# Patient Record
Sex: FEMALE | Race: BLACK OR AFRICAN AMERICAN | Employment: UNEMPLOYED
[De-identification: names, ages, dates, MRNs, and addresses within clinical notes are randomized per-mention and may not be internally consistent; named-entity substitution may affect disease eponyms.]

---

## 2017-01-03 DIAGNOSIS — I10 ESSENTIAL HYPERTENSION: ICD-10-CM

## 2017-01-03 RX ORDER — LOSARTAN POTASSIUM 25 MG/1
TABLET ORAL
Qty: 30 TABLET | Refills: 0 | Status: SHIPPED | OUTPATIENT
Start: 2017-01-03 | End: 2017-01-09

## 2017-01-20 DIAGNOSIS — R35.0 FREQUENT URINATION: ICD-10-CM

## 2017-01-25 ENCOUNTER — OFFICE VISIT (OUTPATIENT)
Dept: FAMILY MEDICINE CLINIC | Facility: CLINIC | Age: 57
End: 2017-01-25

## 2017-01-25 VITALS
DIASTOLIC BLOOD PRESSURE: 79 MMHG | SYSTOLIC BLOOD PRESSURE: 139 MMHG | HEART RATE: 66 BPM | HEIGHT: 62 IN | BODY MASS INDEX: 35.36 KG/M2 | WEIGHT: 192.13 LBS

## 2017-01-25 DIAGNOSIS — I10 ESSENTIAL HYPERTENSION: ICD-10-CM

## 2017-01-25 DIAGNOSIS — Z98.84 S/P GASTRIC BYPASS: ICD-10-CM

## 2017-01-25 DIAGNOSIS — E78.2 MIXED HYPERLIPIDEMIA: ICD-10-CM

## 2017-01-25 DIAGNOSIS — N18.30 CKD (CHRONIC KIDNEY DISEASE) STAGE 3, GFR 30-59 ML/MIN (HCC): ICD-10-CM

## 2017-01-25 DIAGNOSIS — E11.9 TYPE 2 DIABETES MELLITUS WITHOUT COMPLICATION, UNSPECIFIED LONG TERM INSULIN USE STATUS: Primary | ICD-10-CM

## 2017-01-25 DIAGNOSIS — E66.09 NON MORBID OBESITY DUE TO EXCESS CALORIES: ICD-10-CM

## 2017-01-25 DIAGNOSIS — M17.0 PRIMARY OSTEOARTHRITIS OF BOTH KNEES: ICD-10-CM

## 2017-01-25 LAB — HBA1C MFR BLD: 5.6 %

## 2017-01-25 PROCEDURE — 99214 OFFICE O/P EST MOD 30 MIN: CPT | Performed by: FAMILY MEDICINE

## 2017-01-25 PROCEDURE — 83036 HEMOGLOBIN GLYCOSYLATED A1C: CPT | Performed by: FAMILY MEDICINE

## 2017-01-25 RX ORDER — ACETAMINOPHEN 500 MG
500 TABLET ORAL EVERY 6 HOURS PRN
COMMUNITY
End: 2018-05-07 | Stop reason: ALTCHOICE

## 2017-01-25 RX ORDER — LANOLIN ALCOHOL/MO/W.PET/CERES
325 CREAM (GRAM) TOPICAL
COMMUNITY
End: 2018-05-07 | Stop reason: SDUPTHER

## 2017-01-25 ASSESSMENT — ENCOUNTER SYMPTOMS
ABDOMINAL PAIN: 0
CHEST TIGHTNESS: 0
CONSTIPATION: 0
SORE THROAT: 0
TROUBLE SWALLOWING: 0
DIARRHEA: 0
RHINORRHEA: 0
BACK PAIN: 1
COUGH: 0
SHORTNESS OF BREATH: 0
NAUSEA: 0

## 2017-02-17 DIAGNOSIS — I10 ESSENTIAL HYPERTENSION: ICD-10-CM

## 2017-02-21 RX ORDER — AMLODIPINE BESYLATE 10 MG/1
TABLET ORAL
Qty: 30 TABLET | Refills: 0 | Status: SHIPPED | OUTPATIENT
Start: 2017-02-21 | End: 2017-03-13 | Stop reason: SDUPTHER

## 2017-03-10 ENCOUNTER — HOSPITAL ENCOUNTER (OUTPATIENT)
Age: 57
Discharge: HOME | End: 2017-03-10
Payer: MEDICARE

## 2017-03-10 DIAGNOSIS — N18.4 CKD (CHRONIC KIDNEY DISEASE) STAGE 4, GFR 15-29 ML/MIN (HCC): ICD-10-CM

## 2017-03-10 DIAGNOSIS — I12.9 BENIGN HYPERTENSION WITH CKD (CHRONIC KIDNEY DISEASE) STAGE IV (HCC): ICD-10-CM

## 2017-03-10 DIAGNOSIS — E13.22 SECONDARY DIABETES MELLITUS WITH STAGE 4 CHRONIC KIDNEY DISEASE (GFR 15-29) (HCC): ICD-10-CM

## 2017-03-10 DIAGNOSIS — N20.0 NEPHROLITHIASIS: ICD-10-CM

## 2017-03-10 DIAGNOSIS — N18.4 SECONDARY DIABETES MELLITUS WITH STAGE 4 CHRONIC KIDNEY DISEASE (GFR 15-29) (HCC): ICD-10-CM

## 2017-03-10 DIAGNOSIS — N18.4 BENIGN HYPERTENSION WITH CKD (CHRONIC KIDNEY DISEASE) STAGE IV (HCC): ICD-10-CM

## 2017-03-10 DIAGNOSIS — N13.30 HYDRONEPHROSIS OF LEFT KIDNEY: ICD-10-CM

## 2017-03-10 DIAGNOSIS — N26.1 RENAL ATROPHY, RIGHT: ICD-10-CM

## 2017-03-10 LAB
ABSOLUTE EOS #: 0.2 K/UL (ref 0–0.4)
ABSOLUTE LYMPH #: 1.8 K/UL (ref 1–4.8)
ABSOLUTE MONO #: 0.5 K/UL (ref 0.1–1.3)
ANION GAP SERPL CALCULATED.3IONS-SCNC: 16 MMOL/L (ref 9–17)
BASOPHILS # BLD: 1 % (ref 0–2)
BASOPHILS ABSOLUTE: 0 K/UL (ref 0–0.2)
BUN BLDV-MCNC: 33 MG/DL (ref 6–20)
BUN/CREAT BLD: ABNORMAL (ref 9–20)
CALCIUM SERPL-MCNC: 9.2 MG/DL (ref 8.6–10.4)
CHLORIDE BLD-SCNC: 101 MMOL/L (ref 98–107)
CO2: 28 MMOL/L (ref 20–31)
CREAT SERPL-MCNC: 1.97 MG/DL (ref 0.5–0.9)
DIFFERENTIAL TYPE: ABNORMAL
EOSINOPHILS RELATIVE PERCENT: 4 % (ref 0–4)
GFR AFRICAN AMERICAN: 32 ML/MIN
GFR NON-AFRICAN AMERICAN: 26 ML/MIN
GFR SERPL CREATININE-BSD FRML MDRD: ABNORMAL ML/MIN/{1.73_M2}
GFR SERPL CREATININE-BSD FRML MDRD: ABNORMAL ML/MIN/{1.73_M2}
GLUCOSE BLD-MCNC: 103 MG/DL (ref 70–99)
HCT VFR BLD CALC: 36.3 % (ref 36–46)
HEMOGLOBIN: 11.9 G/DL (ref 12–16)
LYMPHOCYTES # BLD: 27 % (ref 24–44)
MAGNESIUM: 2.5 MG/DL (ref 1.6–2.6)
MCH RBC QN AUTO: 28.9 PG (ref 26–34)
MCHC RBC AUTO-ENTMCNC: 32.9 G/DL (ref 31–37)
MCV RBC AUTO: 87.8 FL (ref 80–100)
MONOCYTES # BLD: 7 % (ref 1–7)
PDW BLD-RTO: 14.8 % (ref 11.5–14.9)
PHOSPHORUS: 3.3 MG/DL (ref 2.6–4.5)
PLATELET # BLD: 353 K/UL (ref 150–450)
PLATELET ESTIMATE: ABNORMAL
PMV BLD AUTO: 7.8 FL (ref 6–12)
POTASSIUM SERPL-SCNC: 3.4 MMOL/L (ref 3.7–5.3)
RBC # BLD: 4.13 M/UL (ref 4–5.2)
RBC # BLD: ABNORMAL 10*6/UL
SEG NEUTROPHILS: 61 % (ref 36–66)
SEGMENTED NEUTROPHILS ABSOLUTE COUNT: 4.2 K/UL (ref 1.3–9.1)
SODIUM BLD-SCNC: 145 MMOL/L (ref 135–144)
VITAMIN D 25-HYDROXY: 28.7 NG/ML (ref 30–100)
WBC # BLD: 6.7 K/UL (ref 3.5–11)
WBC # BLD: ABNORMAL 10*3/UL

## 2017-03-10 PROCEDURE — 84100 ASSAY OF PHOSPHORUS: CPT

## 2017-03-10 PROCEDURE — 36415 COLL VENOUS BLD VENIPUNCTURE: CPT

## 2017-03-10 PROCEDURE — 80048 BASIC METABOLIC PNL TOTAL CA: CPT

## 2017-03-10 PROCEDURE — 82306 VITAMIN D 25 HYDROXY: CPT

## 2017-03-10 PROCEDURE — 85025 COMPLETE CBC W/AUTO DIFF WBC: CPT

## 2017-03-10 PROCEDURE — 83735 ASSAY OF MAGNESIUM: CPT

## 2017-03-22 DIAGNOSIS — I10 ESSENTIAL HYPERTENSION: ICD-10-CM

## 2017-03-23 RX ORDER — AMLODIPINE BESYLATE 10 MG/1
TABLET ORAL
Qty: 30 TABLET | Refills: 2 | Status: SHIPPED | OUTPATIENT
Start: 2017-03-23 | End: 2017-07-27 | Stop reason: SDUPTHER

## 2017-04-26 ENCOUNTER — TELEPHONE (OUTPATIENT)
Dept: FAMILY MEDICINE CLINIC | Age: 57
End: 2017-04-26

## 2017-04-27 ENCOUNTER — APPOINTMENT (OUTPATIENT)
Dept: GENERAL RADIOLOGY | Age: 57
End: 2017-04-27
Payer: MEDICARE

## 2017-04-27 ENCOUNTER — HOSPITAL ENCOUNTER (EMERGENCY)
Age: 57
Discharge: HOME | End: 2017-04-27
Attending: EMERGENCY MEDICINE
Payer: MEDICARE

## 2017-04-27 VITALS
HEART RATE: 61 BPM | DIASTOLIC BLOOD PRESSURE: 81 MMHG | HEIGHT: 63 IN | BODY MASS INDEX: 33.84 KG/M2 | RESPIRATION RATE: 16 BRPM | SYSTOLIC BLOOD PRESSURE: 149 MMHG | OXYGEN SATURATION: 99 % | TEMPERATURE: 98.3 F | WEIGHT: 191 LBS

## 2017-04-27 DIAGNOSIS — S92.912A CLOSED NONDISPLACED FRACTURE OF PHALANX OF TOE OF LEFT FOOT, UNSPECIFIED TOE, INITIAL ENCOUNTER: Primary | ICD-10-CM

## 2017-04-27 PROCEDURE — 6370000000 HC RX 637 (ALT 250 FOR IP): Performed by: EMERGENCY MEDICINE

## 2017-04-27 PROCEDURE — 99283 EMERGENCY DEPT VISIT LOW MDM: CPT

## 2017-04-27 PROCEDURE — 73630 X-RAY EXAM OF FOOT: CPT

## 2017-04-27 RX ORDER — ACETAMINOPHEN 500 MG
1000 TABLET ORAL ONCE
Status: COMPLETED | OUTPATIENT
Start: 2017-04-27 | End: 2017-04-27

## 2017-04-27 RX ORDER — NAPROXEN 250 MG/1
500 TABLET ORAL ONCE
Status: DISCONTINUED | OUTPATIENT
Start: 2017-04-27 | End: 2017-04-27

## 2017-04-27 RX ORDER — HYDROCODONE BITARTRATE AND ACETAMINOPHEN 5; 325 MG/1; MG/1
1 TABLET ORAL EVERY 6 HOURS PRN
Qty: 10 TABLET | Refills: 0 | Status: SHIPPED | OUTPATIENT
Start: 2017-04-27 | End: 2017-05-04

## 2017-04-27 RX ADMIN — ACETAMINOPHEN 1000 MG: 500 TABLET, FILM COATED ORAL at 22:41

## 2017-04-27 ASSESSMENT — ENCOUNTER SYMPTOMS
VOMITING: 0
SHORTNESS OF BREATH: 0
NAUSEA: 0
RHINORRHEA: 0
TROUBLE SWALLOWING: 0
EYE PAIN: 0
ABDOMINAL PAIN: 0
EYE DISCHARGE: 0
PHOTOPHOBIA: 0
DIARRHEA: 0
COUGH: 0
SORE THROAT: 0

## 2017-04-27 ASSESSMENT — PAIN DESCRIPTION - LOCATION: LOCATION: FOOT

## 2017-04-27 ASSESSMENT — PAIN DESCRIPTION - FREQUENCY: FREQUENCY: CONTINUOUS

## 2017-04-27 ASSESSMENT — PAIN DESCRIPTION - ORIENTATION: ORIENTATION: LEFT

## 2017-04-27 ASSESSMENT — PAIN DESCRIPTION - PAIN TYPE: TYPE: ACUTE PAIN

## 2017-04-27 ASSESSMENT — PAIN DESCRIPTION - PROGRESSION: CLINICAL_PROGRESSION: NOT CHANGED

## 2017-04-27 ASSESSMENT — PAIN DESCRIPTION - ONSET: ONSET: SUDDEN

## 2017-04-27 ASSESSMENT — PAIN SCALES - GENERAL: PAINLEVEL_OUTOF10: 6

## 2017-04-27 ASSESSMENT — PAIN DESCRIPTION - DESCRIPTORS: DESCRIPTORS: ACHING;THROBBING

## 2017-05-11 ENCOUNTER — OFFICE VISIT (OUTPATIENT)
Dept: FAMILY MEDICINE CLINIC | Age: 57
End: 2017-05-11
Payer: MEDICARE

## 2017-05-11 VITALS
WEIGHT: 193.6 LBS | HEART RATE: 64 BPM | HEIGHT: 63 IN | SYSTOLIC BLOOD PRESSURE: 130 MMHG | DIASTOLIC BLOOD PRESSURE: 80 MMHG | BODY MASS INDEX: 34.3 KG/M2

## 2017-05-11 DIAGNOSIS — S92.502A FRACTURE OF FOURTH TOE, LEFT, CLOSED, INITIAL ENCOUNTER: Primary | ICD-10-CM

## 2017-05-11 DIAGNOSIS — Z98.84 S/P GASTRIC BYPASS: ICD-10-CM

## 2017-05-11 DIAGNOSIS — E11.9 DIABETES MELLITUS TYPE 2, NONINSULIN DEPENDENT (HCC): ICD-10-CM

## 2017-05-11 DIAGNOSIS — R26.81 UNSTABLE GAIT: ICD-10-CM

## 2017-05-11 DIAGNOSIS — I10 ESSENTIAL HYPERTENSION: ICD-10-CM

## 2017-05-11 DIAGNOSIS — E66.09 NON MORBID OBESITY DUE TO EXCESS CALORIES: ICD-10-CM

## 2017-05-11 DIAGNOSIS — E78.2 MIXED HYPERLIPIDEMIA: ICD-10-CM

## 2017-05-11 DIAGNOSIS — N18.30 CKD (CHRONIC KIDNEY DISEASE) STAGE 3, GFR 30-59 ML/MIN (HCC): ICD-10-CM

## 2017-05-11 PROCEDURE — 99213 OFFICE O/P EST LOW 20 MIN: CPT | Performed by: FAMILY MEDICINE

## 2017-05-11 RX ORDER — MEDICAL SUPPLY, MISCELLANEOUS
EACH MISCELLANEOUS
Qty: 1 EACH | Refills: 0 | Status: SHIPPED | OUTPATIENT
Start: 2017-05-11 | End: 2018-04-16 | Stop reason: SDUPTHER

## 2017-05-11 ASSESSMENT — ENCOUNTER SYMPTOMS
RHINORRHEA: 0
DIARRHEA: 0
CHEST TIGHTNESS: 0
COUGH: 0
TROUBLE SWALLOWING: 0
SORE THROAT: 0
NAUSEA: 0
SHORTNESS OF BREATH: 0
BACK PAIN: 0
CONSTIPATION: 0
ABDOMINAL PAIN: 0

## 2017-05-11 ASSESSMENT — PATIENT HEALTH QUESTIONNAIRE - PHQ9
SUM OF ALL RESPONSES TO PHQ9 QUESTIONS 1 & 2: 0
SUM OF ALL RESPONSES TO PHQ QUESTIONS 1-9: 0
1. LITTLE INTEREST OR PLEASURE IN DOING THINGS: 0
2. FEELING DOWN, DEPRESSED OR HOPELESS: 0

## 2017-06-05 ENCOUNTER — HOSPITAL ENCOUNTER (OUTPATIENT)
Age: 57
Discharge: HOME | End: 2017-06-05
Payer: MEDICARE

## 2017-06-05 DIAGNOSIS — E55.9 VITAMIN D DEFICIENCY: ICD-10-CM

## 2017-06-05 DIAGNOSIS — N18.30 CKD (CHRONIC KIDNEY DISEASE) STAGE 3, GFR 30-59 ML/MIN (HCC): ICD-10-CM

## 2017-06-05 DIAGNOSIS — E13.22 SECONDARY DIABETES MELLITUS WITH STAGE 3 CHRONIC KIDNEY DISEASE (GFR 30-59) (HCC): ICD-10-CM

## 2017-06-05 DIAGNOSIS — E87.6 HYPOKALEMIA: ICD-10-CM

## 2017-06-05 DIAGNOSIS — N18.30 SECONDARY DIABETES MELLITUS WITH STAGE 3 CHRONIC KIDNEY DISEASE (GFR 30-59) (HCC): ICD-10-CM

## 2017-06-05 DIAGNOSIS — I12.9 BENIGN HYPERTENSION WITH CKD (CHRONIC KIDNEY DISEASE) STAGE III (HCC): ICD-10-CM

## 2017-06-05 DIAGNOSIS — N18.30 BENIGN HYPERTENSION WITH CKD (CHRONIC KIDNEY DISEASE) STAGE III (HCC): ICD-10-CM

## 2017-06-05 LAB
ABSOLUTE EOS #: 0.2 K/UL (ref 0–0.4)
ABSOLUTE LYMPH #: 2 K/UL (ref 1–4.8)
ABSOLUTE MONO #: 0.5 K/UL (ref 0.1–1.3)
ANION GAP SERPL CALCULATED.3IONS-SCNC: 12 MMOL/L (ref 9–17)
BASOPHILS # BLD: 1 %
BASOPHILS ABSOLUTE: 0 K/UL (ref 0–0.2)
BUN BLDV-MCNC: 39 MG/DL (ref 6–20)
BUN/CREAT BLD: ABNORMAL (ref 9–20)
CALCIUM SERPL-MCNC: 9.3 MG/DL (ref 8.6–10.4)
CHLORIDE BLD-SCNC: 101 MMOL/L (ref 98–107)
CO2: 29 MMOL/L (ref 20–31)
CREAT SERPL-MCNC: 1.82 MG/DL (ref 0.5–0.9)
CREATININE URINE: 67.3 MG/DL (ref 28–217)
DIFFERENTIAL TYPE: ABNORMAL
EOSINOPHILS RELATIVE PERCENT: 3 %
GFR AFRICAN AMERICAN: 35 ML/MIN
GFR NON-AFRICAN AMERICAN: 29 ML/MIN
GFR SERPL CREATININE-BSD FRML MDRD: ABNORMAL ML/MIN/{1.73_M2}
GFR SERPL CREATININE-BSD FRML MDRD: ABNORMAL ML/MIN/{1.73_M2}
GLUCOSE BLD-MCNC: 92 MG/DL (ref 70–99)
HCT VFR BLD CALC: 36.5 % (ref 36–46)
HEMOGLOBIN: 11.8 G/DL (ref 12–16)
LYMPHOCYTES # BLD: 28 %
MAGNESIUM: 2.1 MG/DL (ref 1.6–2.6)
MCH RBC QN AUTO: 28.5 PG (ref 26–34)
MCHC RBC AUTO-ENTMCNC: 32.4 G/DL (ref 31–37)
MCV RBC AUTO: 87.9 FL (ref 80–100)
MONOCYTES # BLD: 7 %
PDW BLD-RTO: 15.2 % (ref 11.5–14.9)
PHOSPHORUS: 3.5 MG/DL (ref 2.6–4.5)
PLATELET # BLD: 318 K/UL (ref 150–450)
PLATELET ESTIMATE: ABNORMAL
PMV BLD AUTO: 7.9 FL (ref 6–12)
POTASSIUM SERPL-SCNC: 3.3 MMOL/L (ref 3.7–5.3)
RBC # BLD: 4.15 M/UL (ref 4–5.2)
RBC # BLD: ABNORMAL 10*6/UL
SEG NEUTROPHILS: 61 %
SEGMENTED NEUTROPHILS ABSOLUTE COUNT: 4.2 K/UL (ref 1.3–9.1)
SODIUM BLD-SCNC: 142 MMOL/L (ref 135–144)
TOTAL PROTEIN, URINE: 21 MG/DL
URINE TOTAL PROTEIN CREATININE RATIO: 0.31 (ref 0–0.2)
VITAMIN D 25-HYDROXY: 45.7 NG/ML (ref 30–100)
WBC # BLD: 7 K/UL (ref 3.5–11)
WBC # BLD: ABNORMAL 10*3/UL

## 2017-06-05 PROCEDURE — 36415 COLL VENOUS BLD VENIPUNCTURE: CPT

## 2017-06-05 PROCEDURE — 82306 VITAMIN D 25 HYDROXY: CPT

## 2017-06-05 PROCEDURE — 84156 ASSAY OF PROTEIN URINE: CPT

## 2017-06-05 PROCEDURE — 84100 ASSAY OF PHOSPHORUS: CPT

## 2017-06-05 PROCEDURE — 82570 ASSAY OF URINE CREATININE: CPT

## 2017-06-05 PROCEDURE — 85025 COMPLETE CBC W/AUTO DIFF WBC: CPT

## 2017-06-05 PROCEDURE — 80048 BASIC METABOLIC PNL TOTAL CA: CPT

## 2017-06-05 PROCEDURE — 83735 ASSAY OF MAGNESIUM: CPT

## 2017-09-14 ENCOUNTER — OFFICE VISIT (OUTPATIENT)
Dept: FAMILY MEDICINE CLINIC | Age: 57
End: 2017-09-14
Payer: MEDICARE

## 2017-09-14 VITALS
BODY MASS INDEX: 36.4 KG/M2 | HEART RATE: 52 BPM | WEIGHT: 197.8 LBS | SYSTOLIC BLOOD PRESSURE: 156 MMHG | HEIGHT: 62 IN | DIASTOLIC BLOOD PRESSURE: 86 MMHG

## 2017-09-14 DIAGNOSIS — E78.2 MIXED HYPERLIPIDEMIA: ICD-10-CM

## 2017-09-14 DIAGNOSIS — E11.9 DIABETES MELLITUS TYPE 2, NONINSULIN DEPENDENT (HCC): ICD-10-CM

## 2017-09-14 DIAGNOSIS — E66.09 NON MORBID OBESITY DUE TO EXCESS CALORIES: ICD-10-CM

## 2017-09-14 DIAGNOSIS — N18.30 CKD (CHRONIC KIDNEY DISEASE) STAGE 3, GFR 30-59 ML/MIN (HCC): ICD-10-CM

## 2017-09-14 DIAGNOSIS — T75.3XXA MOTION SICKNESS, INITIAL ENCOUNTER: ICD-10-CM

## 2017-09-14 DIAGNOSIS — K59.01 SLOW TRANSIT CONSTIPATION: ICD-10-CM

## 2017-09-14 DIAGNOSIS — E79.0 HYPERURICEMIA: ICD-10-CM

## 2017-09-14 DIAGNOSIS — I10 ESSENTIAL HYPERTENSION: Primary | ICD-10-CM

## 2017-09-14 DIAGNOSIS — Z98.84 S/P GASTRIC BYPASS: ICD-10-CM

## 2017-09-14 PROBLEM — N28.9 RENAL INSUFFICIENCY: Status: ACTIVE | Noted: 2017-09-14

## 2017-09-14 PROCEDURE — 99214 OFFICE O/P EST MOD 30 MIN: CPT | Performed by: FAMILY MEDICINE

## 2017-09-14 RX ORDER — LANOLIN ALCOHOL/MO/W.PET/CERES
CREAM (GRAM) TOPICAL
COMMUNITY
Start: 2017-08-23 | End: 2017-09-14 | Stop reason: SDUPTHER

## 2017-09-14 RX ORDER — FERROUS SULFATE 325(65) MG
TABLET ORAL
COMMUNITY
Start: 2017-07-28 | End: 2017-09-14 | Stop reason: SDUPTHER

## 2017-09-14 RX ORDER — MECLIZINE HYDROCHLORIDE CHEWABLE TABLETS 25 MG/1
25 TABLET, CHEWABLE ORAL 2 TIMES DAILY PRN
Qty: 30 TABLET | Refills: 0 | Status: SHIPPED | OUTPATIENT
Start: 2017-09-14 | End: 2018-05-07 | Stop reason: ALTCHOICE

## 2017-09-14 RX ORDER — DOCUSATE SODIUM 100 MG/1
100 CAPSULE, LIQUID FILLED ORAL 2 TIMES DAILY
Qty: 30 CAPSULE | Refills: 0 | Status: SHIPPED | OUTPATIENT
Start: 2017-09-14 | End: 2018-05-07 | Stop reason: ALTCHOICE

## 2017-09-14 RX ORDER — ALLOPURINOL 100 MG/1
100 TABLET ORAL DAILY
Qty: 30 TABLET | Refills: 3 | Status: SHIPPED | OUTPATIENT
Start: 2017-09-14 | End: 2018-01-17 | Stop reason: SDUPTHER

## 2017-09-21 ASSESSMENT — ENCOUNTER SYMPTOMS
RHINORRHEA: 0
SHORTNESS OF BREATH: 0
BACK PAIN: 1
SORE THROAT: 0
CONSTIPATION: 0
ABDOMINAL PAIN: 0
CHEST TIGHTNESS: 0
DIARRHEA: 0
COUGH: 0
TROUBLE SWALLOWING: 0
NAUSEA: 0

## 2018-01-17 DIAGNOSIS — E79.0 HYPERURICEMIA: ICD-10-CM

## 2018-01-17 RX ORDER — ALLOPURINOL 100 MG/1
TABLET ORAL
Qty: 30 TABLET | Refills: 0 | Status: SHIPPED | OUTPATIENT
Start: 2018-01-17 | End: 2018-02-09 | Stop reason: SDUPTHER

## 2018-02-09 DIAGNOSIS — E79.0 HYPERURICEMIA: ICD-10-CM

## 2018-02-09 RX ORDER — ALLOPURINOL 100 MG/1
TABLET ORAL
Qty: 30 TABLET | Refills: 1 | Status: SHIPPED | OUTPATIENT
Start: 2018-02-09 | End: 2018-02-26 | Stop reason: SDUPTHER

## 2018-02-09 NOTE — TELEPHONE ENCOUNTER
Health Maintenance   Topic Date Due    Colon cancer screen colonoscopy  02/03/2011    Smoker: low dose lung CT screening  02/03/2016    Diabetic retinal exam  03/21/2016    Diabetic foot exam  07/14/2016    Cervical cancer screen  02/18/2017    Lipid screen  08/01/2017    Flu vaccine (1) 09/01/2017    Breast cancer screen  11/09/2017    A1C test (Diabetic or Prediabetic)  01/25/2018    DTaP/Tdap/Td vaccine (1 - Tdap) 05/11/2018 (Originally 2/3/1980)    Pneumococcal highest risk (1 of 3 - PCV13) 05/11/2018 (Originally 2/3/1980)    Potassium monitoring  06/05/2018    Creatinine monitoring  06/05/2018    Hepatitis C screen  Completed    HIV screen  Completed             (applicable per patient's age: Cancer Screenings, Depression Screening, Fall Risk Screening, Immunizations)    Hemoglobin A1C (%)   Date Value   01/25/2017 5.6   05/17/2016 5.6   04/29/2015 5.6     Microalb/Crt. Ratio (mcg/mg creat)   Date Value   02/09/2013 664     LDL Cholesterol (mg/dL)   Date Value   02/09/2013 202 (H)     LDL Calculated (mg/dL)   Date Value   08/01/2016 116     AST (U/L)   Date Value   05/04/2016 17     ALT (U/L)   Date Value   05/04/2016 8     BUN (mg/dL)   Date Value   06/05/2017 39 (H)      (goal A1C is < 7)   (goal LDL is <100) need 30-50% reduction from baseline     BP Readings from Last 3 Encounters:   09/14/17 (!) 156/86   05/11/17 130/80   04/27/17 (!) 149/81    (goal /80)      All Future Testing planned in [facility]:  Lab Frequency Next Occurrence   Potassium Once 58/52/4478   Basic Metabolic Panel Once 29/10/0192   CBC Auto Differential Once 09/12/2017   Magnesium Once 09/12/2017   Phosphorus Once 09/12/2017       Next Visit Date:  No future appointments.          Patient Active Problem List:     Hypertension     Diabetes mellitus type 2, noninsulin dependent     Hyperlipidemia     OA (osteoarthritis) of knee,bilateral     Nephrolithiasis     Microalbuminuria     Smoker Cessation     Obesity, morbid

## 2018-03-21 ENCOUNTER — APPOINTMENT (OUTPATIENT)
Dept: CT IMAGING | Age: 58
End: 2018-03-21
Payer: MEDICARE

## 2018-03-21 ENCOUNTER — APPOINTMENT (OUTPATIENT)
Dept: GENERAL RADIOLOGY | Age: 58
End: 2018-03-21
Payer: MEDICARE

## 2018-03-21 ENCOUNTER — APPOINTMENT (OUTPATIENT)
Dept: MRI IMAGING | Age: 58
End: 2018-03-21
Payer: MEDICARE

## 2018-03-21 ENCOUNTER — HOSPITAL ENCOUNTER (OUTPATIENT)
Age: 58
Setting detail: OBSERVATION
Discharge: ANOTHER HOSPITAL | End: 2018-03-22
Attending: EMERGENCY MEDICINE | Admitting: INTERNAL MEDICINE
Payer: MEDICARE

## 2018-03-21 DIAGNOSIS — R07.9 CHEST PAIN, UNSPECIFIED TYPE: Primary | ICD-10-CM

## 2018-03-21 LAB
ABSOLUTE EOS #: 0.2 K/UL (ref 0–0.4)
ABSOLUTE IMMATURE GRANULOCYTE: ABNORMAL K/UL (ref 0–0.3)
ABSOLUTE LYMPH #: 1.5 K/UL (ref 1–4.8)
ABSOLUTE MONO #: 0.5 K/UL (ref 0.1–1.3)
ANION GAP SERPL CALCULATED.3IONS-SCNC: 9 MMOL/L (ref 9–17)
BASOPHILS # BLD: 1 % (ref 0–2)
BASOPHILS ABSOLUTE: 0.1 K/UL (ref 0–0.2)
BNP INTERPRETATION: ABNORMAL
BUN BLDV-MCNC: 33 MG/DL (ref 6–20)
BUN/CREAT BLD: ABNORMAL (ref 9–20)
CALCIUM SERPL-MCNC: 8.5 MG/DL (ref 8.6–10.4)
CHLORIDE BLD-SCNC: 104 MMOL/L (ref 98–107)
CO2: 31 MMOL/L (ref 20–31)
CREAT SERPL-MCNC: 1.81 MG/DL (ref 0.5–0.9)
DIFFERENTIAL TYPE: ABNORMAL
EKG ATRIAL RATE: 47 BPM
EKG P AXIS: 53 DEGREES
EKG P-R INTERVAL: 142 MS
EKG Q-T INTERVAL: 486 MS
EKG QRS DURATION: 86 MS
EKG QTC CALCULATION (BAZETT): 430 MS
EKG R AXIS: -7 DEGREES
EKG T AXIS: -55 DEGREES
EKG VENTRICULAR RATE: 47 BPM
EOSINOPHILS RELATIVE PERCENT: 4 % (ref 0–4)
GFR AFRICAN AMERICAN: 35 ML/MIN
GFR NON-AFRICAN AMERICAN: 29 ML/MIN
GFR SERPL CREATININE-BSD FRML MDRD: ABNORMAL ML/MIN/{1.73_M2}
GFR SERPL CREATININE-BSD FRML MDRD: ABNORMAL ML/MIN/{1.73_M2}
GLUCOSE BLD-MCNC: 121 MG/DL (ref 70–99)
HCT VFR BLD CALC: 32.4 % (ref 36–46)
HEMOGLOBIN: 10.7 G/DL (ref 12–16)
IMMATURE GRANULOCYTES: ABNORMAL %
LACTIC ACID, WHOLE BLOOD: NORMAL MMOL/L (ref 0.7–2.1)
LACTIC ACID: 1.8 MMOL/L (ref 0.5–2.2)
LYMPHOCYTES # BLD: 23 % (ref 24–44)
MCH RBC QN AUTO: 30.1 PG (ref 26–34)
MCHC RBC AUTO-ENTMCNC: 33.1 G/DL (ref 31–37)
MCV RBC AUTO: 90.8 FL (ref 80–100)
MONOCYTES # BLD: 7 % (ref 1–7)
NRBC AUTOMATED: ABNORMAL PER 100 WBC
PDW BLD-RTO: 16.3 % (ref 11.5–14.9)
PLATELET # BLD: 275 K/UL (ref 150–450)
PLATELET ESTIMATE: ABNORMAL
PMV BLD AUTO: 8.5 FL (ref 6–12)
POTASSIUM SERPL-SCNC: 3.7 MMOL/L (ref 3.7–5.3)
PRO-BNP: 3041 PG/ML
RBC # BLD: 3.57 M/UL (ref 4–5.2)
RBC # BLD: ABNORMAL 10*6/UL
SEG NEUTROPHILS: 65 % (ref 36–66)
SEGMENTED NEUTROPHILS ABSOLUTE COUNT: 4.3 K/UL (ref 1.3–9.1)
SODIUM BLD-SCNC: 144 MMOL/L (ref 135–144)
TROPONIN INTERP: NORMAL
TROPONIN T: <0.03 NG/ML
WBC # BLD: 6.6 K/UL (ref 3.5–11)
WBC # BLD: ABNORMAL 10*3/UL

## 2018-03-21 PROCEDURE — 2580000003 HC RX 258: Performed by: EMERGENCY MEDICINE

## 2018-03-21 PROCEDURE — G0378 HOSPITAL OBSERVATION PER HR: HCPCS

## 2018-03-21 PROCEDURE — 6360000002 HC RX W HCPCS: Performed by: STUDENT IN AN ORGANIZED HEALTH CARE EDUCATION/TRAINING PROGRAM

## 2018-03-21 PROCEDURE — 80048 BASIC METABOLIC PNL TOTAL CA: CPT

## 2018-03-21 PROCEDURE — 6360000002 HC RX W HCPCS: Performed by: EMERGENCY MEDICINE

## 2018-03-21 PROCEDURE — 83605 ASSAY OF LACTIC ACID: CPT

## 2018-03-21 PROCEDURE — 6360000002 HC RX W HCPCS: Performed by: NURSE PRACTITIONER

## 2018-03-21 PROCEDURE — 6370000000 HC RX 637 (ALT 250 FOR IP): Performed by: EMERGENCY MEDICINE

## 2018-03-21 PROCEDURE — S0028 INJECTION, FAMOTIDINE, 20 MG: HCPCS | Performed by: STUDENT IN AN ORGANIZED HEALTH CARE EDUCATION/TRAINING PROGRAM

## 2018-03-21 PROCEDURE — 71250 CT THORAX DX C-: CPT

## 2018-03-21 PROCEDURE — 71045 X-RAY EXAM CHEST 1 VIEW: CPT

## 2018-03-21 PROCEDURE — 96372 THER/PROPH/DIAG INJ SC/IM: CPT

## 2018-03-21 PROCEDURE — 94760 N-INVAS EAR/PLS OXIMETRY 1: CPT

## 2018-03-21 PROCEDURE — 36415 COLL VENOUS BLD VENIPUNCTURE: CPT

## 2018-03-21 PROCEDURE — 84484 ASSAY OF TROPONIN QUANT: CPT

## 2018-03-21 PROCEDURE — 94664 DEMO&/EVAL PT USE INHALER: CPT

## 2018-03-21 PROCEDURE — 99285 EMERGENCY DEPT VISIT HI MDM: CPT

## 2018-03-21 PROCEDURE — 96376 TX/PRO/DX INJ SAME DRUG ADON: CPT

## 2018-03-21 PROCEDURE — 93005 ELECTROCARDIOGRAM TRACING: CPT

## 2018-03-21 PROCEDURE — 96375 TX/PRO/DX INJ NEW DRUG ADDON: CPT

## 2018-03-21 PROCEDURE — 85025 COMPLETE CBC W/AUTO DIFF WBC: CPT

## 2018-03-21 PROCEDURE — 96374 THER/PROPH/DIAG INJ IV PUSH: CPT

## 2018-03-21 PROCEDURE — 2500000003 HC RX 250 WO HCPCS: Performed by: STUDENT IN AN ORGANIZED HEALTH CARE EDUCATION/TRAINING PROGRAM

## 2018-03-21 PROCEDURE — 2580000003 HC RX 258: Performed by: STUDENT IN AN ORGANIZED HEALTH CARE EDUCATION/TRAINING PROGRAM

## 2018-03-21 PROCEDURE — 83880 ASSAY OF NATRIURETIC PEPTIDE: CPT

## 2018-03-21 RX ORDER — ACETAMINOPHEN 325 MG/1
650 TABLET ORAL EVERY 4 HOURS PRN
Status: DISCONTINUED | OUTPATIENT
Start: 2018-03-21 | End: 2018-03-22 | Stop reason: HOSPADM

## 2018-03-21 RX ORDER — POTASSIUM CHLORIDE 20 MEQ/1
40 TABLET, EXTENDED RELEASE ORAL PRN
Status: DISCONTINUED | OUTPATIENT
Start: 2018-03-21 | End: 2018-03-22 | Stop reason: HOSPADM

## 2018-03-21 RX ORDER — POTASSIUM CHLORIDE 7.45 MG/ML
10 INJECTION INTRAVENOUS PRN
Status: DISCONTINUED | OUTPATIENT
Start: 2018-03-21 | End: 2018-03-22 | Stop reason: HOSPADM

## 2018-03-21 RX ORDER — CYANOCOBALAMIN (VITAMIN B-12) 1000 MCG
2 TABLET, EXTENDED RELEASE ORAL 2 TIMES DAILY WITH MEALS
COMMUNITY
End: 2018-05-07 | Stop reason: ALTCHOICE

## 2018-03-21 RX ORDER — CYANOCOBALAMIN (VITAMIN B-12) 1000 MCG
1000 TABLET, EXTENDED RELEASE ORAL DAILY
COMMUNITY
End: 2018-05-07 | Stop reason: SDUPTHER

## 2018-03-21 RX ORDER — FERROUS SULFATE 325(65) MG
325 TABLET ORAL
Status: DISCONTINUED | OUTPATIENT
Start: 2018-03-22 | End: 2018-03-22 | Stop reason: HOSPADM

## 2018-03-21 RX ORDER — MORPHINE SULFATE 2 MG/ML
2 INJECTION, SOLUTION INTRAMUSCULAR; INTRAVENOUS
Status: DISCONTINUED | OUTPATIENT
Start: 2018-03-21 | End: 2018-03-22 | Stop reason: HOSPADM

## 2018-03-21 RX ORDER — ONDANSETRON 2 MG/ML
4 INJECTION INTRAMUSCULAR; INTRAVENOUS EVERY 6 HOURS PRN
Status: DISCONTINUED | OUTPATIENT
Start: 2018-03-21 | End: 2018-03-22 | Stop reason: HOSPADM

## 2018-03-21 RX ORDER — SODIUM CHLORIDE 0.9 % (FLUSH) 0.9 %
10 SYRINGE (ML) INJECTION PRN
Status: DISCONTINUED | OUTPATIENT
Start: 2018-03-21 | End: 2018-03-22 | Stop reason: HOSPADM

## 2018-03-21 RX ORDER — POTASSIUM CHLORIDE 1.5 G/1.77G
20 POWDER, FOR SOLUTION ORAL DAILY
Status: DISCONTINUED | OUTPATIENT
Start: 2018-03-22 | End: 2018-03-22 | Stop reason: HOSPADM

## 2018-03-21 RX ORDER — ALBUTEROL SULFATE 90 UG/1
2 AEROSOL, METERED RESPIRATORY (INHALATION) EVERY 6 HOURS PRN
Status: DISCONTINUED | OUTPATIENT
Start: 2018-03-21 | End: 2018-03-22 | Stop reason: HOSPADM

## 2018-03-21 RX ORDER — MORPHINE SULFATE 2 MG/ML
4 INJECTION, SOLUTION INTRAMUSCULAR; INTRAVENOUS
Status: DISCONTINUED | OUTPATIENT
Start: 2018-03-21 | End: 2018-03-22 | Stop reason: HOSPADM

## 2018-03-21 RX ORDER — SODIUM CHLORIDE 0.9 % (FLUSH) 0.9 %
10 SYRINGE (ML) INJECTION EVERY 12 HOURS SCHEDULED
Status: DISCONTINUED | OUTPATIENT
Start: 2018-03-21 | End: 2018-03-22 | Stop reason: HOSPADM

## 2018-03-21 RX ORDER — SODIUM CHLORIDE 9 MG/ML
INJECTION, SOLUTION INTRAVENOUS CONTINUOUS
Status: DISCONTINUED | OUTPATIENT
Start: 2018-03-21 | End: 2018-03-22 | Stop reason: HOSPADM

## 2018-03-21 RX ORDER — HYDRALAZINE HYDROCHLORIDE 20 MG/ML
20 INJECTION INTRAMUSCULAR; INTRAVENOUS EVERY 4 HOURS PRN
Status: DISCONTINUED | OUTPATIENT
Start: 2018-03-21 | End: 2018-03-22 | Stop reason: HOSPADM

## 2018-03-21 RX ORDER — 0.9 % SODIUM CHLORIDE 0.9 %
1000 INTRAVENOUS SOLUTION INTRAVENOUS ONCE
Status: COMPLETED | OUTPATIENT
Start: 2018-03-21 | End: 2018-03-21

## 2018-03-21 RX ORDER — MAGNESIUM SULFATE 1 G/100ML
1 INJECTION INTRAVENOUS PRN
Status: DISCONTINUED | OUTPATIENT
Start: 2018-03-21 | End: 2018-03-22 | Stop reason: HOSPADM

## 2018-03-21 RX ORDER — MAGNESIUM HYDROXIDE/ALUMINUM HYDROXICE/SIMETHICONE 120; 1200; 1200 MG/30ML; MG/30ML; MG/30ML
20 SUSPENSION ORAL ONCE
Status: COMPLETED | OUTPATIENT
Start: 2018-03-21 | End: 2018-03-21

## 2018-03-21 RX ORDER — POTASSIUM CHLORIDE 20MEQ/15ML
40 LIQUID (ML) ORAL PRN
Status: DISCONTINUED | OUTPATIENT
Start: 2018-03-21 | End: 2018-03-22 | Stop reason: HOSPADM

## 2018-03-21 RX ORDER — AMLODIPINE BESYLATE 10 MG/1
10 TABLET ORAL DAILY
Status: DISCONTINUED | OUTPATIENT
Start: 2018-03-22 | End: 2018-03-22 | Stop reason: HOSPADM

## 2018-03-21 RX ORDER — MORPHINE SULFATE 2 MG/ML
4 INJECTION, SOLUTION INTRAMUSCULAR; INTRAVENOUS ONCE
Status: COMPLETED | OUTPATIENT
Start: 2018-03-21 | End: 2018-03-21

## 2018-03-21 RX ORDER — HEPARIN SODIUM 5000 [USP'U]/ML
5000 INJECTION, SOLUTION INTRAVENOUS; SUBCUTANEOUS EVERY 8 HOURS SCHEDULED
Status: DISCONTINUED | OUTPATIENT
Start: 2018-03-21 | End: 2018-03-22 | Stop reason: HOSPADM

## 2018-03-21 RX ADMIN — MORPHINE SULFATE 2 MG: 2 INJECTION, SOLUTION INTRAMUSCULAR; INTRAVENOUS at 21:28

## 2018-03-21 RX ADMIN — MORPHINE SULFATE 4 MG: 2 INJECTION, SOLUTION INTRAMUSCULAR; INTRAVENOUS at 17:37

## 2018-03-21 RX ADMIN — SODIUM CHLORIDE 1000 ML: 9 INJECTION, SOLUTION INTRAVENOUS at 16:18

## 2018-03-21 RX ADMIN — HEPARIN SODIUM 5000 UNITS: 5000 INJECTION, SOLUTION INTRAVENOUS; SUBCUTANEOUS at 23:38

## 2018-03-21 RX ADMIN — SODIUM CHLORIDE: 9 INJECTION, SOLUTION INTRAVENOUS at 23:57

## 2018-03-21 RX ADMIN — HYDRALAZINE HYDROCHLORIDE 20 MG: 20 INJECTION INTRAMUSCULAR; INTRAVENOUS at 23:56

## 2018-03-21 RX ADMIN — Medication 10 ML: at 23:42

## 2018-03-21 RX ADMIN — ALUMINUM HYDROXIDE, MAGNESIUM HYDROXIDE, AND SIMETHICONE 20 ML: 200; 200; 20 SUSPENSION ORAL at 17:40

## 2018-03-21 RX ADMIN — FAMOTIDINE 20 MG: 10 INJECTION, SOLUTION INTRAVENOUS at 23:39

## 2018-03-21 ASSESSMENT — ENCOUNTER SYMPTOMS
ABDOMINAL PAIN: 0
BLURRED VISION: 0
BACK PAIN: 0
CHEST TIGHTNESS: 0
DOUBLE VISION: 0
EYE REDNESS: 0
BLOOD IN STOOL: 0
TROUBLE SWALLOWING: 0
WHEEZING: 0
PHOTOPHOBIA: 0
VOMITING: 0
ORTHOPNEA: 1
DIARRHEA: 0
BACK PAIN: 1
COUGH: 0
SORE THROAT: 0
SHORTNESS OF BREATH: 1
NAUSEA: 0

## 2018-03-21 ASSESSMENT — PAIN DESCRIPTION - PAIN TYPE
TYPE: ACUTE PAIN

## 2018-03-21 ASSESSMENT — PAIN DESCRIPTION - PROGRESSION: CLINICAL_PROGRESSION: GRADUALLY IMPROVING

## 2018-03-21 ASSESSMENT — PAIN DESCRIPTION - LOCATION
LOCATION: CHEST
LOCATION: CHEST
LOCATION: CHEST;BACK

## 2018-03-21 ASSESSMENT — PAIN SCALES - GENERAL
PAINLEVEL_OUTOF10: 6
PAINLEVEL_OUTOF10: 7
PAINLEVEL_OUTOF10: 7
PAINLEVEL_OUTOF10: 8
PAINLEVEL_OUTOF10: 5

## 2018-03-21 ASSESSMENT — PAIN DESCRIPTION - ORIENTATION: ORIENTATION: MID

## 2018-03-21 ASSESSMENT — PAIN DESCRIPTION - DESCRIPTORS: DESCRIPTORS: CONSTANT

## 2018-03-21 NOTE — FLOWSHEET NOTE
03/21/18 4995   Encounter Summary   Services provided to: Patient   Referral/Consult From: [facility]  (Daughter present and talking on phone)   Continue Visiting (3/21/18)   Complexity of Encounter Low   Length of Encounter 15 minutes   Spiritual Assessment Completed Yes   Routine   Type Initial   Assessment Approachable; Anxious  (complained of chest pain)   Intervention Active listening;Explored feelings, thoughts, concerns;Prayer;Sustaining presence/ Ministry of presence; Discussed illness/injury and it's impact   Outcome Acceptance;Expressed feelings/needs/concerns

## 2018-03-21 NOTE — ED PROVIDER NOTES
[facility]     Emergency Department     Faculty Attestation    I performed a history and physical examination of the patient and discussed management with the resident. I reviewed the residents note and agree with the documented findings and plan of care. Any areas of disagreement are noted on the chart. I was personally present for the key portions of any procedures. I have documented in the chart those procedures where I was not present during the key portions. I have reviewed the emergency nurses triage note. I agree with the chief complaint, past medical history, past surgical history, allergies, medications, social and family history as documented unless otherwise noted below. Documentation of the HPI, Physical Exam and Medical Decision Making performed by medical students or scribes is based on my personal performance of the HPI, PE and MDM. For APC cases, I have personally evaluated and examined the patient in conjunction with the APC and agree with the assessment, treatment plan, and disposition of the patient as recorded by the APCAdditional findings are as noted.       CHIEF COMPLAINT       Chief Complaint   Patient presents with    Chest Pain    Shortness of Breath       RECENT VITALS:   BP (!) 163/67   Pulse (!) 46   Temp 97.7 °F (36.5 °C) (Oral)   Resp 19   Ht 5' 2\" (1.575 m)   Wt 190 lb (86.2 kg)   SpO2 99%   BMI 34.75 kg/m²       PERTINENT ATTENDING PHYSICIAN COMMENTS:              Namita Lancaster MD, SAVANAH, [facility]  Attending Emergency Physician           Namita Lancaster MD  03/21/18 1949

## 2018-03-21 NOTE — ED NOTES
Bed: 05  Expected date: 3/21/18  Expected time: 3:46 PM  Means of arrival:   Comments:  Chest pain.  Hoda Wren     Judy Session, [place]  03/21/18 9108

## 2018-03-21 NOTE — ED PROVIDER NOTES
[address]  Emergency Department Encounter  Emergency Medicine Resident     Pt Name: Donovan Rangel  MRN: [id number]  [place] 1960  Date of evaluation: 3/21/18  PCP:  Aki Alves MD    [address] [place] [address]       Chief Complaint   Patient presents with    Chest Pain    Shortness of Breath       HISTORY OF PRESENT ILLNESS  (Location/Symptom, Timing/Onset, Context/Setting, Quality, Duration, Modifying Factors, Severity.)       Donovan Rangel is a 63 y.o. female who presents with  Chest pain. Patient cannot describe the chest pain locates it in her midsternal area and states radiates to her back. States it started abruptly while she was washing dishes. She also shortness of breath. Denies any nausea or vomiting. Denies any lightheadedness or dizziness. Patient does have a history of hypertension, and previously had diabetes which improved after a bypass surgery. She states she has a family history of sudden cardiac death of her mother and father in their 46s. Patient was given nitro and aspirin by EMS. EMS reported that her heart rate was in the 70s but then went on the 50s after the aspirin and nitro. PAST MEDICAL / SURGICAL / SOCIAL / FAMILY HISTORY      has a past medical history of CKD (chronic kidney disease) stage 3, GFR 30-59 ml/min; Gout; Hyperlipidemia; Hypertension; Kidney stone; Nephrolithiasis; Osteoarthritis; and Type II or unspecified type diabetes mellitus without mention of complication, not stated as uncontrolled. has a past surgical history that includes Hysterectomy; Lithotripsy; Thyroidectomy, partial; Gastric bypass surgery; Tonsillectomy; and Total knee arthroplasty (Right, 01/06/2015). Social History     Social History    Marital status: Single     Spouse name: N/A    Number of children: N/A    Years of education: N/A     Occupational History    Not on file.      Social History Main Topics    Smoking status: Former Smoker     Packs/day: 1.00 Years: 30.00     Types: Cigarettes    Smokeless tobacco: Never Used    Alcohol use No    Drug use: No    Sexual activity: Not on file     Other Topics Concern    Not on file     Social History Narrative    No narrative on file       Family History   Problem Relation Age of Onset    Heart Disease Mother     Diabetes Mother     Heart Disease Father     High Blood Pressure Father        Allergies:  Latex; Codeine; Nsaids; Penicillins; and Tolmetin    Home Medications:  Prior to Admission medications    Medication Sig Start Date End Date Taking?  Authorizing Provider   metoprolol tartrate (LOPRESSOR) 25 MG tablet Take 1 tablet by mouth 2 times daily 2/26/18  Yes Flavia Sandoval MD   allopurinol (ZYLOPRIM) 100 MG tablet Take 1 tablet by mouth daily 2/26/18  Yes Flavia Sandoval MD   potassium chloride (KLOR-CON) 20 MEQ packet TAKE 1 PACKET DISSOVLED IN 4 TO 6 OUNCES OF WATER TWICE A DAY 2/12/18  Yes Heather Guthrie MD   losartan-hydrochlorothiazide (HYZAAR) 100-25 MG per tablet TAKE 1 TABLET DAILY 1/18/18  Yes Heather Guthrie MD   amLODIPine (NORVASC) 10 MG tablet TAKE 1 TABLET DAILY 11/22/17  Yes Heather Guthrie MD   vitamin D (ERGOCALCIFEROL) 90019 units CAPS capsule TAKE 1 CAPSULE EVERY WEEK 10/23/17  Yes Heather Guthrie MD   docusate sodium (COLACE) 100 MG capsule Take 1 capsule by mouth 2 times daily 9/14/17  Yes Flavia Sandoval MD   meclizine (ANTIVERT) 25 MG CHEW Take 1 tablet by mouth 2 times daily as needed (motion sickness) 9/14/17  Yes Flavia Sandoval MD   ferrous sulfate 325 (65 FE) MG EC tablet Take 325 mg by mouth 3 times daily (with meals)   Yes Historical Provider, MD   acetaminophen (TYLENOL) 500 MG tablet Take 500 mg by mouth every 6 hours as needed for Pain   Yes Historical Provider, MD   Prenatal Vit-Iron Carbonyl-FA (PRENATAL PLUS IRON) 29-1 MG TABS TAKE 1 TABLET BY MOUTH DAILY 12/7/15  Yes Flavia Sandoval MD   Blood Pressure Monitoring (B-D ASSURE BPM/AUTO WRIST CUFF) MISC Check BP x2 per week. 5/11/17   Keren Blizzard, MD   Blood Pressure Monitor KIT TAKE BLOOD PRESSURE DAILY 12/9/15   Cindy Hooks MD       REVIEW OF SYSTEMS    (2-9 systems for level 4, 10 or more for level 5)      Review of Systems   Constitutional: Negative for chills, fatigue and fever. HENT: Negative for congestion, sore throat and trouble swallowing. Eyes: Negative for photophobia and redness. Respiratory: Positive for shortness of breath. Negative for cough, chest tightness and wheezing. Cardiovascular: Positive for chest pain. Negative for palpitations and leg swelling. Gastrointestinal: Negative for abdominal pain, blood in stool, diarrhea, nausea and vomiting. Genitourinary: Negative for dysuria, hematuria and pelvic pain. Musculoskeletal: Negative for back pain and neck pain. Skin: Negative for pallor and rash. Neurological: Negative for syncope, light-headedness and headaches. Psychiatric/Behavioral: Negative for agitation and confusion. The patient is not nervous/anxious. All other systems reviewed and are negative. PHYSICAL EXAM   (up to 7 for level 4, 8 or more for level 5)      INITIAL VITALS:   BP (!) 154/64   Pulse 51   Temp 97.7 °F (36.5 °C) (Oral)   Resp 13   Ht 5' 2\" (1.575 m)   Wt 190 lb (86.2 kg)   SpO2 98%   BMI 34.75 kg/m²     Physical Exam   Constitutional: She is oriented to person, place, and time. She appears well-developed and well-nourished. No distress. HENT:   Head: Normocephalic and atraumatic. Nose: Nose normal.   Mouth/Throat: Oropharynx is clear and moist. No oropharyngeal exudate. Eyes: Conjunctivae and EOM are normal. Pupils are equal, round, and reactive to light. No scleral icterus. Neck: Normal range of motion. Neck supple. No JVD present. No tracheal deviation present. Cardiovascular: Normal rate, regular rhythm, normal heart sounds and intact distal pulses.   Exam reveals no gallop and no friction rub. Pulmonary/Chest: Breath sounds normal. No stridor. No respiratory distress. She has no wheezes. She has no rales. She exhibits no tenderness. Abdominal: Soft. Bowel sounds are normal. There is no tenderness. There is no rebound and no guarding. Musculoskeletal: Normal range of motion. She exhibits no edema or tenderness. Lymphadenopathy:     She has no cervical adenopathy. Neurological: She is alert and oriented to person, place, and time. She has normal reflexes. No cranial nerve deficit. She exhibits normal muscle tone. Coordination normal.   Skin: Skin is warm and dry. No rash noted. She is not diaphoretic. No erythema. No pallor. Psychiatric: She has a normal mood and affect. Her behavior is normal. Judgment and thought content normal.   Nursing note and vitals reviewed.       DIFFERENTIAL  DIAGNOSIS     PLAN (LABS / IMAGING / EKG):  Orders Placed This Encounter   Procedures    XR CHEST PORTABLE    CBC Auto Differential    Basic Metabolic Panel    Troponin    Troponin    Inpatient consult to Internal Medicine    EKG 12 Lead    Insert peripheral IV       MEDICATIONS ORDERED:  Orders Placed This Encounter   Medications    0.9 % sodium chloride bolus    aluminum & magnesium hydroxide-simethicone (MAALOX) 200-200-20 MG/5ML suspension 20 mL    morphine injection 4 mg       DDX: ACS, disection, GERD     DIAGNOSTIC RESULTS / EMERGENCY DEPARTMENT COURSE / MDM     LABS:  Results for orders placed or performed during the hospital encounter of 03/21/18   CBC Auto Differential   Result Value Ref Range    WBC 6.6 3.5 - 11.0 k/uL    RBC 3.57 (L) 4.0 - 5.2 m/uL    Hemoglobin 10.7 (L) 12.0 - 16.0 g/dL    Hematocrit 32.4 (L) 36 - 46 %    MCV 90.8 80 - 100 fL    MCH 30.1 26 - 34 pg    MCHC 33.1 31 - 37 g/dL    RDW 16.3 (H) 11.5 - 14.9 %    Platelets 581 691 - 015 k/uL    MPV 8.5 6.0 - 12.0 fL    NRBC Automated NOT REPORTED per 100 WBC    Differential Type NOT REPORTED     Seg Neutrophils 65 36 pain into back Ordering Physician Provided Reason for Exam: chest pain into back Acuity: Acute Type of Exam:  Initial FINDINGS: There is moderate cardiomegaly and mild vascular congestion. Calcifications are noted at the aortic knob. No focal airspace consolidation, pneumothorax, or large pleural effusion. No free air beneath the diaphragm. No evidence of acute osseous abnormality. Moderate cardiomegaly and mild vascular congestion. No large pleural effusion. EKG  EKG Interpretation    Interpreted by me    Rhythm: sinus keaton  Rate: normal  Axis: normal  Ectopy: none  Conduction: normal  ST Segments: no acute change  T Waves: Diffuse flattening with multiple precordial T-wave inversions  Q Waves: none    Clinical Impression: Sinus bradycardia, multiple T-wave inversions in lateral leads    All EKG's are interpreted by the Emergency Department Physician who either signs or Co-signs this chart in the absence of a cardiologist.    EMERGENCY DEPARTMENT COURSE:  5:00 PM: Patient cannot get CTA due to low GFR. Troponin is negative. Will admit for cardiac r/o. Discussed with admitted service concern for aortic dissection, and requested they order an MRI to be done on the floor. PROCEDURES:  None    CONSULTS:  IP CONSULT TO INTERNAL MEDICINE    CRITICAL CARE:  None    FINAL IMPRESSION      1. Chest pain, unspecified type          DISPOSITION / PLAN     DISPOSITION Decision To Admit 03/21/2018 05:20:24 PM      PATIENT REFERRED TO:  No follow-up provider specified. DISCHARGE MEDICATIONS:  New Prescriptions    No medications on file       Claribel Mohan DO  Emergency Medicine Resident    (Please note that portions of this note were completed with a voice recognition program.  Efforts were made to edit the dictations but occasionally words are mis-transcribed. )       Claribel Mohan DO  Resident  03/21/18 1989

## 2018-03-21 NOTE — H&P
Authorizing Provider   metoprolol tartrate (LOPRESSOR) 25 MG tablet Take 1 tablet by mouth 2 times daily 2/26/18  Yes Joan Tolentino MD   allopurinol (ZYLOPRIM) 100 MG tablet Take 1 tablet by mouth daily 2/26/18  Yes Joan Tolentino MD   potassium chloride (KLOR-CON) 20 MEQ packet TAKE 1 PACKET DISSOVLED IN 4 TO 6 OUNCES OF WATER TWICE A DAY 2/12/18  Yes Magi San MD   losartan-hydrochlorothiazide (HYZAAR) 100-25 MG per tablet TAKE 1 TABLET DAILY 1/18/18  Yes Magi San MD   amLODIPine (NORVASC) 10 MG tablet TAKE 1 TABLET DAILY 11/22/17  Yes Magi San MD   vitamin D (ERGOCALCIFEROL) 16625 units CAPS capsule TAKE 1 CAPSULE EVERY WEEK 10/23/17  Yes Magi San MD   docusate sodium (COLACE) 100 MG capsule Take 1 capsule by mouth 2 times daily 9/14/17  Yes Joan Tolentino MD   meclizine (ANTIVERT) 25 MG CHEW Take 1 tablet by mouth 2 times daily as needed (motion sickness) 9/14/17  Yes Joan Tolentino MD   ferrous sulfate 325 (65 FE) MG EC tablet Take 325 mg by mouth 3 times daily (with meals)   Yes Historical Provider, MD   acetaminophen (TYLENOL) 500 MG tablet Take 500 mg by mouth every 6 hours as needed for Pain   Yes Historical Provider, MD   Prenatal Vit-Iron Carbonyl-FA (PRENATAL PLUS IRON) 29-1 MG TABS TAKE 1 TABLET BY MOUTH DAILY 12/7/15  Yes Joan Tolentino MD   Blood Pressure Monitoring (B-D ASSURE BPM/AUTO WRIST CUFF) MISC Check BP x2 per week. 5/11/17   Joan Tolentino MD   Blood Pressure Monitor KIT TAKE BLOOD PRESSURE DAILY 12/9/15   Magi San MD        Allergies:     Latex; Codeine; Nsaids; Penicillins; and Tolmetin    Social History:     Tobacco:    reports that she has quit smoking. Her smoking use included Cigarettes. She has a 30.00 pack-year smoking history. She has never used smokeless tobacco.  Alcohol:      reports that she does not drink alcohol. Drug Use:  reports that she does not use drugs.     Family History:     Family and mild vascular congestion. Calcifications are noted at the aortic knob. No focal airspace consolidation, pneumothorax, or large pleural effusion. No free air beneath the diaphragm. No evidence of acute osseous abnormality. Moderate cardiomegaly and mild vascular congestion. No large pleural effusion. Assessment :      Primary Problem  Chest pain    Active Hospital Problems    Diagnosis Date Noted    CKD (chronic kidney disease) stage 3, GFR 30-59 ml/min [N18.3]      Priority: High    Hypertension [I10] 04/18/2011     Priority: High    Chest pain [R07.9] 03/21/2018    Hyperlipidemia [E78.5] 04/18/2011       Plan:     Patient status Admit as inpatient in the  Progressive Unit/Step down    SOB 2/2? R/O Aortic dissection  -chest pain radiating to back   -Hx of HTN, CKD, DM and hyperlipidemia   -MRA of chest  -Stress test if mra is negative  -Echo    HTN  - Norvasc    CKD  - creatinine at b/l  - NS 75 ml/hr    Consultations:   [address] [place] [address] CONSULT TO SOCIAL WORK     Patient is admitted as inpatient status because of co-morbidities listed above, severity of signs and symptoms as outlined, requirement for current medical therapies and most importantly because of direct risk to patient if care not provided in a hospital setting.     Shelley Avery MD  3/21/2018  6:28 PM    Copy sent to Dr. Tran Robledo MD

## 2018-03-22 ENCOUNTER — APPOINTMENT (OUTPATIENT)
Dept: CT IMAGING | Age: 58
End: 2018-03-22
Payer: MEDICARE

## 2018-03-22 ENCOUNTER — HOSPITAL ENCOUNTER (INPATIENT)
Age: 58
LOS: 6 days | Discharge: HOME HEALTH | DRG: 181 | End: 2018-03-28
Attending: INTERNAL MEDICINE | Admitting: INTERNAL MEDICINE
Payer: MEDICARE

## 2018-03-22 VITALS
HEART RATE: 86 BPM | RESPIRATION RATE: 18 BRPM | WEIGHT: 199.52 LBS | BODY MASS INDEX: 35.35 KG/M2 | DIASTOLIC BLOOD PRESSURE: 88 MMHG | OXYGEN SATURATION: 100 % | SYSTOLIC BLOOD PRESSURE: 170 MMHG | TEMPERATURE: 98.6 F | HEIGHT: 63 IN

## 2018-03-22 PROBLEM — I71.00 AORTIC DISSECTION (HCC): Status: ACTIVE | Noted: 2018-03-22

## 2018-03-22 LAB
ABSOLUTE EOS #: 0.07 K/UL (ref 0–0.44)
ABSOLUTE EOS #: 0.1 K/UL (ref 0–0.4)
ABSOLUTE IMMATURE GRANULOCYTE: 0.03 K/UL (ref 0–0.3)
ABSOLUTE IMMATURE GRANULOCYTE: ABNORMAL K/UL (ref 0–0.3)
ABSOLUTE LYMPH #: 1.35 K/UL (ref 1.1–3.7)
ABSOLUTE LYMPH #: 1.8 K/UL (ref 1–4.8)
ABSOLUTE MONO #: 0.59 K/UL (ref 0.1–1.2)
ABSOLUTE MONO #: 0.7 K/UL (ref 0.1–1.3)
ALBUMIN SERPL-MCNC: 3.5 G/DL (ref 3.5–5.2)
ALBUMIN/GLOBULIN RATIO: 1.2 (ref 1–2.5)
ALP BLD-CCNC: 46 U/L (ref 35–104)
ALT SERPL-CCNC: 15 U/L (ref 5–33)
ANION GAP SERPL CALCULATED.3IONS-SCNC: 13 MMOL/L (ref 9–17)
ANION GAP SERPL CALCULATED.3IONS-SCNC: 14 MMOL/L (ref 9–17)
AST SERPL-CCNC: 14 U/L
BASOPHILS # BLD: 0 % (ref 0–2)
BASOPHILS # BLD: 1 % (ref 0–2)
BASOPHILS ABSOLUTE: 0.1 K/UL (ref 0–0.2)
BASOPHILS ABSOLUTE: <0.03 K/UL (ref 0–0.2)
BILIRUB SERPL-MCNC: 0.29 MG/DL (ref 0.3–1.2)
BUN BLDV-MCNC: 29 MG/DL (ref 6–20)
BUN BLDV-MCNC: 30 MG/DL (ref 6–20)
BUN/CREAT BLD: ABNORMAL (ref 9–20)
BUN/CREAT BLD: ABNORMAL (ref 9–20)
CALCIUM SERPL-MCNC: 8.3 MG/DL (ref 8.6–10.4)
CALCIUM SERPL-MCNC: 8.5 MG/DL (ref 8.6–10.4)
CHLORIDE BLD-SCNC: 104 MMOL/L (ref 98–107)
CHLORIDE BLD-SCNC: 104 MMOL/L (ref 98–107)
CO2: 25 MMOL/L (ref 20–31)
CO2: 26 MMOL/L (ref 20–31)
CREAT SERPL-MCNC: 1.5 MG/DL (ref 0.5–0.9)
CREAT SERPL-MCNC: 1.6 MG/DL (ref 0.5–0.9)
DIFFERENTIAL TYPE: ABNORMAL
DIFFERENTIAL TYPE: ABNORMAL
EKG ATRIAL RATE: 74 BPM
EKG P AXIS: 60 DEGREES
EKG P-R INTERVAL: 156 MS
EKG Q-T INTERVAL: 474 MS
EKG QRS DURATION: 92 MS
EKG QTC CALCULATION (BAZETT): 526 MS
EKG T AXIS: 5 DEGREES
EKG VENTRICULAR RATE: 74 BPM
EOSINOPHILS RELATIVE PERCENT: 1 % (ref 0–4)
EOSINOPHILS RELATIVE PERCENT: 1 % (ref 1–4)
GFR AFRICAN AMERICAN: 40 ML/MIN
GFR AFRICAN AMERICAN: 43 ML/MIN
GFR NON-AFRICAN AMERICAN: 33 ML/MIN
GFR NON-AFRICAN AMERICAN: 36 ML/MIN
GFR SERPL CREATININE-BSD FRML MDRD: ABNORMAL ML/MIN/{1.73_M2}
GLUCOSE BLD-MCNC: 100 MG/DL (ref 65–105)
GLUCOSE BLD-MCNC: 107 MG/DL (ref 65–105)
GLUCOSE BLD-MCNC: 116 MG/DL (ref 70–99)
GLUCOSE BLD-MCNC: 143 MG/DL (ref 65–105)
GLUCOSE BLD-MCNC: 97 MG/DL (ref 70–99)
HCT VFR BLD CALC: 31.9 % (ref 36.3–47.1)
HCT VFR BLD CALC: 34.3 % (ref 36–46)
HEMOGLOBIN: 10.2 G/DL (ref 11.9–15.1)
HEMOGLOBIN: 11.2 G/DL (ref 12–16)
IMMATURE GRANULOCYTES: 0 %
IMMATURE GRANULOCYTES: ABNORMAL %
INR BLD: 1.1
LYMPHOCYTES # BLD: 15 % (ref 24–43)
LYMPHOCYTES # BLD: 18 % (ref 24–44)
MAGNESIUM: 1.7 MG/DL (ref 1.6–2.6)
MAGNESIUM: 1.7 MG/DL (ref 1.6–2.6)
MAGNESIUM: 2.9 MG/DL (ref 1.6–2.6)
MCH RBC QN AUTO: 29.3 PG (ref 25.2–33.5)
MCH RBC QN AUTO: 29.9 PG (ref 26–34)
MCHC RBC AUTO-ENTMCNC: 32 G/DL (ref 28.4–34.8)
MCHC RBC AUTO-ENTMCNC: 32.7 G/DL (ref 31–37)
MCV RBC AUTO: 91.4 FL (ref 80–100)
MCV RBC AUTO: 91.7 FL (ref 82.6–102.9)
MONOCYTES # BLD: 7 % (ref 1–7)
MONOCYTES # BLD: 7 % (ref 3–12)
MRSA, DNA, NASAL: NORMAL
NRBC AUTOMATED: 0 PER 100 WBC
NRBC AUTOMATED: ABNORMAL PER 100 WBC
PARTIAL THROMBOPLASTIN TIME: 27.2 SEC (ref 20.5–30.5)
PDW BLD-RTO: 16.4 % (ref 11.5–14.9)
PDW BLD-RTO: 16.8 % (ref 11.8–14.4)
PLATELET # BLD: 268 K/UL (ref 138–453)
PLATELET # BLD: 284 K/UL (ref 150–450)
PLATELET ESTIMATE: ABNORMAL
PLATELET ESTIMATE: ABNORMAL
PMV BLD AUTO: 10.1 FL (ref 8.1–13.5)
PMV BLD AUTO: 8.7 FL (ref 6–12)
POTASSIUM SERPL-SCNC: 2.9 MMOL/L (ref 3.7–5.3)
POTASSIUM SERPL-SCNC: 3 MMOL/L (ref 3.7–5.3)
POTASSIUM SERPL-SCNC: 5.3 MMOL/L (ref 3.7–5.3)
PROTHROMBIN TIME: 11.2 SEC (ref 9–12)
RBC # BLD: 3.48 M/UL (ref 3.95–5.11)
RBC # BLD: 3.75 M/UL (ref 4–5.2)
RBC # BLD: ABNORMAL 10*6/UL
RBC # BLD: ABNORMAL 10*6/UL
SEG NEUTROPHILS: 73 % (ref 36–66)
SEG NEUTROPHILS: 77 % (ref 36–65)
SEGMENTED NEUTROPHILS ABSOLUTE COUNT: 6.77 K/UL (ref 1.5–8.1)
SEGMENTED NEUTROPHILS ABSOLUTE COUNT: 7.4 K/UL (ref 1.3–9.1)
SODIUM BLD-SCNC: 142 MMOL/L (ref 135–144)
SODIUM BLD-SCNC: 144 MMOL/L (ref 135–144)
SPECIMEN DESCRIPTION: NORMAL
TOTAL PROTEIN: 6.4 G/DL (ref 6.4–8.3)
WBC # BLD: 10.1 K/UL (ref 3.5–11)
WBC # BLD: 8.8 K/UL (ref 3.5–11.3)
WBC # BLD: ABNORMAL 10*3/UL
WBC # BLD: ABNORMAL 10*3/UL

## 2018-03-22 PROCEDURE — 76937 US GUIDE VASCULAR ACCESS: CPT

## 2018-03-22 PROCEDURE — 2580000003 HC RX 258: Performed by: INTERNAL MEDICINE

## 2018-03-22 PROCEDURE — G0378 HOSPITAL OBSERVATION PER HR: HCPCS

## 2018-03-22 PROCEDURE — 85025 COMPLETE CBC W/AUTO DIFF WBC: CPT

## 2018-03-22 PROCEDURE — 6360000004 HC RX CONTRAST MEDICATION: Performed by: INTERNAL MEDICINE

## 2018-03-22 PROCEDURE — 85730 THROMBOPLASTIN TIME PARTIAL: CPT

## 2018-03-22 PROCEDURE — 2580000003 HC RX 258: Performed by: NURSE PRACTITIONER

## 2018-03-22 PROCEDURE — 6360000002 HC RX W HCPCS: Performed by: STUDENT IN AN ORGANIZED HEALTH CARE EDUCATION/TRAINING PROGRAM

## 2018-03-22 PROCEDURE — 85610 PROTHROMBIN TIME: CPT

## 2018-03-22 PROCEDURE — 36415 COLL VENOUS BLD VENIPUNCTURE: CPT

## 2018-03-22 PROCEDURE — 2500000003 HC RX 250 WO HCPCS: Performed by: STUDENT IN AN ORGANIZED HEALTH CARE EDUCATION/TRAINING PROGRAM

## 2018-03-22 PROCEDURE — 94762 N-INVAS EAR/PLS OXIMTRY CONT: CPT

## 2018-03-22 PROCEDURE — 6360000002 HC RX W HCPCS: Performed by: NURSE PRACTITIONER

## 2018-03-22 PROCEDURE — 99291 CRITICAL CARE FIRST HOUR: CPT | Performed by: INTERNAL MEDICINE

## 2018-03-22 PROCEDURE — 2000000000 HC ICU R&B

## 2018-03-22 PROCEDURE — 82947 ASSAY GLUCOSE BLOOD QUANT: CPT

## 2018-03-22 PROCEDURE — 80053 COMPREHEN METABOLIC PANEL: CPT

## 2018-03-22 PROCEDURE — 83735 ASSAY OF MAGNESIUM: CPT

## 2018-03-22 PROCEDURE — 84132 ASSAY OF SERUM POTASSIUM: CPT

## 2018-03-22 PROCEDURE — 2580000003 HC RX 258: Performed by: STUDENT IN AN ORGANIZED HEALTH CARE EDUCATION/TRAINING PROGRAM

## 2018-03-22 PROCEDURE — 80048 BASIC METABOLIC PNL TOTAL CA: CPT

## 2018-03-22 PROCEDURE — 6370000000 HC RX 637 (ALT 250 FOR IP): Performed by: STUDENT IN AN ORGANIZED HEALTH CARE EDUCATION/TRAINING PROGRAM

## 2018-03-22 PROCEDURE — 87641 MR-STAPH DNA AMP PROBE: CPT

## 2018-03-22 PROCEDURE — 96376 TX/PRO/DX INJ SAME DRUG ADON: CPT

## 2018-03-22 PROCEDURE — 93005 ELECTROCARDIOGRAM TRACING: CPT

## 2018-03-22 PROCEDURE — 71275 CT ANGIOGRAPHY CHEST: CPT

## 2018-03-22 RX ORDER — LABETALOL HYDROCHLORIDE 5 MG/ML
10 INJECTION, SOLUTION INTRAVENOUS EVERY 6 HOURS PRN
Status: DISCONTINUED | OUTPATIENT
Start: 2018-03-22 | End: 2018-03-26

## 2018-03-22 RX ORDER — LABETALOL 100 MG/1
50 TABLET, FILM COATED ORAL EVERY 12 HOURS SCHEDULED
Status: DISCONTINUED | OUTPATIENT
Start: 2018-03-22 | End: 2018-03-27

## 2018-03-22 RX ORDER — 0.9 % SODIUM CHLORIDE 0.9 %
1000 INTRAVENOUS SOLUTION INTRAVENOUS ONCE
Status: COMPLETED | OUTPATIENT
Start: 2018-03-22 | End: 2018-03-22

## 2018-03-22 RX ORDER — ESMOLOL HYDROCHLORIDE 10 MG/ML
50 INJECTION, SOLUTION INTRAVENOUS CONTINUOUS
Status: DISCONTINUED | OUTPATIENT
Start: 2018-03-22 | End: 2018-03-25

## 2018-03-22 RX ORDER — SODIUM CHLORIDE 0.9 % (FLUSH) 0.9 %
10 SYRINGE (ML) INJECTION PRN
Status: DISCONTINUED | OUTPATIENT
Start: 2018-03-22 | End: 2018-03-22 | Stop reason: HOSPADM

## 2018-03-22 RX ORDER — SODIUM CHLORIDE 0.9 % (FLUSH) 0.9 %
10 SYRINGE (ML) INJECTION EVERY 12 HOURS SCHEDULED
Status: DISCONTINUED | OUTPATIENT
Start: 2018-03-22 | End: 2018-03-28 | Stop reason: HOSPADM

## 2018-03-22 RX ORDER — ACETYLCYSTEINE 200 MG/ML
600 SOLUTION ORAL; RESPIRATORY (INHALATION) 2 TIMES DAILY
Status: DISCONTINUED | OUTPATIENT
Start: 2018-03-22 | End: 2018-03-22 | Stop reason: HOSPADM

## 2018-03-22 RX ORDER — 0.9 % SODIUM CHLORIDE 0.9 %
100 INTRAVENOUS SOLUTION INTRAVENOUS ONCE
Status: COMPLETED | OUTPATIENT
Start: 2018-03-22 | End: 2018-03-22

## 2018-03-22 RX ORDER — MAGNESIUM SULFATE 1 G/100ML
1 INJECTION INTRAVENOUS ONCE
Status: COMPLETED | OUTPATIENT
Start: 2018-03-22 | End: 2018-03-22

## 2018-03-22 RX ORDER — SODIUM CHLORIDE 9 MG/ML
INJECTION, SOLUTION INTRAVENOUS CONTINUOUS
Status: DISCONTINUED | OUTPATIENT
Start: 2018-03-22 | End: 2018-03-25

## 2018-03-22 RX ORDER — DEXTROSE MONOHYDRATE 25 G/50ML
12.5 INJECTION, SOLUTION INTRAVENOUS PRN
Status: DISCONTINUED | OUTPATIENT
Start: 2018-03-22 | End: 2018-03-28 | Stop reason: HOSPADM

## 2018-03-22 RX ORDER — DEXTROSE MONOHYDRATE 50 MG/ML
100 INJECTION, SOLUTION INTRAVENOUS PRN
Status: DISCONTINUED | OUTPATIENT
Start: 2018-03-22 | End: 2018-03-28 | Stop reason: HOSPADM

## 2018-03-22 RX ORDER — ACETAMINOPHEN 325 MG/1
650 TABLET ORAL EVERY 4 HOURS PRN
Status: DISCONTINUED | OUTPATIENT
Start: 2018-03-22 | End: 2018-03-28 | Stop reason: HOSPADM

## 2018-03-22 RX ORDER — NICOTINE POLACRILEX 4 MG
15 LOZENGE BUCCAL PRN
Status: DISCONTINUED | OUTPATIENT
Start: 2018-03-22 | End: 2018-03-28 | Stop reason: HOSPADM

## 2018-03-22 RX ORDER — ONDANSETRON 2 MG/ML
4 INJECTION INTRAMUSCULAR; INTRAVENOUS EVERY 6 HOURS PRN
Status: DISCONTINUED | OUTPATIENT
Start: 2018-03-22 | End: 2018-03-28 | Stop reason: HOSPADM

## 2018-03-22 RX ORDER — FENTANYL CITRATE 50 UG/ML
25 INJECTION, SOLUTION INTRAMUSCULAR; INTRAVENOUS
Status: DISCONTINUED | OUTPATIENT
Start: 2018-03-22 | End: 2018-03-25

## 2018-03-22 RX ORDER — POTASSIUM CHLORIDE 7.45 MG/ML
10 INJECTION INTRAVENOUS PRN
Status: DISCONTINUED | OUTPATIENT
Start: 2018-03-22 | End: 2018-03-28

## 2018-03-22 RX ORDER — SODIUM CHLORIDE 0.9 % (FLUSH) 0.9 %
10 SYRINGE (ML) INJECTION PRN
Status: DISCONTINUED | OUTPATIENT
Start: 2018-03-22 | End: 2018-03-28 | Stop reason: HOSPADM

## 2018-03-22 RX ADMIN — MORPHINE SULFATE 2 MG: 2 INJECTION, SOLUTION INTRAMUSCULAR; INTRAVENOUS at 01:24

## 2018-03-22 RX ADMIN — SODIUM CHLORIDE 100 ML: 9 INJECTION, SOLUTION INTRAVENOUS at 02:28

## 2018-03-22 RX ADMIN — SODIUM CHLORIDE 1000 ML: 9 INJECTION, SOLUTION INTRAVENOUS at 03:15

## 2018-03-22 RX ADMIN — LABETALOL HCL 50 MG: 100 TABLET, FILM COATED ORAL at 11:24

## 2018-03-22 RX ADMIN — SODIUM CHLORIDE: 9 INJECTION, SOLUTION INTRAVENOUS at 23:04

## 2018-03-22 RX ADMIN — ESMOLOL HYDROCHLORIDE 50 MCG/KG/MIN: 10 INJECTION INTRAVENOUS at 08:32

## 2018-03-22 RX ADMIN — ESMOLOL HYDROCHLORIDE 300 MCG/KG/MIN: 10 INJECTION INTRAVENOUS at 19:33

## 2018-03-22 RX ADMIN — ESMOLOL HYDROCHLORIDE 300 MCG/KG/MIN: 10 INJECTION INTRAVENOUS at 17:46

## 2018-03-22 RX ADMIN — MAGNESIUM SULFATE HEPTAHYDRATE 1 G: 1 INJECTION, SOLUTION INTRAVENOUS at 11:20

## 2018-03-22 RX ADMIN — POTASSIUM CHLORIDE: 149 INJECTION, SOLUTION, CONCENTRATE INTRAVENOUS at 10:29

## 2018-03-22 RX ADMIN — ESMOLOL HYDROCHLORIDE 300 MCG/KG/MIN: 10 INJECTION INTRAVENOUS at 14:23

## 2018-03-22 RX ADMIN — HYDRALAZINE HYDROCHLORIDE 20 MG: 20 INJECTION INTRAMUSCULAR; INTRAVENOUS at 04:43

## 2018-03-22 RX ADMIN — MORPHINE SULFATE 2 MG: 2 INJECTION, SOLUTION INTRAMUSCULAR; INTRAVENOUS at 04:38

## 2018-03-22 RX ADMIN — POTASSIUM CHLORIDE 40 MEQ: 149 INJECTION, SOLUTION, CONCENTRATE INTRAVENOUS at 12:48

## 2018-03-22 RX ADMIN — ESMOLOL HYDROCHLORIDE 300 MCG/KG/MIN: 10 INJECTION INTRAVENOUS at 23:04

## 2018-03-22 RX ADMIN — ACETYLCYSTEINE 600 MG: 200 SOLUTION ORAL; RESPIRATORY (INHALATION) at 02:31

## 2018-03-22 RX ADMIN — Medication 10 ML: at 02:28

## 2018-03-22 RX ADMIN — ESMOLOL HYDROCHLORIDE 300 MCG/KG/MIN: 10 INJECTION INTRAVENOUS at 13:05

## 2018-03-22 RX ADMIN — SODIUM CHLORIDE: 9 INJECTION, SOLUTION INTRAVENOUS at 10:29

## 2018-03-22 RX ADMIN — ESMOLOL HYDROCHLORIDE 300 MCG/KG/MIN: 10 INJECTION INTRAVENOUS at 21:09

## 2018-03-22 RX ADMIN — MAGNESIUM SULFATE HEPTAHYDRATE 1 G: 1 INJECTION, SOLUTION INTRAVENOUS at 08:40

## 2018-03-22 RX ADMIN — ESMOLOL HYDROCHLORIDE 300 MCG/KG/MIN: 10 INJECTION INTRAVENOUS at 10:52

## 2018-03-22 RX ADMIN — LABETALOL HCL 50 MG: 100 TABLET, FILM COATED ORAL at 19:40

## 2018-03-22 RX ADMIN — ACETAMINOPHEN 650 MG: 325 TABLET ORAL at 17:45

## 2018-03-22 RX ADMIN — ESMOLOL HYDROCHLORIDE 300 MCG/KG/MIN: 10 INJECTION INTRAVENOUS at 15:49

## 2018-03-22 RX ADMIN — IOPAMIDOL 100 ML: 755 INJECTION, SOLUTION INTRAVENOUS at 02:28

## 2018-03-22 ASSESSMENT — PAIN SCALES - GENERAL
PAINLEVEL_OUTOF10: 9
PAINLEVEL_OUTOF10: 0
PAINLEVEL_OUTOF10: 0
PAINLEVEL_OUTOF10: 5
PAINLEVEL_OUTOF10: 9
PAINLEVEL_OUTOF10: 0
PAINLEVEL_OUTOF10: 9
PAINLEVEL_OUTOF10: 8
PAINLEVEL_OUTOF10: 7

## 2018-03-22 ASSESSMENT — PAIN DESCRIPTION - LOCATION: LOCATION: HEAD

## 2018-03-22 ASSESSMENT — PAIN DESCRIPTION - ORIENTATION: ORIENTATION: MID

## 2018-03-22 ASSESSMENT — PAIN DESCRIPTION - PAIN TYPE: TYPE: ACUTE PAIN

## 2018-03-22 NOTE — PROGRESS NOTES
Report called to [facility] at [facility]. Teresa.

## 2018-03-22 NOTE — PROGRESS NOTES
Multiple telephone interactions with radiology over the course of the evening to clarify interventions based on patient's conflicting risk factors. Dr. Susan Madera called in to inquire after patient and test status at 2148. Case then discussed with Sandra Pineda CNP. At 2230 pt taken down for CT of chest w/o contrast. Based on results and further communications between Sandra Pineda and radiology department, pt signed consent for CTA w/contrast at [address]. Results called from [facility] to Sandra Pineda at 0400. Consults then called by [employer] to cardiothoracic surgery and interventionists at [facility]. V's to coordinate transfer. Awaiting communication from access center. Continuing to monitor patient.

## 2018-03-22 NOTE — H&P
(90.5 kg)   09/14/17 197 lb 12.8 oz (89.7 kg)       Body Mass Index : Body mass index is 35.15 kg/m². PHYSICAL EXAMINATION :    General appearance - alert, well appearing, and in mild distress  Mental status - alert, oriented to person, place, and time  Eyes - pupils equal and reactive, extraocular eye movements intact  Mouth - mucous membranes moist, pharynx normal without lesions  Neck - supple, no significant adenopathy  Chest - clear to auscultation, no wheezes, rales or rhonchi, symmetric air entry  Heart - normal rate, regular rhythm, normal S1, S2, no murmurs, rubs, clicks or gallops  Abdomen - soft, nontender, nondistended, no masses or organomegaly  Neurological - alert, oriented, normal speech, no focal deficits   Extremities - peripheral pulses normal except that she has decreased peripheral pulses in left arm, no pedal edema, no clubbing or cyanosis  Skin - normal coloration and turgor, no rashes, no suspicious skin lesions noted         Any additional physical findings:      Laboratory findings:-    CBC:   Recent Labs      03/22/18   0512   WBC  10.1   HGB  11.2*   PLT  284     BMP:    Recent Labs      03/21/18   1614  03/22/18   0512   NA  144  142   K  3.7  3.0*   CL  104  104   CO2  31  25   BUN  33*  30*   CREATININE  1.81*  1.60*   GLUCOSE  121*  116*     S. Calcium:  Recent Labs      03/22/18   0512   CALCIUM  8.3*     S. Ionized Calcium:No results for input(s): IONCA in the last 72 hours. S. Magnesium:  Recent Labs      03/22/18   0512   MG  1.7     S. Phosphorus:No results for input(s): PHOS in the last 72 hours. S. Glucose:No results for input(s): POCGLU in the last 72 hours. Glycosylated hemoglobin A1C: No results for input(s): LABA1C in the last 72 hours. INR: No results for input(s): INR in the last 72 hours. Hepatic functions: No results for input(s): ALKPHOS, ALT, AST, PROT, BILITOT, BILIDIR, LABALBU in the last 72 hours.   Pancreatic functions:  Recent Labs      03/21/18 2103

## 2018-03-22 NOTE — CONSULTS
 TONSILLECTOMY      TOTAL KNEE ARTHROPLASTY Right 01/06/2015       Social History:  reports that she quit smoking about 17 years ago. Her smoking use included Cigarettes. She has a 30.00 pack-year smoking history. She has never used smokeless tobacco. She reports that she does not drink alcohol or use drugs. Family History: family history includes Diabetes in her mother; Heart Disease in her father and mother; High Blood Pressure in her father. Review of Systems:   General: Denies fevers, chills. HEENT: Denies sore throat, sinus problems, allergic rhinosinusitis  Card: + recent acute left sided chest pain, sharp in nature. Pulm: Denies cough, shortness of breath  GI: denies history of constipation, diarrhea, hematochezia or melena  : Denies polyuria, dysuria, hematuria  Endo: Denies diabetes. Heme: Denies anemia, h/o bleeding or clotting problems. Neuro: Denies h/o CVA, TIA  Skin: Denies rashes, ulcers  Musculoskeletal: Denies muscle, joint, back pain. Allergies: Latex; Codeine; Nsaids;  Penicillins; and Tolmetin    Current Meds:  Current Facility-Administered Medications:     sodium chloride flush 0.9 % injection 10 mL, 10 mL, Intravenous, 2 times per day, Radha Whatley MD    sodium chloride flush 0.9 % injection 10 mL, 10 mL, Intravenous, PRN, Radha Whatley MD    acetaminophen (TYLENOL) tablet 650 mg, 650 mg, Oral, Q4H PRN, Radha Whatley MD    magnesium hydroxide (MILK OF MAGNESIA) 400 MG/5ML suspension 30 mL, 30 mL, Oral, Daily PRN, Radha Whatley MD    ondansetron [facility]) injection 4 mg, 4 mg, Intravenous, Q6H PRN, Radha Whatley MD    esmolol (BREVIBLOC) 2.5gm/250ml NS infusion, 50 mcg/kg/min, Intravenous, Continuous, Radha Whatley MD    fentaNYL (SUBLIMAZE) injection 25 mcg, 25 mcg, Intravenous, Q1H PRN, Radha Whatley MD    Vital Signs:  Vitals:    03/22/18 0700   Temp: 98.6 °F (37 °C)       Physical Exam:  Vital signs and Nurse's note reviewed  Gen:

## 2018-03-22 NOTE — PROGRESS NOTES
Call received from radiologist, Dr. Lily Hensley. Informed of critical finding of CTA as follows: Intramural hematoma of the descending aorta, type B dissection, cardiomegaly, pulmonary hypertension, and small, nonspecific pericardial effusion. Telephone call placed to Dr. Chika Ordoñez, cardiothoracic surgeon. Updated on CTA results. Instructed to transfer patient to [address] [place] [address] under medical service and to request consult for vascular surgeon, Dr. Lucy Love. Will discuss results with patient and inform of need to transfer.

## 2018-03-22 NOTE — PROGRESS NOTES
Spoke with patient and daughter at bedside. Informed of CTA results and conversation with Dr. Michael Carlos, [address] surgeon. Agreeable for transfer to [facility]. Spoke with Dr. Willam Figueroa, intensivist on call at [facility]. [facility], per telephone. Informed of CTA results and request for transfer to [facility] service at [place]. Dr. Willam Figueroa accepts patient for transfer. Access center aware. Awaiting further transfer instructions.

## 2018-03-22 NOTE — PROGRESS NOTES
Smoking Cessation - topics covered   []  Health Risks  []  Benefits of Quitting   []  Smoking Cessation  []  Patient has no history of tobacco use  [x]  Patient is former smoker. Patient quit in 2001. [x]  No need for tobacco cessation education. []  Booklet given  []  Patient verbalizes understanding. []  Patient denies need for tobacco cessation education.   Rosenda Florian  8:21 AM

## 2018-03-23 ENCOUNTER — ANESTHESIA EVENT (OUTPATIENT)
Dept: CARDIAC CATH/INVASIVE PROCEDURES | Age: 58
DRG: 181 | End: 2018-03-23
Payer: MEDICARE

## 2018-03-23 ENCOUNTER — APPOINTMENT (OUTPATIENT)
Dept: GENERAL RADIOLOGY | Age: 58
DRG: 181 | End: 2018-03-23
Attending: INTERNAL MEDICINE
Payer: MEDICARE

## 2018-03-23 ENCOUNTER — ANESTHESIA (OUTPATIENT)
Dept: CARDIAC CATH/INVASIVE PROCEDURES | Age: 58
DRG: 181 | End: 2018-03-23
Payer: MEDICARE

## 2018-03-23 ENCOUNTER — APPOINTMENT (OUTPATIENT)
Dept: CARDIAC CATH/INVASIVE PROCEDURES | Age: 58
DRG: 181 | End: 2018-03-23
Attending: INTERNAL MEDICINE
Payer: MEDICARE

## 2018-03-23 VITALS — TEMPERATURE: 95.4 F | SYSTOLIC BLOOD PRESSURE: 99 MMHG | OXYGEN SATURATION: 100 % | DIASTOLIC BLOOD PRESSURE: 61 MMHG

## 2018-03-23 LAB
-: NORMAL
ABSOLUTE EOS #: 0 K/UL (ref 0–0.4)
ABSOLUTE IMMATURE GRANULOCYTE: 0 K/UL (ref 0–0.3)
ABSOLUTE LYMPH #: 0.57 K/UL (ref 1–4.8)
ABSOLUTE MONO #: 0.64 K/UL (ref 0.1–0.8)
ACTION: NORMAL
ALLEN TEST: ABNORMAL
AMORPHOUS: NORMAL
ANION GAP SERPL CALCULATED.3IONS-SCNC: 12 MMOL/L (ref 9–17)
ANION GAP: 6 MMOL/L (ref 7–16)
BACTERIA: NORMAL
BASOPHILS # BLD: 0 % (ref 0–2)
BASOPHILS ABSOLUTE: 0 K/UL (ref 0–0.2)
BILIRUBIN URINE: NEGATIVE
BLOOD BANK SPECIMEN: NORMAL
BUN BLDV-MCNC: 26 MG/DL (ref 6–20)
BUN BLDV-MCNC: 28 MG/DL (ref 6–20)
BUN/CREAT BLD: ABNORMAL (ref 9–20)
CALCIUM SERPL-MCNC: 7.8 MG/DL (ref 8.6–10.4)
CASTS UA: NORMAL /LPF (ref 0–8)
CHLORIDE BLD-SCNC: 108 MMOL/L (ref 98–107)
CO2: 22 MMOL/L (ref 20–31)
COLOR: YELLOW
COMMENT UA: ABNORMAL
CREAT SERPL-MCNC: 1.65 MG/DL (ref 0.5–0.9)
CREAT SERPL-MCNC: 1.79 MG/DL (ref 0.5–0.9)
CRYSTALS, UA: NORMAL /HPF
DATE AND TIME: NORMAL
DIFFERENTIAL TYPE: ABNORMAL
EOSINOPHILS RELATIVE PERCENT: 0 % (ref 1–4)
EPITHELIAL CELLS UA: NORMAL /HPF (ref 0–5)
ESTIMATED AVERAGE GLUCOSE: 114 MG/DL
FIO2: 40
FIO2: 50
GFR AFRICAN AMERICAN: 35 ML/MIN
GFR AFRICAN AMERICAN: 39 ML/MIN
GFR NON-AFRICAN AMERICAN: 29 ML/MIN
GFR NON-AFRICAN AMERICAN: 32 ML/MIN
GFR SERPL CREATININE-BSD FRML MDRD: ABNORMAL ML/MIN/{1.73_M2}
GLUCOSE BLD-MCNC: 102 MG/DL (ref 65–105)
GLUCOSE BLD-MCNC: 107 MG/DL (ref 74–100)
GLUCOSE BLD-MCNC: 122 MG/DL (ref 70–99)
GLUCOSE BLD-MCNC: 125 MG/DL (ref 74–100)
GLUCOSE BLD-MCNC: 186 MG/DL (ref 74–100)
GLUCOSE BLD-MCNC: 90 MG/DL (ref 74–100)
GLUCOSE URINE: NEGATIVE
HBA1C MFR BLD: 5.6 % (ref 4–6)
HCT VFR BLD CALC: 30.2 % (ref 36.3–47.1)
HEMOGLOBIN: 9.2 G/DL (ref 11.9–15.1)
IMMATURE GRANULOCYTES: 0 %
KETONES, URINE: NEGATIVE
LEUKOCYTE ESTERASE, URINE: NEGATIVE
LYMPHOCYTES # BLD: 8 % (ref 24–44)
MCH RBC QN AUTO: 29 PG (ref 25.2–33.5)
MCHC RBC AUTO-ENTMCNC: 30.5 G/DL (ref 28.4–34.8)
MCV RBC AUTO: 95.3 FL (ref 82.6–102.9)
MODE: ABNORMAL
MONOCYTES # BLD: 9 % (ref 1–7)
MORPHOLOGY: ABNORMAL
MUCUS: NORMAL
NEGATIVE BASE EXCESS, ART: 3 (ref 0–2)
NEGATIVE BASE EXCESS, ART: 4 (ref 0–2)
NEGATIVE BASE EXCESS, ART: 6 (ref 0–2)
NEGATIVE BASE EXCESS, ART: 6 (ref 0–2)
NEGATIVE BASE EXCESS, ART: 9 (ref 0–2)
NITRITE, URINE: NEGATIVE
NOTIFY: NORMAL
NRBC AUTOMATED: 0 PER 100 WBC
O2 DEVICE/FLOW/%: ABNORMAL
OTHER OBSERVATIONS UA: NORMAL
PATIENT TEMP: ABNORMAL
PDW BLD-RTO: 16.8 % (ref 11.8–14.4)
PH UA: 5 (ref 5–8)
PLATELET # BLD: 227 K/UL (ref 138–453)
PLATELET ESTIMATE: ABNORMAL
PMV BLD AUTO: 10.3 FL (ref 8.1–13.5)
POC CHLORIDE: 117 MMOL/L (ref 98–107)
POC HCO3: 20.7 MMOL/L (ref 21–28)
POC HCO3: 21.6 MMOL/L (ref 21–28)
POC HCO3: 22.6 MMOL/L (ref 21–28)
POC HCO3: 22.9 MMOL/L (ref 21–28)
POC HCO3: 24.5 MMOL/L (ref 21–28)
POC IONIZED CALCIUM: 1.02 MMOL/L (ref 1.15–1.33)
POC O2 SATURATION: 96 % (ref 94–98)
POC O2 SATURATION: 97 % (ref 94–98)
POC O2 SATURATION: 99 % (ref 94–98)
POC PCO2 TEMP: ABNORMAL MM HG
POC PCO2: 52.6 MM HG (ref 35–48)
POC PCO2: 52.6 MM HG (ref 35–48)
POC PCO2: 57.3 MM HG (ref 35–48)
POC PCO2: 58.6 MM HG (ref 35–48)
POC PCO2: 63 MM HG (ref 35–48)
POC PH TEMP: ABNORMAL
POC PH: 7.12 (ref 7.35–7.45)
POC PH: 7.19 (ref 7.35–7.45)
POC PH: 7.22 (ref 7.35–7.45)
POC PH: 7.24 (ref 7.35–7.45)
POC PH: 7.25 (ref 7.35–7.45)
POC PO2 TEMP: ABNORMAL MM HG
POC PO2: 107.7 MM HG (ref 83–108)
POC PO2: 107.7 MM HG (ref 83–108)
POC PO2: 142.9 MM HG (ref 83–108)
POC PO2: 143 MM HG (ref 83–108)
POC PO2: 150.5 MM HG (ref 83–108)
POC POTASSIUM: 4.3 MMOL/L (ref 3.5–4.5)
POC SODIUM: 144 MMOL/L (ref 138–146)
POSITIVE BASE EXCESS, ART: ABNORMAL (ref 0–3)
POTASSIUM SERPL-SCNC: 4.5 MMOL/L (ref 3.7–5.3)
PROTEIN UA: ABNORMAL
RBC # BLD: 3.17 M/UL (ref 3.95–5.11)
RBC # BLD: ABNORMAL 10*6/UL
RBC UA: NORMAL /HPF (ref 0–4)
READ BACK: YES
RENAL EPITHELIAL, UA: NORMAL /HPF
SAMPLE SITE: ABNORMAL
SEG NEUTROPHILS: 83 % (ref 36–66)
SEGMENTED NEUTROPHILS ABSOLUTE COUNT: 5.89 K/UL (ref 1.8–7.7)
SODIUM BLD-SCNC: 142 MMOL/L (ref 135–144)
SPECIFIC GRAVITY UA: 1.03 (ref 1–1.03)
TCO2 (CALC), ART: 23 MMOL/L (ref 22–29)
TCO2 (CALC), ART: 23 MMOL/L (ref 22–29)
TCO2 (CALC), ART: 24 MMOL/L (ref 22–29)
TCO2 (CALC), ART: 25 MMOL/L (ref 22–29)
TCO2 (CALC), ART: 26 MMOL/L (ref 22–29)
TRICHOMONAS: NORMAL
TURBIDITY: ABNORMAL
URINE HGB: NEGATIVE
UROBILINOGEN, URINE: NORMAL
WBC # BLD: 7.1 K/UL (ref 3.5–11.3)
WBC # BLD: ABNORMAL 10*3/UL
WBC UA: NORMAL /HPF (ref 0–5)
YEAST: NORMAL

## 2018-03-23 PROCEDURE — 3E033GC INTRODUCTION OF OTHER THERAPEUTIC SUBSTANCE INTO PERIPHERAL VEIN, PERCUTANEOUS APPROACH: ICD-10-PCS | Performed by: SURGERY

## 2018-03-23 PROCEDURE — 2500000003 HC RX 250 WO HCPCS: Performed by: STUDENT IN AN ORGANIZED HEALTH CARE EDUCATION/TRAINING PROGRAM

## 2018-03-23 PROCEDURE — 2780000010 HC IMPLANT OTHER

## 2018-03-23 PROCEDURE — 82947 ASSAY GLUCOSE BLOOD QUANT: CPT

## 2018-03-23 PROCEDURE — 36200 PLACE CATHETER IN AORTA: CPT | Performed by: SURGERY

## 2018-03-23 PROCEDURE — 6370000000 HC RX 637 (ALT 250 FOR IP): Performed by: NURSE ANESTHETIST, CERTIFIED REGISTERED

## 2018-03-23 PROCEDURE — 71045 X-RAY EXAM CHEST 1 VIEW: CPT

## 2018-03-23 PROCEDURE — 6360000002 HC RX W HCPCS: Performed by: ANESTHESIOLOGY

## 2018-03-23 PROCEDURE — C1769 GUIDE WIRE: HCPCS

## 2018-03-23 PROCEDURE — C1894 INTRO/SHEATH, NON-LASER: HCPCS

## 2018-03-23 PROCEDURE — 82803 BLOOD GASES ANY COMBINATION: CPT

## 2018-03-23 PROCEDURE — 94762 N-INVAS EAR/PLS OXIMTRY CONT: CPT

## 2018-03-23 PROCEDURE — 04CK3ZZ EXTIRPATION OF MATTER FROM RIGHT FEMORAL ARTERY, PERCUTANEOUS APPROACH: ICD-10-PCS | Performed by: SURGERY

## 2018-03-23 PROCEDURE — 33881 EVASC RPR TA NDGFT XCOV LSA: CPT | Performed by: SURGERY

## 2018-03-23 PROCEDURE — B310ZZZ FLUOROSCOPY OF THORACIC AORTA: ICD-10-PCS | Performed by: SURGERY

## 2018-03-23 PROCEDURE — 36415 COLL VENOUS BLD VENIPUNCTURE: CPT

## 2018-03-23 PROCEDURE — 6360000002 HC RX W HCPCS

## 2018-03-23 PROCEDURE — 81001 URINALYSIS AUTO W/SCOPE: CPT

## 2018-03-23 PROCEDURE — 99291 CRITICAL CARE FIRST HOUR: CPT | Performed by: INTERNAL MEDICINE

## 2018-03-23 PROCEDURE — 6360000002 HC RX W HCPCS: Performed by: NURSE ANESTHETIST, CERTIFIED REGISTERED

## 2018-03-23 PROCEDURE — 2500000003 HC RX 250 WO HCPCS

## 2018-03-23 PROCEDURE — 36620 INSERTION CATHETER ARTERY: CPT

## 2018-03-23 PROCEDURE — 75957 HC XRAY ENDOVASC THOR AO REPR: CPT | Performed by: SURGERY

## 2018-03-23 PROCEDURE — 2580000003 HC RX 258: Performed by: NURSE ANESTHETIST, CERTIFIED REGISTERED

## 2018-03-23 PROCEDURE — C1887 CATHETER, GUIDING: HCPCS

## 2018-03-23 PROCEDURE — 84295 ASSAY OF SERUM SODIUM: CPT

## 2018-03-23 PROCEDURE — 84132 ASSAY OF SERUM POTASSIUM: CPT

## 2018-03-23 PROCEDURE — 2580000003 HC RX 258: Performed by: EMERGENCY MEDICINE

## 2018-03-23 PROCEDURE — 2500000003 HC RX 250 WO HCPCS: Performed by: NURSE ANESTHETIST, CERTIFIED REGISTERED

## 2018-03-23 PROCEDURE — 02VW3DZ RESTRICTION OF THORACIC AORTA, DESCENDING WITH INTRALUMINAL DEVICE, PERCUTANEOUS APPROACH: ICD-10-PCS | Performed by: SURGERY

## 2018-03-23 PROCEDURE — 82565 ASSAY OF CREATININE: CPT

## 2018-03-23 PROCEDURE — 86901 BLOOD TYPING SEROLOGIC RH(D): CPT

## 2018-03-23 PROCEDURE — 3700000001 HC ADD 15 MINUTES (ANESTHESIA)

## 2018-03-23 PROCEDURE — 85025 COMPLETE CBC W/AUTO DIFF WBC: CPT

## 2018-03-23 PROCEDURE — 3700000000 HC ANESTHESIA ATTENDED CARE

## 2018-03-23 PROCEDURE — 86920 COMPATIBILITY TEST SPIN: CPT

## 2018-03-23 PROCEDURE — 80048 BASIC METABOLIC PNL TOTAL CA: CPT

## 2018-03-23 PROCEDURE — 6360000002 HC RX W HCPCS: Performed by: STUDENT IN AN ORGANIZED HEALTH CARE EDUCATION/TRAINING PROGRAM

## 2018-03-23 PROCEDURE — 34812 OPN FEM ART EXPOS: CPT | Performed by: SURGERY

## 2018-03-23 PROCEDURE — 87086 URINE CULTURE/COLONY COUNT: CPT

## 2018-03-23 PROCEDURE — 82435 ASSAY OF BLOOD CHLORIDE: CPT

## 2018-03-23 PROCEDURE — 6360000004 HC RX CONTRAST MEDICATION

## 2018-03-23 PROCEDURE — 2000000000 HC ICU R&B

## 2018-03-23 PROCEDURE — 86900 BLOOD TYPING SEROLOGIC ABO: CPT

## 2018-03-23 PROCEDURE — 94660 CPAP INITIATION&MGMT: CPT

## 2018-03-23 PROCEDURE — 6370000000 HC RX 637 (ALT 250 FOR IP): Performed by: STUDENT IN AN ORGANIZED HEALTH CARE EDUCATION/TRAINING PROGRAM

## 2018-03-23 PROCEDURE — C1760 CLOSURE DEV, VASC: HCPCS

## 2018-03-23 PROCEDURE — 33880 EVASC RPR TA NDGFT COV LSA: CPT | Performed by: SURGERY

## 2018-03-23 PROCEDURE — 83036 HEMOGLOBIN GLYCOSYLATED A1C: CPT

## 2018-03-23 PROCEDURE — 75625 CONTRAST EXAM ABDOMINL AORTA: CPT | Performed by: SURGERY

## 2018-03-23 PROCEDURE — 84520 ASSAY OF UREA NITROGEN: CPT

## 2018-03-23 PROCEDURE — 82330 ASSAY OF CALCIUM: CPT

## 2018-03-23 PROCEDURE — 86850 RBC ANTIBODY SCREEN: CPT

## 2018-03-23 RX ORDER — NICARDIPINE HYDROCHLORIDE 2.5 MG/ML
INJECTION INTRAVENOUS PRN
Status: DISCONTINUED | OUTPATIENT
Start: 2018-03-23 | End: 2018-03-23 | Stop reason: SDUPTHER

## 2018-03-23 RX ORDER — SODIUM CHLORIDE, SODIUM LACTATE, POTASSIUM CHLORIDE, CALCIUM CHLORIDE 600; 310; 30; 20 MG/100ML; MG/100ML; MG/100ML; MG/100ML
INJECTION, SOLUTION INTRAVENOUS CONTINUOUS PRN
Status: DISCONTINUED | OUTPATIENT
Start: 2018-03-23 | End: 2018-03-23 | Stop reason: SDUPTHER

## 2018-03-23 RX ORDER — MIDAZOLAM HYDROCHLORIDE 1 MG/ML
INJECTION INTRAMUSCULAR; INTRAVENOUS PRN
Status: DISCONTINUED | OUTPATIENT
Start: 2018-03-23 | End: 2018-03-23 | Stop reason: SDUPTHER

## 2018-03-23 RX ORDER — FUROSEMIDE 10 MG/ML
INJECTION INTRAMUSCULAR; INTRAVENOUS
Status: COMPLETED
Start: 2018-03-23 | End: 2018-03-23

## 2018-03-23 RX ORDER — LIDOCAINE HYDROCHLORIDE 10 MG/ML
INJECTION, SOLUTION EPIDURAL; INFILTRATION; INTRACAUDAL; PERINEURAL PRN
Status: DISCONTINUED | OUTPATIENT
Start: 2018-03-23 | End: 2018-03-23 | Stop reason: SDUPTHER

## 2018-03-23 RX ORDER — NITROGLYCERIN 20 MG/100ML
INJECTION INTRAVENOUS PRN
Status: DISCONTINUED | OUTPATIENT
Start: 2018-03-23 | End: 2018-03-23 | Stop reason: SDUPTHER

## 2018-03-23 RX ORDER — FUROSEMIDE 10 MG/ML
40 INJECTION INTRAMUSCULAR; INTRAVENOUS ONCE
Status: DISCONTINUED | OUTPATIENT
Start: 2018-03-23 | End: 2018-03-28 | Stop reason: HOSPADM

## 2018-03-23 RX ORDER — LORAZEPAM 2 MG/ML
0.5 INJECTION INTRAMUSCULAR EVERY 6 HOURS PRN
Status: DISCONTINUED | OUTPATIENT
Start: 2018-03-23 | End: 2018-03-23

## 2018-03-23 RX ORDER — ONDANSETRON 2 MG/ML
INJECTION INTRAMUSCULAR; INTRAVENOUS PRN
Status: DISCONTINUED | OUTPATIENT
Start: 2018-03-23 | End: 2018-03-23 | Stop reason: SDUPTHER

## 2018-03-23 RX ORDER — LORAZEPAM 2 MG/ML
INJECTION INTRAMUSCULAR
Status: DISPENSED
Start: 2018-03-23 | End: 2018-03-24

## 2018-03-23 RX ORDER — GLYCOPYRROLATE 0.2 MG/ML
INJECTION INTRAMUSCULAR; INTRAVENOUS PRN
Status: DISCONTINUED | OUTPATIENT
Start: 2018-03-23 | End: 2018-03-23 | Stop reason: SDUPTHER

## 2018-03-23 RX ORDER — ALBUTEROL SULFATE 90 UG/1
AEROSOL, METERED RESPIRATORY (INHALATION) PRN
Status: DISCONTINUED | OUTPATIENT
Start: 2018-03-23 | End: 2018-03-23 | Stop reason: SDUPTHER

## 2018-03-23 RX ORDER — FENTANYL CITRATE 50 UG/ML
25 INJECTION, SOLUTION INTRAMUSCULAR; INTRAVENOUS EVERY 5 MIN PRN
Status: DISCONTINUED | OUTPATIENT
Start: 2018-03-23 | End: 2018-03-25

## 2018-03-23 RX ORDER — PROPOFOL 10 MG/ML
INJECTION, EMULSION INTRAVENOUS PRN
Status: DISCONTINUED | OUTPATIENT
Start: 2018-03-23 | End: 2018-03-23 | Stop reason: SDUPTHER

## 2018-03-23 RX ORDER — LABETALOL HYDROCHLORIDE 5 MG/ML
INJECTION, SOLUTION INTRAVENOUS PRN
Status: DISCONTINUED | OUTPATIENT
Start: 2018-03-23 | End: 2018-03-23 | Stop reason: SDUPTHER

## 2018-03-23 RX ORDER — FENTANYL CITRATE 50 UG/ML
50 INJECTION, SOLUTION INTRAMUSCULAR; INTRAVENOUS EVERY 5 MIN PRN
Status: COMPLETED | OUTPATIENT
Start: 2018-03-23 | End: 2018-03-24

## 2018-03-23 RX ORDER — PROTAMINE SULFATE 10 MG/ML
INJECTION, SOLUTION INTRAVENOUS PRN
Status: DISCONTINUED | OUTPATIENT
Start: 2018-03-23 | End: 2018-03-23 | Stop reason: SDUPTHER

## 2018-03-23 RX ORDER — DEXAMETHASONE SODIUM PHOSPHATE 10 MG/ML
INJECTION INTRAMUSCULAR; INTRAVENOUS PRN
Status: DISCONTINUED | OUTPATIENT
Start: 2018-03-23 | End: 2018-03-23 | Stop reason: SDUPTHER

## 2018-03-23 RX ORDER — HEPARIN SODIUM 1000 [USP'U]/ML
INJECTION, SOLUTION INTRAVENOUS; SUBCUTANEOUS PRN
Status: DISCONTINUED | OUTPATIENT
Start: 2018-03-23 | End: 2018-03-23 | Stop reason: SDUPTHER

## 2018-03-23 RX ORDER — ROCURONIUM BROMIDE 10 MG/ML
INJECTION, SOLUTION INTRAVENOUS PRN
Status: DISCONTINUED | OUTPATIENT
Start: 2018-03-23 | End: 2018-03-23 | Stop reason: SDUPTHER

## 2018-03-23 RX ORDER — FENTANYL CITRATE 50 UG/ML
INJECTION, SOLUTION INTRAMUSCULAR; INTRAVENOUS PRN
Status: DISCONTINUED | OUTPATIENT
Start: 2018-03-23 | End: 2018-03-23 | Stop reason: SDUPTHER

## 2018-03-23 RX ADMIN — FENTANYL CITRATE 50 MCG: 50 INJECTION INTRAMUSCULAR; INTRAVENOUS at 20:22

## 2018-03-23 RX ADMIN — NITROGLYCERIN 400 MCG: 20 INJECTION INTRAVENOUS at 11:43

## 2018-03-23 RX ADMIN — ROCURONIUM BROMIDE 50 MG: 10 INJECTION INTRAVENOUS at 09:25

## 2018-03-23 RX ADMIN — LIDOCAINE HYDROCHLORIDE 50 MG: 10 INJECTION, SOLUTION EPIDURAL; INFILTRATION; INTRACAUDAL; PERINEURAL at 09:25

## 2018-03-23 RX ADMIN — LABETALOL HCL 50 MG: 100 TABLET, FILM COATED ORAL at 20:24

## 2018-03-23 RX ADMIN — NITROGLYCERIN 200 MCG: 20 INJECTION INTRAVENOUS at 11:39

## 2018-03-23 RX ADMIN — ESMOLOL HYDROCHLORIDE 200 MCG/KG/MIN: 10 INJECTION INTRAVENOUS at 20:19

## 2018-03-23 RX ADMIN — SODIUM BICARBONATE 50 MEQ: 84 INJECTION, SOLUTION INTRAVENOUS at 17:25

## 2018-03-23 RX ADMIN — PROTAMINE SULFATE 20 MG: 10 INJECTION, SOLUTION INTRAVENOUS at 10:49

## 2018-03-23 RX ADMIN — SODIUM BICARBONATE 50 MEQ: 84 INJECTION, SOLUTION INTRAVENOUS at 12:30

## 2018-03-23 RX ADMIN — NITROGLYCERIN 200 MCG: 20 INJECTION INTRAVENOUS at 11:37

## 2018-03-23 RX ADMIN — ESMOLOL HYDROCHLORIDE 50 MCG/KG/MIN: 10 INJECTION INTRAVENOUS at 18:32

## 2018-03-23 RX ADMIN — HEPARIN SODIUM 9000 UNITS: 1000 INJECTION INTRAVENOUS; SUBCUTANEOUS at 10:11

## 2018-03-23 RX ADMIN — SODIUM CHLORIDE, POTASSIUM CHLORIDE, SODIUM LACTATE AND CALCIUM CHLORIDE: 600; 310; 30; 20 INJECTION, SOLUTION INTRAVENOUS at 08:50

## 2018-03-23 RX ADMIN — SODIUM CHLORIDE: 9 INJECTION, SOLUTION INTRAVENOUS at 08:14

## 2018-03-23 RX ADMIN — NITROGLYCERIN 300 MCG: 20 INJECTION INTRAVENOUS at 11:41

## 2018-03-23 RX ADMIN — ONDANSETRON 4 MG: 2 INJECTION, SOLUTION INTRAMUSCULAR; INTRAVENOUS at 11:02

## 2018-03-23 RX ADMIN — FENTANYL CITRATE 50 MCG: 50 INJECTION INTRAMUSCULAR; INTRAVENOUS at 10:12

## 2018-03-23 RX ADMIN — LABETALOL HYDROCHLORIDE 5 MG: 5 INJECTION, SOLUTION INTRAVENOUS at 11:12

## 2018-03-23 RX ADMIN — PROPOFOL 50 MG: 10 INJECTION, EMULSION INTRAVENOUS at 10:59

## 2018-03-23 RX ADMIN — ESMOLOL HYDROCHLORIDE 300 MCG/KG/MIN: 10 INJECTION INTRAVENOUS at 05:16

## 2018-03-23 RX ADMIN — FENTANYL CITRATE 50 MCG: 50 INJECTION INTRAMUSCULAR; INTRAVENOUS at 13:53

## 2018-03-23 RX ADMIN — ESMOLOL HYDROCHLORIDE 50 MCG/KG/MIN: 10 INJECTION INTRAVENOUS at 14:00

## 2018-03-23 RX ADMIN — PHENYLEPHRINE HYDROCHLORIDE 100 MCG: 10 INJECTION INTRAVENOUS at 10:17

## 2018-03-23 RX ADMIN — LABETALOL HYDROCHLORIDE 5 MG: 5 INJECTION, SOLUTION INTRAVENOUS at 11:17

## 2018-03-23 RX ADMIN — LORAZEPAM 0.5 MG: 2 INJECTION INTRAMUSCULAR; INTRAVENOUS at 17:27

## 2018-03-23 RX ADMIN — SODIUM CHLORIDE: 9 INJECTION, SOLUTION INTRAVENOUS at 11:00

## 2018-03-23 RX ADMIN — NITROGLYCERIN 200 MCG: 20 INJECTION INTRAVENOUS at 11:36

## 2018-03-23 RX ADMIN — ROCURONIUM BROMIDE 20 MG: 10 INJECTION INTRAVENOUS at 09:53

## 2018-03-23 RX ADMIN — VANCOMYCIN HYDROCHLORIDE 1000 MG: 1 INJECTION, POWDER, LYOPHILIZED, FOR SOLUTION INTRAVENOUS at 09:18

## 2018-03-23 RX ADMIN — FUROSEMIDE 40 MG: 10 INJECTION, SOLUTION INTRAVENOUS at 13:53

## 2018-03-23 RX ADMIN — LABETALOL HYDROCHLORIDE 5 MG: 5 INJECTION, SOLUTION INTRAVENOUS at 11:08

## 2018-03-23 RX ADMIN — ESMOLOL HYDROCHLORIDE 300 MCG/KG/MIN: 10 INJECTION INTRAVENOUS at 06:51

## 2018-03-23 RX ADMIN — ESMOLOL HYDROCHLORIDE 300 MCG/KG/MIN: 10 INJECTION INTRAVENOUS at 00:33

## 2018-03-23 RX ADMIN — MIDAZOLAM HYDROCHLORIDE 2 MG: 1 INJECTION, SOLUTION INTRAMUSCULAR; INTRAVENOUS at 09:22

## 2018-03-23 RX ADMIN — ESMOLOL HYDROCHLORIDE 300 MCG/KG/MIN: 10 INJECTION INTRAVENOUS at 02:10

## 2018-03-23 RX ADMIN — LABETALOL HYDROCHLORIDE 10 MG: 5 INJECTION, SOLUTION INTRAVENOUS at 11:15

## 2018-03-23 RX ADMIN — ESMOLOL HYDROCHLORIDE 200 MCG/KG/MIN: 10 INJECTION INTRAVENOUS at 22:52

## 2018-03-23 RX ADMIN — GLYCOPYRROLATE 0.9 MG: 0.2 INJECTION INTRAMUSCULAR; INTRAVENOUS at 11:21

## 2018-03-23 RX ADMIN — LABETALOL HYDROCHLORIDE 10 MG: 5 INJECTION, SOLUTION INTRAVENOUS at 11:20

## 2018-03-23 RX ADMIN — NITROGLYCERIN 100 MCG: 20 INJECTION INTRAVENOUS at 11:35

## 2018-03-23 RX ADMIN — NEOSTIGMINE METHYLSULFATE 5 MG: 1 INJECTION, SOLUTION INTRAMUSCULAR; INTRAVENOUS; SUBCUTANEOUS at 11:21

## 2018-03-23 RX ADMIN — SODIUM BICARBONATE 50 MEQ: 84 INJECTION, SOLUTION INTRAVENOUS at 19:55

## 2018-03-23 RX ADMIN — SUGAMMADEX 400 MG: 100 INJECTION, SOLUTION INTRAVENOUS at 11:59

## 2018-03-23 RX ADMIN — NICARDIPINE HYDROCHLORIDE 5 MG: 25 INJECTION INTRAVENOUS at 11:16

## 2018-03-23 RX ADMIN — ESMOLOL HYDROCHLORIDE 300 MCG/KG/MIN: 10 INJECTION INTRAVENOUS at 03:35

## 2018-03-23 RX ADMIN — FENTANYL CITRATE 100 MCG: 50 INJECTION INTRAMUSCULAR; INTRAVENOUS at 09:22

## 2018-03-23 RX ADMIN — PROPOFOL 150 MG: 10 INJECTION, EMULSION INTRAVENOUS at 09:25

## 2018-03-23 RX ADMIN — Medication 10 PUFF: at 11:31

## 2018-03-23 RX ADMIN — DEXAMETHASONE SODIUM PHOSPHATE 4 MG: 10 INJECTION INTRAMUSCULAR; INTRAVENOUS at 09:48

## 2018-03-23 RX ADMIN — ESMOLOL HYDROCHLORIDE 250 MCG/KG/MIN: 10 INJECTION INTRAVENOUS at 08:14

## 2018-03-23 ASSESSMENT — PULMONARY FUNCTION TESTS
PIF_VALUE: 30
PIF_VALUE: 29
PIF_VALUE: 1
PIF_VALUE: 3
PIF_VALUE: 1
PIF_VALUE: 29
PIF_VALUE: 30
PIF_VALUE: 29
PIF_VALUE: 1
PIF_VALUE: 1
PIF_VALUE: 0
PIF_VALUE: 40
PIF_VALUE: 30
PIF_VALUE: 2
PIF_VALUE: 29
PIF_VALUE: 31
PIF_VALUE: 29
PIF_VALUE: 29
PIF_VALUE: 28
PIF_VALUE: 31
PIF_VALUE: 22
PIF_VALUE: 4
PIF_VALUE: 28
PIF_VALUE: 23
PIF_VALUE: 0
PIF_VALUE: 1
PIF_VALUE: 29
PIF_VALUE: 22
PIF_VALUE: 31
PIF_VALUE: 29
PIF_VALUE: 1
PIF_VALUE: 28
PIF_VALUE: 30
PIF_VALUE: 29
PIF_VALUE: 1
PIF_VALUE: 28
PIF_VALUE: 1
PIF_VALUE: 3
PIF_VALUE: 30
PIF_VALUE: 29
PIF_VALUE: 0
PIF_VALUE: 30
PIF_VALUE: 31
PIF_VALUE: 28
PIF_VALUE: 30
PIF_VALUE: 0
PIF_VALUE: 29
PIF_VALUE: 29
PIF_VALUE: 3
PIF_VALUE: 29
PIF_VALUE: 5
PIF_VALUE: 0
PIF_VALUE: 30
PIF_VALUE: 30
PIF_VALUE: 0
PIF_VALUE: 31
PIF_VALUE: 30
PIF_VALUE: 1
PIF_VALUE: 13
PIF_VALUE: 29
PIF_VALUE: 30
PIF_VALUE: 1
PIF_VALUE: 1
PIF_VALUE: 28
PIF_VALUE: 1
PIF_VALUE: 1
PIF_VALUE: 29
PIF_VALUE: 28
PIF_VALUE: 7
PIF_VALUE: 33
PIF_VALUE: 28
PIF_VALUE: 29
PIF_VALUE: 5
PIF_VALUE: 0
PIF_VALUE: 29
PIF_VALUE: 5
PIF_VALUE: 30
PIF_VALUE: 30
PIF_VALUE: 29
PIF_VALUE: 3
PIF_VALUE: 0
PIF_VALUE: 1
PIF_VALUE: 0
PIF_VALUE: 31
PIF_VALUE: 30
PIF_VALUE: 30
PIF_VALUE: 4
PIF_VALUE: 0
PIF_VALUE: 1
PIF_VALUE: 30
PIF_VALUE: 29
PIF_VALUE: 20
PIF_VALUE: 0
PIF_VALUE: 31
PIF_VALUE: 0
PIF_VALUE: 0
PIF_VALUE: 30
PIF_VALUE: 3
PIF_VALUE: 4
PIF_VALUE: 29
PIF_VALUE: 30
PIF_VALUE: 30
PIF_VALUE: 29
PIF_VALUE: 28
PIF_VALUE: 0
PIF_VALUE: 29
PIF_VALUE: 28
PIF_VALUE: 29
PIF_VALUE: 1
PIF_VALUE: 1
PIF_VALUE: 0
PIF_VALUE: 30
PIF_VALUE: 1
PIF_VALUE: 0
PIF_VALUE: 34
PIF_VALUE: 4
PIF_VALUE: 6
PIF_VALUE: 23
PIF_VALUE: 30
PIF_VALUE: 29
PIF_VALUE: 1
PIF_VALUE: 29
PIF_VALUE: 27
PIF_VALUE: 16
PIF_VALUE: 28
PIF_VALUE: 1
PIF_VALUE: 25
PIF_VALUE: 31
PIF_VALUE: 29
PIF_VALUE: 31
PIF_VALUE: 39
PIF_VALUE: 0
PIF_VALUE: 28
PIF_VALUE: 29
PIF_VALUE: 1
PIF_VALUE: 30
PIF_VALUE: 1
PIF_VALUE: 30
PIF_VALUE: 0
PIF_VALUE: 29
PIF_VALUE: 31
PIF_VALUE: 31
PIF_VALUE: 1
PIF_VALUE: 29
PIF_VALUE: 1
PIF_VALUE: 0
PIF_VALUE: 1
PIF_VALUE: 28
PIF_VALUE: 0
PIF_VALUE: 28
PIF_VALUE: 32
PIF_VALUE: 36
PIF_VALUE: 1
PIF_VALUE: 31
PIF_VALUE: 30
PIF_VALUE: 2
PIF_VALUE: 29
PIF_VALUE: 25
PIF_VALUE: 31
PIF_VALUE: 29
PIF_VALUE: 0
PIF_VALUE: 3
PIF_VALUE: 30
PIF_VALUE: 30
PIF_VALUE: 5
PIF_VALUE: 30
PIF_VALUE: 28
PIF_VALUE: 2
PIF_VALUE: 29
PIF_VALUE: 3
PIF_VALUE: 28
PIF_VALUE: 29
PIF_VALUE: 29
PIF_VALUE: 19
PIF_VALUE: 17
PIF_VALUE: 1
PIF_VALUE: 19
PIF_VALUE: 28
PIF_VALUE: 1
PIF_VALUE: 29
PIF_VALUE: 1
PIF_VALUE: 1
PIF_VALUE: 29
PIF_VALUE: 28
PIF_VALUE: 1
PIF_VALUE: 29
PIF_VALUE: 1
PIF_VALUE: 0
PIF_VALUE: 30
PIF_VALUE: 29
PIF_VALUE: 28
PIF_VALUE: 29
PIF_VALUE: 1
PIF_VALUE: 2
PIF_VALUE: 30
PIF_VALUE: 34
PIF_VALUE: 0
PIF_VALUE: 30
PIF_VALUE: 29
PIF_VALUE: 30
PIF_VALUE: 31
PIF_VALUE: 30
PIF_VALUE: 4
PIF_VALUE: 31
PIF_VALUE: 10
PIF_VALUE: 29
PIF_VALUE: 13
PIF_VALUE: 31
PIF_VALUE: 30
PIF_VALUE: 5
PIF_VALUE: 32
PIF_VALUE: 29
PIF_VALUE: 0
PIF_VALUE: 30
PIF_VALUE: 0
PIF_VALUE: 0
PIF_VALUE: 1
PIF_VALUE: 30
PIF_VALUE: 30
PIF_VALUE: 29
PIF_VALUE: 0
PIF_VALUE: 30
PIF_VALUE: 28

## 2018-03-23 ASSESSMENT — PAIN SCALES - GENERAL
PAINLEVEL_OUTOF10: 5
PAINLEVEL_OUTOF10: 0

## 2018-03-23 NOTE — PROGRESS NOTES
(03/23/18 1425)    sodium chloride 75 mL/hr at 03/23/18 0814    dextrose       PRN Meds:     fentanNYL 25 mcg Q5 Min PRN   fentanNYL 50 mcg Q5 Min PRN   sodium chloride flush 10 mL PRN   acetaminophen 650 mg Q4H PRN   magnesium hydroxide 30 mL Daily PRN   ondansetron 4 mg Q6H PRN   fentanNYL 25 mcg Q1H PRN   potassium chloride 10 mEq PRN   glucose 15 g PRN   dextrose 12.5 g PRN   glucagon (rDNA) 1 mg PRN   dextrose 100 mL/hr PRN   labetalol 10 mg Q6H PRN         VENT SETTINGS (Comprehensive) (if applicable):  Vent Information  FiO2 : 40 %  Additional Respiratory  Assessments  Pulse: 81  Resp: 24  SpO2: 98 %      Laboratory findings:    Complete Blood Count: Recent Labs      03/22/18   0512  03/22/18   0844  03/23/18   0404   WBC  10.1  8.8  7.1   HGB  11.2*  10.2*  9.2*   HCT  34.3*  31.9*  30.2*   PLT  284  268  227        Last 3 Blood Glucose:   Recent Labs      03/22/18   0512  03/22/18   0844  03/23/18   0404   GLUCOSE  116*  97  122*        PT/INR:    Lab Results   Component Value Date    PROTIME 11.2 03/22/2018    INR 1.1 03/22/2018     PTT:    Lab Results   Component Value Date    APTT 27.2 03/22/2018       Comprehensive Metabolic Profile:   Recent Labs      03/22/18   0512  03/22/18   0844  03/22/18   1743  03/23/18   0404   NA  142  144   --   142   K  3.0*  2.9*  5.3  4.5   CL  104  104   --   108*   CO2  25  26   --   22   BUN  30*  29*   --   28*   CREATININE  1.60*  1.50*   --   1.79*   GLUCOSE  116*  97   --   122*   CALCIUM  8.3*  8.5*   --   7.8*   PROT   --   6.4   --    --    LABALBU   --   3.5   --    --    BILITOT   --   0.29*   --    --    ALKPHOS   --   46   --    --    AST   --   14   --    --    ALT   --   15   --    --       Magnesium:   Lab Results   Component Value Date    MG 2.9 03/22/2018     Phosphorus:   Lab Results   Component Value Date    PHOS 3.5 06/05/2017     Ionized Calcium: No results found for: WHITNEY     Urinalysis:     Troponin: No results for input(s): TROPONINI in the last 72 hours. Microbiology:    Cultures during this admission:     Blood cultures:                 [] None drawn      [] Negative             []  Positive (Details:  )  Urine Culture:                   [] None drawn      [] Negative             []  Positive (Details:  )  Sputum Culture:               [] None drawn       [] Negative             []  Positive (Details:  )   Endotracheal aspirate:     [] None drawn       [] Negative             []  Positive (Details:  )     Other pertinent Labs:       Radiology/Imaging:       ASSESSMENT:     Active Problems: Aortic dissection (HCC)  Resolved Problems:    * No resolved hospital problems. *            PLAN:     WEAN PER PROTOCOL:  [x] No   [] Yes  [] N/A    DISCONTINUE ANY LABS:   [x] No   [] Yes    ICU PROPHYLAXIS:  Stress ulcer:  [] PPI Agent  [] Q0Cmetg [] Sucralfate  [] Other:  VTE:   [] Enoxaparin  [] Unfract. Heparin Subcut  [x] EPC Cuffs    NUTRITION: (Diet: Diet NPO, After Midnight)    HOME MEDICATIONS RECONCILED: [x] No  [] Yes    INSULIN DRIP:   [x] No   [] Yes    CONSULTATION NEEDED:  [] No   [x] Yes    FAMILY UPDATED:    [] No   [x] Yes    TRANSFER OUT OF ICU:   [x] No   [] Yes    ADDITIONAL PLAN:  Active Problems: Aortic dissection (HCC)  Resolved Problems:    * No resolved hospital problems. *   acute intramural hematoma in the descending aorta at the level of left subclavian artery  Essential hypertension  Type 2 diabetes mellitus  CK D stage III           Plan:     1.    keep systolic blood pressure less than 120, use esmolol gtt  2. #2 vascular surgery consulted,   TEVAR today  3. Monitor blood glucose and keep blood glucose 140-180 using sliding scale  4. Monitor input output, daily weights, BMP, CBC  5. Follow troponins  6. Echocardiogram  7. Keep nothing by mouth  8. Avoid anticoagulants  9. Maintenance IV fluid at 125 ML per hour  10. Fentanyl for pain control  11.      Juan Luis Cameron MD      Department of [address] [place] [address],[address]

## 2018-03-23 NOTE — PROGRESS NOTES
Dr. Asuncion Watkins at bedside, updated on patient's condition, ativan given per orders and able to put bipap back on.   New orders received for ABG's at 1900 pm.

## 2018-03-23 NOTE — PROGRESS NOTES
Vascular perfect served again regarding BP parameters. Finally talked to Dr. Diane Lay over phone. He would like keep SBP between 120-140.

## 2018-03-23 NOTE — PROGRESS NOTES
pressure of less than 120. Patient does not have a similar history in the past.  She does not have any significant cardiac history as well. Initial labs showed T-wave inversion   in anterior lateral leads, troponins negative, BNP mildly elevated. TEVAR planned for today      Physical Exam:   Vitals: /78   Pulse 81   Temp 98.3 °F (36.8 °C) (Oral)   Resp 24   Ht 5' 3\" (1.6 m)   Wt 209 lb 10.5 oz (95.1 kg)   SpO2 98%   BMI 37.14 kg/m²   24 hour intake/output:  Intake/Output Summary (Last 24 hours) at 03/23/18 1359  Last data filed at 03/23/18 1145   Gross per 24 hour   Intake             6636 ml   Output             1100 ml   Net             5536 ml     Last 3 weights: Wt Readings from Last 3 Encounters:   03/23/18 209 lb 10.5 oz (95.1 kg)   03/22/18 199 lb 8.3 oz (90.5 kg)   09/14/17 197 lb 12.8 oz (89.7 kg)       General appearance - alert, well appearing, appears comfortable, on BiPAP  Mental status - alert, oriented to person, place, and time  Eyes - pupils equal and reactive, extraocular eye movements intact  Mouth - mucous membranes moist, pharynx normal without lesions  Neck - supple, no significant adenopathy  Chest - clear to auscultation, no wheezes, rales or rhonchi, symmetric air entry  Heart - normal rate, regular rhythm, normal S1, S2, no murmurs, rubs, clicks or gallops  Abdomen - soft, nontender, nondistended, no masses or organomegaly  Neurological - alert, oriented, normal speech, no focal deficits   Extremities - peripheral pulses normal except that she has decreased peripheral pulses in left arm, no pedal edema, no clubbing or cyanosis. Distal pulses +1 bilaterally  Skin - normal coloration and turgor, no rashes, no suspicious skin lesions noted, no hematomas present to the left or right femoral areas.           Medications:Current Inpatient  Scheduled Meds:   sodium chloride flush  10 mL Intravenous 2 times per day    labetalol  50 mg Oral 2 times per day    insulin lispro  0-12

## 2018-03-23 NOTE — PROGRESS NOTES
AbG results called to Dr Ariella Mares, plan change NIV to Servo NIV/PC. abg results also given to Genie

## 2018-03-23 NOTE — PROGRESS NOTES
Short Call Note      This is a 63 y.o. female admitted 3/22/2018 for Aortic dissection ([facility] [address] [I71.00]. See H&P of admitting resident for more details. Patient was transferred from the ICU. She is status post op for aortic dissection and intramural hematoma. Assessment    Active Problems: Aortic dissection (HCC)  Resolved Problems:    * No resolved hospital problems. *        Plan     1. Descending aortic intramural hematoma - s/p TEVAR. Vascular on board. Appreciate recommendations. Tight blood pressure control with SBP < 120. On esmolol drip. Echo pending. 2. Hypertension - currently on esmolol drip. Labetalol 50 MG twice a day. Home medications include Lopressor 25 MG twice a day, (losartan, hydrochlorothiazide), amlodipine 10 MG. 3. Diabetes mellitus - sliding scale. Follow-up with HbA1c.  4. CKD - Baseline creatinine 1.6-1.8. Monitor I's and O's.  5. DVT prophylaxis - EPC cuffs.        Sandrita Sparrow MD            Department of Internal Medicine  [facility]         3/23/2018, 3:00 PM

## 2018-03-23 NOTE — FLOWSHEET NOTE
Stopped to see pt while rounding on SICU. Pt was sitting up in bed w/ her legs crossed under her. Per RN, pt had just been given OK to have something to eat, but will be NPO after Midnight for procedure tomorrow. Pt appeared quite anxious when  introduced himself. Pt said that she is supposed to have surgery tomorrow, but she could not tell  what the surgery is for. Pt confirmed that she is [facility], but said that she has no regular place of Faith, but attends @ 1 or 2 places. Pt accepted 's offer of prayer. Chaplains will remain available to offer spiritual and emotional support as needed. Rev. Margarette Leon, [address]     03/22/18 3343   Encounter Summary   Services provided to: Patient and family together  (female cousin in room w/ pt)   Referral/Consult From: [address] [facility];Family members   Place of Jehovah's witness none   Continue Visiting (3/22)   Complexity of Encounter Moderate   Length of Encounter 15 minutes   Routine   Type Initial  (pre-procedure)   Assessment Calm; Approachable; Anxious; Coping   Intervention Sustaining presence/ Ministry of presence; Active listening;Explored feelings, thoughts, concerns;Explored coping resources;Nurtured hope;Prayer  (left Spiritual Care info)   Outcome Receptive; Acceptance; Coping;Encouraged;Comfort;Expressed gratitude

## 2018-03-23 NOTE — ANESTHESIA PRE PROCEDURE
Department of Anesthesiology  Preprocedure Note       Name:  Sarah Beth Funes   Age:  63 y.o.  :  1960                                          MRN:  [id number]         Date:  3/23/2018      Surgeon: * No surgeons listed *    Procedure: * No procedures listed *    Medications prior to admission:   Prior to Admission medications    Medication Sig Start Date End Date Taking? Authorizing Provider   Cyanocobalamin (VITAMIN B-12) 1000 MCG extended release tablet Take 1,000 mcg by mouth daily    Historical Provider, MD   calcium citrate-vitamin D (CITRICAL + D) 315-250 MG-UNIT TABS per tablet Take 2 tablets by mouth 2 times daily (with meals)    Historical Provider, MD   metoprolol tartrate (LOPRESSOR) 25 MG tablet Take 1 tablet by mouth 2 times daily 18   Anup Rios MD   allopurinol (ZYLOPRIM) 100 MG tablet Take 1 tablet by mouth daily 18   Anup Rios MD   potassium chloride (KLOR-CON) 20 MEQ packet TAKE 1 PACKET DISSOVLED IN 4 TO 6 OUNCES OF WATER TWICE A DAY 18   David Garzon MD   losartan-hydrochlorothiazide (HYZAAR) 100-25 MG per tablet TAKE 1 TABLET DAILY 18   David Garzon MD   amLODIPine (NORVASC) 10 MG tablet TAKE 1 TABLET DAILY 17   David Garzon MD   vitamin D (ERGOCALCIFEROL) 07507 units CAPS capsule TAKE 1 CAPSULE EVERY WEEK 10/23/17   David Garzon MD   docusate sodium (COLACE) 100 MG capsule Take 1 capsule by mouth 2 times daily 17   Anup Rios MD   meclizine (ANTIVERT) 25 MG CHEW Take 1 tablet by mouth 2 times daily as needed (motion sickness) 17   Anup Rios MD   Blood Pressure Monitoring (B-D ASSURE BPM/AUTO WRIST CUFF) MISC Check BP x2 per week.  17   Anup Rios MD   ferrous sulfate 325 (65 FE) MG EC tablet Take 325 mg by mouth 3 times daily (with meals)    Historical Provider, MD   acetaminophen (TYLENOL) 500 MG tablet Take 500 mg by mouth every 6 hours as needed for Pain    Historical History:    Social History   Substance Use Topics    Smoking status: Former Smoker     Packs/day: 1.00     Years: 30.00     Types: Cigarettes     Quit date: 3/21/2001    Smokeless tobacco: Never Used    Alcohol use No                                Counseling given: Not Answered      Vital Signs (Current):   Vitals:    03/23/18 0745 03/23/18 0800 03/23/18 0815 03/23/18 0830   BP: 126/74 127/82 117/70 127/78   Pulse: 71 73 74 73   Resp: 15 15 20 16   Temp:  98.3 °F (36.8 °C)     TempSrc:       SpO2:       Weight:       Height:                                                  BP Readings from Last 3 Encounters:   03/23/18 127/78   03/23/18 139/78   03/22/18 (!) 170/88       NPO Status:                                                                                 BMI:   Wt Readings from Last 3 Encounters:   03/23/18 209 lb 10.5 oz (95.1 kg)   03/22/18 199 lb 8.3 oz (90.5 kg)   09/14/17 197 lb 12.8 oz (89.7 kg)     Body mass index is 37.14 kg/m².     CBC:   Lab Results   Component Value Date    WBC 7.1 03/23/2018    RBC 3.17 03/23/2018    RBC 4.29 03/13/2012    HGB 9.2 03/23/2018    HCT 30.2 03/23/2018    MCV 95.3 03/23/2018    RDW 16.8 03/23/2018     03/23/2018     03/13/2012       CMP:   Lab Results   Component Value Date     03/23/2018    K 4.5 03/23/2018     03/23/2018    CO2 22 03/23/2018    BUN 28 03/23/2018    CREATININE 1.79 03/23/2018    GFRAA 35 03/23/2018    LABGLOM 29 03/23/2018    GLUCOSE 122 03/23/2018    GLUCOSE 80 03/13/2012    PROT 6.4 03/22/2018    CALCIUM 7.8 03/23/2018    BILITOT 0.29 03/22/2018    ALKPHOS 46 03/22/2018    AST 14 03/22/2018    ALT 15 03/22/2018       POC Tests:   Recent Labs      03/23/18   0835   POCGLU  102       Coags:   Lab Results   Component Value Date    PROTIME 11.2 03/22/2018    INR 1.1 03/22/2018    APTT 27.2 03/22/2018       HCG (If Applicable): No results found for: PREGTESTUR, PREGSERUM, HCG, HCGQUANT     ABGs: No results found for: PHART, PO2ART, SYN2FPH, SSM9UEN, BEART, V6DWCSWS     Type & Screen (If Applicable):  No results found for: [facility]    Anesthesia Evaluation  Patient summary reviewed no history of anesthetic complications:   Airway: Mallampati: III  TM distance: >3 FB   Neck ROM: full  Mouth opening: > = 3 FB Dental:    (+) upper dentures and lower dentures      Pulmonary:Negative Pulmonary ROS and normal exam    (+) sleep apnea:                             Cardiovascular:  Exercise tolerance: good (>4 METS),   (+) hypertension:, hyperlipidemia                  Neuro/Psych:               GI/Hepatic/Renal:   (+) renal disease: CRI,           Endo/Other:    (+) DiabetesType II DM, , : arthritis:., .          Pt had no PAT visit       Abdominal:   (+) obese,         Vascular:                                        Anesthesia Plan      general     ASA 4       Induction: intravenous. arterial line and central line  MIPS: Postoperative opioids intended. Anesthetic plan and risks discussed with patient. Plan discussed with CRNA.                   Hans Wild MD   3/23/2018

## 2018-03-24 LAB
ALLEN TEST: ABNORMAL
ALLEN TEST: ABNORMAL
ALLEN TEST: POSITIVE
CULTURE: NO GROWTH
CULTURE: NORMAL
FIO2: 3
FIO2: 40
FIO2: 40
GLUCOSE BLD-MCNC: 105 MG/DL (ref 65–105)
GLUCOSE BLD-MCNC: 94 MG/DL (ref 74–100)
GLUCOSE BLD-MCNC: 96 MG/DL (ref 74–100)
Lab: NORMAL
MODE: ABNORMAL
NEGATIVE BASE EXCESS, ART: 4 (ref 0–2)
NEGATIVE BASE EXCESS, ART: 5 (ref 0–2)
NEGATIVE BASE EXCESS, ART: 5 (ref 0–2)
O2 DEVICE/FLOW/%: ABNORMAL
PATIENT TEMP: ABNORMAL
POC HCO3: 22.2 MMOL/L (ref 21–28)
POC HCO3: 22.7 MMOL/L (ref 21–28)
POC HCO3: 22.8 MMOL/L (ref 21–28)
POC O2 SATURATION: 90 % (ref 94–98)
POC O2 SATURATION: 97 % (ref 94–98)
POC O2 SATURATION: 98 % (ref 94–98)
POC PCO2 TEMP: ABNORMAL MM HG
POC PCO2: 47.4 MM HG (ref 35–48)
POC PCO2: 50.4 MM HG (ref 35–48)
POC PCO2: 51.9 MM HG (ref 35–48)
POC PH TEMP: ABNORMAL
POC PH: 7.25 (ref 7.35–7.45)
POC PH: 7.25 (ref 7.35–7.45)
POC PH: 7.29 (ref 7.35–7.45)
POC PO2 TEMP: ABNORMAL MM HG
POC PO2: 104.3 MM HG (ref 83–108)
POC PO2: 130.4 MM HG (ref 83–108)
POC PO2: 67 MM HG (ref 83–108)
POSITIVE BASE EXCESS, ART: ABNORMAL (ref 0–3)
SAMPLE SITE: ABNORMAL
SPECIMEN DESCRIPTION: NORMAL
STATUS: NORMAL
TCO2 (CALC), ART: 24 MMOL/L (ref 22–29)

## 2018-03-24 PROCEDURE — 82947 ASSAY GLUCOSE BLOOD QUANT: CPT

## 2018-03-24 PROCEDURE — 36600 WITHDRAWAL OF ARTERIAL BLOOD: CPT

## 2018-03-24 PROCEDURE — 6370000000 HC RX 637 (ALT 250 FOR IP): Performed by: STUDENT IN AN ORGANIZED HEALTH CARE EDUCATION/TRAINING PROGRAM

## 2018-03-24 PROCEDURE — 6360000002 HC RX W HCPCS: Performed by: STUDENT IN AN ORGANIZED HEALTH CARE EDUCATION/TRAINING PROGRAM

## 2018-03-24 PROCEDURE — 94660 CPAP INITIATION&MGMT: CPT

## 2018-03-24 PROCEDURE — 6360000002 HC RX W HCPCS: Performed by: ANESTHESIOLOGY

## 2018-03-24 PROCEDURE — 2000000000 HC ICU R&B

## 2018-03-24 PROCEDURE — 80048 BASIC METABOLIC PNL TOTAL CA: CPT

## 2018-03-24 PROCEDURE — 99233 SBSQ HOSP IP/OBS HIGH 50: CPT | Performed by: INTERNAL MEDICINE

## 2018-03-24 PROCEDURE — 94762 N-INVAS EAR/PLS OXIMTRY CONT: CPT

## 2018-03-24 PROCEDURE — 82803 BLOOD GASES ANY COMBINATION: CPT

## 2018-03-24 PROCEDURE — 2500000003 HC RX 250 WO HCPCS: Performed by: STUDENT IN AN ORGANIZED HEALTH CARE EDUCATION/TRAINING PROGRAM

## 2018-03-24 RX ORDER — MORPHINE SULFATE 2 MG/ML
0.5 INJECTION, SOLUTION INTRAMUSCULAR; INTRAVENOUS
Status: COMPLETED | OUTPATIENT
Start: 2018-03-24 | End: 2018-03-24

## 2018-03-24 RX ORDER — UREA 10 %
3 LOTION (ML) TOPICAL NIGHTLY PRN
Status: DISCONTINUED | OUTPATIENT
Start: 2018-03-24 | End: 2018-03-28 | Stop reason: HOSPADM

## 2018-03-24 RX ORDER — LORAZEPAM 2 MG/ML
0.5 INJECTION INTRAMUSCULAR EVERY 6 HOURS PRN
Status: DISCONTINUED | OUTPATIENT
Start: 2018-03-24 | End: 2018-03-25

## 2018-03-24 RX ADMIN — LABETALOL HCL 50 MG: 100 TABLET, FILM COATED ORAL at 10:51

## 2018-03-24 RX ADMIN — FENTANYL CITRATE 50 MCG: 50 INJECTION INTRAMUSCULAR; INTRAVENOUS at 03:40

## 2018-03-24 RX ADMIN — MORPHINE SULFATE 0.5 MG: 2 INJECTION, SOLUTION INTRAMUSCULAR; INTRAVENOUS at 11:51

## 2018-03-24 RX ADMIN — ESMOLOL HYDROCHLORIDE 200 MCG/KG/MIN: 10 INJECTION INTRAVENOUS at 00:49

## 2018-03-24 RX ADMIN — LABETALOL HCL 50 MG: 100 TABLET, FILM COATED ORAL at 21:13

## 2018-03-24 RX ADMIN — Medication 3 MG: at 21:13

## 2018-03-24 RX ADMIN — LORAZEPAM 0.5 MG: 2 INJECTION INTRAMUSCULAR; INTRAVENOUS at 21:17

## 2018-03-24 RX ADMIN — ESMOLOL HYDROCHLORIDE 200 MCG/KG/MIN: 10 INJECTION INTRAVENOUS at 03:40

## 2018-03-24 RX ADMIN — FENTANYL CITRATE 50 MCG: 50 INJECTION INTRAMUSCULAR; INTRAVENOUS at 01:39

## 2018-03-24 RX ADMIN — ESMOLOL HYDROCHLORIDE 180 MCG/KG/MIN: 10 INJECTION INTRAVENOUS at 06:17

## 2018-03-24 ASSESSMENT — PULMONARY FUNCTION TESTS
PIF_VALUE: 22
PIF_VALUE: 19
PIF_VALUE: 19
PIF_VALUE: 20
PIF_VALUE: 24
PIF_VALUE: 22

## 2018-03-24 ASSESSMENT — PAIN SCALES - GENERAL: PAINLEVEL_OUTOF10: 6

## 2018-03-24 NOTE — PROGRESS NOTES
Vascular Surgery Progress Note         PATIENT NAME: Hosea Lange     TODAY'S DATE: 3/24/2018, 6:10 AM    CC: Intramural hematoma     SUBJECTIVE:    POD#1 s/p TEVAR. Patient seen and examined. Patient resting comfortably with bipap on. She denies any pain at this time. She appears much improved this AM and is responding to questions appropriately. She denies any chest pain, nausea, vomiting, headaches, or changes in vision. Per nursing, patient continued to remove Bipap overnight. AGB's slightly improving however still acidotic and hypercapnic @430 this AM.     OBJECTIVE:   Vitals:  BP (!) 94/49   Pulse 79   Temp 96.6 °F (35.9 °C) (Axillary)   Resp 27   Ht 5' 3\" (1.6 m)   Wt 209 lb 10.5 oz (95.1 kg)   SpO2 99%   BMI 37.14 kg/m²      INTAKE/OUTPUT:      Intake/Output Summary (Last 24 hours) at 03/24/18 0610  Last data filed at 03/24/18 0542   Gross per 24 hour   Intake          6767.94 ml   Output             1680 ml   Net          5087.94 ml     Vital signs and Nurse's note reviewed  Gen:  A&Ox3, NAD  HEENT: PERRLA, EOMI, no scleral icterus, oral mucosa moist  Neck: Supple  Chest: Symmetric rise with inhalation, no evidence of trauma  CVS: Regular rate and rhythm  Resp: BiPap on. Resting comfortably. Abd: soft, non-tender, non-distended  Ext: No clubbing, cyanosis, edema, peripheral pulses 2+ Rad/Fem/DP/PT  CNS: Moves all extremities, no gross focal motor deficits  Incisions:  Dressing with minimal serous drainage to the center of the dressing. Appears c/d/I. No surrounding erythema or hematoma noted to the b/l groins. ASSESSMENT   1. Descending aortic intramural hematoma s/p TEVAR POD#1  2. Hypertension  3. Hyperlipidemia  4. CKD stage 3     PLAN  1. No further vascular interventions indicated at this time. Patient tolerated the procedure well and is asymptomatic this AM.  2. Patient continues to be acidotic and hypercapnic this AM.  Will allow primary team to manage at this time. 3. Continue BiPap per primary. 4. Recommend SBP < 140  5. Recommend goal heart rate of  60  6. Up with assistance only  7. Neurovascular checks  8. Will discuss with attending physician.   Further recommendations to follow     Electronically signed by Delta Siddiqi DO  on 3/24/2018 at 6:10 AM

## 2018-03-24 NOTE — PROGRESS NOTES
Patient refusing bipap. Has removed mask and refuses to have it replaced. Placed 3L NC. Will notify medicine.

## 2018-03-24 NOTE — PROGRESS NOTES
Writer at bedside, patient gown wet. Found with IV pulled out of site. Catheter intact. Esmolol paused at this time. Maintenance fluids resumed. Will cont to monitor.

## 2018-03-24 NOTE — PROGRESS NOTES
Patient again refusing to wear bipap. Writer explained the importance of the bipap and why it is helping her. Patient still refusing at this time. Placed on 3L NC, will try to replace bipap again later.  Medicine notified

## 2018-03-24 NOTE — PROGRESS NOTES
MG.  3. Respiratory Acidosis - Improving. Encourage BIPap. 4. Diabetes mellitus - sliding scale. HbA1C 5.6  5. CKD - Baseline creatinine 1.6-1.8. Monitor I's and O's.  6. DVT prophylaxis - EPC cuffs.       Neil Pacheco, PGY-1, Internal Medicine Resident  [address] \[facility]\""

## 2018-03-24 NOTE — PROGRESS NOTES
Dr. Anna Marie Reveles updated on patient status. Mentation improved from earlier today. Order to repeat blood gas tomorrow am. Patient ok off of bipap for now, but needs to be encouraged to wear it tonight while sleeping.

## 2018-03-25 ENCOUNTER — APPOINTMENT (OUTPATIENT)
Dept: GENERAL RADIOLOGY | Age: 58
DRG: 181 | End: 2018-03-25
Attending: INTERNAL MEDICINE
Payer: MEDICARE

## 2018-03-25 LAB
ABO/RH: NORMAL
ALLEN TEST: POSITIVE
ANION GAP SERPL CALCULATED.3IONS-SCNC: 14 MMOL/L (ref 9–17)
ANTIBODY SCREEN: NEGATIVE
ARM BAND NUMBER: NORMAL
BLD PROD TYP BPU: NORMAL
BLD PROD TYP BPU: NORMAL
BUN BLDV-MCNC: 33 MG/DL (ref 6–20)
BUN/CREAT BLD: ABNORMAL (ref 9–20)
CALCIUM SERPL-MCNC: 7.3 MG/DL (ref 8.6–10.4)
CHLORIDE BLD-SCNC: 111 MMOL/L (ref 98–107)
CO2: 21 MMOL/L (ref 20–31)
CREAT SERPL-MCNC: 1.86 MG/DL (ref 0.5–0.9)
CROSSMATCH RESULT: NORMAL
CROSSMATCH RESULT: NORMAL
DISPENSE STATUS BLOOD BANK: NORMAL
DISPENSE STATUS BLOOD BANK: NORMAL
EXPIRATION DATE: NORMAL
FIO2: 40
GFR AFRICAN AMERICAN: 34 ML/MIN
GFR NON-AFRICAN AMERICAN: 28 ML/MIN
GFR SERPL CREATININE-BSD FRML MDRD: ABNORMAL ML/MIN/{1.73_M2}
GFR SERPL CREATININE-BSD FRML MDRD: ABNORMAL ML/MIN/{1.73_M2}
GLUCOSE BLD-MCNC: 102 MG/DL (ref 65–105)
GLUCOSE BLD-MCNC: 119 MG/DL (ref 65–105)
GLUCOSE BLD-MCNC: 130 MG/DL (ref 65–105)
GLUCOSE BLD-MCNC: 90 MG/DL (ref 65–105)
GLUCOSE BLD-MCNC: 94 MG/DL (ref 70–99)
GLUCOSE BLD-MCNC: 95 MG/DL (ref 65–105)
MODE: ABNORMAL
NEGATIVE BASE EXCESS, ART: 3 (ref 0–2)
O2 DEVICE/FLOW/%: ABNORMAL
PATIENT TEMP: ABNORMAL
POC HCO3: 23.3 MMOL/L (ref 21–28)
POC O2 SATURATION: 99 % (ref 94–98)
POC PCO2 TEMP: ABNORMAL MM HG
POC PCO2: 47.7 MM HG (ref 35–48)
POC PH TEMP: ABNORMAL
POC PH: 7.3 (ref 7.35–7.45)
POC PO2 TEMP: ABNORMAL MM HG
POC PO2: 144.8 MM HG (ref 83–108)
POSITIVE BASE EXCESS, ART: ABNORMAL (ref 0–3)
POTASSIUM SERPL-SCNC: 4.8 MMOL/L (ref 3.7–5.3)
SAMPLE SITE: ABNORMAL
SODIUM BLD-SCNC: 146 MMOL/L (ref 135–144)
TCO2 (CALC), ART: 25 MMOL/L (ref 22–29)
TRANSFUSION STATUS: NORMAL
TRANSFUSION STATUS: NORMAL
UNIT DIVISION: 0
UNIT DIVISION: 0
UNIT NUMBER: NORMAL
UNIT NUMBER: NORMAL

## 2018-03-25 PROCEDURE — 99233 SBSQ HOSP IP/OBS HIGH 50: CPT | Performed by: INTERNAL MEDICINE

## 2018-03-25 PROCEDURE — 2580000003 HC RX 258: Performed by: STUDENT IN AN ORGANIZED HEALTH CARE EDUCATION/TRAINING PROGRAM

## 2018-03-25 PROCEDURE — 94762 N-INVAS EAR/PLS OXIMTRY CONT: CPT

## 2018-03-25 PROCEDURE — 6370000000 HC RX 637 (ALT 250 FOR IP): Performed by: INTERNAL MEDICINE

## 2018-03-25 PROCEDURE — 6370000000 HC RX 637 (ALT 250 FOR IP): Performed by: STUDENT IN AN ORGANIZED HEALTH CARE EDUCATION/TRAINING PROGRAM

## 2018-03-25 PROCEDURE — 71046 X-RAY EXAM CHEST 2 VIEWS: CPT

## 2018-03-25 PROCEDURE — G8978 MOBILITY CURRENT STATUS: HCPCS

## 2018-03-25 PROCEDURE — 82803 BLOOD GASES ANY COMBINATION: CPT

## 2018-03-25 PROCEDURE — 2060000000 HC ICU INTERMEDIATE R&B

## 2018-03-25 PROCEDURE — G8979 MOBILITY GOAL STATUS: HCPCS

## 2018-03-25 PROCEDURE — 6360000002 HC RX W HCPCS: Performed by: STUDENT IN AN ORGANIZED HEALTH CARE EDUCATION/TRAINING PROGRAM

## 2018-03-25 PROCEDURE — 36600 WITHDRAWAL OF ARTERIAL BLOOD: CPT

## 2018-03-25 PROCEDURE — 36415 COLL VENOUS BLD VENIPUNCTURE: CPT

## 2018-03-25 PROCEDURE — 2500000003 HC RX 250 WO HCPCS: Performed by: HOSPITALIST

## 2018-03-25 PROCEDURE — 97530 THERAPEUTIC ACTIVITIES: CPT

## 2018-03-25 PROCEDURE — 80048 BASIC METABOLIC PNL TOTAL CA: CPT

## 2018-03-25 PROCEDURE — 82947 ASSAY GLUCOSE BLOOD QUANT: CPT

## 2018-03-25 PROCEDURE — 97162 PT EVAL MOD COMPLEX 30 MIN: CPT

## 2018-03-25 RX ORDER — LORAZEPAM 0.5 MG/1
0.5 TABLET ORAL ONCE
Status: COMPLETED | OUTPATIENT
Start: 2018-03-25 | End: 2018-03-25

## 2018-03-25 RX ORDER — FENTANYL CITRATE 50 UG/ML
25 INJECTION, SOLUTION INTRAMUSCULAR; INTRAVENOUS EVERY 4 HOURS PRN
Status: DISCONTINUED | OUTPATIENT
Start: 2018-03-25 | End: 2018-03-28 | Stop reason: HOSPADM

## 2018-03-25 RX ORDER — LOSARTAN POTASSIUM 25 MG/1
25 TABLET ORAL DAILY
Status: DISCONTINUED | OUTPATIENT
Start: 2018-03-25 | End: 2018-03-26

## 2018-03-25 RX ADMIN — LABETALOL HYDROCHLORIDE 10 MG: 5 INJECTION, SOLUTION INTRAVENOUS at 22:24

## 2018-03-25 RX ADMIN — MAGNESIUM HYDROXIDE 30 ML: 400 SUSPENSION ORAL at 17:15

## 2018-03-25 RX ADMIN — Medication 10 ML: at 20:32

## 2018-03-25 RX ADMIN — LORAZEPAM 0.5 MG: 0.5 TABLET ORAL at 15:30

## 2018-03-25 RX ADMIN — LABETALOL HCL 50 MG: 100 TABLET, FILM COATED ORAL at 20:30

## 2018-03-25 RX ADMIN — Medication 3 MG: at 20:30

## 2018-03-25 RX ADMIN — LABETALOL HCL 50 MG: 100 TABLET, FILM COATED ORAL at 09:17

## 2018-03-25 RX ADMIN — LOSARTAN POTASSIUM 25 MG: 25 TABLET, FILM COATED ORAL at 12:58

## 2018-03-25 RX ADMIN — LORAZEPAM 0.5 MG: 2 INJECTION INTRAMUSCULAR; INTRAVENOUS at 03:22

## 2018-03-25 ASSESSMENT — PULMONARY FUNCTION TESTS: PIF_VALUE: 15

## 2018-03-25 ASSESSMENT — PAIN SCALES - GENERAL
PAINLEVEL_OUTOF10: 0
PAINLEVEL_OUTOF10: 3

## 2018-03-25 NOTE — PROGRESS NOTES
[address] [place] [address]   Department of Internal Medicine -   Internal Medicine Residency Program  ______________________________________________________________________    Patient: Mitchell Lambert  YOB: 1960   MRN: [id number]    Acct: [de-identified]     Admit date: 3/22/2018  Today's date: 18    Admitting Diagnosis: <principal problem not specified>    Subjective:   Pt seen and Chart reviewed. No acute events overnight. Patient wore BiPap overnight. ABG stable. Patient comfortable. Eating well. Fever:-  Temp (24hrs), Av °F (36.7 °C), Min:97.5 °F (36.4 °C), Max:98.7 °F (37.1 °C)      Blood pressure:   Systolic (00DWA), VFE:331 , Min:116 , JHR:035     Diastolic (68FMJ), GQP:65, Min:52, Max:76      Urine output in the last 24 hours: In: 0380 [P.O.:936; I.V.:1949]  Out: 805 [Urine:805]      Objective:   Vital Sign:  BP (!) 116/52   Pulse 81   Temp 98.7 °F (37.1 °C) (Oral)   Resp 15   Ht 5' 3\" (1.6 m)   Wt 209 lb 10.5 oz (95.1 kg)   SpO2 100%   BMI 37.14 kg/m²       Physical Exam:  General appearance - alert, well appearing, appears comfortable, on BiPAP  Mental status - alert, oriented to person, place, and time  Eyes - pupils equal and reactive, extraocular eye movements intact  Mouth - mucous membranes moist, pharynx normal without lesions  Neck - supple, no significant adenopathy  Chest - clear to auscultation, no wheezes, rales or rhonchi, symmetric air entry  Heart - normal rate, regular rhythm, normal S1, S2, no murmurs, rubs, clicks or gallops  Abdomen - soft, nontender, nondistended, no masses or organomegaly  Neurological - alert, oriented, normal speech, no focal deficits   Extremities - peripheral pulses normal except that she has decreased peripheral pulses in left arm, no pedal edema, no clubbing or cyanosis.  Distal pulses +1 bilaterally  Skin - normal coloration and turgor, no rashes, no suspicious skin lesions noted, no hematomas present to the left 88-92%. 4. Diabetes mellitus - HbA1C 5.6. Blood sugar stable. 5. CKD - Baseline creatinine 1.6-1.8. Monitor I's and O's.  6. DVT prophylaxis - EPC cuffs. Olga Ladd, PGY-1, Internal Medicine Resident  [address] [facility]      Attending Physician Statement  I have discussed the care of Julio Vazquez, including pertinent history and exam findings with the resident. I have reviewed the key elements of all parts of the encounter with the resident. I have seen and examined the patient with the resident and the key elements of all parts of the encounter have been performed by me.      BP slightly high-continue Labetalol, restart Losartan  DC lucas  Wean off oxygen  ECHO

## 2018-03-25 NOTE — PLAN OF CARE
NONINVASIVE VENTILATION    PROVIDE OPTIMAL VENTILATION/ACCEPTABLE SPO2   IMPLEMENT NONINVASIVE VENTILATION PROTOCOL   MAINTAIN ACCEPTABLE SPO2   ASSESS SKIN INTEGRITY/BREAKDOWN SCORE   PATIENT EDUCATION AS NEEDED   BIPAP AS NEEDED

## 2018-03-25 NOTE — PROGRESS NOTES
Vascular Surgery Progress Note         PATIENT NAME: Alexia Boogie     TODAY'S DATE: 3/25/2018, 7:15 AM    CC: Intramural hematoma     SUBJECTIVE:    POD#2 s/p TEVAR. Patient seen and examined. Patient greatly improved this AM.  Resting comfortably in chair with O2 on. No acute issues overnight per nursing. Still refusing to wear BiPap. Patient denies any pain at this time. (-) BM.    OBJECTIVE:   Vitals:  BP (!) 116/52   Pulse 81   Temp 98.7 °F (37.1 °C) (Oral)   Resp 15   Ht 5' 3\" (1.6 m)   Wt 209 lb 10.5 oz (95.1 kg)   SpO2 100%   BMI 37.14 kg/m²      INTAKE/OUTPUT:      Intake/Output Summary (Last 24 hours) at 03/25/18 0715  Last data filed at 03/25/18 0554   Gross per 24 hour   Intake             2885 ml   Output             1100 ml   Net             1785 ml     Vital signs and Nurse's note reviewed  Gen:  A&Ox3, NAD  HEENT: PERRLA, EOMI, no scleral icterus, oral mucosa moist  Neck: Supple  Chest: Symmetric rise with inhalation, no evidence of trauma  CVS: Regular rate and rhythm  Resp: BiPap on. Resting comfortably. Abd: soft, non-tender, non-distended  Ext: No clubbing, cyanosis, edema, peripheral pulses 2+ Rad/Fem/DP/PT  CNS: Moves all extremities, no gross focal motor deficits  Incisions:  Dressing with minimal serous drainage to the center of the dressing. Appears c/d/I. No surrounding erythema or hematoma noted to the b/l groins. ASSESSMENT   1. Descending aortic intramural hematoma s/p TEVAR POD#2  2. Hypertension  3. Hyperlipidemia  4. CKD stage 3     PLAN  1. No further vascular interventions indicated at this time. 2. Improving acidosis. Management per primary  3. Continue BiPap per primary. 4. Recommend SBP < 140  5. Recommend goal heart rate of  60  6. Up with assistance only  7. Neurovascular checks  8. Dressing changed removed this AM.  Incision clean with no erythema, discharge or drainage.    9. Keep Dry kerlix gauze over th right groin surgical site with dressing

## 2018-03-25 NOTE — PLAN OF CARE
Problem: Falls - Risk of  Goal: Absence of falls  Outcome: Ongoing      Problem: Pain:  Goal: Control of acute pain  Control of acute pain   Outcome: Ongoing      Problem: Risk for Impaired Skin Integrity  Goal: Tissue integrity - skin and mucous membranes  Structural intactness and normal physiological function of skin and  mucous membranes.    Outcome: Ongoing

## 2018-03-25 NOTE — PROGRESS NOTES
Car  Objective  AROM RLE (degrees)  RLE AROM: WFL  AROM LLE (degrees)  LLE AROM : WFL  AROM RUE (degrees)  RUE AROM : WFL  AROM LUE (degrees)  LUE AROM : WFL  Strength RLE  Strength RLE: WFL  Strength LLE  Strength LLE: WFL  Strength RUE  Strength RUE: WFL  Strength LUE  Strength LUE: WFL  Motor Control  Gross Motor?: WFL  Sensation  Overall Sensation Status: WFL   Pt up in chair. On 3 L02. Sp02 at 99%. 02 removed. Transfers  Sit to Stand: Contact guard assistance  Stand to sit: Contact guard assistance  Ambulation  Ambulation?: Yes  Ambulation 1  Surface: level tile  Device: Rolling Walker  Assistance: Contact guard assistance  Quality of Gait: Slow gigi, difficulty avoiding obstacles, verbal cues for safe use of RW. Distance: 125 ft  Upon returning to room pt's Sp02 at 92%. Pt left on room air. Stairs/Curb  Stairs?: No     Balance  Sitting - Static: Good  Sitting - Dynamic: Good  Standing - Static: Good with RW  Standing - Dynamic: Good (-) with RW        Assessment   Body structures, Functions, Activity limitations: Decreased functional mobility ; Decreased endurance;Decreased safe awareness;Decreased cognition;Decreased balance  Activity Tolerance  Activity Tolerance: Patient limited by endurance; Patient limited by cognitive status       Plan   Plan  Times per week: 5-6x/week  Safety Devices  Type of devices: Call light within reach, Gait belt, Sitter present, Telesitter in use, Left in chair    G-Code  PT G-Codes  Score: 15  Functional Limitation: Mobility: Walking and moving around  Mobility: Walking and Moving Around Current Status (): At least 40 percent but less than 60 percent impaired, limited or restricted  Mobility: Walking and Moving Around Goal Status ():  At least 1 percent but less than 20 percent impaired, limited or restricted    AM-PAC Score     AM-PAC Inpatient Mobility without Stair Climbing Raw Score : 15  AM-PAC Inpatient without Stair Climbing T-Scale Score : 43.03  Mobility

## 2018-03-26 LAB
ANION GAP SERPL CALCULATED.3IONS-SCNC: 13 MMOL/L (ref 9–17)
BUN BLDV-MCNC: 26 MG/DL (ref 6–20)
BUN/CREAT BLD: ABNORMAL (ref 9–20)
CALCIUM SERPL-MCNC: 7.8 MG/DL (ref 8.6–10.4)
CHLORIDE BLD-SCNC: 106 MMOL/L (ref 98–107)
CO2: 22 MMOL/L (ref 20–31)
CREAT SERPL-MCNC: 1.51 MG/DL (ref 0.5–0.9)
GFR AFRICAN AMERICAN: 43 ML/MIN
GFR NON-AFRICAN AMERICAN: 36 ML/MIN
GFR SERPL CREATININE-BSD FRML MDRD: ABNORMAL ML/MIN/{1.73_M2}
GFR SERPL CREATININE-BSD FRML MDRD: ABNORMAL ML/MIN/{1.73_M2}
GLUCOSE BLD-MCNC: 127 MG/DL (ref 70–99)
GLUCOSE BLD-MCNC: 201 MG/DL (ref 65–105)
HCT VFR BLD CALC: 28.3 % (ref 36.3–47.1)
HEMOGLOBIN: 9 G/DL (ref 11.9–15.1)
MCH RBC QN AUTO: 29.5 PG (ref 25.2–33.5)
MCHC RBC AUTO-ENTMCNC: 31.8 G/DL (ref 28.4–34.8)
MCV RBC AUTO: 92.8 FL (ref 82.6–102.9)
NRBC AUTOMATED: 0.2 PER 100 WBC
PDW BLD-RTO: 17.1 % (ref 11.8–14.4)
PLATELET # BLD: 241 K/UL (ref 138–453)
PMV BLD AUTO: 10.1 FL (ref 8.1–13.5)
POTASSIUM SERPL-SCNC: 3.6 MMOL/L (ref 3.7–5.3)
RBC # BLD: 3.05 M/UL (ref 3.95–5.11)
SODIUM BLD-SCNC: 141 MMOL/L (ref 135–144)
WBC # BLD: 12.6 K/UL (ref 3.5–11.3)

## 2018-03-26 PROCEDURE — 99233 SBSQ HOSP IP/OBS HIGH 50: CPT | Performed by: INTERNAL MEDICINE

## 2018-03-26 PROCEDURE — 6370000000 HC RX 637 (ALT 250 FOR IP): Performed by: STUDENT IN AN ORGANIZED HEALTH CARE EDUCATION/TRAINING PROGRAM

## 2018-03-26 PROCEDURE — 94762 N-INVAS EAR/PLS OXIMTRY CONT: CPT

## 2018-03-26 PROCEDURE — 82947 ASSAY GLUCOSE BLOOD QUANT: CPT

## 2018-03-26 PROCEDURE — 2580000003 HC RX 258: Performed by: STUDENT IN AN ORGANIZED HEALTH CARE EDUCATION/TRAINING PROGRAM

## 2018-03-26 PROCEDURE — G8988 SELF CARE GOAL STATUS: HCPCS

## 2018-03-26 PROCEDURE — 97166 OT EVAL MOD COMPLEX 45 MIN: CPT

## 2018-03-26 PROCEDURE — 2500000003 HC RX 250 WO HCPCS: Performed by: STUDENT IN AN ORGANIZED HEALTH CARE EDUCATION/TRAINING PROGRAM

## 2018-03-26 PROCEDURE — 36415 COLL VENOUS BLD VENIPUNCTURE: CPT

## 2018-03-26 PROCEDURE — G8987 SELF CARE CURRENT STATUS: HCPCS

## 2018-03-26 PROCEDURE — 97116 GAIT TRAINING THERAPY: CPT

## 2018-03-26 PROCEDURE — 85027 COMPLETE CBC AUTOMATED: CPT

## 2018-03-26 PROCEDURE — 2060000000 HC ICU INTERMEDIATE R&B

## 2018-03-26 PROCEDURE — 6370000000 HC RX 637 (ALT 250 FOR IP)

## 2018-03-26 PROCEDURE — 97110 THERAPEUTIC EXERCISES: CPT

## 2018-03-26 PROCEDURE — 80048 BASIC METABOLIC PNL TOTAL CA: CPT

## 2018-03-26 PROCEDURE — 97535 SELF CARE MNGMENT TRAINING: CPT

## 2018-03-26 RX ORDER — LABETALOL HYDROCHLORIDE 5 MG/ML
20 INJECTION, SOLUTION INTRAVENOUS ONCE
Status: COMPLETED | OUTPATIENT
Start: 2018-03-26 | End: 2018-03-26

## 2018-03-26 RX ORDER — LABETALOL HYDROCHLORIDE 5 MG/ML
10 INJECTION, SOLUTION INTRAVENOUS ONCE
Status: COMPLETED | OUTPATIENT
Start: 2018-03-26 | End: 2018-03-26

## 2018-03-26 RX ORDER — LORAZEPAM 0.5 MG/1
0.5 TABLET ORAL NIGHTLY
Status: DISCONTINUED | OUTPATIENT
Start: 2018-03-26 | End: 2018-03-28 | Stop reason: HOSPADM

## 2018-03-26 RX ORDER — LORAZEPAM 0.5 MG/1
TABLET ORAL
Status: COMPLETED
Start: 2018-03-26 | End: 2018-03-26

## 2018-03-26 RX ORDER — LOSARTAN POTASSIUM 50 MG/1
50 TABLET ORAL DAILY
Status: DISCONTINUED | OUTPATIENT
Start: 2018-03-26 | End: 2018-03-28 | Stop reason: HOSPADM

## 2018-03-26 RX ORDER — LORAZEPAM 0.5 MG/1
0.5 TABLET ORAL ONCE
Status: COMPLETED | OUTPATIENT
Start: 2018-03-26 | End: 2018-03-26

## 2018-03-26 RX ORDER — LABETALOL HYDROCHLORIDE 5 MG/ML
1 INJECTION, SOLUTION INTRAVENOUS CONTINUOUS
Status: DISCONTINUED | OUTPATIENT
Start: 2018-03-26 | End: 2018-03-26

## 2018-03-26 RX ORDER — ESMOLOL HYDROCHLORIDE 10 MG/ML
50 INJECTION, SOLUTION INTRAVENOUS CONTINUOUS
Status: DISCONTINUED | OUTPATIENT
Start: 2018-03-26 | End: 2018-03-26

## 2018-03-26 RX ORDER — AMLODIPINE BESYLATE 10 MG/1
10 TABLET ORAL DAILY
Status: DISCONTINUED | OUTPATIENT
Start: 2018-03-26 | End: 2018-03-28 | Stop reason: HOSPADM

## 2018-03-26 RX ADMIN — NICARDIPINE HYDROCHLORIDE 7.5 MG/HR: 0.1 INJECTION, SOLUTION INTRAVENOUS at 15:54

## 2018-03-26 RX ADMIN — NICARDIPINE HYDROCHLORIDE 7.5 MG/HR: 0.1 INJECTION, SOLUTION INTRAVENOUS at 18:19

## 2018-03-26 RX ADMIN — Medication 10 ML: at 08:45

## 2018-03-26 RX ADMIN — ESMOLOL HYDROCHLORIDE 25 MCG/KG/MIN: 10 INJECTION INTRAVENOUS at 03:39

## 2018-03-26 RX ADMIN — NICARDIPINE HYDROCHLORIDE 5 MG/HR: 0.1 INJECTION, SOLUTION INTRAVENOUS at 06:44

## 2018-03-26 RX ADMIN — LORAZEPAM 0.5 MG: 0.5 TABLET ORAL at 05:43

## 2018-03-26 RX ADMIN — LABETALOL HCL 50 MG: 100 TABLET, FILM COATED ORAL at 21:22

## 2018-03-26 RX ADMIN — NICARDIPINE HYDROCHLORIDE 7.5 MG/HR: 0.1 INJECTION, SOLUTION INTRAVENOUS at 21:52

## 2018-03-26 RX ADMIN — LOSARTAN POTASSIUM 50 MG: 50 TABLET, FILM COATED ORAL at 08:44

## 2018-03-26 RX ADMIN — LORAZEPAM 0.5 MG: 0.5 TABLET ORAL at 21:52

## 2018-03-26 RX ADMIN — AMLODIPINE BESYLATE 10 MG: 10 TABLET ORAL at 08:44

## 2018-03-26 RX ADMIN — Medication 3 MG: at 21:22

## 2018-03-26 RX ADMIN — NICARDIPINE HYDROCHLORIDE 5 MG/HR: 0.1 INJECTION, SOLUTION INTRAVENOUS at 12:09

## 2018-03-26 RX ADMIN — Medication 10 ML: at 21:22

## 2018-03-26 RX ADMIN — LABETALOL HYDROCHLORIDE 20 MG: 5 INJECTION, SOLUTION INTRAVENOUS at 00:45

## 2018-03-26 RX ADMIN — LABETALOL HCL 50 MG: 100 TABLET, FILM COATED ORAL at 08:44

## 2018-03-26 RX ADMIN — NICARDIPINE HYDROCHLORIDE 7.5 MG/HR: 0.1 INJECTION, SOLUTION INTRAVENOUS at 09:30

## 2018-03-26 ASSESSMENT — PAIN SCALES - GENERAL
PAINLEVEL_OUTOF10: 0

## 2018-03-26 NOTE — PROGRESS NOTES
[address] [place] [address]   Department of Internal Medicine -   Internal Medicine Residency Program  ______________________________________________________________________    Patient: Lucy García  YOB: 1960   MRN: [id number]    Acct: [de-identified]     Admit date: 3/22/2018  Today's date: 18    Admitting Diagnosis: <principal problem not specified>    Subjective:   Pt seen and Chart reviewed. No acute events overnight. Wishes blood pressure in the high 200s overnight. She maxed out on esmolol drip. Started on Cardene drip. Fever:-  Temp (24hrs), Av.2 °F (37.3 °C), Min:98.6 °F (37 °C), Max:99.8 °F (37.7 °C)      Blood pressure:   Systolic (96HQB), ZVL:619 , Min:151 , FQ     Diastolic (82MVZ), LJN:53, Min:72, Max:104      Urine output in the last 24 hours: In: 1226 [P.O.:720; I.V.:506]  Out: 2610 [Urine:2310]      Objective:   Vital Sign:  BP (!) 195/104   Pulse 88   Temp 98.6 °F (37 °C) (Oral)   Resp 28   Ht 5' 3\" (1.6 m)   Wt 223 lb (101.2 kg)   SpO2 99%   BMI 39.50 kg/m²       Physical Exam:  General appearance - alert, well appearing, appears comfortable, on BiPAP  Mental status - alert, oriented to person, place, and time  Eyes - pupils equal and reactive, extraocular eye movements intact  Mouth - mucous membranes moist, pharynx normal without lesions  Neck - supple, no significant adenopathy  Chest - clear to auscultation, no wheezes, rales or rhonchi, symmetric air entry  Heart - normal rate, regular rhythm, normal S1, S2, no murmurs, rubs, clicks or gallops  Abdomen - soft, nontender, nondistended, no masses or organomegaly  Neurological - alert, oriented, normal speech, no focal deficits   Extremities - peripheral pulses normal except that she has decreased peripheral pulses in left arm, no pedal edema, no clubbing or cyanosis.  Distal pulses +1 bilaterally  Skin - normal coloration and turgor, no rashes, no suspicious skin lesions noted, no hematomas

## 2018-03-26 NOTE — OP NOTE
groin.  Subcutaneous tissue was sharply taken down with bleeding  points being controlled with electrocautery. The common femoral artery was  then readily identified and circumferentially dissected. The vessel was  noted to be under the inguinal ligament with a high bifurcation noted. Percutaneous wire access was then obtained at the left groin, and a  5-Trinidadian sheath was placed. An 8-Trinidadian sheath was advanced on the right  side. Lunderquist guidewire was then advanced through a catheter to the  aortic root, and the pigtail catheter was moved to the left. Contrast  angiography was then performed in the Ukrainian projection, and left subclavian  artery origin was marked. Device was then opened. The Valiant 32 mm x 32  mm x 150 mm device was then flowered, and contrast angiography was  performed in the Ukrainian 50- to 60-degree projection in order to adjust for the  remaining parallax. Device was then deployed, and proximal fixation pins  were released. The pigtail catheter was then brought midline, and  completion angiography was performed which demonstrated good exclusion of  the penetrating aortic ulcer. At this time, catheters and guidewires were  withdrawn, and the introducer assembly was withdrawn, and femoral vessels  were clamped proximally and distally. Vessels were irrigated with  heparinized saline, and there was a large segment of plaque which was  removed from the common femoral artery, was then evacuated from the vessel  with good proximal and distal taper points. The arteriotomy was then  closed in a transverse fashion using interrupted 5-0 Prolene suture. Clamps were released, and flow reestablished to the right lower extremity. 20 mg of protamine was given intravenously in order to reverse from any  heparin.   Wound was then irrigated with normal saline, and the right groin  was closed in multiple layers using running 2-0 and 3-0 Vicryl sutures  followed by closure of the skin with interrupted 4-0 Monocryl subcuticular  suture. A 5-Turkish Mynx closure device was placed on the left and manual  pressure applied. The patient tolerated procedure well and was transferred  to the recovery room in satisfactory condition. Sponge, instrument, and  needle counts were correct at the conclusion of the operation.         Aundrea Bernard    D: 03/23/2018 11:43:37       T: 03/23/2018 23:01:14     [id number]  Job#: [id number]     Doc#: [id number]    CC:  Jesse Finn

## 2018-03-26 NOTE — PROGRESS NOTES
HEP. Pt's daughter has no further questions at this time. Assessment     Body structures, Functions, Activity limitations: Decreased functional mobility ; Decreased endurance;Decreased safe awareness;Decreased cognition;Decreased balance  Pt Education: POC,Sternal Precautions,Cardiac HEP  Activity Tolerance  Activity Tolerance: Patient limited by endurance; Patient limited by cognitive status         Goals  Short term goals  Time Frame for Short term goals: 14 visits  Short term goal 1: Independent bed mobility  Short term goal 2: Independent transfers  Short term goal 3: Independent ambulation  without a device 300 ft  Short term goal 4: Navigate 12 stairs with one rail SBA  Short term goal 5: Pt to tolerate 30 minutes of PT treatment to improve endurance. Patient Goals   Patient goals : get stronger and go home.     Plan    Plan  Times per week: 5-6x/week  Safety Devices  Type of devices: Call light within reach, Gait belt, Sitter present, Telesitter in use, Left in chair     Therapy Time   Individual Concurrent Group Co-treatment   Time In  900         Time Out  945         Minutes  [address] [place] [address]

## 2018-03-26 NOTE — CARE COORDINATION
Met with patient and daughter to discuss discharge planning. They would like home care. Given list for review and freedom of choice. Would like referral made to [address] [place] [address]. Patient will be [facility] phone number is [phone].   Referral electronically faxed    [phone] with Sheba Bellamy at [address] [place] [address] who states they did not receive referral.  Referral manually faxed    [phone]  Received call from [facility] at [address] [place] [address], stating se had received referral

## 2018-03-27 LAB
ABO/RH: NORMAL
ANION GAP SERPL CALCULATED.3IONS-SCNC: 12 MMOL/L (ref 9–17)
ANTIBODY SCREEN: NEGATIVE
ARM BAND NUMBER: NORMAL
BLOOD BANK SPECIMEN: NORMAL
BUN BLDV-MCNC: 19 MG/DL (ref 6–20)
BUN/CREAT BLD: ABNORMAL (ref 9–20)
CALCIUM IONIZED: 1.12 MMOL/L (ref 1.13–1.33)
CALCIUM SERPL-MCNC: 7.7 MG/DL (ref 8.6–10.4)
CHLORIDE BLD-SCNC: 105 MMOL/L (ref 98–107)
CO2: 23 MMOL/L (ref 20–31)
CREAT SERPL-MCNC: 1.28 MG/DL (ref 0.5–0.9)
EXPIRATION DATE: NORMAL
GFR AFRICAN AMERICAN: 52 ML/MIN
GFR NON-AFRICAN AMERICAN: 43 ML/MIN
GFR SERPL CREATININE-BSD FRML MDRD: ABNORMAL ML/MIN/{1.73_M2}
GFR SERPL CREATININE-BSD FRML MDRD: ABNORMAL ML/MIN/{1.73_M2}
GLUCOSE BLD-MCNC: 116 MG/DL (ref 65–105)
GLUCOSE BLD-MCNC: 122 MG/DL (ref 65–105)
GLUCOSE BLD-MCNC: 133 MG/DL (ref 65–105)
GLUCOSE BLD-MCNC: 154 MG/DL (ref 65–105)
GLUCOSE BLD-MCNC: 97 MG/DL (ref 70–99)
HCT VFR BLD CALC: 28.9 % (ref 36.3–47.1)
HEMOGLOBIN: 8.8 G/DL (ref 11.9–15.1)
LV EF: 55 %
LVEF MODALITY: NORMAL
MCH RBC QN AUTO: 29.2 PG (ref 25.2–33.5)
MCHC RBC AUTO-ENTMCNC: 30.4 G/DL (ref 28.4–34.8)
MCV RBC AUTO: 96 FL (ref 82.6–102.9)
NRBC AUTOMATED: 0 PER 100 WBC
PDW BLD-RTO: 16.5 % (ref 11.8–14.4)
PLATELET # BLD: 251 K/UL (ref 138–453)
PMV BLD AUTO: 9.8 FL (ref 8.1–13.5)
POTASSIUM SERPL-SCNC: 3.4 MMOL/L (ref 3.7–5.3)
RBC # BLD: 3.01 M/UL (ref 3.95–5.11)
SODIUM BLD-SCNC: 140 MMOL/L (ref 135–144)
WBC # BLD: 11 K/UL (ref 3.5–11.3)

## 2018-03-27 PROCEDURE — 6370000000 HC RX 637 (ALT 250 FOR IP): Performed by: STUDENT IN AN ORGANIZED HEALTH CARE EDUCATION/TRAINING PROGRAM

## 2018-03-27 PROCEDURE — 82330 ASSAY OF CALCIUM: CPT

## 2018-03-27 PROCEDURE — 85027 COMPLETE CBC AUTOMATED: CPT

## 2018-03-27 PROCEDURE — 99232 SBSQ HOSP IP/OBS MODERATE 35: CPT | Performed by: INTERNAL MEDICINE

## 2018-03-27 PROCEDURE — 86900 BLOOD TYPING SEROLOGIC ABO: CPT

## 2018-03-27 PROCEDURE — 2580000003 HC RX 258: Performed by: STUDENT IN AN ORGANIZED HEALTH CARE EDUCATION/TRAINING PROGRAM

## 2018-03-27 PROCEDURE — 86850 RBC ANTIBODY SCREEN: CPT

## 2018-03-27 PROCEDURE — 36415 COLL VENOUS BLD VENIPUNCTURE: CPT

## 2018-03-27 PROCEDURE — 2060000000 HC ICU INTERMEDIATE R&B

## 2018-03-27 PROCEDURE — 82947 ASSAY GLUCOSE BLOOD QUANT: CPT

## 2018-03-27 PROCEDURE — 86901 BLOOD TYPING SEROLOGIC RH(D): CPT

## 2018-03-27 PROCEDURE — 97116 GAIT TRAINING THERAPY: CPT

## 2018-03-27 PROCEDURE — 97535 SELF CARE MNGMENT TRAINING: CPT

## 2018-03-27 PROCEDURE — 80048 BASIC METABOLIC PNL TOTAL CA: CPT

## 2018-03-27 PROCEDURE — 93306 TTE W/DOPPLER COMPLETE: CPT

## 2018-03-27 PROCEDURE — 97110 THERAPEUTIC EXERCISES: CPT

## 2018-03-27 PROCEDURE — 94762 N-INVAS EAR/PLS OXIMTRY CONT: CPT

## 2018-03-27 RX ORDER — LABETALOL 100 MG/1
100 TABLET, FILM COATED ORAL EVERY 12 HOURS SCHEDULED
Status: DISCONTINUED | OUTPATIENT
Start: 2018-03-27 | End: 2018-03-28 | Stop reason: HOSPADM

## 2018-03-27 RX ORDER — LOSARTAN POTASSIUM 25 MG/1
25 TABLET ORAL ONCE
Status: COMPLETED | OUTPATIENT
Start: 2018-03-27 | End: 2018-03-27

## 2018-03-27 RX ADMIN — LABETALOL HCL 50 MG: 100 TABLET, FILM COATED ORAL at 08:26

## 2018-03-27 RX ADMIN — Medication 10 ML: at 20:48

## 2018-03-27 RX ADMIN — Medication 10 ML: at 10:46

## 2018-03-27 RX ADMIN — LABETALOL HCL 100 MG: 100 TABLET, FILM COATED ORAL at 20:47

## 2018-03-27 RX ADMIN — LOSARTAN POTASSIUM 50 MG: 50 TABLET, FILM COATED ORAL at 08:26

## 2018-03-27 RX ADMIN — AMLODIPINE BESYLATE 10 MG: 10 TABLET ORAL at 08:26

## 2018-03-27 RX ADMIN — LORAZEPAM 0.5 MG: 0.5 TABLET ORAL at 23:39

## 2018-03-27 RX ADMIN — MAGNESIUM HYDROXIDE 30 ML: 400 SUSPENSION ORAL at 20:53

## 2018-03-27 RX ADMIN — LOSARTAN POTASSIUM 25 MG: 25 TABLET, FILM COATED ORAL at 10:45

## 2018-03-27 ASSESSMENT — PAIN DESCRIPTION - PROGRESSION: CLINICAL_PROGRESSION: GRADUALLY IMPROVING

## 2018-03-27 NOTE — PROGRESS NOTES
Vascular Surgery Progress Note         PATIENT NAME: Stella Paniagua     TODAY'S DATE: 3/27/2018, 7:21 AM    SUBJECTIVE:    Pt seen and examined. Off cardene drip since 4 AM. No acute complaints. Incision is intact, but the area is moist. Educated patient the area needs to be kept dry. OBJECTIVE:   Vitals:  /78   Pulse 84   Temp 98.1 °F (36.7 °C) (Oral)   Resp 18   Ht 5' 3\" (1.6 m)   Wt 223 lb 9.6 oz (101.4 kg)   SpO2 98%   BMI 39.61 kg/m²      INTAKE/OUTPUT:      Intake/Output Summary (Last 24 hours) at 03/27/18 2197  Last data filed at 03/27/18 0500   Gross per 24 hour   Intake             2300 ml   Output             2000 ml   Net              300 ml                 GENERAL: AOx3, Mild distress  HEENT: EOMI bilaterally  CARDIAC: Regular rate and rhythm. ABDOMEN: Soft, NT, ND  EXTREMITY: moves all extremities, no edema. Palpable PT/DP pulses  NEURO: CN II-XII intact. Gross motor intact. INCISION: Dressing clean, dry, intact.     Data:  CBC with Differential:    Lab Results   Component Value Date    WBC 11.0 03/27/2018    RBC 3.01 03/27/2018    RBC 4.29 03/13/2012    HGB 8.8 03/27/2018    HCT 28.9 03/27/2018     03/27/2018     03/13/2012    MCV 96.0 03/27/2018    MCH 29.2 03/27/2018    MCHC 30.4 03/27/2018    RDW 16.5 03/27/2018    LYMPHOPCT 8 03/23/2018    LYMPHOPCT 24.5 12/08/2015    MONOPCT 9 03/23/2018    MONOPCT 4.5 12/08/2015    EOSPCT 2.4 12/08/2015    BASOPCT 0 03/23/2018    BASOPCT 0.5 12/08/2015    MONOSABS 0.64 03/23/2018    MONOSABS 0.3 12/08/2015    LYMPHSABS 0.57 03/23/2018    LYMPHSABS 1.8 12/08/2015    EOSABS 0.00 03/23/2018    EOSABS 0.2 12/08/2015    BASOSABS 0.00 03/23/2018    DIFFTYPE NOT REPORTED 03/23/2018     BMP:    Lab Results   Component Value Date     03/27/2018    K 3.4 03/27/2018     03/27/2018    CO2 23 03/27/2018    BUN 19 03/27/2018    LABALBU 3.5 03/22/2018    CREATININE 1.28 03/27/2018    CALCIUM 7.7 03/27/2018    GFRAA 52 03/27/2018

## 2018-03-27 NOTE — PROGRESS NOTES
Stand by assistance  Comment: no LOB or SOB noted   Functional Mobility  Activity: To/from bathroom  Assist Level: Stand by assistance  Functional Mobility Comments: No LOB or SOB noted   Toilet Transfers  Toilet - Technique: Ambulating  Toilet Transfer: Stand by assistance     Transfers  Stand Step Transfers: Stand by assistance  Sit to stand: Stand by assistance  Stand to sit: Stand by assistance   Assessment   Performance deficits / Impairments: Decreased ADL status; Decreased functional mobility ; Decreased strength  Assessment: Pt demo'd ability to complete simple ADL, may require intermittent assist at home from daughters  Prognosis: Good  Patient Education: Role of OT, OT POC, safety with ADL/functional mobility, IS, fall prevention, EC/WS  REQUIRES OT FOLLOW UP: Yes  Safety Devices  Safety Devices in place: Yes  Type of devices: Left in chair;Call light within reach       Discharge Recommendations:        Plan   Plan  Times per week: 4-5  Current Treatment Recommendations: Self-Care / ADL, Functional Mobility Training, Strengthening, Patient/Caregiver Education & Training, Equipment Evaluation, Education, & procurement, Safety Education & Training  Goals  Short term goals  Time Frame for Short term goals: Pt will, by discharge  Short term goal 1: Complete UB bathing/dressing with Mod I  Short term goal 2: complete LB bathing/dressing with Mod I using compensatory techniques/AE as needed  Short term goal 3: verbalize & utilize EC/WS techniques in ADL performance with min verbal cues  Short term goal 4: complete functional mobility with Mod I and 0 verbal cues for safety  Short term goal 5: complete tub shower transfer with SBA  Patient Goals   Patient goals :  To return home       Therapy Time   Individual Concurrent Group Co-treatment   Time In   DONELL Silverman, CARROLL/L

## 2018-03-27 NOTE — PROGRESS NOTES
[address] [place] [address]   Department of Internal Medicine -   Internal Medicine Residency Program  ______________________________________________________________________    Patient: Meek Garg  YOB: 1960   MRN: [id number]    Acct: [de-identified]     Admit date: 3/22/2018  Today's date: 18    Admitting Diagnosis: <principal problem not specified>    Subjective:   Pt seen and Chart reviewed. No acute events overnight. Off Cardene drip. Blood pressure with systolic in 356N. Patient comfortable, slept well. Fever:-  Temp (24hrs), Av.1 °F (36.7 °C), Min:97.9 °F (36.6 °C), Max:98.2 °F (36.8 °C)      Blood pressure:   Systolic (57RKE), XPU:083 , Min:122 , ALICJA:671     Diastolic (01AXC), AYZ:33, Min:63, Max:93      Urine output in the last 24 hours: In: 2300 [P.O.:1080; I.V.:1220]  Out: 2000 [Urine:2000]      Objective:   Vital Sign:  BP (!) 131/93   Pulse 83   Temp 98.2 °F (36.8 °C) (Oral)   Resp 18   Ht 5' 3\" (1.6 m)   Wt 223 lb 9.6 oz (101.4 kg)   SpO2 92%   BMI 39.61 kg/m²       Physical Exam:  General appearance - alert, well appearing, appears comfortable, on BiPAP  Mental status - alert, oriented to person, place, and time  Eyes - pupils equal and reactive, extraocular eye movements intact  Mouth - mucous membranes moist, pharynx normal without lesions  Neck - supple, no significant adenopathy  Chest - clear to auscultation, no wheezes, rales or rhonchi, symmetric air entry  Heart - normal rate, regular rhythm, normal S1, S2, no murmurs, rubs, clicks or gallops  Abdomen - soft, nontender, nondistended, no masses or organomegaly  Neurological - alert, oriented, normal speech, no focal deficits   Extremities - peripheral pulses normal except that she has decreased peripheral pulses in left arm, no pedal edema, no clubbing or cyanosis.  Distal pulses +1 bilaterally  Skin - normal coloration and turgor, no rashes, no suspicious skin lesions noted, no hematomas present to the left or right femoral areas. Medications:   Scheduled Medications   labetalol  100 mg Oral 2 times per day    losartan  50 mg Oral Daily    amLODIPine  10 mg Oral Daily    LORazepam  0.5 mg Oral Nightly    furosemide  40 mg Intravenous Once    sodium bicarbonate  50 mEq Intravenous Once    sodium chloride flush  10 mL Intravenous 2 times per day       PRN Medications    fentanNYL 25 mcg Q4H PRN   melatonin 3 mg Nightly PRN   sodium chloride flush 10 mL PRN   acetaminophen 650 mg Q4H PRN   magnesium hydroxide 30 mL Daily PRN   ondansetron 4 mg Q6H PRN   potassium chloride 10 mEq PRN   glucose 15 g PRN   dextrose 12.5 g PRN   glucagon (rDNA) 1 mg PRN   dextrose 100 mL/hr PRN       Diagnostic Labs and Imaging    CBC:  Recent Labs      03/26/18   0607  03/27/18   0537   WBC  12.6*  11.0   HGB  9.0*  8.8*   PLT  241  251     BMP:   Recent Labs      03/25/18   0842  03/26/18   0607  03/27/18   0537   NA  146*  141  140   K  4.8  3.6*  3.4*   CL  111*  106  105   CO2  21  22  23   BUN  33*  26*  19   CREATININE  1.86*  1.51*  1.28*   GLUCOSE  94  127*  97     Hepatic: No results for input(s): AST, ALT, ALB, BILITOT, ALKPHOS in the last 72 hours. INR:   No results for input(s): INR in the last 72 hours. Troponin: No results for input(s): TROPONINI in the last 72 hours. Assessment and Plan: Active Problems: Aortic dissection (HCC)    JINNY (acute kidney injury) ([facility] [address]    Respiratory acidosis  Resolved Problems:    * No resolved hospital problems. *      Plan    1. Descending aortic intramural hematoma - s/p TEVAR. Vascular Signed off. Tight blood pressure control with SBP < 140 per vascular. patient off Cardene drip. Will increase dose of labetalol to 100 MG twice a day. Echo pending. 2. Hypertension -   continue losartan to 50 MG daily, amlodipine 10 MG. Home medications include Lopressor 25 MG twice a day, (losartan 100, hydrochlorothiazide 25), amlodipine 10 MG.   3. Respiratory Acidosis -  Resolved. Encourage incentive spirometry. Nasal cannula. Maintain saturations between 88-92%. 4. Diabetes mellitus - HbA1C 5.6. Blood sugar stable. 5. CKD - Baseline creatinine 1.6-1.8. Monitor I's and O's. Creatinine trending down. 6. DVT prophylaxis - EPC cuffs. Xiao Dunham, PGY-1, Internal Medicine Resident  [facility]      Attending Physician Statement  I have discussed the care of Anmol Saucedo, including pertinent history and exam findings with the resident. I have reviewed the key elements of all parts of the encounter with the resident. I have seen and examined the patient with the resident and the key elements of all parts of the encounter have been performed by me.

## 2018-03-28 ENCOUNTER — TELEPHONE (OUTPATIENT)
Dept: FAMILY MEDICINE CLINIC | Age: 58
End: 2018-03-28

## 2018-03-28 VITALS
BODY MASS INDEX: 38.73 KG/M2 | HEIGHT: 63 IN | DIASTOLIC BLOOD PRESSURE: 85 MMHG | SYSTOLIC BLOOD PRESSURE: 152 MMHG | HEART RATE: 82 BPM | OXYGEN SATURATION: 96 % | WEIGHT: 218.6 LBS | RESPIRATION RATE: 16 BRPM | TEMPERATURE: 98.7 F

## 2018-03-28 LAB
ANION GAP SERPL CALCULATED.3IONS-SCNC: 13 MMOL/L (ref 9–17)
BUN BLDV-MCNC: 13 MG/DL (ref 6–20)
BUN/CREAT BLD: ABNORMAL (ref 9–20)
CALCIUM SERPL-MCNC: 8.1 MG/DL (ref 8.6–10.4)
CHLORIDE BLD-SCNC: 107 MMOL/L (ref 98–107)
CO2: 25 MMOL/L (ref 20–31)
CREAT SERPL-MCNC: 1.29 MG/DL (ref 0.5–0.9)
GFR AFRICAN AMERICAN: 52 ML/MIN
GFR NON-AFRICAN AMERICAN: 43 ML/MIN
GFR SERPL CREATININE-BSD FRML MDRD: ABNORMAL ML/MIN/{1.73_M2}
GFR SERPL CREATININE-BSD FRML MDRD: ABNORMAL ML/MIN/{1.73_M2}
GLUCOSE BLD-MCNC: 102 MG/DL (ref 65–105)
GLUCOSE BLD-MCNC: 96 MG/DL (ref 70–99)
HCT VFR BLD CALC: 29.1 % (ref 36.3–47.1)
HEMOGLOBIN: 8.9 G/DL (ref 11.9–15.1)
MCH RBC QN AUTO: 28.6 PG (ref 25.2–33.5)
MCHC RBC AUTO-ENTMCNC: 30.6 G/DL (ref 28.4–34.8)
MCV RBC AUTO: 93.6 FL (ref 82.6–102.9)
NRBC AUTOMATED: 0 PER 100 WBC
PDW BLD-RTO: 16.3 % (ref 11.8–14.4)
PLATELET # BLD: 284 K/UL (ref 138–453)
PMV BLD AUTO: 9.5 FL (ref 8.1–13.5)
POTASSIUM SERPL-SCNC: 3 MMOL/L (ref 3.7–5.3)
RBC # BLD: 3.11 M/UL (ref 3.95–5.11)
SODIUM BLD-SCNC: 145 MMOL/L (ref 135–144)
WBC # BLD: 10 K/UL (ref 3.5–11.3)

## 2018-03-28 PROCEDURE — 2580000003 HC RX 258: Performed by: STUDENT IN AN ORGANIZED HEALTH CARE EDUCATION/TRAINING PROGRAM

## 2018-03-28 PROCEDURE — 97116 GAIT TRAINING THERAPY: CPT

## 2018-03-28 PROCEDURE — 80048 BASIC METABOLIC PNL TOTAL CA: CPT

## 2018-03-28 PROCEDURE — 85027 COMPLETE CBC AUTOMATED: CPT

## 2018-03-28 PROCEDURE — 99239 HOSP IP/OBS DSCHRG MGMT >30: CPT | Performed by: INTERNAL MEDICINE

## 2018-03-28 PROCEDURE — 36415 COLL VENOUS BLD VENIPUNCTURE: CPT

## 2018-03-28 PROCEDURE — 2500000003 HC RX 250 WO HCPCS: Performed by: STUDENT IN AN ORGANIZED HEALTH CARE EDUCATION/TRAINING PROGRAM

## 2018-03-28 PROCEDURE — 82947 ASSAY GLUCOSE BLOOD QUANT: CPT

## 2018-03-28 PROCEDURE — 6370000000 HC RX 637 (ALT 250 FOR IP): Performed by: STUDENT IN AN ORGANIZED HEALTH CARE EDUCATION/TRAINING PROGRAM

## 2018-03-28 PROCEDURE — 97530 THERAPEUTIC ACTIVITIES: CPT

## 2018-03-28 RX ORDER — LOSARTAN POTASSIUM 100 MG/1
100 TABLET ORAL DAILY
Qty: 30 TABLET | Refills: 0 | Status: SHIPPED | OUTPATIENT
Start: 2018-03-29 | End: 2018-04-06 | Stop reason: SDUPTHER

## 2018-03-28 RX ORDER — POTASSIUM CHLORIDE 20MEQ/15ML
40 LIQUID (ML) ORAL PRN
Status: DISCONTINUED | OUTPATIENT
Start: 2018-03-28 | End: 2018-03-28 | Stop reason: HOSPADM

## 2018-03-28 RX ORDER — POTASSIUM CHLORIDE 20 MEQ/1
40 TABLET, EXTENDED RELEASE ORAL PRN
Status: DISCONTINUED | OUTPATIENT
Start: 2018-03-28 | End: 2018-03-28 | Stop reason: HOSPADM

## 2018-03-28 RX ORDER — POTASSIUM CHLORIDE 7.45 MG/ML
10 INJECTION INTRAVENOUS PRN
Status: DISCONTINUED | OUTPATIENT
Start: 2018-03-28 | End: 2018-03-28 | Stop reason: HOSPADM

## 2018-03-28 RX ORDER — BLOOD PRESSURE TEST KIT
1 KIT MISCELLANEOUS 3 TIMES DAILY
Qty: 1 KIT | Refills: 0 | Status: SHIPPED | OUTPATIENT
Start: 2018-03-28 | End: 2018-04-06 | Stop reason: SDUPTHER

## 2018-03-28 RX ORDER — LORAZEPAM 0.5 MG/1
0.5 TABLET ORAL NIGHTLY
Qty: 3 TABLET | Refills: 0 | Status: SHIPPED | OUTPATIENT
Start: 2018-03-28 | End: 2018-04-27

## 2018-03-28 RX ORDER — LABETALOL 100 MG/1
100 TABLET, FILM COATED ORAL EVERY 12 HOURS SCHEDULED
Qty: 60 TABLET | Refills: 0 | Status: SHIPPED | OUTPATIENT
Start: 2018-03-28 | End: 2018-04-06 | Stop reason: SDUPTHER

## 2018-03-28 RX ADMIN — POTASSIUM CHLORIDE 40 MEQ: 20 TABLET, EXTENDED RELEASE ORAL at 08:54

## 2018-03-28 RX ADMIN — Medication 10 ML: at 10:13

## 2018-03-28 RX ADMIN — LABETALOL HCL 100 MG: 100 TABLET, FILM COATED ORAL at 08:54

## 2018-03-28 RX ADMIN — LOSARTAN POTASSIUM 50 MG: 50 TABLET, FILM COATED ORAL at 08:54

## 2018-03-28 RX ADMIN — CALCIUM CHLORIDE 1 G: 100 INJECTION, SOLUTION INTRAVENOUS; INTRAVENTRICULAR at 10:13

## 2018-03-28 RX ADMIN — AMLODIPINE BESYLATE 10 MG: 10 TABLET ORAL at 08:54

## 2018-04-06 ENCOUNTER — OFFICE VISIT (OUTPATIENT)
Dept: FAMILY MEDICINE CLINIC | Age: 58
End: 2018-04-06
Payer: MEDICARE

## 2018-04-06 VITALS
WEIGHT: 189.8 LBS | BODY MASS INDEX: 34.93 KG/M2 | DIASTOLIC BLOOD PRESSURE: 86 MMHG | HEIGHT: 62 IN | HEART RATE: 67 BPM | SYSTOLIC BLOOD PRESSURE: 149 MMHG

## 2018-04-06 DIAGNOSIS — Z12.31 ENCOUNTER FOR SCREENING MAMMOGRAM FOR BREAST CANCER: ICD-10-CM

## 2018-04-06 DIAGNOSIS — I71.019 DISSECTION OF THORACIC AORTA: Primary | ICD-10-CM

## 2018-04-06 DIAGNOSIS — E79.0 HYPERURICEMIA: ICD-10-CM

## 2018-04-06 DIAGNOSIS — I10 ESSENTIAL HYPERTENSION: ICD-10-CM

## 2018-04-06 DIAGNOSIS — N62 BREAST HYPERTROPHY IN FEMALE: ICD-10-CM

## 2018-04-06 PROCEDURE — 1111F DSCHRG MED/CURRENT MED MERGE: CPT | Performed by: PHYSICIAN ASSISTANT

## 2018-04-06 PROCEDURE — 99214 OFFICE O/P EST MOD 30 MIN: CPT | Performed by: PHYSICIAN ASSISTANT

## 2018-04-06 RX ORDER — BLOOD PRESSURE TEST KIT
1 KIT MISCELLANEOUS 3 TIMES DAILY
Qty: 1 KIT | Refills: 0 | Status: SHIPPED | OUTPATIENT
Start: 2018-04-06 | End: 2022-04-20 | Stop reason: CLARIF

## 2018-04-06 RX ORDER — AMLODIPINE BESYLATE 10 MG/1
TABLET ORAL
Qty: 30 TABLET | Refills: 2 | Status: SHIPPED | OUTPATIENT
Start: 2018-04-06 | End: 2018-07-02 | Stop reason: SDUPTHER

## 2018-04-06 ASSESSMENT — ENCOUNTER SYMPTOMS
NAUSEA: 0
SHORTNESS OF BREATH: 0
VOMITING: 0
BACK PAIN: 0

## 2018-04-09 RX ORDER — ALLOPURINOL 100 MG/1
TABLET ORAL
Qty: 30 TABLET | Refills: 0 | Status: SHIPPED | OUTPATIENT
Start: 2018-04-09 | End: 2018-05-07 | Stop reason: SDUPTHER

## 2018-04-09 RX ORDER — LOSARTAN POTASSIUM 100 MG/1
TABLET ORAL
Qty: 30 TABLET | Refills: 0 | Status: SHIPPED | OUTPATIENT
Start: 2018-04-09 | End: 2018-05-21 | Stop reason: SDUPTHER

## 2018-04-09 RX ORDER — LABETALOL 100 MG/1
TABLET, FILM COATED ORAL
Qty: 60 TABLET | Refills: 0 | Status: SHIPPED | OUTPATIENT
Start: 2018-04-09 | End: 2018-05-21 | Stop reason: SDUPTHER

## 2018-04-16 ENCOUNTER — HOSPITAL ENCOUNTER (OUTPATIENT)
Age: 58
Discharge: HOME | End: 2018-04-16
Payer: MEDICARE

## 2018-04-16 DIAGNOSIS — N26.1 RENAL ATROPHY, RIGHT: ICD-10-CM

## 2018-04-16 DIAGNOSIS — N18.30 BENIGN HYPERTENSION WITH CKD (CHRONIC KIDNEY DISEASE) STAGE III (HCC): ICD-10-CM

## 2018-04-16 DIAGNOSIS — N18.30 CKD (CHRONIC KIDNEY DISEASE) STAGE 3, GFR 30-59 ML/MIN (HCC): ICD-10-CM

## 2018-04-16 DIAGNOSIS — N13.30 HYDRONEPHROSIS OF LEFT KIDNEY: ICD-10-CM

## 2018-04-16 DIAGNOSIS — I12.9 BENIGN HYPERTENSION WITH CKD (CHRONIC KIDNEY DISEASE) STAGE III (HCC): ICD-10-CM

## 2018-04-16 DIAGNOSIS — N20.0 NEPHROLITHIASIS: ICD-10-CM

## 2018-04-16 LAB
ANION GAP SERPL CALCULATED.3IONS-SCNC: 13 MMOL/L (ref 9–17)
CHLORIDE BLD-SCNC: 105 MMOL/L (ref 98–107)
CO2: 26 MMOL/L (ref 20–31)
MAGNESIUM: 1.9 MG/DL (ref 1.6–2.6)
POTASSIUM SERPL-SCNC: 4.2 MMOL/L (ref 3.7–5.3)
SODIUM BLD-SCNC: 144 MMOL/L (ref 135–144)

## 2018-04-16 PROCEDURE — 36415 COLL VENOUS BLD VENIPUNCTURE: CPT

## 2018-04-16 PROCEDURE — 80051 ELECTROLYTE PANEL: CPT

## 2018-04-16 PROCEDURE — 83735 ASSAY OF MAGNESIUM: CPT

## 2018-05-07 ENCOUNTER — OFFICE VISIT (OUTPATIENT)
Dept: FAMILY MEDICINE CLINIC | Age: 58
End: 2018-05-07
Payer: MEDICARE

## 2018-05-07 VITALS
HEART RATE: 74 BPM | SYSTOLIC BLOOD PRESSURE: 147 MMHG | HEIGHT: 63 IN | BODY MASS INDEX: 33.95 KG/M2 | DIASTOLIC BLOOD PRESSURE: 91 MMHG | WEIGHT: 191.6 LBS

## 2018-05-07 DIAGNOSIS — I10 ESSENTIAL HYPERTENSION: Primary | ICD-10-CM

## 2018-05-07 DIAGNOSIS — N28.9 RENAL INSUFFICIENCY: ICD-10-CM

## 2018-05-07 DIAGNOSIS — E78.2 MIXED HYPERLIPIDEMIA: ICD-10-CM

## 2018-05-07 DIAGNOSIS — I71.019 DISSECTION OF THORACIC AORTA: ICD-10-CM

## 2018-05-07 PROBLEM — M75.40 SUBACROMIAL IMPINGEMENT: Status: ACTIVE | Noted: 2018-05-07

## 2018-05-07 PROCEDURE — 1036F TOBACCO NON-USER: CPT | Performed by: PHYSICIAN ASSISTANT

## 2018-05-07 PROCEDURE — G8417 CALC BMI ABV UP PARAM F/U: HCPCS | Performed by: PHYSICIAN ASSISTANT

## 2018-05-07 PROCEDURE — 3017F COLORECTAL CA SCREEN DOC REV: CPT | Performed by: PHYSICIAN ASSISTANT

## 2018-05-07 PROCEDURE — G8427 DOCREV CUR MEDS BY ELIG CLIN: HCPCS | Performed by: PHYSICIAN ASSISTANT

## 2018-05-07 PROCEDURE — 99213 OFFICE O/P EST LOW 20 MIN: CPT | Performed by: PHYSICIAN ASSISTANT

## 2018-05-07 RX ORDER — TRAMADOL HYDROCHLORIDE 50 MG/1
TABLET ORAL
COMMUNITY
End: 2018-05-07 | Stop reason: ALTCHOICE

## 2018-05-07 RX ORDER — BLOOD SUGAR DIAGNOSTIC
STRIP MISCELLANEOUS
COMMUNITY
End: 2018-05-07 | Stop reason: ALTCHOICE

## 2018-05-07 RX ORDER — ATORVASTATIN CALCIUM 80 MG/1
TABLET, FILM COATED ORAL
COMMUNITY
Start: 2018-05-02 | End: 2018-06-25 | Stop reason: ALTCHOICE

## 2018-05-07 RX ORDER — DOCUSATE SODIUM 100 MG/1
CAPSULE, LIQUID FILLED ORAL
COMMUNITY
End: 2018-05-07 | Stop reason: ALTCHOICE

## 2018-05-07 RX ORDER — ALLOPURINOL 100 MG/1
100 TABLET ORAL
COMMUNITY
Start: 2015-12-09 | End: 2018-06-25 | Stop reason: SDUPTHER

## 2018-05-07 RX ORDER — MELOXICAM 15 MG/1
TABLET ORAL
COMMUNITY
End: 2018-05-07 | Stop reason: ALTCHOICE

## 2018-05-07 RX ORDER — AMLODIPINE BESYLATE 5 MG/1
TABLET ORAL
COMMUNITY
Start: 2018-05-02 | End: 2018-05-07

## 2018-05-07 RX ORDER — INSULIN GLARGINE 100 [IU]/ML
INJECTION, SOLUTION SUBCUTANEOUS
COMMUNITY
End: 2018-05-07 | Stop reason: ALTCHOICE

## 2018-05-07 RX ORDER — BLOOD-GLUCOSE METER
EACH MISCELLANEOUS
COMMUNITY
End: 2018-05-07 | Stop reason: ALTCHOICE

## 2018-05-07 RX ORDER — PHENAZOPYRIDINE HYDROCHLORIDE 200 MG/1
TABLET, FILM COATED ORAL
COMMUNITY
End: 2018-05-07 | Stop reason: ALTCHOICE

## 2018-05-07 RX ORDER — HYDRALAZINE HYDROCHLORIDE 25 MG/1
TABLET, FILM COATED ORAL
COMMUNITY
End: 2018-05-07 | Stop reason: ALTCHOICE

## 2018-05-07 RX ORDER — ERGOCALCIFEROL (VITAMIN D2) 1250 MCG
CAPSULE ORAL
COMMUNITY
End: 2018-05-07 | Stop reason: ALTCHOICE

## 2018-05-07 RX ORDER — LANOLIN ALCOHOL/MO/W.PET/CERES
CREAM (GRAM) TOPICAL
COMMUNITY
Start: 2018-04-07 | End: 2018-08-06

## 2018-05-07 RX ORDER — DULOXETIN HYDROCHLORIDE 30 MG/1
CAPSULE, DELAYED RELEASE ORAL
COMMUNITY
End: 2018-05-07 | Stop reason: ALTCHOICE

## 2018-05-07 RX ORDER — IBUPROFEN 200 MG
CAPSULE ORAL
COMMUNITY
End: 2018-05-07 | Stop reason: ALTCHOICE

## 2018-05-07 RX ORDER — FERROUS SULFATE 325(65) MG
TABLET ORAL
COMMUNITY
End: 2018-05-07 | Stop reason: SDUPTHER

## 2018-05-07 RX ORDER — METOPROLOL SUCCINATE 25 MG/1
TABLET, EXTENDED RELEASE ORAL
COMMUNITY
End: 2018-05-07 | Stop reason: ALTCHOICE

## 2018-05-07 RX ORDER — LUBIPROSTONE 24 UG/1
CAPSULE, GELATIN COATED ORAL
COMMUNITY
End: 2018-05-07 | Stop reason: ALTCHOICE

## 2018-05-07 RX ORDER — ALLOPURINOL 300 MG/1
TABLET ORAL
COMMUNITY
End: 2018-05-07 | Stop reason: SDUPTHER

## 2018-05-07 RX ORDER — DULOXETIN HYDROCHLORIDE 60 MG/1
CAPSULE, DELAYED RELEASE ORAL
COMMUNITY
End: 2018-05-07 | Stop reason: ALTCHOICE

## 2018-05-07 RX ORDER — ISOSORBIDE MONONITRATE 30 MG/1
TABLET, EXTENDED RELEASE ORAL
COMMUNITY
End: 2018-05-07 | Stop reason: ALTCHOICE

## 2018-05-07 RX ORDER — POTASSIUM CHLORIDE 20 MEQ/1
TABLET, EXTENDED RELEASE ORAL
COMMUNITY
End: 2018-05-07 | Stop reason: SDUPTHER

## 2018-05-07 RX ORDER — VITAMIN A ACETATE, .BETA.-CAROTENE, ASCORBIC ACID, CHOLECALCIFEROL, .ALPHA.-TOCOPHEROL ACETATE, DL-, THIAMINE MONONITRATE, RIBOFLAVIN, NIACINAMIDE, PYRIDOXINE HYDROCHLORIDE, FOLIC ACID, CYANOCOBALAMIN, CALCIUM CARBONATE, FERROUS FUMARATE, ZINC OXIDE, AND CUPRIC OXIDE 2000; 2000; 120; 400; 22; 1.84; 3; 20; 10; 1; 12; 200; 27; 25; 2 [IU]/1; [IU]/1; MG/1; [IU]/1; MG/1; MG/1; MG/1; MG/1; MG/1; MG/1; UG/1; MG/1; MG/1; MG/1; MG/1
TABLET ORAL
COMMUNITY
Start: 2018-03-17 | End: 2018-06-25 | Stop reason: SDUPTHER

## 2018-05-07 RX ORDER — FUROSEMIDE 20 MG/1
TABLET ORAL
COMMUNITY
End: 2018-05-07 | Stop reason: ALTCHOICE

## 2018-05-07 RX ORDER — HYDROCODONE BITARTRATE AND ACETAMINOPHEN 5; 325 MG/1; MG/1
TABLET ORAL
COMMUNITY
End: 2018-05-07 | Stop reason: ALTCHOICE

## 2018-05-07 RX ORDER — NITROGLYCERIN 0.4 MG/1
TABLET SUBLINGUAL
COMMUNITY
End: 2018-05-07 | Stop reason: ALTCHOICE

## 2018-05-07 RX ORDER — CLOPIDOGREL BISULFATE 75 MG/1
TABLET ORAL
COMMUNITY
End: 2018-05-07 | Stop reason: ALTCHOICE

## 2018-05-07 RX ORDER — OXYCODONE HYDROCHLORIDE AND ACETAMINOPHEN 5; 325 MG/1; MG/1
TABLET ORAL
COMMUNITY
End: 2018-05-07 | Stop reason: ALTCHOICE

## 2018-05-07 RX ORDER — LISINOPRIL 40 MG/1
TABLET ORAL
COMMUNITY
Start: 2018-05-02 | End: 2018-05-07

## 2018-05-07 RX ORDER — SIMVASTATIN 40 MG
TABLET ORAL
COMMUNITY
End: 2018-05-07 | Stop reason: ALTCHOICE

## 2018-05-07 RX ORDER — ASPIRIN 81 MG/1
TABLET ORAL
COMMUNITY
End: 2018-05-07 | Stop reason: ALTCHOICE

## 2018-05-07 RX ORDER — LISINOPRIL 10 MG/1
TABLET ORAL
COMMUNITY
End: 2018-05-07

## 2018-05-07 RX ORDER — FAMOTIDINE 20 MG/1
TABLET, FILM COATED ORAL
COMMUNITY
End: 2018-05-07 | Stop reason: ALTCHOICE

## 2018-05-07 RX ORDER — LANOLIN ALCOHOL/MO/W.PET/CERES
CREAM (GRAM) TOPICAL
COMMUNITY
End: 2018-05-07 | Stop reason: SDUPTHER

## 2018-05-07 RX ORDER — LORAZEPAM 0.5 MG/1
TABLET ORAL
COMMUNITY
End: 2018-05-07 | Stop reason: ALTCHOICE

## 2018-05-07 RX ORDER — FERROUS SULFATE 325(65) MG
TABLET ORAL
COMMUNITY
Start: 2018-04-07 | End: 2018-08-06

## 2018-05-07 RX ORDER — LOSARTAN POTASSIUM AND HYDROCHLOROTHIAZIDE 25; 100 MG/1; MG/1
TABLET ORAL
COMMUNITY
End: 2018-05-07

## 2018-05-07 RX ORDER — PEN NEEDLE, DIABETIC 31 GX5/16"
NEEDLE, DISPOSABLE MISCELLANEOUS
COMMUNITY
End: 2018-05-07 | Stop reason: SDUPTHER

## 2018-05-07 ASSESSMENT — ENCOUNTER SYMPTOMS
CONSTIPATION: 0
COUGH: 0
SHORTNESS OF BREATH: 0
BACK PAIN: 0
VOMITING: 0
DIARRHEA: 0
NAUSEA: 0

## 2018-05-11 ENCOUNTER — TELEPHONE (OUTPATIENT)
Dept: FAMILY MEDICINE CLINIC | Age: 58
End: 2018-05-11

## 2018-05-17 ENCOUNTER — HOSPITAL ENCOUNTER (EMERGENCY)
Age: 58
Discharge: HOME | End: 2018-05-17
Payer: MEDICARE

## 2018-05-17 VITALS
HEIGHT: 63 IN | TEMPERATURE: 98.2 F | SYSTOLIC BLOOD PRESSURE: 158 MMHG | WEIGHT: 193.2 LBS | OXYGEN SATURATION: 98 % | HEART RATE: 75 BPM | RESPIRATION RATE: 16 BRPM | BODY MASS INDEX: 34.23 KG/M2 | DIASTOLIC BLOOD PRESSURE: 74 MMHG

## 2018-05-17 DIAGNOSIS — L03.115 CELLULITIS OF RIGHT LOWER EXTREMITY: Primary | ICD-10-CM

## 2018-05-17 PROCEDURE — 99283 EMERGENCY DEPT VISIT LOW MDM: CPT

## 2018-05-17 RX ORDER — POTASSIUM CHLORIDE 1.5 G/1.77G
20 POWDER, FOR SOLUTION ORAL 2 TIMES DAILY
COMMUNITY
End: 2018-07-30 | Stop reason: SDUPTHER

## 2018-05-17 RX ORDER — CEPHALEXIN 500 MG/1
500 CAPSULE ORAL 3 TIMES DAILY
Qty: 30 CAPSULE | Refills: 0 | Status: SHIPPED | OUTPATIENT
Start: 2018-05-17 | End: 2018-06-10

## 2018-05-19 ASSESSMENT — ENCOUNTER SYMPTOMS
WHEEZING: 0
SINUS PRESSURE: 0
ABDOMINAL PAIN: 0
SHORTNESS OF BREATH: 0
RHINORRHEA: 0
COUGH: 0
CONSTIPATION: 0
DIARRHEA: 0
COLOR CHANGE: 0
NAUSEA: 0
VOMITING: 0
SORE THROAT: 0

## 2018-05-22 RX ORDER — LABETALOL 100 MG/1
TABLET, FILM COATED ORAL
Qty: 60 TABLET | Refills: 2 | Status: SHIPPED | OUTPATIENT
Start: 2018-05-22 | End: 2018-07-26 | Stop reason: SDUPTHER

## 2018-05-22 RX ORDER — LOSARTAN POTASSIUM 100 MG/1
TABLET ORAL
Qty: 30 TABLET | Refills: 2 | Status: SHIPPED | OUTPATIENT
Start: 2018-05-22 | End: 2018-06-25 | Stop reason: ALTCHOICE

## 2018-06-10 ENCOUNTER — APPOINTMENT (OUTPATIENT)
Dept: CT IMAGING | Age: 58
End: 2018-06-10
Payer: MEDICARE

## 2018-06-10 ENCOUNTER — HOSPITAL ENCOUNTER (EMERGENCY)
Age: 58
Discharge: ANOTHER HOSPITAL | End: 2018-06-11
Attending: EMERGENCY MEDICINE
Payer: MEDICARE

## 2018-06-10 DIAGNOSIS — N13.30 HYDRONEPHROSIS OF LEFT KIDNEY: ICD-10-CM

## 2018-06-10 DIAGNOSIS — N26.1 RIGHT RENAL ATROPHY: ICD-10-CM

## 2018-06-10 DIAGNOSIS — I71.40 ABDOMINAL AORTIC ANEURYSM (AAA) 30 TO 34 MM IN DIAMETER: ICD-10-CM

## 2018-06-10 DIAGNOSIS — K92.2 GASTROINTESTINAL HEMORRHAGE, UNSPECIFIED GASTROINTESTINAL HEMORRHAGE TYPE: Primary | ICD-10-CM

## 2018-06-10 DIAGNOSIS — N18.9 ACUTE ON CHRONIC RENAL INSUFFICIENCY: ICD-10-CM

## 2018-06-10 DIAGNOSIS — Z98.84 HISTORY OF GASTRIC BYPASS: ICD-10-CM

## 2018-06-10 DIAGNOSIS — N28.9 ACUTE ON CHRONIC RENAL INSUFFICIENCY: ICD-10-CM

## 2018-06-10 LAB
ABSOLUTE EOS #: 0.3 K/UL (ref 0–0.4)
ABSOLUTE IMMATURE GRANULOCYTE: ABNORMAL K/UL (ref 0–0.3)
ABSOLUTE LYMPH #: 2 K/UL (ref 1–4.8)
ABSOLUTE MONO #: 0.4 K/UL (ref 0.2–0.8)
ALBUMIN SERPL-MCNC: 4.1 G/DL (ref 3.5–5.2)
ALBUMIN/GLOBULIN RATIO: ABNORMAL (ref 1–2.5)
ALP BLD-CCNC: 61 U/L (ref 35–104)
ALT SERPL-CCNC: 13 U/L (ref 5–33)
ANION GAP SERPL CALCULATED.3IONS-SCNC: 15 MMOL/L (ref 9–17)
AST SERPL-CCNC: 16 U/L
BASOPHILS # BLD: 1 % (ref 0–2)
BASOPHILS ABSOLUTE: 0 K/UL (ref 0–0.2)
BILIRUB SERPL-MCNC: 0.19 MG/DL (ref 0.3–1.2)
BILIRUBIN DIRECT: <0.08 MG/DL
BILIRUBIN, INDIRECT: ABNORMAL MG/DL (ref 0–1)
BUN BLDV-MCNC: 27 MG/DL (ref 6–20)
BUN/CREAT BLD: 13 (ref 9–20)
CALCIUM SERPL-MCNC: 9.2 MG/DL (ref 8.6–10.4)
CHLORIDE BLD-SCNC: 102 MMOL/L (ref 98–107)
CO2: 26 MMOL/L (ref 20–31)
CREAT SERPL-MCNC: 2.15 MG/DL (ref 0.5–0.9)
DATE, STOOL #1: ABNORMAL
DATE, STOOL #2: ABNORMAL
DATE, STOOL #3: ABNORMAL
DIFFERENTIAL TYPE: ABNORMAL
EOSINOPHILS RELATIVE PERCENT: 4 % (ref 1–4)
GFR AFRICAN AMERICAN: 29 ML/MIN
GFR NON-AFRICAN AMERICAN: 24 ML/MIN
GFR SERPL CREATININE-BSD FRML MDRD: ABNORMAL ML/MIN/{1.73_M2}
GFR SERPL CREATININE-BSD FRML MDRD: ABNORMAL ML/MIN/{1.73_M2}
GLOBULIN: ABNORMAL G/DL (ref 1.5–3.8)
GLUCOSE BLD-MCNC: 98 MG/DL (ref 70–99)
HCT VFR BLD CALC: 34.5 % (ref 36–46)
HEMOCCULT SP1 STL QL: POSITIVE
HEMOCCULT SP2 STL QL: ABNORMAL
HEMOCCULT SP3 STL QL: ABNORMAL
HEMOGLOBIN: 11.3 G/DL (ref 12–16)
IMMATURE GRANULOCYTES: ABNORMAL %
INR BLD: 1
LACTATE DEHYDROGENASE: 220 U/L (ref 135–214)
LACTIC ACID: 0.9 MMOL/L (ref 0.5–2.2)
LYMPHOCYTES # BLD: 31 % (ref 24–44)
MAGNESIUM: 2.1 MG/DL (ref 1.6–2.6)
MCH RBC QN AUTO: 28.6 PG (ref 26–34)
MCHC RBC AUTO-ENTMCNC: 32.6 G/DL (ref 31–37)
MCV RBC AUTO: 87.7 FL (ref 80–100)
MONOCYTES # BLD: 6 % (ref 1–7)
NRBC AUTOMATED: ABNORMAL PER 100 WBC
PARTIAL THROMBOPLASTIN TIME: 30.3 SEC (ref 23–31)
PDW BLD-RTO: 16 % (ref 11.5–14.5)
PLATELET # BLD: 333 K/UL (ref 130–400)
PLATELET ESTIMATE: ABNORMAL
PMV BLD AUTO: 7.1 FL (ref 6–12)
POTASSIUM SERPL-SCNC: 3.6 MMOL/L (ref 3.7–5.3)
PROTHROMBIN TIME: 10.5 SEC (ref 9.7–11.6)
RBC # BLD: 3.94 M/UL (ref 4–5.2)
RBC # BLD: ABNORMAL 10*6/UL
SEG NEUTROPHILS: 58 % (ref 36–66)
SEGMENTED NEUTROPHILS ABSOLUTE COUNT: 3.7 K/UL (ref 1.8–7.7)
SODIUM BLD-SCNC: 143 MMOL/L (ref 135–144)
TIME, STOOL #1: 2145
TIME, STOOL #2: ABNORMAL
TIME, STOOL #3: ABNORMAL
TOTAL PROTEIN: 7.8 G/DL (ref 6.4–8.3)
WBC # BLD: 6.4 K/UL (ref 3.5–11)
WBC # BLD: ABNORMAL 10*3/UL

## 2018-06-10 PROCEDURE — 80048 BASIC METABOLIC PNL TOTAL CA: CPT

## 2018-06-10 PROCEDURE — 86850 RBC ANTIBODY SCREEN: CPT

## 2018-06-10 PROCEDURE — 96375 TX/PRO/DX INJ NEW DRUG ADDON: CPT

## 2018-06-10 PROCEDURE — 2580000003 HC RX 258: Performed by: EMERGENCY MEDICINE

## 2018-06-10 PROCEDURE — 36415 COLL VENOUS BLD VENIPUNCTURE: CPT

## 2018-06-10 PROCEDURE — 74176 CT ABD & PELVIS W/O CONTRAST: CPT

## 2018-06-10 PROCEDURE — C9113 INJ PANTOPRAZOLE SODIUM, VIA: HCPCS | Performed by: EMERGENCY MEDICINE

## 2018-06-10 PROCEDURE — 96366 THER/PROPH/DIAG IV INF ADDON: CPT

## 2018-06-10 PROCEDURE — 83615 LACTATE (LD) (LDH) ENZYME: CPT

## 2018-06-10 PROCEDURE — 6360000002 HC RX W HCPCS: Performed by: EMERGENCY MEDICINE

## 2018-06-10 PROCEDURE — 80076 HEPATIC FUNCTION PANEL: CPT

## 2018-06-10 PROCEDURE — 85610 PROTHROMBIN TIME: CPT

## 2018-06-10 PROCEDURE — 83735 ASSAY OF MAGNESIUM: CPT

## 2018-06-10 PROCEDURE — G0328 FECAL BLOOD SCRN IMMUNOASSAY: HCPCS

## 2018-06-10 PROCEDURE — 96365 THER/PROPH/DIAG IV INF INIT: CPT

## 2018-06-10 PROCEDURE — 85730 THROMBOPLASTIN TIME PARTIAL: CPT

## 2018-06-10 PROCEDURE — 86901 BLOOD TYPING SEROLOGIC RH(D): CPT

## 2018-06-10 PROCEDURE — 83605 ASSAY OF LACTIC ACID: CPT

## 2018-06-10 PROCEDURE — 85025 COMPLETE CBC W/AUTO DIFF WBC: CPT

## 2018-06-10 PROCEDURE — 99285 EMERGENCY DEPT VISIT HI MDM: CPT

## 2018-06-10 PROCEDURE — 86900 BLOOD TYPING SEROLOGIC ABO: CPT

## 2018-06-10 RX ORDER — ONDANSETRON 2 MG/ML
4 INJECTION INTRAMUSCULAR; INTRAVENOUS ONCE
Status: COMPLETED | OUTPATIENT
Start: 2018-06-10 | End: 2018-06-10

## 2018-06-10 RX ORDER — CYANOCOBALAMIN 1000 UG/ML
1000 INJECTION INTRAMUSCULAR; SUBCUTANEOUS ONCE
Status: COMPLETED | OUTPATIENT
Start: 2018-06-10 | End: 2018-06-10

## 2018-06-10 RX ORDER — SODIUM CHLORIDE 9 MG/ML
INJECTION, SOLUTION INTRAVENOUS CONTINUOUS
Status: DISCONTINUED | OUTPATIENT
Start: 2018-06-10 | End: 2018-06-11 | Stop reason: HOSPADM

## 2018-06-10 RX ORDER — ALLOPURINOL 100 MG/1
100 TABLET ORAL DAILY
COMMUNITY
End: 2018-06-25

## 2018-06-10 RX ADMIN — ONDANSETRON 4 MG: 2 INJECTION, SOLUTION INTRAMUSCULAR; INTRAVENOUS at 23:10

## 2018-06-10 RX ADMIN — SODIUM CHLORIDE 8 MG/HR: 9 INJECTION, SOLUTION INTRAVENOUS at 23:31

## 2018-06-10 RX ADMIN — SODIUM CHLORIDE 80 MG: 9 INJECTION, SOLUTION INTRAVENOUS at 23:11

## 2018-06-10 RX ADMIN — CYANOCOBALAMIN 1000 MCG: 1000 INJECTION, SOLUTION INTRAMUSCULAR at 23:10

## 2018-06-10 RX ADMIN — SODIUM CHLORIDE: 9 INJECTION, SOLUTION INTRAVENOUS at 23:11

## 2018-06-11 ENCOUNTER — TELEPHONE (OUTPATIENT)
Dept: FAMILY MEDICINE CLINIC | Age: 58
End: 2018-06-11

## 2018-06-11 VITALS
HEIGHT: 62 IN | BODY MASS INDEX: 35.51 KG/M2 | WEIGHT: 193 LBS | HEART RATE: 63 BPM | OXYGEN SATURATION: 95 % | RESPIRATION RATE: 13 BRPM | DIASTOLIC BLOOD PRESSURE: 64 MMHG | SYSTOLIC BLOOD PRESSURE: 137 MMHG | TEMPERATURE: 98 F

## 2018-06-11 LAB
-: ABNORMAL
ABO/RH: NORMAL
AMORPHOUS: ABNORMAL
ANTIBODY SCREEN: NEGATIVE
ARM BAND NUMBER: NORMAL
BACTERIA: ABNORMAL
BILIRUBIN URINE: NEGATIVE
CASTS UA: ABNORMAL /LPF
COLOR: YELLOW
COMMENT UA: ABNORMAL
CRYSTALS, UA: ABNORMAL /HPF
EPITHELIAL CELLS UA: ABNORMAL /HPF (ref 0–5)
EXPIRATION DATE: NORMAL
GLUCOSE URINE: NEGATIVE
KETONES, URINE: NEGATIVE
LEUKOCYTE ESTERASE, URINE: NEGATIVE
MUCUS: ABNORMAL
NITRITE, URINE: NEGATIVE
OTHER OBSERVATIONS UA: ABNORMAL
PH UA: 6 (ref 5–8)
PROTEIN UA: ABNORMAL
RBC UA: ABNORMAL /HPF (ref 0–2)
RENAL EPITHELIAL, UA: ABNORMAL /HPF
SPECIFIC GRAVITY UA: 1.01 (ref 1–1.03)
TRICHOMONAS: ABNORMAL
TURBIDITY: CLEAR
URINE HGB: ABNORMAL
UROBILINOGEN, URINE: NORMAL
WBC UA: ABNORMAL /HPF (ref 0–5)
YEAST: ABNORMAL

## 2018-06-11 PROCEDURE — 81001 URINALYSIS AUTO W/SCOPE: CPT

## 2018-06-11 PROCEDURE — 96366 THER/PROPH/DIAG IV INF ADDON: CPT

## 2018-06-25 ENCOUNTER — OFFICE VISIT (OUTPATIENT)
Dept: FAMILY MEDICINE CLINIC | Age: 58
End: 2018-06-25
Payer: MEDICARE

## 2018-06-25 VITALS
HEIGHT: 63 IN | SYSTOLIC BLOOD PRESSURE: 150 MMHG | OXYGEN SATURATION: 96 % | TEMPERATURE: 97.7 F | HEART RATE: 65 BPM | DIASTOLIC BLOOD PRESSURE: 80 MMHG | WEIGHT: 192.2 LBS | BODY MASS INDEX: 34.05 KG/M2

## 2018-06-25 DIAGNOSIS — K92.2 GASTROINTESTINAL HEMORRHAGE, UNSPECIFIED GASTROINTESTINAL HEMORRHAGE TYPE: Primary | ICD-10-CM

## 2018-06-25 DIAGNOSIS — I10 ESSENTIAL HYPERTENSION: ICD-10-CM

## 2018-06-25 DIAGNOSIS — E79.0 HYPERURICEMIA: ICD-10-CM

## 2018-06-25 DIAGNOSIS — N18.30 STAGE 3 CHRONIC KIDNEY DISEASE (HCC): ICD-10-CM

## 2018-06-25 DIAGNOSIS — Z13.220 SCREENING FOR HYPERLIPIDEMIA: ICD-10-CM

## 2018-06-25 DIAGNOSIS — M85.89 OSTEOPENIA OF MULTIPLE SITES: ICD-10-CM

## 2018-06-25 PROCEDURE — 99213 OFFICE O/P EST LOW 20 MIN: CPT | Performed by: PHYSICIAN ASSISTANT

## 2018-06-25 RX ORDER — VITAMIN A ACETATE, .BETA.-CAROTENE, ASCORBIC ACID, CHOLECALCIFEROL, .ALPHA.-TOCOPHEROL ACETATE, DL-, THIAMINE MONONITRATE, RIBOFLAVIN, NIACINAMIDE, PYRIDOXINE HYDROCHLORIDE, FOLIC ACID, CYANOCOBALAMIN, CALCIUM CARBONATE, FERROUS FUMARATE, ZINC OXIDE, AND CUPRIC OXIDE 2000; 2000; 120; 400; 22; 1.84; 3; 20; 10; 1; 12; 200; 27; 25; 2 [IU]/1; [IU]/1; MG/1; [IU]/1; MG/1; MG/1; MG/1; MG/1; MG/1; MG/1; UG/1; MG/1; MG/1; MG/1; MG/1
TABLET ORAL
Qty: 30 TABLET | Refills: 3 | Status: SHIPPED | OUTPATIENT
Start: 2018-06-25 | End: 2018-09-21 | Stop reason: SDUPTHER

## 2018-06-25 RX ORDER — PANTOPRAZOLE SODIUM 20 MG/1
20 TABLET, DELAYED RELEASE ORAL DAILY
Qty: 30 TABLET | Refills: 3 | Status: SHIPPED | OUTPATIENT
Start: 2018-06-25 | End: 2018-09-21 | Stop reason: SDUPTHER

## 2018-06-25 RX ORDER — ALLOPURINOL 100 MG/1
100 TABLET ORAL DAILY
Qty: 30 TABLET | Refills: 3 | Status: SHIPPED | OUTPATIENT
Start: 2018-06-25 | End: 2018-12-03 | Stop reason: SDUPTHER

## 2018-06-25 RX ORDER — ERGOCALCIFEROL 1.25 MG/1
50000 CAPSULE ORAL WEEKLY
COMMUNITY
End: 2018-06-25 | Stop reason: SDUPTHER

## 2018-06-25 RX ORDER — POTASSIUM CHLORIDE 1.5 G/1.77G
20 POWDER, FOR SOLUTION ORAL DAILY
Qty: 30 EACH | Status: CANCELLED | OUTPATIENT
Start: 2018-06-25

## 2018-06-25 RX ORDER — ERGOCALCIFEROL (VITAMIN D2) 1250 MCG
50000 CAPSULE ORAL WEEKLY
Qty: 4 CAPSULE | Refills: 3 | Status: SHIPPED | OUTPATIENT
Start: 2018-06-25 | End: 2018-09-21 | Stop reason: SDUPTHER

## 2018-06-25 RX ORDER — FAMOTIDINE 20 MG/1
20 TABLET, FILM COATED ORAL 2 TIMES DAILY
COMMUNITY
End: 2018-06-26

## 2018-06-25 RX ORDER — FAMOTIDINE 20 MG/1
20 TABLET, FILM COATED ORAL 2 TIMES DAILY
Qty: 60 TABLET | Refills: 3 | Status: CANCELLED | OUTPATIENT
Start: 2018-06-25

## 2018-06-25 ASSESSMENT — PATIENT HEALTH QUESTIONNAIRE - PHQ9
SUM OF ALL RESPONSES TO PHQ9 QUESTIONS 1 & 2: 0
2. FEELING DOWN, DEPRESSED OR HOPELESS: 0
SUM OF ALL RESPONSES TO PHQ QUESTIONS 1-9: 0
1. LITTLE INTEREST OR PLEASURE IN DOING THINGS: 0

## 2018-06-26 ASSESSMENT — ENCOUNTER SYMPTOMS
SHORTNESS OF BREATH: 0
COUGH: 0
CONSTIPATION: 0
VOMITING: 0
DIARRHEA: 0
BACK PAIN: 0
NAUSEA: 0

## 2018-07-02 DIAGNOSIS — I10 ESSENTIAL HYPERTENSION: ICD-10-CM

## 2018-07-04 RX ORDER — AMLODIPINE BESYLATE 10 MG/1
TABLET ORAL
Qty: 30 TABLET | Refills: 2 | Status: SHIPPED | OUTPATIENT
Start: 2018-07-04 | End: 2018-09-21 | Stop reason: SDUPTHER

## 2018-07-27 RX ORDER — LABETALOL 100 MG/1
TABLET, FILM COATED ORAL
Qty: 60 TABLET | Refills: 1 | Status: SHIPPED | OUTPATIENT
Start: 2018-07-27 | End: 2018-09-21 | Stop reason: SDUPTHER

## 2018-07-27 RX ORDER — LOSARTAN POTASSIUM 100 MG/1
TABLET ORAL
Qty: 30 TABLET | Refills: 2 | OUTPATIENT
Start: 2018-07-27

## 2018-07-30 NOTE — TELEPHONE ENCOUNTER
Health Maintenance   Topic Date Due    Diabetic retinal exam  03/21/2016    Diabetic foot exam  07/14/2016    Cervical cancer screen  02/18/2017    Lipid screen  08/01/2017    DTaP/Tdap/Td vaccine (1 - Tdap) 12/31/2018 (Originally 2/3/1980)    Shingles Vaccine (1 of 2 - 2 Dose Series) 05/07/2019 (Originally 2/3/2011)    Pneumococcal highest risk (1 of 3 - PCV13) 12/31/2019 (Originally 2/3/1980)    Flu vaccine (1) 09/01/2018    A1C test (Diabetic or Prediabetic)  03/23/2019    Creatinine monitoring  07/13/2019    Potassium monitoring  07/23/2019    Breast cancer screen  06/13/2020    Colon cancer screen colonoscopy  06/13/2028    Hepatitis C screen  Completed    HIV screen  Completed             (applicable per patient's age: Cancer Screenings, Depression Screening, Fall Risk Screening, Immunizations)    Hemoglobin A1C (%)   Date Value   03/23/2018 5.6   01/25/2017 5.6   05/17/2016 5.6     Microalb/Crt.  Ratio (mcg/mg creat)   Date Value   02/09/2013 664     LDL Cholesterol (mg/dL)   Date Value   02/09/2013 202 (H)     LDL Calculated (mg/dL)   Date Value   08/01/2016 116     AST (U/L)   Date Value   06/10/2018 16     ALT (U/L)   Date Value   06/10/2018 13     BUN (mg/dL)   Date Value   07/13/2018 27      (goal A1C is < 7)   (goal LDL is <100) need 30-50% reduction from baseline     BP Readings from Last 3 Encounters:   07/18/18 110/60   06/25/18 (!) 150/80   06/11/18 137/64    (goal /80)      All Future Testing planned in [facility]:  Lab Frequency Next Occurrence   Basic Metabolic Panel Once 16/84/3062   CBC Auto Differential Once 09/12/2017   Magnesium Once 09/12/2017   ALLAN Digital Screen Bilateral [[id number]] Once 09/01/2018   Phosphorus Once 04/16/2018   Lipid Panel Once 07/25/2018   Hemoglobin A1C Once 01/01/2019   Microalbumin / Creatinine Urine Ratio Once 01/01/2019   BUN & Creatinine Once 07/18/2018   Calcium Once 07/18/2018   CBC Auto Differential Once 07/18/2018   Electrolyte Panel Once 07/18/2018   Magnesium Once 07/18/2018   Phosphorus Once 07/18/2018   EKG 12 lead Once 07/18/2018       Next Visit Date:  Future Appointments  Date Time Provider Amanda Kamala   8/6/2018 1:20 PM Eryn Trinh PA-C [facility]   8/7/2018 10:00 AM Eryn Trinh PA-C [place] [facility]   10/18/2018 11:15 AM Tc [place] MD [facility] None            Patient Active Problem List:     Diabetes mellitus type 2, noninsulin dependent     Hyperlipidemia     Osteoarthritis of knee     Nephrolithiasis     Microalbuminuria     Smoker Cessation     Obesity, morbid ([address]     S/P gastric bypass     Preoperative general physical examination     Anemia     Chronic kidney disease     S/P total knee arthroplasty     Hyperuricemia     Hydronephrosis of left kidney     Renal atrophy, right     Localized, primary osteoarthritis of hand     Essential hypertension     Renal insufficiency     Motion sickness     Slow transit constipation     Chest pain     Aortic dissection (HCC)     JINNY (acute kidney injury) ([address]     Respiratory acidosis     Subacromial impingement

## 2018-08-01 RX ORDER — POTASSIUM CHLORIDE 1.5 G/1.77G
20 POWDER, FOR SOLUTION ORAL 2 TIMES DAILY
Qty: 60 EACH | Refills: 0 | Status: SHIPPED | OUTPATIENT
Start: 2018-08-01 | End: 2018-09-12 | Stop reason: SDUPTHER

## 2018-08-06 ENCOUNTER — OFFICE VISIT (OUTPATIENT)
Dept: FAMILY MEDICINE CLINIC | Age: 58
End: 2018-08-06
Payer: MEDICARE

## 2018-08-06 VITALS
RESPIRATION RATE: 20 BRPM | BODY MASS INDEX: 35.3 KG/M2 | SYSTOLIC BLOOD PRESSURE: 158 MMHG | OXYGEN SATURATION: 93 % | HEART RATE: 81 BPM | WEIGHT: 191.8 LBS | HEIGHT: 62 IN | DIASTOLIC BLOOD PRESSURE: 91 MMHG | TEMPERATURE: 98.6 F

## 2018-08-06 DIAGNOSIS — E11.9 DIABETES MELLITUS TYPE 2, NONINSULIN DEPENDENT (HCC): ICD-10-CM

## 2018-08-06 DIAGNOSIS — K21.9 GASTROESOPHAGEAL REFLUX DISEASE, ESOPHAGITIS PRESENCE NOT SPECIFIED: ICD-10-CM

## 2018-08-06 DIAGNOSIS — S40.021A CONTUSION OF RIGHT UPPER EXTREMITY, INITIAL ENCOUNTER: ICD-10-CM

## 2018-08-06 DIAGNOSIS — E66.01 CLASS 2 SEVERE OBESITY DUE TO EXCESS CALORIES WITH SERIOUS COMORBIDITY AND BODY MASS INDEX (BMI) OF 35.0 TO 35.9 IN ADULT (HCC): ICD-10-CM

## 2018-08-06 DIAGNOSIS — I10 ESSENTIAL HYPERTENSION: Primary | ICD-10-CM

## 2018-08-06 DIAGNOSIS — D17.22 LIPOMA OF LEFT UPPER EXTREMITY: ICD-10-CM

## 2018-08-06 LAB — HBA1C MFR BLD: 5.8 %

## 2018-08-06 PROCEDURE — 3044F HG A1C LEVEL LT 7.0%: CPT | Performed by: PHYSICIAN ASSISTANT

## 2018-08-06 PROCEDURE — 3017F COLORECTAL CA SCREEN DOC REV: CPT | Performed by: PHYSICIAN ASSISTANT

## 2018-08-06 PROCEDURE — 83036 HEMOGLOBIN GLYCOSYLATED A1C: CPT | Performed by: PHYSICIAN ASSISTANT

## 2018-08-06 PROCEDURE — 1036F TOBACCO NON-USER: CPT | Performed by: PHYSICIAN ASSISTANT

## 2018-08-06 PROCEDURE — G8417 CALC BMI ABV UP PARAM F/U: HCPCS | Performed by: PHYSICIAN ASSISTANT

## 2018-08-06 PROCEDURE — 99214 OFFICE O/P EST MOD 30 MIN: CPT | Performed by: PHYSICIAN ASSISTANT

## 2018-08-06 PROCEDURE — G8427 DOCREV CUR MEDS BY ELIG CLIN: HCPCS | Performed by: PHYSICIAN ASSISTANT

## 2018-08-06 PROCEDURE — 2022F DILAT RTA XM EVC RTNOPTHY: CPT | Performed by: PHYSICIAN ASSISTANT

## 2018-08-06 RX ORDER — INSULIN GLARGINE 100 [IU]/ML
INJECTION, SOLUTION SUBCUTANEOUS
COMMUNITY
Start: 2018-05-29 | End: 2019-04-09 | Stop reason: ALTCHOICE

## 2018-08-06 RX ORDER — FERROUS SULFATE 325(65) MG
TABLET ORAL
Status: ON HOLD | COMMUNITY
Start: 2018-07-27 | End: 2019-10-24 | Stop reason: SDUPTHER

## 2018-08-06 RX ORDER — CALCIUM CITRATE/VITAMIN D3 200MG-6.25
TABLET ORAL
COMMUNITY
Start: 2018-06-01 | End: 2022-08-11 | Stop reason: ALTCHOICE

## 2018-08-06 RX ORDER — LANOLIN ALCOHOL/MO/W.PET/CERES
CREAM (GRAM) TOPICAL
COMMUNITY
Start: 2018-07-27 | End: 2021-03-05

## 2018-08-06 RX ORDER — ISOSORBIDE MONONITRATE 30 MG/1
TABLET, EXTENDED RELEASE ORAL
COMMUNITY
Start: 2018-05-29 | End: 2019-04-09

## 2018-08-06 RX ORDER — LISINOPRIL 40 MG/1
TABLET ORAL
COMMUNITY
Start: 2018-05-29 | End: 2019-04-09

## 2018-08-06 RX ORDER — FUROSEMIDE 20 MG/1
TABLET ORAL
COMMUNITY
Start: 2018-05-29 | End: 2019-04-08

## 2018-08-06 RX ORDER — METOPROLOL SUCCINATE 25 MG/1
TABLET, EXTENDED RELEASE ORAL
COMMUNITY
Start: 2018-05-29 | End: 2019-04-09

## 2018-08-06 RX ORDER — TICAGRELOR 90 MG/1
TABLET ORAL
COMMUNITY
Start: 2018-06-07 | End: 2019-04-09

## 2018-08-06 ASSESSMENT — PATIENT HEALTH QUESTIONNAIRE - PHQ9
2. FEELING DOWN, DEPRESSED OR HOPELESS: 0
SUM OF ALL RESPONSES TO PHQ QUESTIONS 1-9: 0
SUM OF ALL RESPONSES TO PHQ9 QUESTIONS 1 & 2: 0
1. LITTLE INTEREST OR PLEASURE IN DOING THINGS: 0

## 2018-08-06 NOTE — PROGRESS NOTES
Visit Information    Have you changed or started any medications since your last visit including any over-the-counter medicines, vitamins, or herbal medicines? no   Have you stopped taking any of your medications? Is so, why? -  no  Are you having any side effects from any of your medications? - no    Have you seen any other physician or provider since your last visit?  no   Have you had any other diagnostic tests since your last visit?  no   Have you been seen in the emergency room and/or had an admission in a hospital since we last saw you?  no   Have you had your routine dental cleaning in the past 6 months?  no     Do you have an active MyChart account? If no, what is the barrier?   Yes    Patient Care Team:  Sunshine Allen MD as PCP - General (Family Medicine)  Bess Keenan PA-C as PCP - [facility] Attributed Provider    Medical History Review  Past Medical, Family, and Social History reviewed and does not contribute to the patient presenting condition    Health Maintenance   Topic Date Due    Diabetic retinal exam  03/21/2016    Diabetic foot exam  07/14/2016    Cervical cancer screen  02/18/2017    Lipid screen  08/01/2017    DTaP/Tdap/Td vaccine (1 - Tdap) 12/31/2018 (Originally 2/3/1980)    Shingles Vaccine (1 of 2 - 2 Dose Series) 05/07/2019 (Originally 2/3/2011)    Pneumococcal highest risk (1 of 3 - PCV13) 12/31/2019 (Originally 2/3/1980)    Flu vaccine (1) 09/01/2018    A1C test (Diabetic or Prediabetic)  03/23/2019    Creatinine monitoring  07/13/2019    Potassium monitoring  07/23/2019    Breast cancer screen  06/13/2020    Colon cancer screen colonoscopy  06/13/2028    Hepatitis C screen  Completed    HIV screen  Completed
diabetes, continue current treatments.     HPI    Patient Active Problem List   Diagnosis    Diabetes mellitus type 2, noninsulin dependent    Hyperlipidemia    Microalbuminuria    Obesity, morbid ([address]    S/P gastric bypass    Anemia    Chronic kidney disease    S/P total knee arthroplasty    Hyperuricemia    Hydronephrosis of left kidney    Renal atrophy, right    Localized, primary osteoarthritis of hand    Essential hypertension    Renal insufficiency    Motion sickness    Slow transit constipation    Chest pain    Aortic dissection (HCC)    [facility]) ([facility] [address]    Respiratory acidosis    Subacromial impingement       Health Maintenance Due   Topic Date Due    Diabetic retinal exam  03/21/2016    Diabetic foot exam  07/14/2016    Cervical cancer screen  02/18/2017    Lipid screen  08/01/2017       Allergies   Allergen Reactions    Latex Hives    Penicillins Hives    Codeine Itching    Nsaids Other (See Comments)     Gastric Bypass Surgery     Tolmetin Other (See Comments)     Gastric Bypass Surgery          Current Outpatient Prescriptions   Medication Sig Dispense Refill    TRUE METRIX BLOOD GLUCOSE TEST strip       ferrous sulfate 325 (65 Fe) MG tablet TAKE 1 TABLET TWICE A DAY      vitamin B-12 (CYANOCOBALAMIN) 1000 MCG tablet TAKE 1 TABLET BY MOUTH DAILY      potassium chloride (KLOR-CON) 20 MEQ packet Take 20 mEq by mouth 2 times daily 60 each 0    labetalol (NORMODYNE) 100 MG tablet TAKE 1 TABLET TWICE A DAY 60 tablet 1    amLODIPine (NORVASC) 10 MG tablet TAKE 1 TABLET DAILY 30 tablet 2    Prenatal Vit-Fe Fumarate-FA (PNV PRENATAL PLUS MULTIVITAMIN) 27-1 MG TABS Take 1 tablet daily 30 tablet 3    allopurinol (ZYLOPRIM) 100 MG tablet Take 1 tablet by mouth daily 30 tablet 3    ergocalciferol (DRISDOL) 01139 units capsule Take 1 capsule by mouth once a week 4 capsule 3    pantoprazole (PROTONIX) 20 MG tablet Take 1 tablet by mouth daily 30 tablet 3   

## 2018-08-08 ASSESSMENT — ENCOUNTER SYMPTOMS
CONSTIPATION: 0
DIARRHEA: 0
SHORTNESS OF BREATH: 0
NAUSEA: 0
BACK PAIN: 0
VOMITING: 0
COUGH: 0

## 2018-08-14 DIAGNOSIS — S40.021A CONTUSION OF RIGHT UPPER EXTREMITY, INITIAL ENCOUNTER: Primary | ICD-10-CM

## 2018-09-12 NOTE — TELEPHONE ENCOUNTER
Last visit:08/06/2018  Last Med refill:08/01/2018    Next Visit Date:  Future Appointments  Date Time Provider Amanda Paboni   10/18/2018 11:15 AM Saul Quijano MD [facility] None   11/6/2018 1:00 PM Cynthia Clark PA-C [id number] Maintenance   Topic Date Due    Diabetic retinal exam  03/21/2016    Diabetic foot exam  07/14/2016    Cervical cancer screen  02/18/2017    Lipid screen  08/01/2017    Flu vaccine (1) 09/01/2018    DTaP/Tdap/Td vaccine (1 - Tdap) 12/31/2018 (Originally 2/3/1980)    Shingles Vaccine (1 of 2 - 2 Dose Series) 05/07/2019 (Originally 2/3/2011)    Pneumococcal highest risk (1 of 3 - PCV13) 12/31/2019 (Originally 2/3/1980)    Creatinine monitoring  07/13/2019    Potassium monitoring  07/23/2019    A1C test (Diabetic or Prediabetic)  08/06/2019    Breast cancer screen  06/13/2020    Colon cancer screen colonoscopy  06/13/2028    Hepatitis C screen  Completed    HIV screen  Completed       Hemoglobin A1C (%)   Date Value   08/06/2018 5.8   03/23/2018 5.6   01/25/2017 5.6             ( goal A1C is < 7)   Microalb/Crt.  Ratio (mcg/mg creat)   Date Value   02/09/2013 664     LDL Cholesterol (mg/dL)   Date Value   02/09/2013 202 (H)     LDL Calculated (mg/dL)   Date Value   08/01/2016 116   04/29/2015 113       (goal LDL is <100)   AST (U/L)   Date Value   06/10/2018 16     ALT (U/L)   Date Value   06/10/2018 13     BUN (mg/dL)   Date Value   07/13/2018 27     BP Readings from Last 3 Encounters:   08/06/18 (!) 158/91   07/18/18 110/60   06/25/18 (!) 150/80          (goal 120/80)    All Future Testing planned in [facility]  Lab Frequency Next Occurrence   Basic Metabolic Panel Once 38/87/8994   CBC Auto Differential Once 09/12/2017   Magnesium Once 09/12/2017   ALLAN Digital Screen Bilateral [[id number]] Once 09/01/2018   Phosphorus Once 04/16/2018   Lipid Panel Once 09/01/2018   Hemoglobin A1C Once 01/01/2019   Microalbumin / Creatinine Urine Ratio Once 01/01/2019   BUN & Creatinine Once 07/18/2018   Calcium Once 07/18/2018   CBC Auto Differential Once 07/18/2018   Electrolyte Panel Once 07/18/2018   Magnesium Once 07/18/2018   Phosphorus Once 07/18/2018   US DUP UPPER EXTREMITY RIGHT VENOUS Once 10/01/2018   US DUP UPPER EXTREMITY RIGHT ARTERIES Once 10/01/2018               Patient Active Problem List:     Diabetes mellitus type 2, noninsulin dependent     Hyperlipidemia     Microalbuminuria     Obesity, morbid (HCC)     S/P gastric bypass     Anemia     Chronic kidney disease     S/P total knee arthroplasty     Hyperuricemia     Hydronephrosis of left kidney     Renal atrophy, right     Localized, primary osteoarthritis of hand     Essential hypertension     Renal insufficiency     Motion sickness     Slow transit constipation     Chest pain     Aortic dissection (HCC)     JINNY (acute kidney injury) ([address]     Respiratory acidosis     Subacromial impingement

## 2018-09-14 RX ORDER — POTASSIUM CHLORIDE 1.5 G/1
POWDER, FOR SOLUTION ORAL
Qty: 60 EACH | Refills: 3 | Status: SHIPPED | OUTPATIENT
Start: 2018-09-14 | End: 2019-03-06 | Stop reason: SDUPTHER

## 2018-11-28 ENCOUNTER — TELEPHONE (OUTPATIENT)
Dept: PRIMARY CARE CLINIC | Age: 58
End: 2018-11-28

## 2018-12-03 ENCOUNTER — TELEPHONE (OUTPATIENT)
Dept: PRIMARY CARE CLINIC | Age: 58
End: 2018-12-03

## 2018-12-03 DIAGNOSIS — I10 ESSENTIAL HYPERTENSION: ICD-10-CM

## 2018-12-03 DIAGNOSIS — E79.0 HYPERURICEMIA: ICD-10-CM

## 2018-12-03 DIAGNOSIS — K92.2 GASTROINTESTINAL HEMORRHAGE, UNSPECIFIED GASTROINTESTINAL HEMORRHAGE TYPE: ICD-10-CM

## 2018-12-04 ENCOUNTER — OFFICE VISIT (OUTPATIENT)
Dept: PRIMARY CARE CLINIC | Age: 58
End: 2018-12-04
Payer: MEDICARE

## 2018-12-04 VITALS
DIASTOLIC BLOOD PRESSURE: 96 MMHG | WEIGHT: 187 LBS | OXYGEN SATURATION: 95 % | TEMPERATURE: 98.4 F | BODY MASS INDEX: 34.2 KG/M2 | SYSTOLIC BLOOD PRESSURE: 176 MMHG | HEART RATE: 78 BPM

## 2018-12-04 DIAGNOSIS — E66.09 CLASS 1 OBESITY DUE TO EXCESS CALORIES WITH SERIOUS COMORBIDITY AND BODY MASS INDEX (BMI) OF 34.0 TO 34.9 IN ADULT: ICD-10-CM

## 2018-12-04 DIAGNOSIS — G89.29 CHRONIC PAIN OF LEFT KNEE: ICD-10-CM

## 2018-12-04 DIAGNOSIS — M25.562 CHRONIC PAIN OF LEFT KNEE: ICD-10-CM

## 2018-12-04 DIAGNOSIS — Z01.818 PREOPERATIVE CLEARANCE: Primary | ICD-10-CM

## 2018-12-04 DIAGNOSIS — I10 ESSENTIAL HYPERTENSION: ICD-10-CM

## 2018-12-04 PROCEDURE — G8427 DOCREV CUR MEDS BY ELIG CLIN: HCPCS | Performed by: PHYSICIAN ASSISTANT

## 2018-12-04 PROCEDURE — G8484 FLU IMMUNIZE NO ADMIN: HCPCS | Performed by: PHYSICIAN ASSISTANT

## 2018-12-04 PROCEDURE — 3017F COLORECTAL CA SCREEN DOC REV: CPT | Performed by: PHYSICIAN ASSISTANT

## 2018-12-04 PROCEDURE — 99214 OFFICE O/P EST MOD 30 MIN: CPT | Performed by: PHYSICIAN ASSISTANT

## 2018-12-04 PROCEDURE — 1036F TOBACCO NON-USER: CPT | Performed by: PHYSICIAN ASSISTANT

## 2018-12-04 PROCEDURE — G8417 CALC BMI ABV UP PARAM F/U: HCPCS | Performed by: PHYSICIAN ASSISTANT

## 2018-12-04 RX ORDER — POTASSIUM CHLORIDE 1.5 G/1.77G
20 POWDER, FOR SOLUTION ORAL
COMMUNITY
End: 2019-04-30 | Stop reason: SDUPTHER

## 2018-12-04 RX ORDER — LABETALOL 100 MG/1
TABLET, FILM COATED ORAL
Qty: 60 TABLET | Refills: 3 | Status: SHIPPED | OUTPATIENT
Start: 2018-12-04 | End: 2019-03-13 | Stop reason: SDUPTHER

## 2018-12-04 RX ORDER — AMLODIPINE BESYLATE 10 MG/1
TABLET ORAL
Qty: 30 TABLET | Refills: 3 | Status: SHIPPED | OUTPATIENT
Start: 2018-12-04 | End: 2019-03-13 | Stop reason: SDUPTHER

## 2018-12-04 RX ORDER — ALLOPURINOL 100 MG/1
100 TABLET ORAL DAILY
Qty: 30 TABLET | Refills: 3 | Status: SHIPPED | OUTPATIENT
Start: 2018-12-04 | End: 2019-03-13 | Stop reason: SDUPTHER

## 2018-12-04 RX ORDER — PANTOPRAZOLE SODIUM 20 MG/1
TABLET, DELAYED RELEASE ORAL
Qty: 30 TABLET | Refills: 3 | Status: SHIPPED | OUTPATIENT
Start: 2018-12-04 | End: 2019-03-13 | Stop reason: SDUPTHER

## 2018-12-04 NOTE — PROGRESS NOTES
Visit Information    Have you changed or started any medications since your last visit including any over-the-counter medicines, vitamins, or herbal medicines? no   Are you having any side effects from any of your medications? -  no  Have you stopped taking any of your medications? Is so, why? -  no    Have you seen any other physician or provider since your last visit? Yes - Records Requested  Have you had any other diagnostic tests since your last visit? Yes - Records Requested  Have you been seen in the emergency room and/or had an admission to a hospital since we last saw you? No  Have you had your routine dental cleaning in the past 6 months? yes -     Have you activated your [employer] account? If not, what are your barriers?  Yes     Patient Care Team:  Chelsey Sweeney PA-C as PCP - General  Chelsey Sweeney PA-C as PCP - [facility] Attributed Provider    Medical History Review  Past Medical, Family, and Social History reviewed and does contribute to the patient presenting condition    Health Maintenance   Topic Date Due    Diabetic retinal exam  03/21/2016    Diabetic foot exam  07/14/2016    Cervical cancer screen  02/18/2017    Lipid screen  08/01/2017    Flu vaccine (1) 09/01/2018    DTaP/Tdap/Td vaccine (1 - Tdap) 12/31/2018 (Originally 2/3/1980)    Shingles Vaccine (1 of 2 - 2 Dose Series) 05/07/2019 (Originally 2/3/2011)    Pneumococcal highest risk (1 of 3 - PCV13) 12/31/2019 (Originally 2/3/1980)    Creatinine monitoring  07/13/2019    Potassium monitoring  07/23/2019    A1C test (Diabetic or Prediabetic)  08/06/2019    Breast cancer screen  06/13/2020    Colon cancer screen colonoscopy  06/13/2028    Hepatitis C screen  Completed    HIV screen  Completed
that actually reflects the content of the visit, thedocument can still have some mistakes which may not have been identified and corrected by editing.

## 2018-12-06 DIAGNOSIS — M85.89 OSTEOPENIA OF MULTIPLE SITES: ICD-10-CM

## 2018-12-06 ASSESSMENT — ENCOUNTER SYMPTOMS
NAUSEA: 0
DIARRHEA: 0
CONSTIPATION: 0
BACK PAIN: 0
SHORTNESS OF BREATH: 0
VOMITING: 0
COUGH: 0

## 2018-12-07 NOTE — TELEPHONE ENCOUNTER
DUP UPPER EXTREMITY RIGHT VENOUS Once 04/01/2019    DUP UPPER EXTREMITY RIGHT ARTERIES Once 04/01/2019               Patient Active Problem List:     Diabetes mellitus type 2, noninsulin dependent     Hyperlipidemia     Microalbuminuria     Obesity, morbid (HCC)     S/P gastric bypass     Anemia     Chronic kidney disease     S/P total knee arthroplasty     Hyperuricemia     Hydronephrosis of left kidney     Renal atrophy, right     Localized, primary osteoarthritis of hand     Essential hypertension     Renal insufficiency     Motion sickness     Slow transit constipation     Chest pain     Aortic dissection (HCC)     JINNY (acute kidney injury) ([address]     Respiratory acidosis     Subacromial impingement

## 2018-12-08 RX ORDER — ERGOCALCIFEROL 1.25 MG/1
CAPSULE ORAL
Qty: 4 CAPSULE | Refills: 2 | Status: SHIPPED | OUTPATIENT
Start: 2018-12-08 | End: 2019-03-13 | Stop reason: SDUPTHER

## 2019-01-09 ENCOUNTER — HOSPITAL ENCOUNTER (OUTPATIENT)
Dept: PHYSICAL THERAPY | Age: 59
Setting detail: THERAPIES SERIES
Discharge: HOME | End: 2019-01-09
Payer: MEDICARE

## 2019-01-09 PROCEDURE — 97161 PT EVAL LOW COMPLEX 20 MIN: CPT

## 2019-01-09 PROCEDURE — G0283 ELEC STIM OTHER THAN WOUND: HCPCS

## 2019-01-09 PROCEDURE — 97110 THERAPEUTIC EXERCISES: CPT

## 2019-01-09 ASSESSMENT — PAIN DESCRIPTION - ORIENTATION: ORIENTATION: LEFT

## 2019-01-09 ASSESSMENT — PAIN DESCRIPTION - LOCATION: LOCATION: KNEE

## 2019-01-09 ASSESSMENT — PAIN DESCRIPTION - FREQUENCY: FREQUENCY: CONTINUOUS

## 2019-01-09 ASSESSMENT — PAIN SCALES - GENERAL: PAINLEVEL_OUTOF10: 7

## 2019-01-11 ENCOUNTER — HOSPITAL ENCOUNTER (OUTPATIENT)
Dept: PHYSICAL THERAPY | Age: 59
Setting detail: THERAPIES SERIES
Discharge: HOME | End: 2019-01-11
Payer: MEDICARE

## 2019-01-11 PROCEDURE — G0283 ELEC STIM OTHER THAN WOUND: HCPCS

## 2019-01-11 PROCEDURE — 97110 THERAPEUTIC EXERCISES: CPT

## 2019-01-11 ASSESSMENT — PAIN DESCRIPTION - LOCATION: LOCATION: KNEE

## 2019-01-11 ASSESSMENT — PAIN SCALES - GENERAL: PAINLEVEL_OUTOF10: 8

## 2019-01-11 ASSESSMENT — PAIN DESCRIPTION - DESCRIPTORS: DESCRIPTORS: CONSTANT

## 2019-01-11 ASSESSMENT — PAIN DESCRIPTION - FREQUENCY: FREQUENCY: CONTINUOUS

## 2019-01-11 ASSESSMENT — PAIN DESCRIPTION - ORIENTATION: ORIENTATION: LEFT

## 2019-01-15 ENCOUNTER — APPOINTMENT (OUTPATIENT)
Dept: PHYSICAL THERAPY | Age: 59
End: 2019-01-15
Payer: MEDICARE

## 2019-01-17 ENCOUNTER — HOSPITAL ENCOUNTER (OUTPATIENT)
Dept: PHYSICAL THERAPY | Age: 59
Setting detail: THERAPIES SERIES
Discharge: HOME | End: 2019-01-17
Payer: MEDICARE

## 2019-01-17 PROCEDURE — G0283 ELEC STIM OTHER THAN WOUND: HCPCS

## 2019-01-17 PROCEDURE — 97110 THERAPEUTIC EXERCISES: CPT

## 2019-01-17 ASSESSMENT — PAIN SCALES - GENERAL: PAINLEVEL_OUTOF10: 6

## 2019-01-17 ASSESSMENT — PAIN DESCRIPTION - FREQUENCY: FREQUENCY: CONTINUOUS

## 2019-01-17 ASSESSMENT — PAIN DESCRIPTION - ORIENTATION: ORIENTATION: LEFT

## 2019-01-17 ASSESSMENT — PAIN DESCRIPTION - LOCATION: LOCATION: KNEE

## 2019-01-17 ASSESSMENT — PAIN DESCRIPTION - DESCRIPTORS: DESCRIPTORS: CONSTANT

## 2019-01-18 ENCOUNTER — HOSPITAL ENCOUNTER (OUTPATIENT)
Dept: PHYSICAL THERAPY | Age: 59
Setting detail: THERAPIES SERIES
Discharge: HOME | End: 2019-01-18
Payer: MEDICARE

## 2019-01-18 PROCEDURE — 97110 THERAPEUTIC EXERCISES: CPT

## 2019-01-18 PROCEDURE — G0283 ELEC STIM OTHER THAN WOUND: HCPCS

## 2019-01-18 ASSESSMENT — PAIN DESCRIPTION - FREQUENCY: FREQUENCY: CONTINUOUS

## 2019-01-18 ASSESSMENT — PAIN SCALES - GENERAL: PAINLEVEL_OUTOF10: 5

## 2019-01-18 ASSESSMENT — PAIN DESCRIPTION - DESCRIPTORS: DESCRIPTORS: CONSTANT

## 2019-01-18 ASSESSMENT — PAIN DESCRIPTION - ORIENTATION: ORIENTATION: LEFT

## 2019-01-21 ENCOUNTER — APPOINTMENT (OUTPATIENT)
Dept: PHYSICAL THERAPY | Age: 59
End: 2019-01-21
Payer: MEDICARE

## 2019-01-23 ENCOUNTER — HOSPITAL ENCOUNTER (OUTPATIENT)
Dept: PHYSICAL THERAPY | Age: 59
Setting detail: THERAPIES SERIES
Discharge: HOME | End: 2019-01-23
Payer: MEDICARE

## 2019-01-23 PROCEDURE — G0283 ELEC STIM OTHER THAN WOUND: HCPCS

## 2019-01-23 PROCEDURE — 97110 THERAPEUTIC EXERCISES: CPT

## 2019-01-23 ASSESSMENT — PAIN DESCRIPTION - FREQUENCY: FREQUENCY: INTERMITTENT

## 2019-01-23 ASSESSMENT — PAIN DESCRIPTION - ORIENTATION: ORIENTATION: LEFT

## 2019-01-23 ASSESSMENT — PAIN DESCRIPTION - LOCATION: LOCATION: KNEE

## 2019-01-23 ASSESSMENT — PAIN SCALES - GENERAL: PAINLEVEL_OUTOF10: 5

## 2019-01-25 ENCOUNTER — HOSPITAL ENCOUNTER (OUTPATIENT)
Dept: PHYSICAL THERAPY | Age: 59
Setting detail: THERAPIES SERIES
Discharge: HOME | End: 2019-01-25
Payer: MEDICARE

## 2019-01-25 PROCEDURE — 97110 THERAPEUTIC EXERCISES: CPT

## 2019-01-25 PROCEDURE — G0283 ELEC STIM OTHER THAN WOUND: HCPCS

## 2019-01-28 ENCOUNTER — HOSPITAL ENCOUNTER (OUTPATIENT)
Dept: PHYSICAL THERAPY | Age: 59
Setting detail: THERAPIES SERIES
Discharge: HOME | End: 2019-01-28
Payer: MEDICARE

## 2019-01-28 PROCEDURE — G0283 ELEC STIM OTHER THAN WOUND: HCPCS

## 2019-01-28 PROCEDURE — 97110 THERAPEUTIC EXERCISES: CPT

## 2019-02-01 ENCOUNTER — HOSPITAL ENCOUNTER (OUTPATIENT)
Dept: PHYSICAL THERAPY | Age: 59
Setting detail: THERAPIES SERIES
Discharge: HOME | End: 2019-02-01
Payer: MEDICARE

## 2019-02-27 ENCOUNTER — HOSPITAL ENCOUNTER (OUTPATIENT)
Dept: PHYSICAL THERAPY | Age: 59
Setting detail: THERAPIES SERIES
Discharge: HOME | End: 2019-02-27
Payer: MEDICARE

## 2019-02-27 PROCEDURE — G0283 ELEC STIM OTHER THAN WOUND: HCPCS

## 2019-02-27 PROCEDURE — 97110 THERAPEUTIC EXERCISES: CPT

## 2019-02-27 ASSESSMENT — PAIN DESCRIPTION - ORIENTATION: ORIENTATION: LEFT

## 2019-02-27 ASSESSMENT — PAIN DESCRIPTION - DESCRIPTORS: DESCRIPTORS: CONSTANT

## 2019-02-27 ASSESSMENT — PAIN SCALES - GENERAL: PAINLEVEL_OUTOF10: 6

## 2019-02-27 ASSESSMENT — PAIN DESCRIPTION - FREQUENCY: FREQUENCY: CONTINUOUS

## 2019-02-27 ASSESSMENT — PAIN DESCRIPTION - LOCATION: LOCATION: KNEE

## 2019-03-01 ENCOUNTER — HOSPITAL ENCOUNTER (OUTPATIENT)
Dept: PHYSICAL THERAPY | Age: 59
Setting detail: THERAPIES SERIES
Discharge: HOME | End: 2019-03-01
Payer: MEDICARE

## 2019-03-13 DIAGNOSIS — E79.0 HYPERURICEMIA: ICD-10-CM

## 2019-03-13 DIAGNOSIS — M85.89 OSTEOPENIA OF MULTIPLE SITES: ICD-10-CM

## 2019-03-13 DIAGNOSIS — K92.2 GASTROINTESTINAL HEMORRHAGE, UNSPECIFIED GASTROINTESTINAL HEMORRHAGE TYPE: ICD-10-CM

## 2019-03-13 DIAGNOSIS — I10 ESSENTIAL HYPERTENSION: ICD-10-CM

## 2019-03-14 RX ORDER — ALLOPURINOL 100 MG/1
100 TABLET ORAL DAILY
Qty: 30 TABLET | Refills: 1 | Status: SHIPPED | OUTPATIENT
Start: 2019-03-14 | End: 2019-04-09 | Stop reason: SDUPTHER

## 2019-03-14 RX ORDER — ERGOCALCIFEROL 1.25 MG/1
50000 CAPSULE ORAL WEEKLY
Qty: 4 CAPSULE | Refills: 1 | Status: SHIPPED | OUTPATIENT
Start: 2019-03-14 | End: 2019-04-09 | Stop reason: SDUPTHER

## 2019-03-14 RX ORDER — PANTOPRAZOLE SODIUM 20 MG/1
20 TABLET, DELAYED RELEASE ORAL DAILY
Qty: 30 TABLET | Refills: 1 | Status: SHIPPED | OUTPATIENT
Start: 2019-03-14 | End: 2019-04-09 | Stop reason: SDUPTHER

## 2019-03-14 RX ORDER — LABETALOL 100 MG/1
100 TABLET, FILM COATED ORAL 2 TIMES DAILY
Qty: 60 TABLET | Refills: 1 | Status: SHIPPED | OUTPATIENT
Start: 2019-03-14 | End: 2019-04-09 | Stop reason: SDUPTHER

## 2019-03-14 RX ORDER — AMLODIPINE BESYLATE 10 MG/1
10 TABLET ORAL DAILY
Qty: 30 TABLET | Refills: 1 | Status: SHIPPED | OUTPATIENT
Start: 2019-03-14 | End: 2019-04-09 | Stop reason: SDUPTHER

## 2019-04-09 ENCOUNTER — OFFICE VISIT (OUTPATIENT)
Dept: PRIMARY CARE CLINIC | Age: 59
End: 2019-04-09
Payer: MEDICARE

## 2019-04-09 VITALS
SYSTOLIC BLOOD PRESSURE: 149 MMHG | HEIGHT: 63 IN | TEMPERATURE: 98 F | WEIGHT: 194.8 LBS | BODY MASS INDEX: 34.52 KG/M2 | DIASTOLIC BLOOD PRESSURE: 78 MMHG | HEART RATE: 69 BPM | OXYGEN SATURATION: 96 %

## 2019-04-09 DIAGNOSIS — I10 ESSENTIAL HYPERTENSION: Primary | ICD-10-CM

## 2019-04-09 DIAGNOSIS — R35.0 URINARY FREQUENCY: ICD-10-CM

## 2019-04-09 DIAGNOSIS — F32.A ACUTE DEPRESSION: ICD-10-CM

## 2019-04-09 DIAGNOSIS — E11.9 DIABETES MELLITUS TYPE 2, NONINSULIN DEPENDENT (HCC): ICD-10-CM

## 2019-04-09 DIAGNOSIS — Z76.0 MEDICATION REFILL: ICD-10-CM

## 2019-04-09 DIAGNOSIS — S46.919A STRAIN OF SHOULDER, UNSPECIFIED LATERALITY, INITIAL ENCOUNTER: ICD-10-CM

## 2019-04-09 LAB
BILIRUBIN, POC: ABNORMAL
BLOOD URINE, POC: ABNORMAL
CLARITY, POC: CLEAR
COLOR, POC: YELLOW
GLUCOSE URINE, POC: ABNORMAL
KETONES, POC: ABNORMAL
LEUKOCYTE EST, POC: ABNORMAL
NITRITE, POC: ABNORMAL
PH, POC: 5
PROTEIN, POC: 300
SPECIFIC GRAVITY, POC: 1.01
UROBILINOGEN, POC: 0.2

## 2019-04-09 PROCEDURE — 1036F TOBACCO NON-USER: CPT | Performed by: PHYSICIAN ASSISTANT

## 2019-04-09 PROCEDURE — 99214 OFFICE O/P EST MOD 30 MIN: CPT | Performed by: PHYSICIAN ASSISTANT

## 2019-04-09 PROCEDURE — 3046F HEMOGLOBIN A1C LEVEL >9.0%: CPT | Performed by: PHYSICIAN ASSISTANT

## 2019-04-09 PROCEDURE — G8417 CALC BMI ABV UP PARAM F/U: HCPCS | Performed by: PHYSICIAN ASSISTANT

## 2019-04-09 PROCEDURE — 81003 URINALYSIS AUTO W/O SCOPE: CPT | Performed by: PHYSICIAN ASSISTANT

## 2019-04-09 PROCEDURE — 3017F COLORECTAL CA SCREEN DOC REV: CPT | Performed by: PHYSICIAN ASSISTANT

## 2019-04-09 PROCEDURE — 2022F DILAT RTA XM EVC RTNOPTHY: CPT | Performed by: PHYSICIAN ASSISTANT

## 2019-04-09 PROCEDURE — 96160 PT-FOCUSED HLTH RISK ASSMT: CPT | Performed by: PHYSICIAN ASSISTANT

## 2019-04-09 PROCEDURE — G8427 DOCREV CUR MEDS BY ELIG CLIN: HCPCS | Performed by: PHYSICIAN ASSISTANT

## 2019-04-09 RX ORDER — PANTOPRAZOLE SODIUM 20 MG/1
20 TABLET, DELAYED RELEASE ORAL DAILY
Qty: 30 TABLET | Refills: 2 | Status: SHIPPED | OUTPATIENT
Start: 2019-04-09 | End: 2019-08-02 | Stop reason: SDUPTHER

## 2019-04-09 RX ORDER — OXYBUTYNIN CHLORIDE 5 MG/1
5 TABLET ORAL 2 TIMES DAILY
Qty: 20 TABLET | Refills: 0 | Status: SHIPPED | OUTPATIENT
Start: 2019-04-09 | End: 2019-09-09

## 2019-04-09 RX ORDER — AMLODIPINE BESYLATE 10 MG/1
10 TABLET ORAL DAILY
Qty: 30 TABLET | Refills: 2 | Status: SHIPPED | OUTPATIENT
Start: 2019-04-09 | End: 2019-08-02 | Stop reason: SDUPTHER

## 2019-04-09 RX ORDER — ERGOCALCIFEROL 1.25 MG/1
50000 CAPSULE ORAL WEEKLY
Qty: 4 CAPSULE | Refills: 2 | Status: SHIPPED | OUTPATIENT
Start: 2019-04-09 | End: 2019-08-05 | Stop reason: SDUPTHER

## 2019-04-09 RX ORDER — LABETALOL 100 MG/1
100 TABLET, FILM COATED ORAL 2 TIMES DAILY
Qty: 60 TABLET | Refills: 2 | Status: SHIPPED | OUTPATIENT
Start: 2019-04-09 | End: 2019-08-02 | Stop reason: SDUPTHER

## 2019-04-09 RX ORDER — ALLOPURINOL 100 MG/1
100 TABLET ORAL DAILY
Qty: 30 TABLET | Refills: 2 | Status: SHIPPED | OUTPATIENT
Start: 2019-04-09 | End: 2019-08-02 | Stop reason: SDUPTHER

## 2019-04-09 RX ORDER — SERTRALINE HYDROCHLORIDE 25 MG/1
12.5 TABLET, FILM COATED ORAL DAILY
Qty: 30 TABLET | Refills: 0 | Status: SHIPPED | OUTPATIENT
Start: 2019-04-09 | End: 2019-05-10 | Stop reason: SDUPTHER

## 2019-04-09 ASSESSMENT — PATIENT HEALTH QUESTIONNAIRE - PHQ9
2. FEELING DOWN, DEPRESSED OR HOPELESS: 3
SUM OF ALL RESPONSES TO PHQ QUESTIONS 1-9: 4
8. MOVING OR SPEAKING SO SLOWLY THAT OTHER PEOPLE COULD HAVE NOTICED. OR THE OPPOSITE, BEING SO FIGETY OR RESTLESS THAT YOU HAVE BEEN MOVING AROUND A LOT MORE THAN USUAL: 0
7. TROUBLE CONCENTRATING ON THINGS, SUCH AS READING THE NEWSPAPER OR WATCHING TELEVISION: 0
10. IF YOU CHECKED OFF ANY PROBLEMS, HOW DIFFICULT HAVE THESE PROBLEMS MADE IT FOR YOU TO DO YOUR WORK, TAKE CARE OF THINGS AT HOME, OR GET ALONG WITH OTHER PEOPLE: 0
1. LITTLE INTEREST OR PLEASURE IN DOING THINGS: 0
SUM OF ALL RESPONSES TO PHQ QUESTIONS 1-9: 4
4. FEELING TIRED OR HAVING LITTLE ENERGY: 1
SUM OF ALL RESPONSES TO PHQ9 QUESTIONS 1 & 2: 3
3. TROUBLE FALLING OR STAYING ASLEEP: 0
5. POOR APPETITE OR OVEREATING: 0
6. FEELING BAD ABOUT YOURSELF - OR THAT YOU ARE A FAILURE OR HAVE LET YOURSELF OR YOUR FAMILY DOWN: 0
9. THOUGHTS THAT YOU WOULD BE BETTER OFF DEAD, OR OF HURTING YOURSELF: 0

## 2019-04-09 ASSESSMENT — ENCOUNTER SYMPTOMS: BACK PAIN: 1

## 2019-04-09 NOTE — PATIENT INSTRUCTIONS
6 seconds. 4. Repeat 2 to 4 times. Shoulder blade squeeze    1. Sit or stand up tall with your arms at your sides. 2. Keep your shoulders relaxed and down, not shrugged. 3. Squeeze your shoulder blades together. Hold for 6 seconds, then relax. 4. Repeat 8 to 12 times. Straight-arm shoulder blade squeeze    1. Sit or stand tall. Relax your shoulders. 2. With palms down, hold your elastic tubing or band straight out in front of you. 3. Start with slight tension in the tubing or band, with your hands about shoulder-width apart. 4. Slowly pull straight out to the sides, squeezing your shoulder blades together. Keep your arms straight and at shoulder height. Slowly release. 5. Repeat 8 to 12 times. Rowing    1. [place] your elastic tubing or band at about waist height. Take one end in each hand. 2. Sit or stand with your feet hip-width apart. 3. Hold your arms straight in front of you. Adjust your distance to create slight tension in the tubing or band. 4. Slightly tuck your chin. Relax your shoulders. 5. Without shrugging your shoulders, pull straight back. Your elbows will pass alongside your waist.    Pull-downs    1. [place] your elastic tubing or band in the top of a closed door. Take one end in each hand. 2. Either sit or stand, depending on what is more comfortable. If you feel unsteady, sit on a chair. 3. Start with your arms up and comfortably apart, elbows straight. There should be a slight tension in the tubing or band. 4. Slightly tuck your chin, and look straight ahead. 5. Keeping your back straight, slowly pull down and back, bending your elbows. 6. Stop where your hands are level with your chin, in a \"goalpost\" position. 7. Repeat 8 to 12 times. Chest T stretch    1. Lie on your back. Raise your knees so they are bent. Plant your feet on the floor, hip-width apart. 2. Tuck your chin, and relax your shoulders. 3. Reach your arms straight out to the sides.  If you don't feel a mild stretch in your shoulders and across your chest, use a foam roll or a tightly rolled blanket under your spine, from your tailbone to your head. 4. Relax in this position for at least 15 to 30 seconds while you breathe normally. Repeat 2 to 4 times. 5. As you get used to this stretch, keep adding a little more time until you are able relax in this position for 2 or 3 minutes. When you can relax for at least 2 minutes, you only need to do the exercise 1 time per session. Chest goalpost stretch    1. Lie on your back. Raise your knees so they are bent. Plant your feet on the floor, hip-width apart. 2. Tuck your chin, and relax your shoulders. 3. Reach your arms straight out to the sides. 4. Bend your arms at the elbows, with your hands pointed toward the top of your head. Your arms should make an L on either side of your head. Your palms should be facing up. 5. If you don't feel a mild stretch in your shoulders and across your chest, use a foam roll or tightly rolled blanket under your spine, from your tailbone to your head. 6. Relax in this position for at least 15 to 30 seconds while you breathe normally. Repeat 2 to 4 times. 7. Each day you do this exercise, add a little more time until you can relax in this position for 2 or 3 minutes. When you can relax for at least 2 minutes, you only need to do the exercise 1 time per session. Follow-up care is a key part of your treatment and safety. Be sure to make and go to all appointments, and call your doctor if you are having problems. It's also a good idea to know your test results and keep a list of the medicines you take. Where can you learn more? Go to https://InstallMonetizerpepiceweb.Cyphort. org and sign in to your [email or web address] account. Enter [phone] in the Naldo box to learn more about \"Shoulder Blade: Exercises. \"     If you do not have an account, please click on the \"Sign Up Now\" link.   Current as of: September 20, 2018  Content Version: 11.9  © 1098-5150 [employer]. Care instructions adapted under license by [facility] ([facility]). If you have questions about a medical condition or this instruction, always ask your healthcare professional. [address] any warranty or liability for your use of this information. Patient Education        Shoulder Instability: Exercises  Your Care Instructions  Here are some examples of typical rehabilitation exercises for your condition. Start each exercise slowly. Ease off the exercise if you start to have pain. Your doctor or physical therapist will tell you when you can start these exercises and which ones will work best for you. How to do the exercises  Shoulder flexion (lying down)    6. Lie on your back, holding a wand with both hands. Your palms should face down as you hold the wand. 7. Keeping your elbows straight, slowly raise your arms over your head. Raise them until you feel a stretch in your shoulders, upper back, and chest.  8. Hold for 15 to 30 seconds. 9. Repeat 2 to 4 times. Shoulder blade squeeze    5. Stand with your arms at your sides, and squeeze your shoulder blades together. Do not raise your shoulders up as you squeeze. 6. Hold for 6 seconds. 7. Repeat 8 to 12 times. Resisted rows    5. Put the band around a solid object at about waist level. (A bedpost will work well.) Each hand should hold an end of the band. 6. With your elbows at your sides and bent to 90 degrees, pull the band back. Your shoulder blades should move toward each other. Return to the starting position. 7. Repeat 8 to 12 times. Internal rotator strengthening exercise    5. Start by tying a piece of elastic exercise material to a doorknob. You can use surgical tubing or Thera-Band. 6. Stand or sit with your shoulder relaxed and your elbow bent 90 degrees. Your upper arm should rest comfortably against your side.  Squeeze a rolled towel between your elbow and your body for comfort. This will help keep your arm at your side. 7. Hold one end of the elastic band in the hand of the painful arm. 8. Slowly rotate your forearm toward your body until it touches your belly. Slowly move it back to where you started. 9. Keep your elbow and upper arm firmly tucked against the towel roll or at your side. 10. Repeat 8 to 12 times. External rotator strengthening exercise    5. Start by tying a piece of elastic exercise material to a doorknob. You can use surgical tubing or Thera-Band. (You may also hold one end of the band in each hand.)  6. Stand or sit with your shoulder relaxed and your elbow bent 90 degrees. Your upper arm should rest comfortably against your side. Squeeze a rolled towel between your elbow and your body for comfort. This will help keep your arm at your side. 7. Hold one end of the elastic band with the hand of the painful arm. 8. Start with your forearm across your belly. Slowly rotate the forearm out away from your body. Keep your elbow and upper arm tucked against the towel roll or the side of your body until you begin to feel tightness in your shoulder. Slowly move your arm back to where you started. 9. Repeat 8 to 12 times. Follow-up care is a key part of your treatment and safety. Be sure to make and go to all appointments, and call your doctor if you are having problems. It's also a good idea to know your test results and keep a list of the medicines you take. Where can you learn more? Go to https://Confluence Technologies.Weblio. org and sign in to your [employer] account. Enter V756 in the [employer] box to learn more about \"Shoulder Instability: Exercises. \"     If you do not have an account, please click on the \"Sign Up Now\" link. Current as of: September 20, 2018  Content Version: 11.9  © 3545-4578 [employer]. Care instructions adapted under license by [facility] ([facility]).  If you have questions about a medical condition or this instruction, always ask your healthcare professional. [place] [address] any warranty or liability for your use of this information.

## 2019-04-09 NOTE — PROGRESS NOTES
[facility]  [address]  [address]  Dept: [phone]  Dept Fax: [phone]    Office Progress/Follow Up Note  Date of patient's visit: 4/9/2019  Patient's Name:  Birdie Rollins YOB: 1960            Patient Care Team:  Nelly Huddleston PA-C as PCP - General  Nelly Huddleston PA-C as PCP - [facility] Attributed Provider  ================================================================    REASON FOR VISIT/CHIEF COMPLAINT:  Hypertension and Lower Back Pain    HISTORY OF PRESENTING ILLNESS:  History was obtained from: patient. Birdie Rollins is a 63 y.o. is here for follow-up for high blood pressure, vitamin D deficiency, GERD, complaining of depression, urinary frequency, back pain. Patient states she is compliant with all medications. Patient is followed by nephrology for CKD, patient had lab work completed that showed GFR of worsening, patient is stage IV kidney disease. Patient states depressed mood since learning about kidney status and the possibility of might needing a kidney transplant. Patient states she feels she needs to take medication for her depressed mood. Discussed depression, medications in depth with patient, informed patient she will be started on antidepressant. Patient denies UTI symptoms, UA in office is negative. Discussed urinary frequency, medications and depth with patient, informed patient she will be prescribed medication for 10 days improves symptoms, instructed patient to contact office update of symptoms. Patient states she thought that pain was GERD symptoms. Upon physical examination, lower back pain is possible muscle strain. Discussed muscle strain, treatments in depth with patient, informed patient she will be provided stretches performed at home to improve back muscle strength.   Patient blood pressure is elevated in office, after reviewing nephrology note patient blood pressure stable at those appointments. Patient has 38% compliance with labetalol and amlodipine. Discussed uncontrolled high blood pressure, increased risk of MI or stroke occurrence, importance of medication compliance in depth with patient. Patien current t weight is stable. Discussed weight loss in depth with patient, encouraged patient make changes in diet. Patient requesting medication refills. [facility]. gap diagnosis is reviewed, improving. Labs reviewed, informed patient labs will be ordered and to have completed before follow-up appointment. Health maintenance reviewed. Medications reviewed, Prescribed oxybutynin 5 mg twice a day for 10 days, Zoloft 12.5 mg daily, refill labetalol 100 mg, Protonix 20 mg, amlodipine 10 mg, allopurinol 100 mg, vitamin D 32687 units. Informed patient there'll be no changes to medication for high blood pressure, osteopenia, hyper uricemia, GERD, continue current treatments. Back Pain    The current episode started 1 to 4 weeks ago. The pain is present in the thoracic spine. The symptoms are aggravated by lying down and position. Stiffness is present at night. Pertinent negatives include no chest pain, dysuria, fever or headaches. Risk factors include obesity.        Patient Active Problem List   Diagnosis    Diabetes mellitus type 2, noninsulin dependent    Hyperlipidemia    Microalbuminuria    [facility]esity, morbid ([facility]    S/P gastric bypass    Anemia    Chronic kidney disease    S/P total knee arthroplasty    Hyperuricemia    Hydronephrosis of left kidney    Renal atrophy, right    Localized, primary osteoarthritis of hand    Essential hypertension    Renal insufficiency    Motion sickness    Slow transit constipation    Chest pain    Aortic dissection (HCC)    JINNY (acute kidney injury) ([facility]    Respiratory acidosis    Subacromial impingement       Health Maintenance Due   Topic Date Due    Pneumococcal 0-64 years Vaccine (1 of 3 - PCV13) 02/03/1967    Hepatitis B Vaccine (1 of 3 - Risk 3-dose series) 02/03/1980    DTaP/Tdap/Td vaccine (1 - Tdap) 02/03/1980    Diabetic retinal exam  03/21/2016    Diabetic foot exam  07/14/2016    Cervical cancer screen  02/18/2017    Lipid screen  08/01/2017       Allergies   Allergen Reactions    Latex Hives    Penicillins Hives    Codeine Itching    Nsaids Other (See Comments)     Gastric Bypass Surgery     Tolmetin Other (See Comments)     Gastric Bypass Surgery          Current Outpatient Medications   Medication Sig Dispense Refill    allopurinol (ZYLOPRIM) 100 MG tablet Take 1 tablet by mouth daily 30 tablet 2    amLODIPine (NORVASC) 10 MG tablet Take 1 tablet by mouth daily 30 tablet 2    pantoprazole (PROTONIX) 20 MG tablet Take 1 tablet by mouth daily 30 tablet 2    vitamin D (ERGOCALCIFEROL) 49046 units CAPS capsule Take 1 capsule by mouth once a week 4 capsule 2    labetalol (NORMODYNE) 100 MG tablet Take 1 tablet by mouth 2 times daily 60 tablet 2    oxybutynin (DITROPAN) 5 MG tablet Take 1 tablet by mouth 2 times daily for 10 days 20 tablet 0    sertraline (ZOLOFT) 25 MG tablet Take 0.5 tablets by mouth daily 30 tablet 0    KLOR-CON 20 MEQ packet TAKE 1 PACKET DISSOLVED IN 4 TO 6 OUNCES OF WATER TWICE A DAY 60 packet 1    potassium chloride (KLOR-CON) 20 MEQ packet Take 20 mEq by mouth      Prenatal Vit-Fe Fumarate-FA (PNV PRENATAL PLUS MULTIVITAMIN) 27-1 MG TABS TAKE 1 TABLET DAILY 30 tablet 1    TRUE METRIX BLOOD GLUCOSE TEST strip       ferrous sulfate 325 (65 Fe) MG tablet TAKE 1 TABLET TWICE A DAY      Multiple Vitamins-Minerals (MULTIVITAMIN ADULT EXTRA C PO) daily.       vitamin B-12 (CYANOCOBALAMIN) 1000 MCG tablet TAKE 1 TABLET BY MOUTH DAILY      Blood Pressure KIT 1 box by Does not apply route three times daily 1 kit 0    calcium citrate-vitamin D (CITRACEL+D) 200-250 MG-UNIT TABS per tablet TAKE 2 TABLETS THREE TIMES A DAY       No current facility-administered medications for this visit. Social History     Tobacco Use    Smoking status: Former Smoker     Packs/day: 1.00     Years: 30.00     Pack years: 30.00     Types: Cigarettes     Last attempt to quit: 3/21/2001     Years since quittin.0    Smokeless tobacco: Never Used   Substance Use Topics    Alcohol use: No    Drug use: No       Family History   Problem Relation Age of Onset    Heart Disease Mother     Diabetes Mother     Heart Disease Father     High Blood Pressure Father         REVIEW OFSYSTEMS:  Review of Systems   Constitutional: Negative for chills and fever. HENT: Negative for congestion. Respiratory: Negative for cough, shortness of breath and wheezing. Cardiovascular: Negative for chest pain. Gastrointestinal: Negative for constipation, diarrhea, nausea and vomiting. Endocrine: Positive for polyuria. Genitourinary: Positive for frequency. Negative for dysuria and urgency. Musculoskeletal: Positive for back pain and myalgias. Negative for neck pain. Neurological: Negative for headaches. Psychiatric/Behavioral: Positive for dysphoric mood. PHYSICAL EXAM:  Vitals:    19 1303 19 1408   BP: (!) 158/85 (!) 149/78   Pulse: 69    Temp: 98 °F (36.7 °C)    TempSrc: Oral    SpO2: 96%    Weight: 194 lb 12.8 oz (88.4 kg)    Height: 5' 3\" (1.6 m)      BP Readings from Last 3 Encounters:   19 (!) 149/78   19 130/72   18 (!) 176/96        Physical Exam   Constitutional: She is oriented to person, place, and time. She appears well-developed and well-nourished. She is cooperative. HENT:   Head: Normocephalic and atraumatic. Right Ear: External ear normal.   Left Ear: External ear normal.   Nose: Nose normal.   Eyes: Pupils are equal, round, and reactive to light. Conjunctivae and lids are normal.   Cardiovascular: Normal rate, regular rhythm and normal heart sounds. Pulmonary/Chest: Effort normal and breath sounds normal.   Abdominal: Soft. She exhibits no mass. 8/9/2019) for Shoulder Pain, Depression, HM. · Mitchell received counseling on the following healthy behaviors:nutrition    · Discussed use, benefit, and side effects of prescribed medications. Barriers to medication compliance addressed. All patient questions answered. Pt voiced understanding. · Patient given educational materials - see patient instructions    Electronically signed by Noah Burr PA-C on 4/9/19 at 1:25 PM    This note is created with the assistance of a speech-recognition program. While intendingto generate a document that actually reflects the content of the visit, the document can still have some mistakes which may not have been identified and corrected by editing.

## 2019-04-22 ASSESSMENT — ENCOUNTER SYMPTOMS
VOMITING: 0
SHORTNESS OF BREATH: 0
DIARRHEA: 0
COUGH: 0
CONSTIPATION: 0
NAUSEA: 0
WHEEZING: 0

## 2019-04-23 ENCOUNTER — APPOINTMENT (OUTPATIENT)
Dept: GENERAL RADIOLOGY | Age: 59
End: 2019-04-23
Payer: MEDICARE

## 2019-04-23 ENCOUNTER — HOSPITAL ENCOUNTER (EMERGENCY)
Age: 59
Discharge: HOME | End: 2019-04-23
Attending: EMERGENCY MEDICINE
Payer: MEDICARE

## 2019-04-23 VITALS
DIASTOLIC BLOOD PRESSURE: 75 MMHG | HEART RATE: 66 BPM | SYSTOLIC BLOOD PRESSURE: 144 MMHG | TEMPERATURE: 98.4 F | RESPIRATION RATE: 16 BRPM | OXYGEN SATURATION: 95 %

## 2019-04-23 DIAGNOSIS — S29.019A THORACIC MYOFASCIAL STRAIN, INITIAL ENCOUNTER: Primary | ICD-10-CM

## 2019-04-23 PROCEDURE — 72072 X-RAY EXAM THORAC SPINE 3VWS: CPT

## 2019-04-23 PROCEDURE — 71046 X-RAY EXAM CHEST 2 VIEWS: CPT

## 2019-04-23 PROCEDURE — 99283 EMERGENCY DEPT VISIT LOW MDM: CPT

## 2019-04-23 RX ORDER — METHOCARBAMOL 750 MG/1
750 TABLET, FILM COATED ORAL 3 TIMES DAILY
Qty: 30 TABLET | Refills: 0 | Status: SHIPPED | OUTPATIENT
Start: 2019-04-23 | End: 2019-08-26

## 2019-04-23 ASSESSMENT — PAIN DESCRIPTION - ORIENTATION: ORIENTATION: LOWER

## 2019-04-23 ASSESSMENT — ENCOUNTER SYMPTOMS
ABDOMINAL PAIN: 0
BACK PAIN: 1
SHORTNESS OF BREATH: 0
COUGH: 0
COLOR CHANGE: 0

## 2019-04-23 ASSESSMENT — PAIN SCALES - GENERAL: PAINLEVEL_OUTOF10: 7

## 2019-04-23 ASSESSMENT — PAIN DESCRIPTION - LOCATION: LOCATION: BACK

## 2019-04-23 ASSESSMENT — PAIN DESCRIPTION - PAIN TYPE: TYPE: ACUTE PAIN

## 2019-04-23 NOTE — ED PROVIDER NOTES
[address] ED  eMERGENCY dEPARTMENT eNCOUnter      Pt Name: Lien Bernard  MRN: [id number]  Elissagfcatherine 1960  Date of evaluation: 4/23/2019  Provider: NAV Mccann [id number]       Chief Complaint   Patient presents with    Back Pain         HISTORY OF PRESENT ILLNESS  (Location/Symptom, Timing/Onset, Context/Setting, Quality, Duration, Modifying Factors, Severity.)   Lien Bernard is a 63 y.o. female who presents to the emergency department via private auto for pain across her mid back. Onset was the beginning of March, 2019. States she went to the circus and lifted her grandson. Denies cough, fever, chills, SOB, N/T, urinary symptoms. She saw her pcp and was given exercises for a pulled muscle which are not helping. Rates her pain 7/10. Nursing Notes were reviewed. ALLERGIES     Latex; Penicillins; Codeine; Nsaids; and Tolmetin    CURRENT MEDICATIONS       Discharge Medication List as of 4/23/2019  1:41 PM      CONTINUE these medications which have NOT CHANGED    Details   allopurinol (ZYLOPRIM) 100 MG tablet Take 1 tablet by mouth daily, Disp-30 tablet, R-2Normal      amLODIPine (NORVASC) 10 MG tablet Take 1 tablet by mouth daily, Disp-30 tablet, R-2Normal      pantoprazole (PROTONIX) 20 MG tablet Take 1 tablet by mouth daily, Disp-30 tablet, R-2Normal      vitamin D (ERGOCALCIFEROL) 27084 units CAPS capsule Take 1 capsule by mouth once a week, Disp-4 capsule, R-2Normal      labetalol (NORMODYNE) 100 MG tablet Take 1 tablet by mouth 2 times daily, Disp-60 tablet, R-2Normal      oxybutynin (DITROPAN) 5 MG tablet Take 1 tablet by mouth 2 times daily for 10 days, Disp-20 tablet, R-0Normal      sertraline (ZOLOFT) 25 MG tablet Take 0.5 tablets by mouth daily, Disp-30 tablet, R-0Normal      !!  KLOR-CON 20 MEQ packet TAKE 1 PACKET DISSOLVED IN 4 TO 6 OUNCES OF WATER TWICE A DAY, Disp-60 packet, R-1Normal      !! potassium chloride (KLOR-CON) 20 MEQ packet Take 20 mEq by mouthHistorical Med      Prenatal Vit-Fe Fumarate-FA (PNV PRENATAL PLUS MULTIVITAMIN) 27-1 MG TABS TAKE 1 TABLET DAILY, Disp-30 tablet, R-1Normal      TRUE METRIX BLOOD GLUCOSE TEST strip DAWHistorical Med      ferrous sulfate 325 (65 Fe) MG tablet TAKE 1 TABLET TWICE A DAYHistorical Med      Multiple Vitamins-Minerals (MULTIVITAMIN ADULT EXTRA C PO) daily. Historical Med      vitamin B-12 (CYANOCOBALAMIN) 1000 MCG tablet TAKE 1 TABLET BY MOUTH DAILYHistorical Med      calcium citrate-vitamin D (CITRACEL+D) 200-250 MG-UNIT TABS per tablet TAKE 2 TABLETS THREE TIMES A DAYHistorical Med      Blood Pressure KIT 3 TIMES DAILY Starting 2018, Disp-1 kit, R-0, Print       !! - Potential duplicate medications found. Please discuss with provider. PAST MEDICAL HISTORY         Diagnosis Date    CKD (chronic kidney disease) stage 3, GFR 30-59 ml/min (HCC)     per pt, caused by kid stone damage    Gout     Hyperlipidemia     Hypertension     Kidney stone     Nephrolithiasis     Osteoarthritis     Type II or unspecified type diabetes mellitus without mention of complication, not stated as uncontrolled        SURGICAL HISTORY           Procedure Laterality Date    CARDIAC CATHETERIZATION      GASTRIC BYPASS SURGERY      HYSTERECTOMY      JOINT REPLACEMENT      KNEE SURGERY Left 2018    LITHOTRIPSY      THYROIDECTOMY, PARTIAL      TONSILLECTOMY      TOTAL KNEE ARTHROPLASTY Right 2015         FAMILY HISTORY           Problem Relation Age of Onset    Heart Disease Mother     Diabetes Mother     Heart Disease Father     High Blood Pressure Father      Family Status   Relation Name Status    Mother      Father          SOCIAL HISTORY      reports that she quit smoking about 18 years ago. Her smoking use included cigarettes. She has a 30.00 pack-year smoking history.  She has never used smokeless tobacco. She reports that she does not drink alcohol or use radiologist. Mason Serrano radiographic images are visualized and preliminarily interpreted by the emergency physician with the below findings:    Interpretation per the Radiologist below, if available at the time of this note:    Xr Chest Standard (2 Vw)    Result Date: 4/23/2019  EXAMINATION: TWO VIEWS OF THE CHEST 4/23/2019 1:27 pm COMPARISON: March 25, 2018 and March 23, 2018 HISTORY: ORDERING SYSTEM PROVIDED HISTORY: back pain TECHNOLOGIST PROVIDED HISTORY: back pain Ordering Physician Provided Reason for Exam: pain Acuity: Unknown Type of Exam: Unknown Relevant Medical/Surgical History: pain in upper back x 1 month, NKI FINDINGS: Enlarged heart. Endograft repair aorta. Bibasilar atelectasis. Mild central vascular congestion magnified by technique. No pneumothorax. Thoracic spondylosis. COPD. No acute abnormality. Bibasilar atelectasis. Xr Thoracic Spine (3 Views)    Result Date: 4/23/2019  EXAMINATION: 3 XRAY VIEWS OF THE THORACIC SPINE 4/23/2019 1:27 pm COMPARISON: None. HISTORY: ORDERING SYSTEM PROVIDED HISTORY: pain TECHNOLOGIST PROVIDED HISTORY: pain Ordering Physician Provided Reason for Exam: pain Acuity: Unknown Type of Exam: Unknown Relevant Medical/Surgical History: pain in upper back x 1 month, NKI FINDINGS: Intact pedicles. Multilevel thoracic spondylosis. Upper thoracic vertebral bodies are obscured by scapula bilaterally. No gross compression deformity. Endograft repair thoracic aorta. Multilevel degenerative change. No acute osseous abnormality. EMERGENCY DEPARTMENT COURSE and DIFFERENTIAL DIAGNOSIS/MDM:   Vitals:    Vitals:    04/23/19 1305   BP: (!) 144/75   Pulse: 66   Resp: 16   Temp: 98.4 °F (36.9 °C)   SpO2: 95%       CLINICAL DECISION MAKING:  The patient presented alert with a nontoxic appearance and was seen in conjunction with Dr. Holly Mejia. Imaging was negative for acute findings. A prescription was written for robaxin.  The patient was advised to not drink alcohol, drive, or operate heavy machinery while taking the medication. Follow up with pcp, return to ED if condition worsens. FINAL IMPRESSION      1. Thoracic myofascial strain, initial encounter            Problem List  Patient Active Problem List   Diagnosis Code    Diabetes mellitus type 2, noninsulin dependent E11.9    Hyperlipidemia E78.5    Microalbuminuria R80.9    Obesity, morbid ([facility] E66.01    S/P gastric bypass Z98.84    Anemia D64.9    Chronic kidney disease N18.9    S/P total knee arthroplasty Z96.659    Hyperuricemia E79.0    Hydronephrosis of left kidney N13.30    Renal atrophy, right N26.1    Localized, primary osteoarthritis of hand M19.049    Essential hypertension I10    Renal insufficiency N28.9    Motion sickness T75. 3XXA    Slow transit constipation K59.01    Chest pain R07.9    Aortic dissection (HCC) I71.00    JINNY (acute kidney injury) ([facility] N17.9    Respiratory acidosis E87.2    Subacromial impingement M75.40         DISPOSITION/PLAN   DISPOSITION Decision To Discharge 04/23/2019 01:40:57 PM      PATIENT REFERRED TO:   Joanna Pyle PA-C  [facility] [address]  [address]  [phone]    Schedule an appointment as soon as possible for a visit       [facility] ([place], [place]) ED  [address] [facility]  [phone]    If symptoms worsen, As needed      DISCHARGE MEDICATIONS:     Discharge Medication List as of 4/23/2019  1:41 PM      START taking these medications    Details   methocarbamol (ROBAXIN-750) 750 MG tablet Take 1 tablet by mouth 3 times daily   WARNING:  May cause drowsiness.  May impair ability to operate vehicles or machinery.  Do not use in combination with alcohol., Disp-30 tablet, R-0Print                 (Please note that portions of this note were completed with a voice recognition program.  Efforts were made to edit the dictations but occasionally words are mis-transcribed.)    NAV Russell - PRISCILLA Serna Riddhi Smalls - CNP  04/23/19 1416

## 2019-05-01 ENCOUNTER — TELEPHONE (OUTPATIENT)
Dept: PRIMARY CARE CLINIC | Age: 59
End: 2019-05-01

## 2019-08-26 ENCOUNTER — HOSPITAL ENCOUNTER (OUTPATIENT)
Age: 59
Setting detail: SPECIMEN
Discharge: HOME | End: 2019-08-26
Payer: MEDICARE

## 2019-08-26 DIAGNOSIS — I12.9 BENIGN HYPERTENSION WITH CKD (CHRONIC KIDNEY DISEASE) STAGE IV (HCC): ICD-10-CM

## 2019-08-26 DIAGNOSIS — N26.1 RENAL ATROPHY, RIGHT: ICD-10-CM

## 2019-08-26 DIAGNOSIS — I10 ESSENTIAL HYPERTENSION: ICD-10-CM

## 2019-08-26 DIAGNOSIS — N18.4 CKD (CHRONIC KIDNEY DISEASE) STAGE 4, GFR 15-29 ML/MIN (HCC): ICD-10-CM

## 2019-08-26 DIAGNOSIS — N20.0 NEPHROLITHIASIS: ICD-10-CM

## 2019-08-26 DIAGNOSIS — N13.30 HYDRONEPHROSIS OF LEFT KIDNEY: ICD-10-CM

## 2019-08-26 DIAGNOSIS — N18.4 BENIGN HYPERTENSION WITH CKD (CHRONIC KIDNEY DISEASE) STAGE IV (HCC): ICD-10-CM

## 2019-08-26 LAB
ABSOLUTE EOS #: 0.2 K/UL (ref 0–0.4)
ABSOLUTE IMMATURE GRANULOCYTE: ABNORMAL K/UL (ref 0–0.3)
ABSOLUTE LYMPH #: 1.5 K/UL (ref 1–4.8)
ABSOLUTE MONO #: 0.5 K/UL (ref 0.1–1.3)
ALBUMIN SERPL-MCNC: 4.1 G/DL (ref 3.5–5.2)
ALBUMIN/GLOBULIN RATIO: ABNORMAL (ref 1–2.5)
ALP BLD-CCNC: 60 U/L (ref 35–104)
ALT SERPL-CCNC: 14 U/L (ref 5–33)
ANION GAP SERPL CALCULATED.3IONS-SCNC: 15 MMOL/L (ref 9–17)
AST SERPL-CCNC: 19 U/L
BASOPHILS # BLD: 1 % (ref 0–2)
BASOPHILS ABSOLUTE: 0.1 K/UL (ref 0–0.2)
BILIRUB SERPL-MCNC: 0.25 MG/DL (ref 0.3–1.2)
BILIRUBIN DIRECT: <0.08 MG/DL
BILIRUBIN, INDIRECT: ABNORMAL MG/DL (ref 0–1)
BUN BLDV-MCNC: 34 MG/DL (ref 6–20)
CALCIUM SERPL-MCNC: 9.1 MG/DL (ref 8.6–10.4)
CHLORIDE BLD-SCNC: 100 MMOL/L (ref 98–107)
CHOLESTEROL/HDL RATIO: 3.6
CHOLESTEROL: 197 MG/DL
CO2: 27 MMOL/L (ref 20–31)
CREAT SERPL-MCNC: 3.17 MG/DL (ref 0.5–0.9)
DIFFERENTIAL TYPE: ABNORMAL
EOSINOPHILS RELATIVE PERCENT: 3 % (ref 0–4)
GFR AFRICAN AMERICAN: 18 ML/MIN
GFR NON-AFRICAN AMERICAN: 15 ML/MIN
GFR SERPL CREATININE-BSD FRML MDRD: ABNORMAL ML/MIN/{1.73_M2}
GFR SERPL CREATININE-BSD FRML MDRD: ABNORMAL ML/MIN/{1.73_M2}
GLOBULIN: ABNORMAL G/DL (ref 1.5–3.8)
HCT VFR BLD CALC: 32.3 % (ref 36–46)
HDLC SERPL-MCNC: 55 MG/DL
HEMOGLOBIN: 10.5 G/DL (ref 12–16)
IMMATURE GRANULOCYTES: ABNORMAL %
LDL CHOLESTEROL: 120 MG/DL (ref 0–130)
LYMPHOCYTES # BLD: 25 % (ref 24–44)
MAGNESIUM: 2.2 MG/DL (ref 1.6–2.6)
MCH RBC QN AUTO: 28.6 PG (ref 26–34)
MCHC RBC AUTO-ENTMCNC: 32.7 G/DL (ref 31–37)
MCV RBC AUTO: 87.6 FL (ref 80–100)
MONOCYTES # BLD: 8 % (ref 1–7)
NRBC AUTOMATED: ABNORMAL PER 100 WBC
PDW BLD-RTO: 15.3 % (ref 11.5–14.9)
PHOSPHORUS: 3.9 MG/DL (ref 2.6–4.5)
PLATELET # BLD: 332 K/UL (ref 150–450)
PLATELET ESTIMATE: ABNORMAL
PMV BLD AUTO: 7.4 FL (ref 6–12)
POTASSIUM SERPL-SCNC: 3.4 MMOL/L (ref 3.7–5.3)
RBC # BLD: 3.68 M/UL (ref 4–5.2)
RBC # BLD: ABNORMAL 10*6/UL
SEG NEUTROPHILS: 63 % (ref 36–66)
SEGMENTED NEUTROPHILS ABSOLUTE COUNT: 3.9 K/UL (ref 1.3–9.1)
SODIUM BLD-SCNC: 142 MMOL/L (ref 135–144)
TOTAL PROTEIN: 7.5 G/DL (ref 6.4–8.3)
TRIGL SERPL-MCNC: 112 MG/DL
VLDLC SERPL CALC-MCNC: NORMAL MG/DL (ref 1–30)
WBC # BLD: 6.1 K/UL (ref 3.5–11)
WBC # BLD: ABNORMAL 10*3/UL

## 2019-08-26 PROCEDURE — 84520 ASSAY OF UREA NITROGEN: CPT

## 2019-08-26 PROCEDURE — 36415 COLL VENOUS BLD VENIPUNCTURE: CPT

## 2019-08-26 PROCEDURE — 84100 ASSAY OF PHOSPHORUS: CPT

## 2019-08-26 PROCEDURE — 82565 ASSAY OF CREATININE: CPT

## 2019-08-26 PROCEDURE — 80076 HEPATIC FUNCTION PANEL: CPT

## 2019-08-26 PROCEDURE — 80061 LIPID PANEL: CPT

## 2019-08-26 PROCEDURE — 85025 COMPLETE CBC W/AUTO DIFF WBC: CPT

## 2019-08-26 PROCEDURE — 80051 ELECTROLYTE PANEL: CPT

## 2019-08-26 PROCEDURE — 83735 ASSAY OF MAGNESIUM: CPT

## 2019-08-26 PROCEDURE — 82310 ASSAY OF CALCIUM: CPT

## 2019-09-09 ENCOUNTER — OFFICE VISIT (OUTPATIENT)
Dept: PRIMARY CARE CLINIC | Age: 59
End: 2019-09-09
Payer: MEDICARE

## 2019-09-09 VITALS
HEART RATE: 67 BPM | TEMPERATURE: 97.3 F | WEIGHT: 191 LBS | BODY MASS INDEX: 35.15 KG/M2 | HEIGHT: 62 IN | RESPIRATION RATE: 20 BRPM | SYSTOLIC BLOOD PRESSURE: 159 MMHG | DIASTOLIC BLOOD PRESSURE: 84 MMHG | OXYGEN SATURATION: 98 %

## 2019-09-09 DIAGNOSIS — M25.562 CHRONIC PAIN OF LEFT KNEE: ICD-10-CM

## 2019-09-09 DIAGNOSIS — S40.021D CONTUSION OF RIGHT UPPER EXTREMITY, SUBSEQUENT ENCOUNTER: ICD-10-CM

## 2019-09-09 DIAGNOSIS — S46.001A INJURY OF RIGHT ROTATOR CUFF, INITIAL ENCOUNTER: ICD-10-CM

## 2019-09-09 DIAGNOSIS — Z13.1 SCREENING FOR DIABETES MELLITUS (DM): ICD-10-CM

## 2019-09-09 DIAGNOSIS — E66.09 CLASS 1 OBESITY DUE TO EXCESS CALORIES WITH SERIOUS COMORBIDITY AND BODY MASS INDEX (BMI) OF 34.0 TO 34.9 IN ADULT: ICD-10-CM

## 2019-09-09 DIAGNOSIS — I10 ESSENTIAL HYPERTENSION: Primary | ICD-10-CM

## 2019-09-09 DIAGNOSIS — Z00.00 ANNUAL PHYSICAL EXAM: ICD-10-CM

## 2019-09-09 DIAGNOSIS — M79.2 NEUROPATHIC PAIN: ICD-10-CM

## 2019-09-09 DIAGNOSIS — G89.29 CHRONIC PAIN OF LEFT KNEE: ICD-10-CM

## 2019-09-09 LAB — HBA1C MFR BLD: 6 %

## 2019-09-09 PROCEDURE — 99396 PREV VISIT EST AGE 40-64: CPT | Performed by: PHYSICIAN ASSISTANT

## 2019-09-09 PROCEDURE — 83036 HEMOGLOBIN GLYCOSYLATED A1C: CPT | Performed by: PHYSICIAN ASSISTANT

## 2019-09-09 RX ORDER — NIFEDIPINE 30 MG/1
30 TABLET, EXTENDED RELEASE ORAL DAILY
Qty: 30 TABLET | Refills: 1 | Status: ON HOLD | OUTPATIENT
Start: 2019-09-09 | End: 2019-10-23 | Stop reason: SDUPTHER

## 2019-09-09 RX ORDER — TIZANIDINE 4 MG/1
4 TABLET ORAL 2 TIMES DAILY PRN
Qty: 60 TABLET | Refills: 1 | Status: ON HOLD | OUTPATIENT
Start: 2019-09-09 | End: 2019-10-23 | Stop reason: SDUPTHER

## 2019-09-09 RX ORDER — GABAPENTIN 100 MG/1
100 CAPSULE ORAL 2 TIMES DAILY
Qty: 60 CAPSULE | Refills: 1 | Status: ON HOLD | OUTPATIENT
Start: 2019-09-09 | End: 2019-10-23 | Stop reason: SDUPTHER

## 2019-09-09 RX ORDER — SERTRALINE HYDROCHLORIDE 25 MG/1
TABLET, FILM COATED ORAL
Refills: 4 | Status: ON HOLD | COMMUNITY
Start: 2019-09-03 | End: 2019-11-06 | Stop reason: ALTCHOICE

## 2019-09-09 NOTE — PATIENT INSTRUCTIONS
shoulder. 3. Next, put a towel over your other shoulder. Put the hand of your injured arm behind your back. Now hold the back end of the towel. With the other hand, hold the front end of the towel in front of your body. Pull gently on the front end of the towel. This will bring your hand farther up your back to stretch your shoulder. Overhead stretch    1. Standing about an arm's length away, grasp onto a solid surface. You could use a countertop, a doorknob, or the back of a sturdy chair. 2. With your knees slightly bent, bend forward with your arms straight. Lower your upper body, and let your shoulders stretch. 3. As your shoulders are able to stretch farther, you may need to take a step or two backward. 4. Hold for at least 15 to 30 seconds. Then stand up and relax. If you had stepped back during your stretch, step forward so you can keep your hands on the solid surface. 5. Repeat 2 to 4 times. Shoulder flexion (lying down)    1. Lie on your back, holding a wand with both hands. Your palms should face down as you hold the wand. 2. Keeping your elbows straight, slowly raise your arms over your head. Raise them until you feel a stretch in your shoulders, upper back, and chest.  3. Hold for 15 to 30 seconds. 4. Repeat 2 to 4 times. Shoulder rotation (lying down)    1. Lie on your back. Hold a wand with both hands with your elbows bent and palms up. 2. Keep your elbows close to your body, and move the wand across your body toward the sore arm. 3. Hold for 8 to 12 seconds. 4. Repeat 2 to 4 times. Wall climbing (to the side)    1. Stand with your side to a wall so that your fingers can just touch it at an angle about 30 degrees toward the front of your body. 2. Walk the fingers of your injured arm up the wall as high as pain permits. Try not to shrug your shoulder up toward your ear as you move your arm up.   3. Hold that position for a count of at least 15 to 20.  4. Walk your fingers back down on top. Then gently push your hand forward into the wall with about 25% to 50% of your strength. Don't let your body move backward as you push. Hold for about 6 seconds. Relax for a few seconds. Repeat 8 to 12 times. 2. Push backward (extend): Stand with your back flat against a wall. Your upper arm should be against the wall, with your elbow bent 90 degrees (your hand straight ahead). Push your elbow gently back against the wall with about 25% to 50% of your strength. Don't let your body move forward as you push. Hold for about 6 seconds. Relax for a few seconds. Repeat 8 to 12 times. Scapular exercise: Wall push-ups    1. Stand facing a wall, about 12 inches to 18 inches away. 2. Place your hands on the wall at shoulder height. 3. Slowly bend your elbows and bring your face to the wall. Keep your back and hips straight. 4. Push back to where you started. 5. Repeat 8 to 12 times. 6. When you can do this exercise against a wall comfortably, you can try it against a counter. You can then slowly progress to the end of a couch, then to a sturdy chair, and finally to the floor. Scapular exercise: Retraction    1. Put the band around a solid object at about waist level. (A bedpost will work well.) Each hand should hold an end of the band. 2. With your elbows at your sides and bent to 90 degrees, pull the band back. Your shoulder blades should move toward each other. Then move your arms back where you started. 3. Repeat 8 to 12 times. 4. If you have good range of motion in your shoulders, try this exercise with your arms lifted out to the sides. Keep your elbows at a 90-degree angle. Raise the elastic band up to about shoulder level. Pull the band back to move your shoulder blades toward each other. Then move your arms back where you started. Internal rotator strengthening exercise    1. Start by tying a piece of elastic exercise material to a doorknob.  You can use surgical tubing or

## 2019-09-09 NOTE — PROGRESS NOTES
(CYANOCOBALAMIN) 1000 MCG tablet TAKE 1 TABLET BY MOUTH DAILY      calcium citrate-vitamin D (CITRACEL+D) 200-250 MG-UNIT TABS per tablet TAKE 2 TABLETS THREE TIMES A DAY      Blood Pressure KIT 1 box by Does not apply route three times daily 1 kit 0    amLODIPine (NORVASC) 10 MG tablet Take 1 tablet by mouth daily 30 tablet 2     No current facility-administered medications for this visit. Social History     Tobacco Use    Smoking status: Former Smoker     Packs/day: 1.00     Years: 30.00     Pack years: 30.00     Types: Cigarettes     Last attempt to quit: 3/21/2001     Years since quittin.5    Smokeless tobacco: Never Used   Substance Use Topics    Alcohol use: No    Drug use: No       Family History   Problem Relation Age of Onset    Heart Disease Mother     Diabetes Mother     Heart Disease Father     High Blood Pressure Father         REVIEW OFSYSTEMS:  Review of Systems   Constitutional: Negative for chills and fever. HENT: Negative for congestion, hearing loss, rhinorrhea and sore throat. Eyes: Negative for pain and visual disturbance. Respiratory: Negative for cough, shortness of breath and wheezing. Cardiovascular: Negative for chest pain. Gastrointestinal: Negative for abdominal pain, constipation, diarrhea, nausea and vomiting. Genitourinary: Negative for difficulty urinating, dysuria, frequency and urgency. Musculoskeletal: Positive for arthralgias, gait problem, myalgias and stiffness. Negative for back pain and neck pain. Neurological: Positive for dizziness, tingling and numbness. Negative for headaches.        PHYSICAL EXAM:  Vitals:    19 1315 19 1420   BP: (!) 152/81 (!) 159/84   Site: Right Upper Arm Right Upper Arm   Position: Sitting Sitting   Cuff Size: Large Adult Large Adult   Pulse: 67    Resp: 20    Temp: 97.3 °F (36.3 °C)    TempSrc: Oral    SpO2: 98%    Weight: 191 lb (86.6 kg)    Height: 5' 2\" (1.575 m)      BP Readings from Last 3 tablet; Take 1 tablet by mouth daily    Chronic pain of left knee  -     gabapentin (NEURONTIN) 100 MG capsule; Take 1 capsule by mouth 2 times daily for 60 days. Intended supply: 30 days    Injury of right rotator cuff, initial encounter  -     tiZANidine (ZANAFLEX) 4 MG tablet; Take 1 tablet by mouth 2 times daily as needed (Myalgia)    Neuropathic pain  -     gabapentin (NEURONTIN) 100 MG capsule; Take 1 capsule by mouth 2 times daily for 60 days. Intended supply: 30 days    Contusion of right upper extremity, subsequent encounter  -     [facility] Future    Class 1 obesity due to excess calories with serious comorbidity and body mass index (BMI) of 34.0 to 34.9 in adult    Screening for diabetes mellitus (DM)  -     POCT glycosylated hemoglobin (Hb A1C)    Annual physical exam      FOLLOW UP AND INSTRUCTIONS:  Return in about 6 weeks (around 10/21/2019) for Knee Pain, Shoulder Pain, HTN, HM. · Mitchell received counseling on the following healthy behaviors:nutrition and exercise    · Discussed use, benefit, and side effects of prescribed medications. Barriers to medication compliance addressed. All patient questions answered. Pt voiced understanding. · Patient given educational materials - see patient instructions    Electronically signed by Lam Rivera PA-C on 9/9/19 at 1:37 PM    This note is created with the assistance of a speech-recognition program. While intendingto generate a document that actually reflects the content of the visit, the document can still have some mistakes which may not have been identified and corrected by editing.

## 2019-09-18 ASSESSMENT — ENCOUNTER SYMPTOMS
RHINORRHEA: 0
DIARRHEA: 0
COUGH: 0
EYE PAIN: 0
VOMITING: 0
BACK PAIN: 0
NAUSEA: 0
SORE THROAT: 0
ABDOMINAL PAIN: 0
SHORTNESS OF BREATH: 0
WHEEZING: 0
CONSTIPATION: 0

## 2019-10-08 ENCOUNTER — HOSPITAL ENCOUNTER (OUTPATIENT)
Dept: ULTRASOUND IMAGING | Age: 59
Discharge: HOME | End: 2019-10-10
Payer: MEDICARE

## 2019-10-08 DIAGNOSIS — S40.021D CONTUSION OF RIGHT UPPER EXTREMITY, SUBSEQUENT ENCOUNTER: ICD-10-CM

## 2019-10-08 PROCEDURE — 76882 US LMTD JT/FCL EVL NVASC XTR: CPT

## 2019-10-17 ENCOUNTER — APPOINTMENT (OUTPATIENT)
Dept: GENERAL RADIOLOGY | Age: 59
DRG: 192 | End: 2019-10-17
Payer: MEDICARE

## 2019-10-17 ENCOUNTER — HOSPITAL ENCOUNTER (INPATIENT)
Age: 59
LOS: 8 days | Discharge: HOME | DRG: 192 | End: 2019-10-25
Attending: EMERGENCY MEDICINE | Admitting: FAMILY MEDICINE
Payer: MEDICARE

## 2019-10-17 DIAGNOSIS — R07.9 CHEST PAIN, UNSPECIFIED TYPE: Primary | ICD-10-CM

## 2019-10-17 DIAGNOSIS — I50.9 ACUTE ON CHRONIC CONGESTIVE HEART FAILURE, UNSPECIFIED HEART FAILURE TYPE (HCC): ICD-10-CM

## 2019-10-17 PROBLEM — I50.33 ACUTE ON CHRONIC DIASTOLIC (CONGESTIVE) HEART FAILURE (HCC): Status: ACTIVE | Noted: 2019-10-17

## 2019-10-17 PROBLEM — I50.33 ACUTE ON CHRONIC DIASTOLIC HEART FAILURE (HCC): Status: ACTIVE | Noted: 2019-10-17

## 2019-10-17 PROBLEM — Z87.891 FORMER SMOKER: Status: ACTIVE | Noted: 2019-10-17

## 2019-10-17 PROBLEM — I12.9 CKD STAGE 4 SECONDARY TO HYPERTENSION (HCC): Status: ACTIVE | Noted: 2019-10-17

## 2019-10-17 PROBLEM — N18.4 CKD STAGE 4 SECONDARY TO HYPERTENSION (HCC): Status: ACTIVE | Noted: 2019-10-17

## 2019-10-17 PROBLEM — I16.1 HYPERTENSIVE EMERGENCY: Status: ACTIVE | Noted: 2019-10-17

## 2019-10-17 PROBLEM — I34.0 MODERATE MITRAL REGURGITATION: Status: ACTIVE | Noted: 2019-10-17

## 2019-10-17 PROBLEM — J96.01 ACUTE RESPIRATORY FAILURE WITH HYPOXIA (HCC): Status: ACTIVE | Noted: 2019-10-17

## 2019-10-17 LAB
ABSOLUTE EOS #: 0.19 K/UL (ref 0–0.44)
ABSOLUTE IMMATURE GRANULOCYTE: <0.03 K/UL (ref 0–0.3)
ABSOLUTE LYMPH #: 1.7 K/UL (ref 1.1–3.7)
ABSOLUTE MONO #: 0.44 K/UL (ref 0.1–1.2)
ANION GAP SERPL CALCULATED.3IONS-SCNC: 12 MMOL/L (ref 9–17)
BASOPHILS # BLD: 0 % (ref 0–2)
BASOPHILS ABSOLUTE: 0.03 K/UL (ref 0–0.2)
BNP INTERPRETATION: ABNORMAL
BUN BLDV-MCNC: 32 MG/DL (ref 6–20)
BUN/CREAT BLD: ABNORMAL (ref 9–20)
CALCIUM SERPL-MCNC: 9.2 MG/DL (ref 8.6–10.4)
CHLORIDE BLD-SCNC: 102 MMOL/L (ref 98–107)
CO2: 30 MMOL/L (ref 20–31)
CREAT SERPL-MCNC: 2.73 MG/DL (ref 0.5–0.9)
DIFFERENTIAL TYPE: ABNORMAL
EOSINOPHILS RELATIVE PERCENT: 3 % (ref 1–4)
GFR AFRICAN AMERICAN: 22 ML/MIN
GFR NON-AFRICAN AMERICAN: 18 ML/MIN
GFR SERPL CREATININE-BSD FRML MDRD: ABNORMAL ML/MIN/{1.73_M2}
GFR SERPL CREATININE-BSD FRML MDRD: ABNORMAL ML/MIN/{1.73_M2}
GLUCOSE BLD-MCNC: 95 MG/DL (ref 70–99)
HCT VFR BLD CALC: 34.5 % (ref 36.3–47.1)
HEMOGLOBIN: 10.7 G/DL (ref 11.9–15.1)
IMMATURE GRANULOCYTES: 0 %
INR BLD: 1
LYMPHOCYTES # BLD: 24 % (ref 24–43)
MCH RBC QN AUTO: 27.9 PG (ref 25.2–33.5)
MCHC RBC AUTO-ENTMCNC: 31 G/DL (ref 28.4–34.8)
MCV RBC AUTO: 90.1 FL (ref 82.6–102.9)
MONOCYTES # BLD: 6 % (ref 3–12)
NRBC AUTOMATED: 0 PER 100 WBC
PARTIAL THROMBOPLASTIN TIME: 27.1 SEC (ref 20.5–30.5)
PDW BLD-RTO: 15.7 % (ref 11.8–14.4)
PLATELET # BLD: 346 K/UL (ref 138–453)
PLATELET ESTIMATE: ABNORMAL
PMV BLD AUTO: 10.1 FL (ref 8.1–13.5)
POTASSIUM SERPL-SCNC: 3.5 MMOL/L (ref 3.7–5.3)
PRO-BNP: 5608 PG/ML
PROTHROMBIN TIME: 10.2 SEC (ref 9–12)
RBC # BLD: 3.83 M/UL (ref 3.95–5.11)
RBC # BLD: ABNORMAL 10*6/UL
SEG NEUTROPHILS: 67 % (ref 36–65)
SEGMENTED NEUTROPHILS ABSOLUTE COUNT: 4.66 K/UL (ref 1.5–8.1)
SODIUM BLD-SCNC: 144 MMOL/L (ref 135–144)
TROPONIN INTERP: ABNORMAL
TROPONIN INTERP: ABNORMAL
TROPONIN T: ABNORMAL NG/ML
TROPONIN T: ABNORMAL NG/ML
TROPONIN, HIGH SENSITIVITY: 22 NG/L (ref 0–14)
TROPONIN, HIGH SENSITIVITY: 23 NG/L (ref 0–14)
WBC # BLD: 7 K/UL (ref 3.5–11.3)
WBC # BLD: ABNORMAL 10*3/UL

## 2019-10-17 PROCEDURE — 6370000000 HC RX 637 (ALT 250 FOR IP): Performed by: FAMILY MEDICINE

## 2019-10-17 PROCEDURE — 93005 ELECTROCARDIOGRAM TRACING: CPT | Performed by: STUDENT IN AN ORGANIZED HEALTH CARE EDUCATION/TRAINING PROGRAM

## 2019-10-17 PROCEDURE — 80048 BASIC METABOLIC PNL TOTAL CA: CPT

## 2019-10-17 PROCEDURE — 6360000002 HC RX W HCPCS: Performed by: FAMILY MEDICINE

## 2019-10-17 PROCEDURE — 83880 ASSAY OF NATRIURETIC PEPTIDE: CPT

## 2019-10-17 PROCEDURE — 71046 X-RAY EXAM CHEST 2 VIEWS: CPT

## 2019-10-17 PROCEDURE — 94761 N-INVAS EAR/PLS OXIMETRY MLT: CPT

## 2019-10-17 PROCEDURE — 99285 EMERGENCY DEPT VISIT HI MDM: CPT

## 2019-10-17 PROCEDURE — 2500000003 HC RX 250 WO HCPCS

## 2019-10-17 PROCEDURE — 2580000003 HC RX 258: Performed by: FAMILY MEDICINE

## 2019-10-17 PROCEDURE — 85730 THROMBOPLASTIN TIME PARTIAL: CPT

## 2019-10-17 PROCEDURE — 85610 PROTHROMBIN TIME: CPT

## 2019-10-17 PROCEDURE — 2700000000 HC OXYGEN THERAPY PER DAY

## 2019-10-17 PROCEDURE — 6360000002 HC RX W HCPCS: Performed by: EMERGENCY MEDICINE

## 2019-10-17 PROCEDURE — 6360000002 HC RX W HCPCS

## 2019-10-17 PROCEDURE — 2500000003 HC RX 250 WO HCPCS: Performed by: STUDENT IN AN ORGANIZED HEALTH CARE EDUCATION/TRAINING PROGRAM

## 2019-10-17 PROCEDURE — 2060000000 HC ICU INTERMEDIATE R&B

## 2019-10-17 PROCEDURE — 94660 CPAP INITIATION&MGMT: CPT

## 2019-10-17 PROCEDURE — 84484 ASSAY OF TROPONIN QUANT: CPT

## 2019-10-17 PROCEDURE — 94640 AIRWAY INHALATION TREATMENT: CPT

## 2019-10-17 PROCEDURE — 99223 1ST HOSP IP/OBS HIGH 75: CPT | Performed by: FAMILY MEDICINE

## 2019-10-17 PROCEDURE — 85025 COMPLETE CBC W/AUTO DIFF WBC: CPT

## 2019-10-17 PROCEDURE — 96374 THER/PROPH/DIAG INJ IV PUSH: CPT

## 2019-10-17 RX ORDER — NICOTINE 21 MG/24HR
1 PATCH, TRANSDERMAL 24 HOURS TRANSDERMAL DAILY PRN
Status: DISCONTINUED | OUTPATIENT
Start: 2019-10-17 | End: 2019-10-25 | Stop reason: HOSPADM

## 2019-10-17 RX ORDER — SERTRALINE HYDROCHLORIDE 25 MG/1
25 TABLET, FILM COATED ORAL DAILY
Status: DISCONTINUED | OUTPATIENT
Start: 2019-10-18 | End: 2019-10-25 | Stop reason: HOSPADM

## 2019-10-17 RX ORDER — SODIUM CHLORIDE 0.9 % (FLUSH) 0.9 %
10 SYRINGE (ML) INJECTION EVERY 12 HOURS SCHEDULED
Status: DISCONTINUED | OUTPATIENT
Start: 2019-10-17 | End: 2019-10-25 | Stop reason: HOSPADM

## 2019-10-17 RX ORDER — HEPARIN SODIUM 1000 [USP'U]/ML
40 INJECTION, SOLUTION INTRAVENOUS; SUBCUTANEOUS PRN
Status: DISCONTINUED | OUTPATIENT
Start: 2019-10-17 | End: 2019-10-17

## 2019-10-17 RX ORDER — LANOLIN ALCOHOL/MO/W.PET/CERES
325 CREAM (GRAM) TOPICAL 2 TIMES DAILY
Status: DISCONTINUED | OUTPATIENT
Start: 2019-10-17 | End: 2019-10-21

## 2019-10-17 RX ORDER — ACETAMINOPHEN 325 MG/1
650 TABLET ORAL EVERY 4 HOURS PRN
Status: DISCONTINUED | OUTPATIENT
Start: 2019-10-17 | End: 2019-10-25 | Stop reason: HOSPADM

## 2019-10-17 RX ORDER — HEPARIN SODIUM 1000 [USP'U]/ML
80 INJECTION, SOLUTION INTRAVENOUS; SUBCUTANEOUS ONCE
Status: DISCONTINUED | OUTPATIENT
Start: 2019-10-17 | End: 2019-10-17

## 2019-10-17 RX ORDER — CARVEDILOL 3.12 MG/1
3.12 TABLET ORAL 2 TIMES DAILY WITH MEALS
Status: DISCONTINUED | OUTPATIENT
Start: 2019-10-18 | End: 2019-10-17

## 2019-10-17 RX ORDER — ONDANSETRON 2 MG/ML
4 INJECTION INTRAMUSCULAR; INTRAVENOUS EVERY 6 HOURS PRN
Status: DISCONTINUED | OUTPATIENT
Start: 2019-10-17 | End: 2019-10-25 | Stop reason: HOSPADM

## 2019-10-17 RX ORDER — NITROGLYCERIN 20 MG/100ML
5 INJECTION INTRAVENOUS CONTINUOUS
Status: DISCONTINUED | OUTPATIENT
Start: 2019-10-17 | End: 2019-10-19

## 2019-10-17 RX ORDER — FUROSEMIDE 10 MG/ML
20 INJECTION INTRAMUSCULAR; INTRAVENOUS ONCE
Status: COMPLETED | OUTPATIENT
Start: 2019-10-17 | End: 2019-10-17

## 2019-10-17 RX ORDER — LISINOPRIL 5 MG/1
5 TABLET ORAL DAILY
Status: DISCONTINUED | OUTPATIENT
Start: 2019-10-18 | End: 2019-10-21

## 2019-10-17 RX ORDER — PANTOPRAZOLE SODIUM 20 MG/1
20 TABLET, DELAYED RELEASE ORAL DAILY
Status: DISCONTINUED | OUTPATIENT
Start: 2019-10-18 | End: 2019-10-21

## 2019-10-17 RX ORDER — POTASSIUM CHLORIDE 750 MG/1
20 CAPSULE, EXTENDED RELEASE ORAL 2 TIMES DAILY
Status: DISCONTINUED | OUTPATIENT
Start: 2019-10-17 | End: 2019-10-21

## 2019-10-17 RX ORDER — GABAPENTIN 100 MG/1
100 CAPSULE ORAL 2 TIMES DAILY
Status: DISCONTINUED | OUTPATIENT
Start: 2019-10-17 | End: 2019-10-25 | Stop reason: HOSPADM

## 2019-10-17 RX ORDER — HEPARIN SODIUM 5000 [USP'U]/ML
5000 INJECTION, SOLUTION INTRAVENOUS; SUBCUTANEOUS EVERY 8 HOURS SCHEDULED
Status: DISCONTINUED | OUTPATIENT
Start: 2019-10-17 | End: 2019-10-18

## 2019-10-17 RX ORDER — AMLODIPINE BESYLATE 10 MG/1
10 TABLET ORAL DAILY
Status: DISCONTINUED | OUTPATIENT
Start: 2019-10-18 | End: 2019-10-22

## 2019-10-17 RX ORDER — FUROSEMIDE 10 MG/ML
20 INJECTION INTRAMUSCULAR; INTRAVENOUS ONCE
Status: DISCONTINUED | OUTPATIENT
Start: 2019-10-17 | End: 2019-10-17

## 2019-10-17 RX ORDER — HEPARIN SODIUM 10000 [USP'U]/100ML
18 INJECTION, SOLUTION INTRAVENOUS CONTINUOUS
Status: DISCONTINUED | OUTPATIENT
Start: 2019-10-17 | End: 2019-10-17

## 2019-10-17 RX ORDER — SODIUM CHLORIDE 0.9 % (FLUSH) 0.9 %
10 SYRINGE (ML) INJECTION PRN
Status: DISCONTINUED | OUTPATIENT
Start: 2019-10-17 | End: 2019-10-25 | Stop reason: HOSPADM

## 2019-10-17 RX ORDER — TIZANIDINE 4 MG/1
4 TABLET ORAL 2 TIMES DAILY PRN
Status: DISCONTINUED | OUTPATIENT
Start: 2019-10-17 | End: 2019-10-25 | Stop reason: HOSPADM

## 2019-10-17 RX ORDER — LABETALOL 200 MG/1
200 TABLET, FILM COATED ORAL EVERY 8 HOURS SCHEDULED
Status: DISCONTINUED | OUTPATIENT
Start: 2019-10-17 | End: 2019-10-25 | Stop reason: HOSPADM

## 2019-10-17 RX ORDER — ALLOPURINOL 100 MG/1
100 TABLET ORAL DAILY
Status: DISCONTINUED | OUTPATIENT
Start: 2019-10-18 | End: 2019-10-25 | Stop reason: HOSPADM

## 2019-10-17 RX ORDER — FUROSEMIDE 10 MG/ML
40 INJECTION INTRAMUSCULAR; INTRAVENOUS 2 TIMES DAILY
Status: DISCONTINUED | OUTPATIENT
Start: 2019-10-17 | End: 2019-10-18

## 2019-10-17 RX ORDER — HEPARIN SODIUM 1000 [USP'U]/ML
80 INJECTION, SOLUTION INTRAVENOUS; SUBCUTANEOUS PRN
Status: DISCONTINUED | OUTPATIENT
Start: 2019-10-17 | End: 2019-10-17

## 2019-10-17 RX ADMIN — FUROSEMIDE 20 MG: 10 INJECTION, SOLUTION INTRAMUSCULAR; INTRAVENOUS at 15:13

## 2019-10-17 RX ADMIN — GABAPENTIN 100 MG: 100 CAPSULE ORAL at 22:43

## 2019-10-17 RX ADMIN — LABETALOL HCL 200 MG: 200 TABLET, FILM COATED ORAL at 22:43

## 2019-10-17 RX ADMIN — SODIUM CHLORIDE, PRESERVATIVE FREE 10 ML: 5 INJECTION INTRAVENOUS at 22:47

## 2019-10-17 RX ADMIN — TIZANIDINE 4 MG: 4 TABLET ORAL at 22:43

## 2019-10-17 RX ADMIN — NITROGLYCERIN 20 MCG/MIN: 20 INJECTION INTRAVENOUS at 14:20

## 2019-10-17 RX ADMIN — FUROSEMIDE 40 MG: 10 INJECTION, SOLUTION INTRAMUSCULAR; INTRAVENOUS at 22:48

## 2019-10-17 RX ADMIN — HEPARIN SODIUM 5000 UNITS: 5000 INJECTION INTRAVENOUS; SUBCUTANEOUS at 22:53

## 2019-10-17 RX ADMIN — POTASSIUM CHLORIDE 20 MEQ: 10 CAPSULE, COATED, EXTENDED RELEASE ORAL at 22:43

## 2019-10-17 RX ADMIN — FERROUS SULFATE TAB EC 325 MG (65 MG FE EQUIVALENT) 325 MG: 325 (65 FE) TABLET DELAYED RESPONSE at 22:43

## 2019-10-17 ASSESSMENT — ENCOUNTER SYMPTOMS
SINUS PRESSURE: 0
COUGH: 0
DIARRHEA: 0
ABDOMINAL PAIN: 0
VOMITING: 0
SHORTNESS OF BREATH: 1
BLOOD IN STOOL: 0
NAUSEA: 0
CONSTIPATION: 0
SORE THROAT: 0
VOICE CHANGE: 0
WHEEZING: 0

## 2019-10-17 ASSESSMENT — PAIN DESCRIPTION - DESCRIPTORS: DESCRIPTORS: PRESSURE

## 2019-10-17 ASSESSMENT — PAIN SCALES - GENERAL
PAINLEVEL_OUTOF10: 5
PAINLEVEL_OUTOF10: 0

## 2019-10-17 ASSESSMENT — PAIN DESCRIPTION - PAIN TYPE: TYPE: ACUTE PAIN

## 2019-10-17 ASSESSMENT — PAIN DESCRIPTION - LOCATION: LOCATION: CHEST

## 2019-10-17 ASSESSMENT — PAIN DESCRIPTION - PROGRESSION: CLINICAL_PROGRESSION: NOT CHANGED

## 2019-10-17 ASSESSMENT — PAIN DESCRIPTION - ONSET: ONSET: ON-GOING

## 2019-10-17 ASSESSMENT — PAIN DESCRIPTION - ORIENTATION: ORIENTATION: MID

## 2019-10-17 ASSESSMENT — PAIN DESCRIPTION - FREQUENCY: FREQUENCY: CONTINUOUS

## 2019-10-18 ENCOUNTER — APPOINTMENT (OUTPATIENT)
Dept: GENERAL RADIOLOGY | Age: 59
DRG: 192 | End: 2019-10-18
Payer: MEDICARE

## 2019-10-18 LAB
ANION GAP SERPL CALCULATED.3IONS-SCNC: 11 MMOL/L (ref 9–17)
BNP INTERPRETATION: ABNORMAL
BUN BLDV-MCNC: 39 MG/DL (ref 6–20)
BUN/CREAT BLD: ABNORMAL (ref 9–20)
CALCIUM SERPL-MCNC: 8.5 MG/DL (ref 8.6–10.4)
CHLORIDE BLD-SCNC: 103 MMOL/L (ref 98–107)
CHOLESTEROL/HDL RATIO: 3.7
CHOLESTEROL: 186 MG/DL
CO2: 29 MMOL/L (ref 20–31)
CREAT SERPL-MCNC: 3.04 MG/DL (ref 0.5–0.9)
EKG ATRIAL RATE: 141 BPM
EKG P AXIS: 49 DEGREES
EKG P-R INTERVAL: 138 MS
EKG Q-T INTERVAL: 252 MS
EKG QRS DURATION: 86 MS
EKG QTC CALCULATION (BAZETT): 388 MS
EKG R AXIS: 5 DEGREES
EKG T AXIS: 71 DEGREES
EKG VENTRICULAR RATE: 143 BPM
GFR AFRICAN AMERICAN: 19 ML/MIN
GFR NON-AFRICAN AMERICAN: 16 ML/MIN
GFR SERPL CREATININE-BSD FRML MDRD: ABNORMAL ML/MIN/{1.73_M2}
GFR SERPL CREATININE-BSD FRML MDRD: ABNORMAL ML/MIN/{1.73_M2}
GLUCOSE BLD-MCNC: 105 MG/DL (ref 70–99)
GLUCOSE BLD-MCNC: 110 MG/DL (ref 65–105)
GLUCOSE BLD-MCNC: 83 MG/DL (ref 65–105)
HCT VFR BLD CALC: 28.4 % (ref 36.3–47.1)
HCT VFR BLD CALC: 29.5 % (ref 36.3–47.1)
HDLC SERPL-MCNC: 50 MG/DL
HEMOGLOBIN: 8.9 G/DL (ref 11.9–15.1)
HEMOGLOBIN: 9 G/DL (ref 11.9–15.1)
LDL CHOLESTEROL: 111 MG/DL (ref 0–130)
MAGNESIUM: 1.8 MG/DL (ref 1.6–2.6)
MCH RBC QN AUTO: 28.4 PG (ref 25.2–33.5)
MCHC RBC AUTO-ENTMCNC: 31.3 G/DL (ref 28.4–34.8)
MCV RBC AUTO: 90.7 FL (ref 82.6–102.9)
NRBC AUTOMATED: 0 PER 100 WBC
PARTIAL THROMBOPLASTIN TIME: 47.8 SEC (ref 20.5–30.5)
PDW BLD-RTO: 15.8 % (ref 11.8–14.4)
PLATELET # BLD: 284 K/UL (ref 138–453)
PMV BLD AUTO: 10.2 FL (ref 8.1–13.5)
POTASSIUM SERPL-SCNC: 3.5 MMOL/L (ref 3.7–5.3)
PRO-BNP: ABNORMAL PG/ML
RBC # BLD: 3.13 M/UL (ref 3.95–5.11)
SODIUM BLD-SCNC: 143 MMOL/L (ref 135–144)
TRIGL SERPL-MCNC: 124 MG/DL
TSH SERPL DL<=0.05 MIU/L-ACNC: 0.78 MIU/L (ref 0.3–5)
VLDLC SERPL CALC-MCNC: NORMAL MG/DL (ref 1–30)
WBC # BLD: 8.3 K/UL (ref 3.5–11.3)

## 2019-10-18 PROCEDURE — 80061 LIPID PANEL: CPT

## 2019-10-18 PROCEDURE — 2580000003 HC RX 258: Performed by: FAMILY MEDICINE

## 2019-10-18 PROCEDURE — 2500000003 HC RX 250 WO HCPCS: Performed by: FAMILY MEDICINE

## 2019-10-18 PROCEDURE — 83735 ASSAY OF MAGNESIUM: CPT

## 2019-10-18 PROCEDURE — 85027 COMPLETE CBC AUTOMATED: CPT

## 2019-10-18 PROCEDURE — 97165 OT EVAL LOW COMPLEX 30 MIN: CPT

## 2019-10-18 PROCEDURE — 6360000002 HC RX W HCPCS: Performed by: FAMILY MEDICINE

## 2019-10-18 PROCEDURE — 82947 ASSAY GLUCOSE BLOOD QUANT: CPT

## 2019-10-18 PROCEDURE — 6360000002 HC RX W HCPCS: Performed by: STUDENT IN AN ORGANIZED HEALTH CARE EDUCATION/TRAINING PROGRAM

## 2019-10-18 PROCEDURE — 85730 THROMBOPLASTIN TIME PARTIAL: CPT

## 2019-10-18 PROCEDURE — 71046 X-RAY EXAM CHEST 2 VIEWS: CPT

## 2019-10-18 PROCEDURE — 97162 PT EVAL MOD COMPLEX 30 MIN: CPT

## 2019-10-18 PROCEDURE — 85014 HEMATOCRIT: CPT

## 2019-10-18 PROCEDURE — 2060000000 HC ICU INTERMEDIATE R&B

## 2019-10-18 PROCEDURE — 99232 SBSQ HOSP IP/OBS MODERATE 35: CPT | Performed by: FAMILY MEDICINE

## 2019-10-18 PROCEDURE — 83880 ASSAY OF NATRIURETIC PEPTIDE: CPT

## 2019-10-18 PROCEDURE — 97535 SELF CARE MNGMENT TRAINING: CPT

## 2019-10-18 PROCEDURE — 6370000000 HC RX 637 (ALT 250 FOR IP): Performed by: FAMILY MEDICINE

## 2019-10-18 PROCEDURE — 84443 ASSAY THYROID STIM HORMONE: CPT

## 2019-10-18 PROCEDURE — 6370000000 HC RX 637 (ALT 250 FOR IP): Performed by: INTERNAL MEDICINE

## 2019-10-18 PROCEDURE — 36415 COLL VENOUS BLD VENIPUNCTURE: CPT

## 2019-10-18 PROCEDURE — 85018 HEMOGLOBIN: CPT

## 2019-10-18 PROCEDURE — 80048 BASIC METABOLIC PNL TOTAL CA: CPT

## 2019-10-18 RX ORDER — HEPARIN SODIUM 1000 [USP'U]/ML
4000 INJECTION, SOLUTION INTRAVENOUS; SUBCUTANEOUS ONCE
Status: COMPLETED | OUTPATIENT
Start: 2019-10-18 | End: 2019-10-18

## 2019-10-18 RX ORDER — HEPARIN SODIUM 1000 [USP'U]/ML
4000 INJECTION, SOLUTION INTRAVENOUS; SUBCUTANEOUS PRN
Status: DISCONTINUED | OUTPATIENT
Start: 2019-10-18 | End: 2019-10-21

## 2019-10-18 RX ORDER — ATORVASTATIN CALCIUM 40 MG/1
40 TABLET, FILM COATED ORAL NIGHTLY
Status: DISCONTINUED | OUTPATIENT
Start: 2019-10-18 | End: 2019-10-25 | Stop reason: HOSPADM

## 2019-10-18 RX ORDER — HEPARIN SODIUM 10000 [USP'U]/100ML
1000 INJECTION, SOLUTION INTRAVENOUS CONTINUOUS
Status: DISCONTINUED | OUTPATIENT
Start: 2019-10-18 | End: 2019-10-21

## 2019-10-18 RX ORDER — FUROSEMIDE 40 MG/1
40 TABLET ORAL DAILY
Status: DISCONTINUED | OUTPATIENT
Start: 2019-10-19 | End: 2019-10-21

## 2019-10-18 RX ORDER — HEPARIN SODIUM 1000 [USP'U]/ML
2000 INJECTION, SOLUTION INTRAVENOUS; SUBCUTANEOUS PRN
Status: DISCONTINUED | OUTPATIENT
Start: 2019-10-18 | End: 2019-10-21

## 2019-10-18 RX ORDER — ASPIRIN 81 MG/1
81 TABLET ORAL DAILY
Status: DISCONTINUED | OUTPATIENT
Start: 2019-10-18 | End: 2019-10-25 | Stop reason: HOSPADM

## 2019-10-18 RX ADMIN — GABAPENTIN 100 MG: 100 CAPSULE ORAL at 20:31

## 2019-10-18 RX ADMIN — TIZANIDINE 4 MG: 4 TABLET ORAL at 20:30

## 2019-10-18 RX ADMIN — Medication 81 MG: at 17:22

## 2019-10-18 RX ADMIN — NITROGLYCERIN 10 MCG/MIN: 20 INJECTION INTRAVENOUS at 16:27

## 2019-10-18 RX ADMIN — ALLOPURINOL 100 MG: 100 TABLET ORAL at 08:49

## 2019-10-18 RX ADMIN — FERROUS SULFATE TAB EC 325 MG (65 MG FE EQUIVALENT) 325 MG: 325 (65 FE) TABLET DELAYED RESPONSE at 20:30

## 2019-10-18 RX ADMIN — LABETALOL HCL 200 MG: 200 TABLET, FILM COATED ORAL at 05:55

## 2019-10-18 RX ADMIN — POTASSIUM CHLORIDE 20 MEQ: 10 CAPSULE, COATED, EXTENDED RELEASE ORAL at 08:48

## 2019-10-18 RX ADMIN — LABETALOL HCL 200 MG: 200 TABLET, FILM COATED ORAL at 14:15

## 2019-10-18 RX ADMIN — HEPARIN SODIUM 4000 UNITS: 1000 INJECTION, SOLUTION INTRAVENOUS; SUBCUTANEOUS at 15:13

## 2019-10-18 RX ADMIN — POTASSIUM CHLORIDE 20 MEQ: 10 CAPSULE, COATED, EXTENDED RELEASE ORAL at 20:31

## 2019-10-18 RX ADMIN — GABAPENTIN 100 MG: 100 CAPSULE ORAL at 08:49

## 2019-10-18 RX ADMIN — HEPARIN SODIUM 2000 UNITS: 1000 INJECTION, SOLUTION INTRAVENOUS; SUBCUTANEOUS at 22:03

## 2019-10-18 RX ADMIN — SODIUM CHLORIDE, PRESERVATIVE FREE 10 ML: 5 INJECTION INTRAVENOUS at 20:31

## 2019-10-18 RX ADMIN — FERROUS SULFATE TAB EC 325 MG (65 MG FE EQUIVALENT) 325 MG: 325 (65 FE) TABLET DELAYED RESPONSE at 08:49

## 2019-10-18 RX ADMIN — DESMOPRESSIN ACETATE 40 MG: 0.2 TABLET ORAL at 20:30

## 2019-10-18 RX ADMIN — SODIUM CHLORIDE, PRESERVATIVE FREE 10 ML: 5 INJECTION INTRAVENOUS at 08:49

## 2019-10-18 RX ADMIN — HEPARIN SODIUM 5000 UNITS: 5000 INJECTION INTRAVENOUS; SUBCUTANEOUS at 05:57

## 2019-10-18 RX ADMIN — PANTOPRAZOLE SODIUM 20 MG: 20 TABLET, DELAYED RELEASE ORAL at 08:49

## 2019-10-18 RX ADMIN — AMLODIPINE BESYLATE 10 MG: 10 TABLET ORAL at 08:49

## 2019-10-18 RX ADMIN — HEPARIN SODIUM AND DEXTROSE 12 UNITS/KG/HR: 10000; 5 INJECTION INTRAVENOUS at 15:08

## 2019-10-18 RX ADMIN — FUROSEMIDE 40 MG: 10 INJECTION, SOLUTION INTRAMUSCULAR; INTRAVENOUS at 08:49

## 2019-10-18 RX ADMIN — LISINOPRIL 5 MG: 5 TABLET ORAL at 08:49

## 2019-10-18 ASSESSMENT — ENCOUNTER SYMPTOMS
RHINORRHEA: 0
COUGH: 0
DIARRHEA: 0
BLOOD IN STOOL: 0
NAUSEA: 0
SHORTNESS OF BREATH: 1
VOMITING: 0
CONSTIPATION: 0
ABDOMINAL PAIN: 0
WHEEZING: 0
CHEST TIGHTNESS: 0

## 2019-10-18 ASSESSMENT — PAIN SCALES - GENERAL
PAINLEVEL_OUTOF10: 0

## 2019-10-19 LAB
ANION GAP SERPL CALCULATED.3IONS-SCNC: 14 MMOL/L (ref 9–17)
BUN BLDV-MCNC: 52 MG/DL (ref 6–20)
BUN/CREAT BLD: ABNORMAL (ref 9–20)
CALCIUM SERPL-MCNC: 8.3 MG/DL (ref 8.6–10.4)
CHLORIDE BLD-SCNC: 104 MMOL/L (ref 98–107)
CO2: 25 MMOL/L (ref 20–31)
CREAT SERPL-MCNC: 3.87 MG/DL (ref 0.5–0.9)
EKG ATRIAL RATE: 91 BPM
EKG P AXIS: 57 DEGREES
EKG P-R INTERVAL: 170 MS
EKG Q-T INTERVAL: 352 MS
EKG QRS DURATION: 92 MS
EKG QTC CALCULATION (BAZETT): 432 MS
EKG R AXIS: 0 DEGREES
EKG T AXIS: 28 DEGREES
EKG VENTRICULAR RATE: 91 BPM
GFR AFRICAN AMERICAN: 14 ML/MIN
GFR NON-AFRICAN AMERICAN: 12 ML/MIN
GFR SERPL CREATININE-BSD FRML MDRD: ABNORMAL ML/MIN/{1.73_M2}
GFR SERPL CREATININE-BSD FRML MDRD: ABNORMAL ML/MIN/{1.73_M2}
GLUCOSE BLD-MCNC: 93 MG/DL (ref 70–99)
HCT VFR BLD CALC: 28.1 % (ref 36.3–47.1)
HCT VFR BLD CALC: 30 % (ref 36.3–47.1)
HEMOGLOBIN: 8.6 G/DL (ref 11.9–15.1)
HEMOGLOBIN: 8.8 G/DL (ref 11.9–15.1)
PARTIAL THROMBOPLASTIN TIME: 54 SEC (ref 20.5–30.5)
PARTIAL THROMBOPLASTIN TIME: 63.3 SEC (ref 20.5–30.5)
PARTIAL THROMBOPLASTIN TIME: 78 SEC (ref 20.5–30.5)
POTASSIUM SERPL-SCNC: 3.7 MMOL/L (ref 3.7–5.3)
SODIUM BLD-SCNC: 143 MMOL/L (ref 135–144)

## 2019-10-19 PROCEDURE — 85730 THROMBOPLASTIN TIME PARTIAL: CPT

## 2019-10-19 PROCEDURE — 36415 COLL VENOUS BLD VENIPUNCTURE: CPT

## 2019-10-19 PROCEDURE — 6360000002 HC RX W HCPCS: Performed by: STUDENT IN AN ORGANIZED HEALTH CARE EDUCATION/TRAINING PROGRAM

## 2019-10-19 PROCEDURE — 93010 ELECTROCARDIOGRAM REPORT: CPT | Performed by: INTERNAL MEDICINE

## 2019-10-19 PROCEDURE — 99232 SBSQ HOSP IP/OBS MODERATE 35: CPT | Performed by: FAMILY MEDICINE

## 2019-10-19 PROCEDURE — 85014 HEMATOCRIT: CPT

## 2019-10-19 PROCEDURE — 6370000000 HC RX 637 (ALT 250 FOR IP): Performed by: INTERNAL MEDICINE

## 2019-10-19 PROCEDURE — 2060000000 HC ICU INTERMEDIATE R&B

## 2019-10-19 PROCEDURE — 85018 HEMOGLOBIN: CPT

## 2019-10-19 PROCEDURE — 2580000003 HC RX 258: Performed by: FAMILY MEDICINE

## 2019-10-19 PROCEDURE — 80048 BASIC METABOLIC PNL TOTAL CA: CPT

## 2019-10-19 PROCEDURE — 6370000000 HC RX 637 (ALT 250 FOR IP): Performed by: FAMILY MEDICINE

## 2019-10-19 RX ADMIN — LABETALOL HCL 200 MG: 200 TABLET, FILM COATED ORAL at 20:58

## 2019-10-19 RX ADMIN — LABETALOL HCL 200 MG: 200 TABLET, FILM COATED ORAL at 00:14

## 2019-10-19 RX ADMIN — Medication 81 MG: at 07:58

## 2019-10-19 RX ADMIN — PANTOPRAZOLE SODIUM 20 MG: 20 TABLET, DELAYED RELEASE ORAL at 07:57

## 2019-10-19 RX ADMIN — ALLOPURINOL 100 MG: 100 TABLET ORAL at 07:58

## 2019-10-19 RX ADMIN — LABETALOL HCL 200 MG: 200 TABLET, FILM COATED ORAL at 13:10

## 2019-10-19 RX ADMIN — DESMOPRESSIN ACETATE 40 MG: 0.2 TABLET ORAL at 20:59

## 2019-10-19 RX ADMIN — AMLODIPINE BESYLATE 10 MG: 10 TABLET ORAL at 07:58

## 2019-10-19 RX ADMIN — GABAPENTIN 100 MG: 100 CAPSULE ORAL at 07:59

## 2019-10-19 RX ADMIN — GABAPENTIN 100 MG: 100 CAPSULE ORAL at 20:59

## 2019-10-19 RX ADMIN — MAGNESIUM HYDROXIDE 30 ML: 400 SUSPENSION ORAL at 07:58

## 2019-10-19 RX ADMIN — HEPARIN SODIUM AND DEXTROSE 14 UNITS/KG/HR: 10000; 5 INJECTION INTRAVENOUS at 07:52

## 2019-10-19 RX ADMIN — SODIUM CHLORIDE, PRESERVATIVE FREE 10 ML: 5 INJECTION INTRAVENOUS at 21:31

## 2019-10-19 RX ADMIN — POTASSIUM CHLORIDE 20 MEQ: 10 CAPSULE, COATED, EXTENDED RELEASE ORAL at 08:00

## 2019-10-19 RX ADMIN — FERROUS SULFATE TAB EC 325 MG (65 MG FE EQUIVALENT) 325 MG: 325 (65 FE) TABLET DELAYED RESPONSE at 20:59

## 2019-10-19 RX ADMIN — FERROUS SULFATE TAB EC 325 MG (65 MG FE EQUIVALENT) 325 MG: 325 (65 FE) TABLET DELAYED RESPONSE at 07:59

## 2019-10-19 RX ADMIN — TIZANIDINE 4 MG: 4 TABLET ORAL at 20:58

## 2019-10-19 RX ADMIN — LISINOPRIL 5 MG: 5 TABLET ORAL at 07:57

## 2019-10-19 RX ADMIN — POTASSIUM CHLORIDE 20 MEQ: 10 CAPSULE, COATED, EXTENDED RELEASE ORAL at 20:59

## 2019-10-19 RX ADMIN — FUROSEMIDE 40 MG: 40 TABLET ORAL at 07:58

## 2019-10-19 ASSESSMENT — ENCOUNTER SYMPTOMS
CHEST TIGHTNESS: 0
ABDOMINAL PAIN: 0
COUGH: 0
SHORTNESS OF BREATH: 1
CONSTIPATION: 0
WHEEZING: 0
NAUSEA: 0
DIARRHEA: 0
BLOOD IN STOOL: 0
VOMITING: 0
RHINORRHEA: 0

## 2019-10-19 ASSESSMENT — PAIN SCALES - GENERAL
PAINLEVEL_OUTOF10: 0

## 2019-10-20 LAB
ANION GAP SERPL CALCULATED.3IONS-SCNC: 11 MMOL/L (ref 9–17)
BNP INTERPRETATION: ABNORMAL
BUN BLDV-MCNC: 50 MG/DL (ref 6–20)
BUN/CREAT BLD: ABNORMAL (ref 9–20)
CALCIUM SERPL-MCNC: 8.4 MG/DL (ref 8.6–10.4)
CHLORIDE BLD-SCNC: 106 MMOL/L (ref 98–107)
CO2: 28 MMOL/L (ref 20–31)
CREAT SERPL-MCNC: 3.74 MG/DL (ref 0.5–0.9)
GFR AFRICAN AMERICAN: 15 ML/MIN
GFR NON-AFRICAN AMERICAN: 12 ML/MIN
GFR SERPL CREATININE-BSD FRML MDRD: ABNORMAL ML/MIN/{1.73_M2}
GFR SERPL CREATININE-BSD FRML MDRD: ABNORMAL ML/MIN/{1.73_M2}
GLUCOSE BLD-MCNC: 103 MG/DL (ref 70–99)
PARTIAL THROMBOPLASTIN TIME: 46 SEC (ref 20.5–30.5)
PARTIAL THROMBOPLASTIN TIME: 49.4 SEC (ref 20.5–30.5)
PARTIAL THROMBOPLASTIN TIME: 62 SEC (ref 20.5–30.5)
PLATELET # BLD: 276 K/UL (ref 138–453)
POTASSIUM SERPL-SCNC: 3.7 MMOL/L (ref 3.7–5.3)
PRO-BNP: 7313 PG/ML
SODIUM BLD-SCNC: 145 MMOL/L (ref 135–144)

## 2019-10-20 PROCEDURE — 36415 COLL VENOUS BLD VENIPUNCTURE: CPT

## 2019-10-20 PROCEDURE — 6370000000 HC RX 637 (ALT 250 FOR IP): Performed by: INTERNAL MEDICINE

## 2019-10-20 PROCEDURE — 85049 AUTOMATED PLATELET COUNT: CPT

## 2019-10-20 PROCEDURE — 80048 BASIC METABOLIC PNL TOTAL CA: CPT

## 2019-10-20 PROCEDURE — 6360000002 HC RX W HCPCS: Performed by: STUDENT IN AN ORGANIZED HEALTH CARE EDUCATION/TRAINING PROGRAM

## 2019-10-20 PROCEDURE — 85730 THROMBOPLASTIN TIME PARTIAL: CPT

## 2019-10-20 PROCEDURE — 99232 SBSQ HOSP IP/OBS MODERATE 35: CPT | Performed by: FAMILY MEDICINE

## 2019-10-20 PROCEDURE — 2060000000 HC ICU INTERMEDIATE R&B

## 2019-10-20 PROCEDURE — 6370000000 HC RX 637 (ALT 250 FOR IP): Performed by: FAMILY MEDICINE

## 2019-10-20 PROCEDURE — 2580000003 HC RX 258: Performed by: FAMILY MEDICINE

## 2019-10-20 PROCEDURE — 83880 ASSAY OF NATRIURETIC PEPTIDE: CPT

## 2019-10-20 RX ADMIN — SERTRALINE 25 MG: 25 TABLET, FILM COATED ORAL at 08:48

## 2019-10-20 RX ADMIN — GABAPENTIN 100 MG: 100 CAPSULE ORAL at 20:32

## 2019-10-20 RX ADMIN — POTASSIUM CHLORIDE 20 MEQ: 10 CAPSULE, COATED, EXTENDED RELEASE ORAL at 08:48

## 2019-10-20 RX ADMIN — FERROUS SULFATE TAB EC 325 MG (65 MG FE EQUIVALENT) 325 MG: 325 (65 FE) TABLET DELAYED RESPONSE at 08:48

## 2019-10-20 RX ADMIN — GABAPENTIN 100 MG: 100 CAPSULE ORAL at 08:48

## 2019-10-20 RX ADMIN — PANTOPRAZOLE SODIUM 20 MG: 20 TABLET, DELAYED RELEASE ORAL at 08:48

## 2019-10-20 RX ADMIN — HEPARIN SODIUM AND DEXTROSE 18 UNITS/KG/HR: 10000; 5 INJECTION INTRAVENOUS at 21:53

## 2019-10-20 RX ADMIN — LABETALOL HCL 200 MG: 200 TABLET, FILM COATED ORAL at 21:45

## 2019-10-20 RX ADMIN — FERROUS SULFATE TAB EC 325 MG (65 MG FE EQUIVALENT) 325 MG: 325 (65 FE) TABLET DELAYED RESPONSE at 20:32

## 2019-10-20 RX ADMIN — LABETALOL HCL 200 MG: 200 TABLET, FILM COATED ORAL at 06:25

## 2019-10-20 RX ADMIN — SODIUM CHLORIDE, PRESERVATIVE FREE 10 ML: 5 INJECTION INTRAVENOUS at 08:48

## 2019-10-20 RX ADMIN — Medication 81 MG: at 08:48

## 2019-10-20 RX ADMIN — TIZANIDINE 4 MG: 4 TABLET ORAL at 23:41

## 2019-10-20 RX ADMIN — ALLOPURINOL 100 MG: 100 TABLET ORAL at 08:48

## 2019-10-20 RX ADMIN — AMLODIPINE BESYLATE 10 MG: 10 TABLET ORAL at 08:48

## 2019-10-20 RX ADMIN — DESMOPRESSIN ACETATE 40 MG: 0.2 TABLET ORAL at 20:32

## 2019-10-20 RX ADMIN — POTASSIUM CHLORIDE 20 MEQ: 10 CAPSULE, COATED, EXTENDED RELEASE ORAL at 20:32

## 2019-10-20 RX ADMIN — FUROSEMIDE 40 MG: 40 TABLET ORAL at 08:48

## 2019-10-20 RX ADMIN — HEPARIN SODIUM 2000 UNITS: 1000 INJECTION, SOLUTION INTRAVENOUS; SUBCUTANEOUS at 21:49

## 2019-10-20 RX ADMIN — HEPARIN SODIUM 2000 UNITS: 1000 INJECTION, SOLUTION INTRAVENOUS; SUBCUTANEOUS at 07:42

## 2019-10-20 RX ADMIN — LABETALOL HCL 200 MG: 200 TABLET, FILM COATED ORAL at 14:13

## 2019-10-20 RX ADMIN — HEPARIN SODIUM AND DEXTROSE 14 UNITS/KG/HR: 10000; 5 INJECTION INTRAVENOUS at 03:12

## 2019-10-20 ASSESSMENT — PAIN SCALES - GENERAL
PAINLEVEL_OUTOF10: 0

## 2019-10-20 ASSESSMENT — ENCOUNTER SYMPTOMS
WHEEZING: 0
DIARRHEA: 0
BLOOD IN STOOL: 0
RHINORRHEA: 0
ABDOMINAL PAIN: 0
COUGH: 0
VOMITING: 0
CHEST TIGHTNESS: 0
NAUSEA: 0
SHORTNESS OF BREATH: 1
CONSTIPATION: 0

## 2019-10-21 ENCOUNTER — APPOINTMENT (OUTPATIENT)
Dept: CARDIAC CATH/INVASIVE PROCEDURES | Age: 59
DRG: 192 | End: 2019-10-21
Payer: MEDICARE

## 2019-10-21 ENCOUNTER — APPOINTMENT (OUTPATIENT)
Dept: GENERAL RADIOLOGY | Age: 59
DRG: 192 | End: 2019-10-21
Payer: MEDICARE

## 2019-10-21 ENCOUNTER — APPOINTMENT (OUTPATIENT)
Dept: INTERVENTIONAL RADIOLOGY/VASCULAR | Age: 59
DRG: 192 | End: 2019-10-21
Payer: MEDICARE

## 2019-10-21 PROBLEM — J81.0 ACUTE PULMONARY EDEMA (HCC): Status: ACTIVE | Noted: 2019-10-21

## 2019-10-21 LAB
-: NORMAL
ABSOLUTE EOS #: 0 K/UL (ref 0–0.4)
ABSOLUTE IMMATURE GRANULOCYTE: 0 K/UL (ref 0–0.3)
ABSOLUTE LYMPH #: 0.54 K/UL (ref 1–4.8)
ABSOLUTE MONO #: 0.11 K/UL (ref 0.1–0.8)
ACTION: NORMAL
ALLEN TEST: POSITIVE
AMORPHOUS: NORMAL
ANION GAP SERPL CALCULATED.3IONS-SCNC: 14 MMOL/L (ref 9–17)
BACTERIA: NORMAL
BASOPHILS # BLD: 1 % (ref 0–2)
BASOPHILS ABSOLUTE: 0.11 K/UL (ref 0–0.2)
BILIRUBIN URINE: NEGATIVE
BUN BLDV-MCNC: 53 MG/DL (ref 6–20)
BUN/CREAT BLD: ABNORMAL (ref 9–20)
CALCIUM SERPL-MCNC: 8.3 MG/DL (ref 8.6–10.4)
CASTS UA: NORMAL /LPF (ref 0–8)
CHLORIDE BLD-SCNC: 103 MMOL/L (ref 98–107)
CO2: 27 MMOL/L (ref 20–31)
COLOR: YELLOW
COMMENT UA: ABNORMAL
CREAT SERPL-MCNC: 3.79 MG/DL (ref 0.5–0.9)
CRYSTALS, UA: NORMAL /HPF
DATE AND TIME: NORMAL
DIFFERENTIAL TYPE: ABNORMAL
EOSINOPHILS RELATIVE PERCENT: 0 % (ref 1–4)
EPITHELIAL CELLS UA: NORMAL /HPF (ref 0–5)
FERRITIN: 436 UG/L (ref 13–150)
FIO2: 100
GFR AFRICAN AMERICAN: 15 ML/MIN
GFR NON-AFRICAN AMERICAN: 12 ML/MIN
GFR SERPL CREATININE-BSD FRML MDRD: ABNORMAL ML/MIN/{1.73_M2}
GFR SERPL CREATININE-BSD FRML MDRD: ABNORMAL ML/MIN/{1.73_M2}
GLUCOSE BLD-MCNC: 97 MG/DL (ref 70–99)
GLUCOSE URINE: NEGATIVE
HCT VFR BLD CALC: 33.2 % (ref 36.3–47.1)
HEMOGLOBIN: 9.9 G/DL (ref 11.9–15.1)
IMMATURE GRANULOCYTES: 0 %
IRON SATURATION: 27 % (ref 20–55)
IRON: 63 UG/DL (ref 37–145)
KETONES, URINE: NEGATIVE
LEUKOCYTE ESTERASE, URINE: NEGATIVE
LYMPHOCYTES # BLD: 5 % (ref 24–44)
MCH RBC QN AUTO: 28.4 PG (ref 25.2–33.5)
MCHC RBC AUTO-ENTMCNC: 29.8 G/DL (ref 28.4–34.8)
MCV RBC AUTO: 95.4 FL (ref 82.6–102.9)
MODE: ABNORMAL
MONOCYTES # BLD: 1 % (ref 1–7)
MORPHOLOGY: ABNORMAL
MUCUS: NORMAL
NEGATIVE BASE EXCESS, ART: ABNORMAL (ref 0–2)
NITRITE, URINE: NEGATIVE
NOTIFY: NORMAL
NRBC AUTOMATED: 0 PER 100 WBC
O2 DEVICE/FLOW/%: ABNORMAL
OTHER OBSERVATIONS UA: NORMAL
PARTIAL THROMBOPLASTIN TIME: 57.7 SEC (ref 20.5–30.5)
PATIENT TEMP: ABNORMAL
PDW BLD-RTO: 15.8 % (ref 11.8–14.4)
PH UA: 5.5 (ref 5–8)
PLATELET # BLD: 272 K/UL (ref 138–453)
PLATELET ESTIMATE: ABNORMAL
PMV BLD AUTO: 10.2 FL (ref 8.1–13.5)
POC HCO3: 33.1 MMOL/L (ref 21–28)
POC O2 SATURATION: 90 % (ref 94–98)
POC PCO2 TEMP: ABNORMAL MM HG
POC PCO2: 82.2 MM HG (ref 35–48)
POC PH TEMP: ABNORMAL
POC PH: 7.21 (ref 7.35–7.45)
POC PO2 TEMP: ABNORMAL MM HG
POC PO2: 74 MM HG (ref 83–108)
POSITIVE BASE EXCESS, ART: 4 (ref 0–3)
POTASSIUM SERPL-SCNC: 3.8 MMOL/L (ref 3.7–5.3)
PROTEIN UA: ABNORMAL
RBC # BLD: 3.48 M/UL (ref 3.95–5.11)
RBC # BLD: ABNORMAL 10*6/UL
RBC UA: NORMAL /HPF (ref 0–4)
READ BACK: YES
RENAL EPITHELIAL, UA: NORMAL /HPF
SAMPLE SITE: ABNORMAL
SEG NEUTROPHILS: 93 % (ref 36–66)
SEGMENTED NEUTROPHILS ABSOLUTE COUNT: 9.94 K/UL (ref 1.8–7.7)
SODIUM BLD-SCNC: 144 MMOL/L (ref 135–144)
SPECIFIC GRAVITY UA: 1.03 (ref 1–1.03)
TCO2 (CALC), ART: 36 MMOL/L (ref 22–29)
TOTAL IRON BINDING CAPACITY: 232 UG/DL (ref 250–450)
TRICHOMONAS: NORMAL
TROPONIN INTERP: ABNORMAL
TROPONIN INTERP: ABNORMAL
TROPONIN T: ABNORMAL NG/ML
TROPONIN T: ABNORMAL NG/ML
TROPONIN, HIGH SENSITIVITY: 29 NG/L (ref 0–14)
TROPONIN, HIGH SENSITIVITY: 48 NG/L (ref 0–14)
TURBIDITY: CLEAR
UNSATURATED IRON BINDING CAPACITY: 169 UG/DL (ref 112–347)
URINE HGB: NEGATIVE
UROBILINOGEN, URINE: NORMAL
WBC # BLD: 10.7 K/UL (ref 3.5–11.3)
WBC # BLD: ABNORMAL 10*3/UL
WBC UA: NORMAL /HPF (ref 0–5)
YEAST: NORMAL

## 2019-10-21 PROCEDURE — 82803 BLOOD GASES ANY COMBINATION: CPT

## 2019-10-21 PROCEDURE — C1769 GUIDE WIRE: HCPCS

## 2019-10-21 PROCEDURE — 6360000002 HC RX W HCPCS: Performed by: STUDENT IN AN ORGANIZED HEALTH CARE EDUCATION/TRAINING PROGRAM

## 2019-10-21 PROCEDURE — C9113 INJ PANTOPRAZOLE SODIUM, VIA: HCPCS | Performed by: STUDENT IN AN ORGANIZED HEALTH CARE EDUCATION/TRAINING PROGRAM

## 2019-10-21 PROCEDURE — 0BH18EZ INSERTION OF ENDOTRACHEAL AIRWAY INTO TRACHEA, VIA NATURAL OR ARTIFICIAL OPENING ENDOSCOPIC: ICD-10-PCS | Performed by: INTERNAL MEDICINE

## 2019-10-21 PROCEDURE — 02H633Z INSERTION OF INFUSION DEVICE INTO RIGHT ATRIUM, PERCUTANEOUS APPROACH: ICD-10-PCS | Performed by: RADIOLOGY

## 2019-10-21 PROCEDURE — 94660 CPAP INITIATION&MGMT: CPT

## 2019-10-21 PROCEDURE — 77001 FLUOROGUIDE FOR VEIN DEVICE: CPT

## 2019-10-21 PROCEDURE — C1894 INTRO/SHEATH, NON-LASER: HCPCS

## 2019-10-21 PROCEDURE — 4A023N7 MEASUREMENT OF CARDIAC SAMPLING AND PRESSURE, LEFT HEART, PERCUTANEOUS APPROACH: ICD-10-PCS | Performed by: INTERNAL MEDICINE

## 2019-10-21 PROCEDURE — 2000000000 HC ICU R&B

## 2019-10-21 PROCEDURE — 99291 CRITICAL CARE FIRST HOUR: CPT | Performed by: INTERNAL MEDICINE

## 2019-10-21 PROCEDURE — 87641 MR-STAPH DNA AMP PROBE: CPT

## 2019-10-21 PROCEDURE — 84484 ASSAY OF TROPONIN QUANT: CPT

## 2019-10-21 PROCEDURE — 81001 URINALYSIS AUTO W/SCOPE: CPT

## 2019-10-21 PROCEDURE — 36558 INSERT TUNNELED CV CATH: CPT

## 2019-10-21 PROCEDURE — C1750 CATH, HEMODIALYSIS,LONG-TERM: HCPCS

## 2019-10-21 PROCEDURE — 2580000003 HC RX 258: Performed by: STUDENT IN AN ORGANIZED HEALTH CARE EDUCATION/TRAINING PROGRAM

## 2019-10-21 PROCEDURE — 80048 BASIC METABOLIC PNL TOTAL CA: CPT

## 2019-10-21 PROCEDURE — 6360000002 HC RX W HCPCS: Performed by: INTERNAL MEDICINE

## 2019-10-21 PROCEDURE — 2709999900 HC NON-CHARGEABLE SUPPLY

## 2019-10-21 PROCEDURE — 85025 COMPLETE CBC W/AUTO DIFF WBC: CPT

## 2019-10-21 PROCEDURE — 2580000003 HC RX 258: Performed by: FAMILY MEDICINE

## 2019-10-21 PROCEDURE — 6370000000 HC RX 637 (ALT 250 FOR IP): Performed by: FAMILY MEDICINE

## 2019-10-21 PROCEDURE — 94770 HC ETCO2 MONITOR DAILY: CPT

## 2019-10-21 PROCEDURE — 76937 US GUIDE VASCULAR ACCESS: CPT

## 2019-10-21 PROCEDURE — 83550 IRON BINDING TEST: CPT

## 2019-10-21 PROCEDURE — 2500000003 HC RX 250 WO HCPCS: Performed by: STUDENT IN AN ORGANIZED HEALTH CARE EDUCATION/TRAINING PROGRAM

## 2019-10-21 PROCEDURE — 0JH63XZ INSERTION OF TUNNELED VASCULAR ACCESS DEVICE INTO CHEST SUBCUTANEOUS TISSUE AND FASCIA, PERCUTANEOUS APPROACH: ICD-10-PCS | Performed by: RADIOLOGY

## 2019-10-21 PROCEDURE — 82728 ASSAY OF FERRITIN: CPT

## 2019-10-21 PROCEDURE — 93454 CORONARY ARTERY ANGIO S&I: CPT | Performed by: INTERNAL MEDICINE

## 2019-10-21 PROCEDURE — 99233 SBSQ HOSP IP/OBS HIGH 50: CPT | Performed by: FAMILY MEDICINE

## 2019-10-21 PROCEDURE — 93005 ELECTROCARDIOGRAM TRACING: CPT | Performed by: STUDENT IN AN ORGANIZED HEALTH CARE EDUCATION/TRAINING PROGRAM

## 2019-10-21 PROCEDURE — 94761 N-INVAS EAR/PLS OXIMETRY MLT: CPT

## 2019-10-21 PROCEDURE — 36415 COLL VENOUS BLD VENIPUNCTURE: CPT

## 2019-10-21 PROCEDURE — 36620 INSERTION CATHETER ARTERY: CPT

## 2019-10-21 PROCEDURE — 83540 ASSAY OF IRON: CPT

## 2019-10-21 PROCEDURE — B2111ZZ FLUOROSCOPY OF MULTIPLE CORONARY ARTERIES USING LOW OSMOLAR CONTRAST: ICD-10-PCS | Performed by: INTERNAL MEDICINE

## 2019-10-21 PROCEDURE — 2500000003 HC RX 250 WO HCPCS

## 2019-10-21 PROCEDURE — 5A1935Z RESPIRATORY VENTILATION, LESS THAN 24 CONSECUTIVE HOURS: ICD-10-PCS | Performed by: INTERNAL MEDICINE

## 2019-10-21 PROCEDURE — C1725 CATH, TRANSLUMIN NON-LASER: HCPCS

## 2019-10-21 PROCEDURE — 2500000003 HC RX 250 WO HCPCS: Performed by: RADIOLOGY

## 2019-10-21 PROCEDURE — B2151ZZ FLUOROSCOPY OF LEFT HEART USING LOW OSMOLAR CONTRAST: ICD-10-PCS | Performed by: INTERNAL MEDICINE

## 2019-10-21 PROCEDURE — 36600 WITHDRAWAL OF ARTERIAL BLOOD: CPT

## 2019-10-21 PROCEDURE — 6370000000 HC RX 637 (ALT 250 FOR IP): Performed by: INTERNAL MEDICINE

## 2019-10-21 PROCEDURE — 6360000002 HC RX W HCPCS

## 2019-10-21 PROCEDURE — 71045 X-RAY EXAM CHEST 1 VIEW: CPT

## 2019-10-21 PROCEDURE — 85730 THROMBOPLASTIN TIME PARTIAL: CPT

## 2019-10-21 PROCEDURE — 5A1D70Z PERFORMANCE OF URINARY FILTRATION, INTERMITTENT, LESS THAN 6 HOURS PER DAY: ICD-10-PCS | Performed by: INTERNAL MEDICINE

## 2019-10-21 PROCEDURE — 6360000004 HC RX CONTRAST MEDICATION

## 2019-10-21 PROCEDURE — 94002 VENT MGMT INPAT INIT DAY: CPT

## 2019-10-21 PROCEDURE — 6360000002 HC RX W HCPCS: Performed by: RADIOLOGY

## 2019-10-21 RX ORDER — 0.9 % SODIUM CHLORIDE 0.9 %
10 VIAL (ML) INJECTION DAILY
Status: DISCONTINUED | OUTPATIENT
Start: 2019-10-21 | End: 2019-10-21

## 2019-10-21 RX ORDER — FERROUS SULFATE 300 MG/5ML
325 LIQUID (ML) ORAL 2 TIMES DAILY
Status: DISCONTINUED | OUTPATIENT
Start: 2019-10-21 | End: 2019-10-22

## 2019-10-21 RX ORDER — CHLORHEXIDINE GLUCONATE 0.12 MG/ML
15 RINSE ORAL 2 TIMES DAILY
Status: DISCONTINUED | OUTPATIENT
Start: 2019-10-21 | End: 2019-10-21

## 2019-10-21 RX ORDER — SODIUM CHLORIDE 0.9 % (FLUSH) 0.9 %
10 SYRINGE (ML) INJECTION PRN
Status: DISCONTINUED | OUTPATIENT
Start: 2019-10-21 | End: 2019-10-21 | Stop reason: SDUPTHER

## 2019-10-21 RX ORDER — MIDAZOLAM HYDROCHLORIDE 1 MG/ML
1 INJECTION INTRAMUSCULAR; INTRAVENOUS ONCE
Status: COMPLETED | OUTPATIENT
Start: 2019-10-21 | End: 2019-10-21

## 2019-10-21 RX ORDER — PANTOPRAZOLE SODIUM 40 MG/10ML
40 INJECTION, POWDER, LYOPHILIZED, FOR SOLUTION INTRAVENOUS DAILY
Status: DISCONTINUED | OUTPATIENT
Start: 2019-10-21 | End: 2019-10-21

## 2019-10-21 RX ORDER — HEPARIN SODIUM 5000 [USP'U]/ML
INJECTION, SOLUTION INTRAVENOUS; SUBCUTANEOUS
Status: COMPLETED | OUTPATIENT
Start: 2019-10-21 | End: 2019-10-21

## 2019-10-21 RX ORDER — PROPOFOL 10 MG/ML
10 INJECTION, EMULSION INTRAVENOUS
Status: DISCONTINUED | OUTPATIENT
Start: 2019-10-21 | End: 2019-10-22

## 2019-10-21 RX ORDER — ACETAMINOPHEN 325 MG/1
650 TABLET ORAL EVERY 4 HOURS PRN
Status: DISCONTINUED | OUTPATIENT
Start: 2019-10-21 | End: 2019-10-25 | Stop reason: HOSPADM

## 2019-10-21 RX ORDER — CLINDAMYCIN PHOSPHATE 600 MG/50ML
600 INJECTION INTRAVENOUS EVERY 8 HOURS
Status: COMPLETED | OUTPATIENT
Start: 2019-10-21 | End: 2019-10-21

## 2019-10-21 RX ORDER — FUROSEMIDE 10 MG/ML
40 INJECTION INTRAMUSCULAR; INTRAVENOUS ONCE
Status: COMPLETED | OUTPATIENT
Start: 2019-10-21 | End: 2019-10-21

## 2019-10-21 RX ORDER — FENTANYL CITRATE 50 UG/ML
50 INJECTION, SOLUTION INTRAMUSCULAR; INTRAVENOUS
Status: DISCONTINUED | OUTPATIENT
Start: 2019-10-21 | End: 2019-10-25 | Stop reason: HOSPADM

## 2019-10-21 RX ORDER — HEPARIN SODIUM 5000 [USP'U]/ML
5000 INJECTION, SOLUTION INTRAVENOUS; SUBCUTANEOUS EVERY 8 HOURS SCHEDULED
Status: DISCONTINUED | OUTPATIENT
Start: 2019-10-21 | End: 2019-10-25 | Stop reason: HOSPADM

## 2019-10-21 RX ORDER — CHLORHEXIDINE GLUCONATE 0.12 MG/ML
15 RINSE ORAL 2 TIMES DAILY
Status: DISCONTINUED | OUTPATIENT
Start: 2019-10-21 | End: 2019-10-23

## 2019-10-21 RX ORDER — SODIUM CHLORIDE 0.9 % (FLUSH) 0.9 %
10 SYRINGE (ML) INJECTION EVERY 12 HOURS SCHEDULED
Status: DISCONTINUED | OUTPATIENT
Start: 2019-10-21 | End: 2019-10-21 | Stop reason: SDUPTHER

## 2019-10-21 RX ORDER — ALBUTEROL SULFATE 90 UG/1
4 AEROSOL, METERED RESPIRATORY (INHALATION) EVERY 4 HOURS PRN
Status: DISCONTINUED | OUTPATIENT
Start: 2019-10-21 | End: 2019-10-21

## 2019-10-21 RX ORDER — FENTANYL CITRATE 50 UG/ML
25 INJECTION, SOLUTION INTRAMUSCULAR; INTRAVENOUS
Status: DISCONTINUED | OUTPATIENT
Start: 2019-10-21 | End: 2019-10-25 | Stop reason: HOSPADM

## 2019-10-21 RX ORDER — NITROGLYCERIN 20 MG/100ML
5 INJECTION INTRAVENOUS CONTINUOUS
Status: DISCONTINUED | OUTPATIENT
Start: 2019-10-21 | End: 2019-10-25 | Stop reason: HOSPADM

## 2019-10-21 RX ADMIN — NITROGLYCERIN 5 MCG/MIN: 20 INJECTION INTRAVENOUS at 17:02

## 2019-10-21 RX ADMIN — FENTANYL CITRATE 50 MCG: 50 INJECTION, SOLUTION INTRAMUSCULAR; INTRAVENOUS at 22:04

## 2019-10-21 RX ADMIN — HEPARIN SODIUM 1.6 ML: 5000 INJECTION INTRAVENOUS; SUBCUTANEOUS at 14:23

## 2019-10-21 RX ADMIN — FENTANYL CITRATE 50 MCG: 50 INJECTION, SOLUTION INTRAMUSCULAR; INTRAVENOUS at 20:26

## 2019-10-21 RX ADMIN — MINERAL SUPPLEMENT IRON 300 MG / 5 ML STRENGTH LIQUID 100 PER BOX UNFLAVORED 325 MG: at 22:52

## 2019-10-21 RX ADMIN — HEPARIN SODIUM 1.6 ML: 5000 INJECTION INTRAVENOUS; SUBCUTANEOUS at 14:22

## 2019-10-21 RX ADMIN — CLINDAMYCIN PHOSPHATE 600 MG: 600 INJECTION, SOLUTION INTRAVENOUS at 14:09

## 2019-10-21 RX ADMIN — FUROSEMIDE 40 MG: 10 INJECTION, SOLUTION INTRAMUSCULAR; INTRAVENOUS at 17:07

## 2019-10-21 RX ADMIN — FUROSEMIDE 40 MG: 10 INJECTION, SOLUTION INTRAMUSCULAR; INTRAVENOUS at 17:46

## 2019-10-21 RX ADMIN — HEPARIN SODIUM 5000 UNITS: 5000 INJECTION INTRAVENOUS; SUBCUTANEOUS at 22:51

## 2019-10-21 RX ADMIN — SODIUM CHLORIDE, PRESERVATIVE FREE 10 ML: 5 INJECTION INTRAVENOUS at 22:09

## 2019-10-21 RX ADMIN — MIDAZOLAM HYDROCHLORIDE 1 MG: 1 INJECTION, SOLUTION INTRAMUSCULAR; INTRAVENOUS at 09:31

## 2019-10-21 RX ADMIN — PROPOFOL 10 MCG/KG/MIN: 10 INJECTION, EMULSION INTRAVENOUS at 15:58

## 2019-10-21 RX ADMIN — PANTOPRAZOLE SODIUM 40 MG: 40 INJECTION, POWDER, FOR SOLUTION INTRAVENOUS at 17:10

## 2019-10-21 RX ADMIN — DESMOPRESSIN ACETATE 40 MG: 0.2 TABLET ORAL at 22:52

## 2019-10-21 RX ADMIN — FENTANYL CITRATE 50 MCG: 50 INJECTION, SOLUTION INTRAMUSCULAR; INTRAVENOUS at 23:02

## 2019-10-21 RX ADMIN — Medication 10 ML: at 17:10

## 2019-10-21 RX ADMIN — GABAPENTIN 100 MG: 100 CAPSULE ORAL at 22:52

## 2019-10-21 RX ADMIN — ACETAMINOPHEN 650 MG: 325 TABLET ORAL at 22:57

## 2019-10-21 ASSESSMENT — PULMONARY FUNCTION TESTS
PIF_VALUE: 39
PIF_VALUE: 39
PIF_VALUE: 22
PIF_VALUE: 31

## 2019-10-21 ASSESSMENT — PAIN SCALES - GENERAL
PAINLEVEL_OUTOF10: 4
PAINLEVEL_OUTOF10: 0
PAINLEVEL_OUTOF10: 0

## 2019-10-21 ASSESSMENT — ENCOUNTER SYMPTOMS: SHORTNESS OF BREATH: 1

## 2019-10-22 ENCOUNTER — APPOINTMENT (OUTPATIENT)
Dept: GENERAL RADIOLOGY | Age: 59
DRG: 192 | End: 2019-10-22
Payer: MEDICARE

## 2019-10-22 LAB
-: ABNORMAL
ABSOLUTE EOS #: <0.03 K/UL (ref 0–0.44)
ABSOLUTE IMMATURE GRANULOCYTE: 0.04 K/UL (ref 0–0.3)
ABSOLUTE LYMPH #: 0.78 K/UL (ref 1.1–3.7)
ABSOLUTE MONO #: 0.41 K/UL (ref 0.1–1.2)
ALBUMIN SERPL-MCNC: 3.7 G/DL (ref 3.5–5.2)
ALBUMIN/GLOBULIN RATIO: 1.2 (ref 1–2.5)
ALLEN TEST: ABNORMAL
ALP BLD-CCNC: 100 U/L (ref 35–104)
ALT SERPL-CCNC: 22 U/L (ref 5–33)
AMORPHOUS: ABNORMAL
ANION GAP SERPL CALCULATED.3IONS-SCNC: 10 MMOL/L (ref 9–17)
ANION GAP SERPL CALCULATED.3IONS-SCNC: 17 MMOL/L (ref 9–17)
AST SERPL-CCNC: 30 U/L
BACTERIA: ABNORMAL
BASOPHILS # BLD: 0 % (ref 0–2)
BASOPHILS ABSOLUTE: <0.03 K/UL (ref 0–0.2)
BILIRUB SERPL-MCNC: 0.36 MG/DL (ref 0.3–1.2)
BILIRUBIN URINE: NEGATIVE
BNP INTERPRETATION: ABNORMAL
BUN BLDV-MCNC: 19 MG/DL (ref 6–20)
BUN BLDV-MCNC: 37 MG/DL (ref 6–20)
BUN/CREAT BLD: ABNORMAL (ref 9–20)
CALCIUM SERPL-MCNC: 8.2 MG/DL (ref 8.6–10.4)
CALCIUM SERPL-MCNC: 8.6 MG/DL (ref 8.6–10.4)
CASTS UA: ABNORMAL /LPF (ref 0–8)
CHLORIDE BLD-SCNC: 102 MMOL/L (ref 98–107)
CHLORIDE BLD-SCNC: 104 MMOL/L (ref 98–107)
CO2: 23 MMOL/L (ref 20–31)
CO2: 27 MMOL/L (ref 20–31)
COLOR: YELLOW
CREAT SERPL-MCNC: 2.07 MG/DL (ref 0.5–0.9)
CREAT SERPL-MCNC: 3.8 MG/DL (ref 0.5–0.9)
CRYSTALS, UA: ABNORMAL /HPF
DIFFERENTIAL TYPE: ABNORMAL
EKG ATRIAL RATE: 80 BPM
EKG P AXIS: 70 DEGREES
EKG P-R INTERVAL: 166 MS
EKG Q-T INTERVAL: 392 MS
EKG QRS DURATION: 104 MS
EKG QTC CALCULATION (BAZETT): 452 MS
EKG R AXIS: -4 DEGREES
EKG T AXIS: 115 DEGREES
EKG VENTRICULAR RATE: 80 BPM
EOSINOPHILS RELATIVE PERCENT: 0 % (ref 1–4)
EPITHELIAL CELLS UA: ABNORMAL /HPF (ref 0–5)
FIO2: 70
GFR AFRICAN AMERICAN: 15 ML/MIN
GFR AFRICAN AMERICAN: 30 ML/MIN
GFR NON-AFRICAN AMERICAN: 12 ML/MIN
GFR NON-AFRICAN AMERICAN: 25 ML/MIN
GFR SERPL CREATININE-BSD FRML MDRD: ABNORMAL ML/MIN/{1.73_M2}
GLUCOSE BLD-MCNC: 102 MG/DL (ref 74–100)
GLUCOSE BLD-MCNC: 103 MG/DL (ref 70–99)
GLUCOSE BLD-MCNC: 138 MG/DL (ref 70–99)
GLUCOSE URINE: NEGATIVE
HBV SURFACE AB TITR SER: <3.5 MIU/ML
HCT VFR BLD CALC: 27.3 % (ref 36.3–47.1)
HCT VFR BLD CALC: 29.6 % (ref 36.3–47.1)
HEMOGLOBIN: 8.3 G/DL (ref 11.9–15.1)
HEMOGLOBIN: 9.1 G/DL (ref 11.9–15.1)
HEPATITIS B CORE TOTAL ANTIBODY: NONREACTIVE
HEPATITIS B SURFACE ANTIGEN: NONREACTIVE
HEPATITIS C ANTIBODY: NONREACTIVE
IMMATURE GRANULOCYTES: 0 %
KETONES, URINE: NEGATIVE
LEUKOCYTE ESTERASE, URINE: NEGATIVE
LYMPHOCYTES # BLD: 8 % (ref 24–43)
MCH RBC QN AUTO: 27.9 PG (ref 25.2–33.5)
MCHC RBC AUTO-ENTMCNC: 30.4 G/DL (ref 28.4–34.8)
MCV RBC AUTO: 91.9 FL (ref 82.6–102.9)
MODE: ABNORMAL
MONOCYTES # BLD: 4 % (ref 3–12)
MRSA, DNA, NASAL: NORMAL
MUCUS: ABNORMAL
NEGATIVE BASE EXCESS, ART: ABNORMAL (ref 0–2)
NITRITE, URINE: NEGATIVE
NRBC AUTOMATED: 0 PER 100 WBC
O2 DEVICE/FLOW/%: ABNORMAL
OTHER OBSERVATIONS UA: ABNORMAL
PATIENT TEMP: 38.3
PDW BLD-RTO: 15.4 % (ref 11.8–14.4)
PH UA: 5 (ref 5–8)
PHOSPHORUS: 2.8 MG/DL (ref 2.6–4.5)
PLATELET # BLD: 219 K/UL (ref 138–453)
PLATELET ESTIMATE: ABNORMAL
PMV BLD AUTO: 10.6 FL (ref 8.1–13.5)
POC HCO3: 31.1 MMOL/L (ref 21–28)
POC O2 SATURATION: 100 % (ref 94–98)
POC PCO2 TEMP: 57 MM HG
POC PCO2: 53.5 MM HG (ref 35–48)
POC PH TEMP: 7.35
POC PH: 7.37 (ref 7.35–7.45)
POC PO2 TEMP: 242 MM HG
POC PO2: 235.3 MM HG (ref 83–108)
POSITIVE BASE EXCESS, ART: 5 (ref 0–3)
POTASSIUM SERPL-SCNC: 3.3 MMOL/L (ref 3.7–5.3)
POTASSIUM SERPL-SCNC: 4.5 MMOL/L (ref 3.7–5.3)
PRO-BNP: ABNORMAL PG/ML
PROTEIN UA: ABNORMAL
RBC # BLD: 2.97 M/UL (ref 3.95–5.11)
RBC # BLD: ABNORMAL 10*6/UL
RBC UA: ABNORMAL /HPF (ref 0–4)
RENAL EPITHELIAL, UA: ABNORMAL /HPF
SAMPLE SITE: ABNORMAL
SEG NEUTROPHILS: 88 % (ref 36–65)
SEGMENTED NEUTROPHILS ABSOLUTE COUNT: 9.02 K/UL (ref 1.5–8.1)
SODIUM BLD-SCNC: 139 MMOL/L (ref 135–144)
SODIUM BLD-SCNC: 144 MMOL/L (ref 135–144)
SPECIFIC GRAVITY UA: 1.03 (ref 1–1.03)
SPECIMEN DESCRIPTION: NORMAL
TCO2 (CALC), ART: 33 MMOL/L (ref 22–29)
TOTAL PROTEIN: 6.7 G/DL (ref 6.4–8.3)
TRICHOMONAS: ABNORMAL
TROPONIN INTERP: ABNORMAL
TROPONIN T: ABNORMAL NG/ML
TROPONIN, HIGH SENSITIVITY: 53 NG/L (ref 0–14)
TURBIDITY: ABNORMAL
URINE HGB: ABNORMAL
UROBILINOGEN, URINE: NORMAL
WBC # BLD: 10.3 K/UL (ref 3.5–11.3)
WBC # BLD: ABNORMAL 10*3/UL
WBC UA: ABNORMAL /HPF (ref 0–5)
YEAST: ABNORMAL

## 2019-10-22 PROCEDURE — 87340 HEPATITIS B SURFACE AG IA: CPT

## 2019-10-22 PROCEDURE — 36415 COLL VENOUS BLD VENIPUNCTURE: CPT

## 2019-10-22 PROCEDURE — 6370000000 HC RX 637 (ALT 250 FOR IP): Performed by: FAMILY MEDICINE

## 2019-10-22 PROCEDURE — 81001 URINALYSIS AUTO W/SCOPE: CPT

## 2019-10-22 PROCEDURE — 2700000000 HC OXYGEN THERAPY PER DAY

## 2019-10-22 PROCEDURE — 2580000003 HC RX 258: Performed by: STUDENT IN AN ORGANIZED HEALTH CARE EDUCATION/TRAINING PROGRAM

## 2019-10-22 PROCEDURE — 86317 IMMUNOASSAY INFECTIOUS AGENT: CPT

## 2019-10-22 PROCEDURE — 80048 BASIC METABOLIC PNL TOTAL CA: CPT

## 2019-10-22 PROCEDURE — 6370000000 HC RX 637 (ALT 250 FOR IP): Performed by: INTERNAL MEDICINE

## 2019-10-22 PROCEDURE — 82947 ASSAY GLUCOSE BLOOD QUANT: CPT

## 2019-10-22 PROCEDURE — 94770 HC ETCO2 MONITOR DAILY: CPT

## 2019-10-22 PROCEDURE — 83880 ASSAY OF NATRIURETIC PEPTIDE: CPT

## 2019-10-22 PROCEDURE — 84100 ASSAY OF PHOSPHORUS: CPT

## 2019-10-22 PROCEDURE — 85025 COMPLETE CBC W/AUTO DIFF WBC: CPT

## 2019-10-22 PROCEDURE — 86803 HEPATITIS C AB TEST: CPT

## 2019-10-22 PROCEDURE — 85014 HEMATOCRIT: CPT

## 2019-10-22 PROCEDURE — 37799 UNLISTED PX VASCULAR SURGERY: CPT

## 2019-10-22 PROCEDURE — 94003 VENT MGMT INPAT SUBQ DAY: CPT

## 2019-10-22 PROCEDURE — 2580000003 HC RX 258: Performed by: FAMILY MEDICINE

## 2019-10-22 PROCEDURE — 99291 CRITICAL CARE FIRST HOUR: CPT | Performed by: INTERNAL MEDICINE

## 2019-10-22 PROCEDURE — 2000000000 HC ICU R&B

## 2019-10-22 PROCEDURE — 80053 COMPREHEN METABOLIC PANEL: CPT

## 2019-10-22 PROCEDURE — 84484 ASSAY OF TROPONIN QUANT: CPT

## 2019-10-22 PROCEDURE — 6360000002 HC RX W HCPCS: Performed by: STUDENT IN AN ORGANIZED HEALTH CARE EDUCATION/TRAINING PROGRAM

## 2019-10-22 PROCEDURE — 82803 BLOOD GASES ANY COMBINATION: CPT

## 2019-10-22 PROCEDURE — C9113 INJ PANTOPRAZOLE SODIUM, VIA: HCPCS | Performed by: STUDENT IN AN ORGANIZED HEALTH CARE EDUCATION/TRAINING PROGRAM

## 2019-10-22 PROCEDURE — 94761 N-INVAS EAR/PLS OXIMETRY MLT: CPT

## 2019-10-22 PROCEDURE — 71045 X-RAY EXAM CHEST 1 VIEW: CPT

## 2019-10-22 PROCEDURE — 90935 HEMODIALYSIS ONE EVALUATION: CPT

## 2019-10-22 PROCEDURE — 85018 HEMOGLOBIN: CPT

## 2019-10-22 PROCEDURE — 86704 HEP B CORE ANTIBODY TOTAL: CPT

## 2019-10-22 PROCEDURE — 6370000000 HC RX 637 (ALT 250 FOR IP): Performed by: STUDENT IN AN ORGANIZED HEALTH CARE EDUCATION/TRAINING PROGRAM

## 2019-10-22 PROCEDURE — 87040 BLOOD CULTURE FOR BACTERIA: CPT

## 2019-10-22 PROCEDURE — 6360000002 HC RX W HCPCS: Performed by: INTERNAL MEDICINE

## 2019-10-22 PROCEDURE — 2500000003 HC RX 250 WO HCPCS: Performed by: INTERNAL MEDICINE

## 2019-10-22 PROCEDURE — 87086 URINE CULTURE/COLONY COUNT: CPT

## 2019-10-22 RX ORDER — 0.9 % SODIUM CHLORIDE 0.9 %
150 INTRAVENOUS SOLUTION INTRAVENOUS PRN
Status: DISCONTINUED | OUTPATIENT
Start: 2019-10-22 | End: 2019-10-25 | Stop reason: HOSPADM

## 2019-10-22 RX ORDER — LABETALOL HYDROCHLORIDE 5 MG/ML
10 INJECTION, SOLUTION INTRAVENOUS ONCE
Status: COMPLETED | OUTPATIENT
Start: 2019-10-22 | End: 2019-10-22

## 2019-10-22 RX ORDER — 0.9 % SODIUM CHLORIDE 0.9 %
10 VIAL (ML) INJECTION DAILY
Status: DISCONTINUED | OUTPATIENT
Start: 2019-10-22 | End: 2019-10-23

## 2019-10-22 RX ORDER — HYDRALAZINE HYDROCHLORIDE 20 MG/ML
10 INJECTION INTRAMUSCULAR; INTRAVENOUS EVERY 6 HOURS PRN
Status: DISCONTINUED | OUTPATIENT
Start: 2019-10-22 | End: 2019-10-25 | Stop reason: HOSPADM

## 2019-10-22 RX ORDER — AMLODIPINE BESYLATE 10 MG/1
10 TABLET ORAL DAILY
Status: DISCONTINUED | OUTPATIENT
Start: 2019-10-22 | End: 2019-10-25 | Stop reason: HOSPADM

## 2019-10-22 RX ORDER — HEPARIN SODIUM 1000 [USP'U]/ML
1600 INJECTION, SOLUTION INTRAVENOUS; SUBCUTANEOUS ONCE
Status: COMPLETED | OUTPATIENT
Start: 2019-10-22 | End: 2019-10-22

## 2019-10-22 RX ORDER — FUROSEMIDE 10 MG/ML
40 INJECTION INTRAMUSCULAR; INTRAVENOUS ONCE
Status: COMPLETED | OUTPATIENT
Start: 2019-10-22 | End: 2019-10-22

## 2019-10-22 RX ORDER — FUROSEMIDE 10 MG/ML
40 INJECTION INTRAMUSCULAR; INTRAVENOUS 2 TIMES DAILY
Status: DISCONTINUED | OUTPATIENT
Start: 2019-10-22 | End: 2019-10-25

## 2019-10-22 RX ORDER — PANTOPRAZOLE SODIUM 40 MG/10ML
40 INJECTION, POWDER, LYOPHILIZED, FOR SOLUTION INTRAVENOUS DAILY
Status: DISCONTINUED | OUTPATIENT
Start: 2019-10-22 | End: 2019-10-23

## 2019-10-22 RX ORDER — 0.9 % SODIUM CHLORIDE 0.9 %
250 INTRAVENOUS SOLUTION INTRAVENOUS ONCE
Status: COMPLETED | OUTPATIENT
Start: 2019-10-22 | End: 2019-10-22

## 2019-10-22 RX ADMIN — FENTANYL CITRATE 50 MCG: 50 INJECTION, SOLUTION INTRAMUSCULAR; INTRAVENOUS at 01:43

## 2019-10-22 RX ADMIN — LABETALOL HCL 200 MG: 200 TABLET, FILM COATED ORAL at 15:07

## 2019-10-22 RX ADMIN — Medication 10 ML: at 14:47

## 2019-10-22 RX ADMIN — DESMOPRESSIN ACETATE 40 MG: 0.2 TABLET ORAL at 20:07

## 2019-10-22 RX ADMIN — FENTANYL CITRATE 50 MCG: 50 INJECTION, SOLUTION INTRAMUSCULAR; INTRAVENOUS at 04:17

## 2019-10-22 RX ADMIN — HYDRALAZINE HYDROCHLORIDE 10 MG: 20 INJECTION INTRAMUSCULAR; INTRAVENOUS at 07:43

## 2019-10-22 RX ADMIN — AMLODIPINE BESYLATE 10 MG: 10 TABLET ORAL at 12:45

## 2019-10-22 RX ADMIN — FUROSEMIDE 40 MG: 10 INJECTION, SOLUTION INTRAMUSCULAR; INTRAVENOUS at 17:19

## 2019-10-22 RX ADMIN — MAGNESIUM HYDROXIDE 30 ML: 400 SUSPENSION ORAL at 20:06

## 2019-10-22 RX ADMIN — Medication 10 MG: at 14:52

## 2019-10-22 RX ADMIN — HEPARIN SODIUM 1600 UNITS: 1000 INJECTION INTRAVENOUS; SUBCUTANEOUS at 12:16

## 2019-10-22 RX ADMIN — FUROSEMIDE 40 MG: 10 INJECTION, SOLUTION INTRAMUSCULAR; INTRAVENOUS at 07:51

## 2019-10-22 RX ADMIN — HEPARIN SODIUM 5000 UNITS: 5000 INJECTION INTRAVENOUS; SUBCUTANEOUS at 05:30

## 2019-10-22 RX ADMIN — SODIUM CHLORIDE, PRESERVATIVE FREE 10 ML: 5 INJECTION INTRAVENOUS at 07:44

## 2019-10-22 RX ADMIN — Medication 81 MG: at 07:49

## 2019-10-22 RX ADMIN — HEPARIN SODIUM 1600 UNITS: 1000 INJECTION INTRAVENOUS; SUBCUTANEOUS at 12:17

## 2019-10-22 RX ADMIN — FENTANYL CITRATE 50 MCG: 50 INJECTION, SOLUTION INTRAMUSCULAR; INTRAVENOUS at 14:52

## 2019-10-22 RX ADMIN — HEPARIN SODIUM 5000 UNITS: 5000 INJECTION INTRAVENOUS; SUBCUTANEOUS at 14:40

## 2019-10-22 RX ADMIN — GABAPENTIN 100 MG: 100 CAPSULE ORAL at 20:06

## 2019-10-22 RX ADMIN — ALLOPURINOL 100 MG: 100 TABLET ORAL at 07:49

## 2019-10-22 RX ADMIN — SODIUM CHLORIDE 250 ML: 9 INJECTION, SOLUTION INTRAVENOUS at 04:17

## 2019-10-22 RX ADMIN — LABETALOL HCL 200 MG: 200 TABLET, FILM COATED ORAL at 21:08

## 2019-10-22 RX ADMIN — PANTOPRAZOLE SODIUM 40 MG: 40 INJECTION, POWDER, FOR SOLUTION INTRAVENOUS at 14:40

## 2019-10-22 RX ADMIN — HEPARIN SODIUM 5000 UNITS: 5000 INJECTION INTRAVENOUS; SUBCUTANEOUS at 21:08

## 2019-10-22 RX ADMIN — GABAPENTIN 100 MG: 100 CAPSULE ORAL at 07:49

## 2019-10-22 RX ADMIN — SERTRALINE 25 MG: 25 TABLET, FILM COATED ORAL at 07:49

## 2019-10-22 RX ADMIN — FENTANYL CITRATE 50 MCG: 50 INJECTION, SOLUTION INTRAMUSCULAR; INTRAVENOUS at 05:31

## 2019-10-22 RX ADMIN — SODIUM CHLORIDE, PRESERVATIVE FREE 10 ML: 5 INJECTION INTRAVENOUS at 20:07

## 2019-10-22 RX ADMIN — MINERAL SUPPLEMENT IRON 300 MG / 5 ML STRENGTH LIQUID 100 PER BOX UNFLAVORED 325 MG: at 07:49

## 2019-10-22 RX ADMIN — FENTANYL CITRATE 50 MCG: 50 INJECTION, SOLUTION INTRAMUSCULAR; INTRAVENOUS at 00:33

## 2019-10-22 ASSESSMENT — PAIN SCALES - GENERAL
PAINLEVEL_OUTOF10: 0
PAINLEVEL_OUTOF10: 0
PAINLEVEL_OUTOF10: 8
PAINLEVEL_OUTOF10: 0

## 2019-10-22 ASSESSMENT — PULMONARY FUNCTION TESTS
PIF_VALUE: 29
PIF_VALUE: 13
PIF_VALUE: 11

## 2019-10-23 ENCOUNTER — APPOINTMENT (OUTPATIENT)
Dept: GENERAL RADIOLOGY | Age: 59
DRG: 192 | End: 2019-10-23
Payer: MEDICARE

## 2019-10-23 DIAGNOSIS — M25.562 CHRONIC PAIN OF LEFT KNEE: ICD-10-CM

## 2019-10-23 DIAGNOSIS — G89.29 CHRONIC PAIN OF LEFT KNEE: ICD-10-CM

## 2019-10-23 DIAGNOSIS — M79.2 NEUROPATHIC PAIN: ICD-10-CM

## 2019-10-23 DIAGNOSIS — I10 ESSENTIAL HYPERTENSION: ICD-10-CM

## 2019-10-23 DIAGNOSIS — S46.001A INJURY OF RIGHT ROTATOR CUFF, INITIAL ENCOUNTER: ICD-10-CM

## 2019-10-23 DIAGNOSIS — Z76.0 MEDICATION REFILL: ICD-10-CM

## 2019-10-23 LAB
ABSOLUTE EOS #: 0.22 K/UL (ref 0–0.44)
ABSOLUTE IMMATURE GRANULOCYTE: 0.04 K/UL (ref 0–0.3)
ABSOLUTE LYMPH #: 1.56 K/UL (ref 1.1–3.7)
ABSOLUTE MONO #: 0.75 K/UL (ref 0.1–1.2)
ANION GAP SERPL CALCULATED.3IONS-SCNC: 11 MMOL/L (ref 9–17)
BASOPHILS # BLD: 0 % (ref 0–2)
BASOPHILS ABSOLUTE: 0.03 K/UL (ref 0–0.2)
BUN BLDV-MCNC: 33 MG/DL (ref 6–20)
BUN/CREAT BLD: ABNORMAL (ref 9–20)
CALCIUM SERPL-MCNC: 8.7 MG/DL (ref 8.6–10.4)
CHLORIDE BLD-SCNC: 101 MMOL/L (ref 98–107)
CO2: 27 MMOL/L (ref 20–31)
CREAT SERPL-MCNC: 3.92 MG/DL (ref 0.5–0.9)
CULTURE: NO GROWTH
DIFFERENTIAL TYPE: ABNORMAL
EOSINOPHILS RELATIVE PERCENT: 2 % (ref 1–4)
GFR AFRICAN AMERICAN: 14 ML/MIN
GFR NON-AFRICAN AMERICAN: 12 ML/MIN
GFR SERPL CREATININE-BSD FRML MDRD: ABNORMAL ML/MIN/{1.73_M2}
GFR SERPL CREATININE-BSD FRML MDRD: ABNORMAL ML/MIN/{1.73_M2}
GLUCOSE BLD-MCNC: 94 MG/DL (ref 70–99)
HCT VFR BLD CALC: 27.2 % (ref 36.3–47.1)
HEMOGLOBIN: 8.4 G/DL (ref 11.9–15.1)
IMMATURE GRANULOCYTES: 0 %
LV EF: 65 %
LVEF MODALITY: NORMAL
LYMPHOCYTES # BLD: 13 % (ref 24–43)
Lab: NORMAL
MCH RBC QN AUTO: 28.3 PG (ref 25.2–33.5)
MCHC RBC AUTO-ENTMCNC: 30.9 G/DL (ref 28.4–34.8)
MCV RBC AUTO: 91.6 FL (ref 82.6–102.9)
MONOCYTES # BLD: 6 % (ref 3–12)
NRBC AUTOMATED: 0 PER 100 WBC
PDW BLD-RTO: 15.4 % (ref 11.8–14.4)
PLATELET # BLD: 180 K/UL (ref 138–453)
PLATELET ESTIMATE: ABNORMAL
PMV BLD AUTO: 10.5 FL (ref 8.1–13.5)
POTASSIUM SERPL-SCNC: 4.1 MMOL/L (ref 3.7–5.3)
RBC # BLD: 2.97 M/UL (ref 3.95–5.11)
RBC # BLD: ABNORMAL 10*6/UL
SEG NEUTROPHILS: 79 % (ref 36–65)
SEGMENTED NEUTROPHILS ABSOLUTE COUNT: 9.13 K/UL (ref 1.5–8.1)
SODIUM BLD-SCNC: 139 MMOL/L (ref 135–144)
SPECIMEN DESCRIPTION: NORMAL
TROPONIN INTERP: ABNORMAL
TROPONIN T: ABNORMAL NG/ML
TROPONIN, HIGH SENSITIVITY: 53 NG/L (ref 0–14)
WBC # BLD: 11.7 K/UL (ref 3.5–11.3)
WBC # BLD: ABNORMAL 10*3/UL

## 2019-10-23 PROCEDURE — 99291 CRITICAL CARE FIRST HOUR: CPT | Performed by: INTERNAL MEDICINE

## 2019-10-23 PROCEDURE — 6370000000 HC RX 637 (ALT 250 FOR IP): Performed by: INTERNAL MEDICINE

## 2019-10-23 PROCEDURE — 97116 GAIT TRAINING THERAPY: CPT

## 2019-10-23 PROCEDURE — 90935 HEMODIALYSIS ONE EVALUATION: CPT

## 2019-10-23 PROCEDURE — 6360000002 HC RX W HCPCS: Performed by: STUDENT IN AN ORGANIZED HEALTH CARE EDUCATION/TRAINING PROGRAM

## 2019-10-23 PROCEDURE — 71045 X-RAY EXAM CHEST 1 VIEW: CPT

## 2019-10-23 PROCEDURE — 80048 BASIC METABOLIC PNL TOTAL CA: CPT

## 2019-10-23 PROCEDURE — 6370000000 HC RX 637 (ALT 250 FOR IP): Performed by: FAMILY MEDICINE

## 2019-10-23 PROCEDURE — 6360000002 HC RX W HCPCS: Performed by: INTERNAL MEDICINE

## 2019-10-23 PROCEDURE — 99232 SBSQ HOSP IP/OBS MODERATE 35: CPT | Performed by: FAMILY MEDICINE

## 2019-10-23 PROCEDURE — 2060000000 HC ICU INTERMEDIATE R&B

## 2019-10-23 PROCEDURE — 85025 COMPLETE CBC W/AUTO DIFF WBC: CPT

## 2019-10-23 PROCEDURE — 6370000000 HC RX 637 (ALT 250 FOR IP): Performed by: STUDENT IN AN ORGANIZED HEALTH CARE EDUCATION/TRAINING PROGRAM

## 2019-10-23 PROCEDURE — 2580000003 HC RX 258: Performed by: FAMILY MEDICINE

## 2019-10-23 PROCEDURE — 97110 THERAPEUTIC EXERCISES: CPT

## 2019-10-23 PROCEDURE — 93306 TTE W/DOPPLER COMPLETE: CPT

## 2019-10-23 PROCEDURE — 84484 ASSAY OF TROPONIN QUANT: CPT

## 2019-10-23 PROCEDURE — P9047 ALBUMIN (HUMAN), 25%, 50ML: HCPCS | Performed by: INTERNAL MEDICINE

## 2019-10-23 RX ORDER — TIZANIDINE 4 MG/1
4 TABLET ORAL 2 TIMES DAILY PRN
Qty: 60 TABLET | Refills: 1 | Status: SHIPPED | OUTPATIENT
Start: 2019-10-23 | End: 2019-12-24

## 2019-10-23 RX ORDER — HEPARIN SODIUM 1000 [USP'U]/ML
1600 INJECTION, SOLUTION INTRAVENOUS; SUBCUTANEOUS ONCE
Status: COMPLETED | OUTPATIENT
Start: 2019-10-23 | End: 2019-10-23

## 2019-10-23 RX ORDER — MIDODRINE HYDROCHLORIDE 5 MG/1
5 TABLET ORAL
Status: COMPLETED | OUTPATIENT
Start: 2019-10-23 | End: 2019-10-23

## 2019-10-23 RX ORDER — PANTOPRAZOLE SODIUM 20 MG/1
20 TABLET, DELAYED RELEASE ORAL DAILY
Qty: 30 TABLET | Refills: 2 | Status: SHIPPED | OUTPATIENT
Start: 2019-10-23 | End: 2020-01-21

## 2019-10-23 RX ORDER — LABETALOL 100 MG/1
100 TABLET, FILM COATED ORAL 2 TIMES DAILY
Qty: 60 TABLET | Refills: 2 | Status: SHIPPED | OUTPATIENT
Start: 2019-10-23 | End: 2019-10-25 | Stop reason: HOSPADM

## 2019-10-23 RX ORDER — POLYETHYLENE GLYCOL 3350 17 G/17G
17 POWDER, FOR SOLUTION ORAL DAILY PRN
Status: DISCONTINUED | OUTPATIENT
Start: 2019-10-23 | End: 2019-10-25 | Stop reason: HOSPADM

## 2019-10-23 RX ORDER — NIFEDIPINE 30 MG/1
30 TABLET, EXTENDED RELEASE ORAL DAILY
Qty: 30 TABLET | Refills: 1 | Status: SHIPPED | OUTPATIENT
Start: 2019-10-23 | End: 2019-10-25 | Stop reason: HOSPADM

## 2019-10-23 RX ORDER — ALBUMIN (HUMAN) 12.5 G/50ML
25 SOLUTION INTRAVENOUS ONCE
Status: COMPLETED | OUTPATIENT
Start: 2019-10-23 | End: 2019-10-23

## 2019-10-23 RX ORDER — GABAPENTIN 100 MG/1
100 CAPSULE ORAL 2 TIMES DAILY
Qty: 60 CAPSULE | Refills: 1 | Status: SHIPPED | OUTPATIENT
Start: 2019-10-23 | End: 2019-12-24

## 2019-10-23 RX ORDER — ALLOPURINOL 100 MG/1
100 TABLET ORAL DAILY
Qty: 30 TABLET | Refills: 2 | Status: SHIPPED | OUTPATIENT
Start: 2019-10-23 | End: 2020-01-21

## 2019-10-23 RX ADMIN — DESMOPRESSIN ACETATE 40 MG: 0.2 TABLET ORAL at 20:26

## 2019-10-23 RX ADMIN — HEPARIN SODIUM 5000 UNITS: 5000 INJECTION INTRAVENOUS; SUBCUTANEOUS at 20:29

## 2019-10-23 RX ADMIN — SERTRALINE 25 MG: 25 TABLET, FILM COATED ORAL at 08:02

## 2019-10-23 RX ADMIN — HEPARIN SODIUM 5000 UNITS: 5000 INJECTION INTRAVENOUS; SUBCUTANEOUS at 14:43

## 2019-10-23 RX ADMIN — ALLOPURINOL 100 MG: 100 TABLET ORAL at 08:03

## 2019-10-23 RX ADMIN — FUROSEMIDE 40 MG: 10 INJECTION, SOLUTION INTRAMUSCULAR; INTRAVENOUS at 09:59

## 2019-10-23 RX ADMIN — GABAPENTIN 100 MG: 100 CAPSULE ORAL at 20:26

## 2019-10-23 RX ADMIN — SODIUM CHLORIDE, PRESERVATIVE FREE 10 ML: 5 INJECTION INTRAVENOUS at 08:03

## 2019-10-23 RX ADMIN — HEPARIN SODIUM 5000 UNITS: 5000 INJECTION INTRAVENOUS; SUBCUTANEOUS at 05:26

## 2019-10-23 RX ADMIN — SODIUM CHLORIDE, PRESERVATIVE FREE 10 ML: 5 INJECTION INTRAVENOUS at 20:26

## 2019-10-23 RX ADMIN — LABETALOL HCL 200 MG: 200 TABLET, FILM COATED ORAL at 05:26

## 2019-10-23 RX ADMIN — MIDODRINE HYDROCHLORIDE 5 MG: 5 TABLET ORAL at 16:09

## 2019-10-23 RX ADMIN — ACETAMINOPHEN 650 MG: 325 TABLET ORAL at 19:41

## 2019-10-23 RX ADMIN — ALBUMIN (HUMAN) 25 G: 0.25 INJECTION, SOLUTION INTRAVENOUS at 15:28

## 2019-10-23 RX ADMIN — GABAPENTIN 100 MG: 100 CAPSULE ORAL at 08:02

## 2019-10-23 RX ADMIN — Medication 81 MG: at 08:02

## 2019-10-23 RX ADMIN — AMLODIPINE BESYLATE 10 MG: 10 TABLET ORAL at 08:02

## 2019-10-23 RX ADMIN — HEPARIN SODIUM 1600 UNITS: 1000 INJECTION INTRAVENOUS; SUBCUTANEOUS at 18:00

## 2019-10-23 ASSESSMENT — ENCOUNTER SYMPTOMS
CONSTIPATION: 0
RHINORRHEA: 0
VOMITING: 0
ABDOMINAL PAIN: 0
WHEEZING: 0
NAUSEA: 0
COUGH: 0
CHEST TIGHTNESS: 0
DIARRHEA: 0
BLOOD IN STOOL: 0
SHORTNESS OF BREATH: 1

## 2019-10-23 ASSESSMENT — PAIN SCALES - GENERAL
PAINLEVEL_OUTOF10: 0
PAINLEVEL_OUTOF10: 6
PAINLEVEL_OUTOF10: 0

## 2019-10-24 LAB
ABSOLUTE EOS #: 0.43 K/UL (ref 0–0.44)
ABSOLUTE IMMATURE GRANULOCYTE: 0.04 K/UL (ref 0–0.3)
ABSOLUTE LYMPH #: 1.29 K/UL (ref 1.1–3.7)
ABSOLUTE MONO #: 0.71 K/UL (ref 0.1–1.2)
ANION GAP SERPL CALCULATED.3IONS-SCNC: 16 MMOL/L (ref 9–17)
BASOPHILS # BLD: 0 % (ref 0–2)
BASOPHILS ABSOLUTE: 0.03 K/UL (ref 0–0.2)
BUN BLDV-MCNC: 20 MG/DL (ref 6–20)
BUN/CREAT BLD: ABNORMAL (ref 9–20)
CALCIUM SERPL-MCNC: 8.6 MG/DL (ref 8.6–10.4)
CHLORIDE BLD-SCNC: 98 MMOL/L (ref 98–107)
CO2: 25 MMOL/L (ref 20–31)
CREAT SERPL-MCNC: 2.82 MG/DL (ref 0.5–0.9)
DIFFERENTIAL TYPE: ABNORMAL
EOSINOPHILS RELATIVE PERCENT: 4 % (ref 1–4)
GFR AFRICAN AMERICAN: 21 ML/MIN
GFR NON-AFRICAN AMERICAN: 17 ML/MIN
GFR SERPL CREATININE-BSD FRML MDRD: ABNORMAL ML/MIN/{1.73_M2}
GFR SERPL CREATININE-BSD FRML MDRD: ABNORMAL ML/MIN/{1.73_M2}
GLUCOSE BLD-MCNC: 100 MG/DL (ref 70–99)
HCT VFR BLD CALC: 28.8 % (ref 36.3–47.1)
HEMOGLOBIN: 8.8 G/DL (ref 11.9–15.1)
IMMATURE GRANULOCYTES: 0 %
LYMPHOCYTES # BLD: 13 % (ref 24–43)
MCH RBC QN AUTO: 28 PG (ref 25.2–33.5)
MCHC RBC AUTO-ENTMCNC: 30.6 G/DL (ref 28.4–34.8)
MCV RBC AUTO: 91.7 FL (ref 82.6–102.9)
MONOCYTES # BLD: 7 % (ref 3–12)
NRBC AUTOMATED: 0 PER 100 WBC
PDW BLD-RTO: 15.1 % (ref 11.8–14.4)
PLATELET # BLD: 180 K/UL (ref 138–453)
PLATELET ESTIMATE: ABNORMAL
PMV BLD AUTO: 10.6 FL (ref 8.1–13.5)
POTASSIUM SERPL-SCNC: 3.8 MMOL/L (ref 3.7–5.3)
RBC # BLD: 3.14 M/UL (ref 3.95–5.11)
RBC # BLD: ABNORMAL 10*6/UL
SEG NEUTROPHILS: 76 % (ref 36–65)
SEGMENTED NEUTROPHILS ABSOLUTE COUNT: 7.39 K/UL (ref 1.5–8.1)
SODIUM BLD-SCNC: 139 MMOL/L (ref 135–144)
TROPONIN INTERP: ABNORMAL
TROPONIN T: ABNORMAL NG/ML
TROPONIN, HIGH SENSITIVITY: 55 NG/L (ref 0–14)
WBC # BLD: 9.9 K/UL (ref 3.5–11.3)
WBC # BLD: ABNORMAL 10*3/UL

## 2019-10-24 PROCEDURE — 99222 1ST HOSP IP/OBS MODERATE 55: CPT | Performed by: SURGERY

## 2019-10-24 PROCEDURE — 6370000000 HC RX 637 (ALT 250 FOR IP): Performed by: STUDENT IN AN ORGANIZED HEALTH CARE EDUCATION/TRAINING PROGRAM

## 2019-10-24 PROCEDURE — 97110 THERAPEUTIC EXERCISES: CPT

## 2019-10-24 PROCEDURE — 84484 ASSAY OF TROPONIN QUANT: CPT

## 2019-10-24 PROCEDURE — 6370000000 HC RX 637 (ALT 250 FOR IP): Performed by: INTERNAL MEDICINE

## 2019-10-24 PROCEDURE — 36415 COLL VENOUS BLD VENIPUNCTURE: CPT

## 2019-10-24 PROCEDURE — 99232 SBSQ HOSP IP/OBS MODERATE 35: CPT | Performed by: FAMILY MEDICINE

## 2019-10-24 PROCEDURE — 80048 BASIC METABOLIC PNL TOTAL CA: CPT

## 2019-10-24 PROCEDURE — 6370000000 HC RX 637 (ALT 250 FOR IP): Performed by: FAMILY MEDICINE

## 2019-10-24 PROCEDURE — 2580000003 HC RX 258: Performed by: FAMILY MEDICINE

## 2019-10-24 PROCEDURE — 97116 GAIT TRAINING THERAPY: CPT

## 2019-10-24 PROCEDURE — 76937 US GUIDE VASCULAR ACCESS: CPT

## 2019-10-24 PROCEDURE — 6360000002 HC RX W HCPCS: Performed by: STUDENT IN AN ORGANIZED HEALTH CARE EDUCATION/TRAINING PROGRAM

## 2019-10-24 PROCEDURE — 85025 COMPLETE CBC W/AUTO DIFF WBC: CPT

## 2019-10-24 PROCEDURE — 2060000000 HC ICU INTERMEDIATE R&B

## 2019-10-24 RX ADMIN — ALLOPURINOL 100 MG: 100 TABLET ORAL at 09:01

## 2019-10-24 RX ADMIN — AMLODIPINE BESYLATE 10 MG: 10 TABLET ORAL at 09:01

## 2019-10-24 RX ADMIN — HEPARIN SODIUM 5000 UNITS: 5000 INJECTION INTRAVENOUS; SUBCUTANEOUS at 05:34

## 2019-10-24 RX ADMIN — HEPARIN SODIUM 5000 UNITS: 5000 INJECTION INTRAVENOUS; SUBCUTANEOUS at 20:38

## 2019-10-24 RX ADMIN — SODIUM CHLORIDE, PRESERVATIVE FREE 10 ML: 5 INJECTION INTRAVENOUS at 20:41

## 2019-10-24 RX ADMIN — GABAPENTIN 100 MG: 100 CAPSULE ORAL at 09:01

## 2019-10-24 RX ADMIN — DESMOPRESSIN ACETATE 40 MG: 0.2 TABLET ORAL at 20:38

## 2019-10-24 RX ADMIN — FUROSEMIDE 40 MG: 10 INJECTION, SOLUTION INTRAMUSCULAR; INTRAVENOUS at 09:01

## 2019-10-24 RX ADMIN — SODIUM CHLORIDE, PRESERVATIVE FREE 10 ML: 5 INJECTION INTRAVENOUS at 09:02

## 2019-10-24 RX ADMIN — HEPARIN SODIUM 5000 UNITS: 5000 INJECTION INTRAVENOUS; SUBCUTANEOUS at 16:22

## 2019-10-24 RX ADMIN — Medication 81 MG: at 09:01

## 2019-10-24 RX ADMIN — LABETALOL HCL 200 MG: 200 TABLET, FILM COATED ORAL at 16:22

## 2019-10-24 RX ADMIN — GABAPENTIN 100 MG: 100 CAPSULE ORAL at 20:38

## 2019-10-24 RX ADMIN — FUROSEMIDE 40 MG: 10 INJECTION, SOLUTION INTRAMUSCULAR; INTRAVENOUS at 17:58

## 2019-10-24 RX ADMIN — LABETALOL HCL 200 MG: 200 TABLET, FILM COATED ORAL at 05:35

## 2019-10-24 ASSESSMENT — ENCOUNTER SYMPTOMS
WHEEZING: 0
ABDOMINAL PAIN: 0
CONSTIPATION: 0
NAUSEA: 0
RHINORRHEA: 0
VOMITING: 0
DIARRHEA: 0
SHORTNESS OF BREATH: 0
COUGH: 0
BLOOD IN STOOL: 0
CHEST TIGHTNESS: 0

## 2019-10-25 VITALS
WEIGHT: 183.86 LBS | HEIGHT: 62 IN | BODY MASS INDEX: 33.84 KG/M2 | OXYGEN SATURATION: 98 % | RESPIRATION RATE: 16 BRPM | TEMPERATURE: 98.4 F | SYSTOLIC BLOOD PRESSURE: 136 MMHG | DIASTOLIC BLOOD PRESSURE: 74 MMHG | HEART RATE: 66 BPM

## 2019-10-25 LAB
ABSOLUTE EOS #: 0.39 K/UL (ref 0–0.44)
ABSOLUTE IMMATURE GRANULOCYTE: 0.03 K/UL (ref 0–0.3)
ABSOLUTE LYMPH #: 1.26 K/UL (ref 1.1–3.7)
ABSOLUTE MONO #: 0.66 K/UL (ref 0.1–1.2)
ANION GAP SERPL CALCULATED.3IONS-SCNC: 20 MMOL/L (ref 9–17)
BASOPHILS # BLD: 0 % (ref 0–2)
BASOPHILS ABSOLUTE: <0.03 K/UL (ref 0–0.2)
BUN BLDV-MCNC: 42 MG/DL (ref 6–20)
BUN/CREAT BLD: ABNORMAL (ref 9–20)
CALCIUM SERPL-MCNC: 8.7 MG/DL (ref 8.6–10.4)
CHLORIDE BLD-SCNC: 98 MMOL/L (ref 98–107)
CO2: 22 MMOL/L (ref 20–31)
CREAT SERPL-MCNC: 4.43 MG/DL (ref 0.5–0.9)
DIFFERENTIAL TYPE: ABNORMAL
EOSINOPHILS RELATIVE PERCENT: 6 % (ref 1–4)
GFR AFRICAN AMERICAN: 12 ML/MIN
GFR NON-AFRICAN AMERICAN: 10 ML/MIN
GFR SERPL CREATININE-BSD FRML MDRD: ABNORMAL ML/MIN/{1.73_M2}
GFR SERPL CREATININE-BSD FRML MDRD: ABNORMAL ML/MIN/{1.73_M2}
GLUCOSE BLD-MCNC: 94 MG/DL (ref 70–99)
HCT VFR BLD CALC: 28.6 % (ref 36.3–47.1)
HEMOGLOBIN: 8.7 G/DL (ref 11.9–15.1)
IMMATURE GRANULOCYTES: 0 %
LYMPHOCYTES # BLD: 18 % (ref 24–43)
MAGNESIUM: 2.5 MG/DL (ref 1.6–2.6)
MCH RBC QN AUTO: 28.2 PG (ref 25.2–33.5)
MCHC RBC AUTO-ENTMCNC: 30.4 G/DL (ref 28.4–34.8)
MCV RBC AUTO: 92.6 FL (ref 82.6–102.9)
MONOCYTES # BLD: 10 % (ref 3–12)
NRBC AUTOMATED: 0 PER 100 WBC
PDW BLD-RTO: 15 % (ref 11.8–14.4)
PLATELET # BLD: 222 K/UL (ref 138–453)
PLATELET ESTIMATE: ABNORMAL
PMV BLD AUTO: 10 FL (ref 8.1–13.5)
POTASSIUM SERPL-SCNC: 3.4 MMOL/L (ref 3.7–5.3)
RBC # BLD: 3.09 M/UL (ref 3.95–5.11)
RBC # BLD: ABNORMAL 10*6/UL
SEG NEUTROPHILS: 66 % (ref 36–65)
SEGMENTED NEUTROPHILS ABSOLUTE COUNT: 4.54 K/UL (ref 1.5–8.1)
SODIUM BLD-SCNC: 140 MMOL/L (ref 135–144)
TROPONIN INTERP: ABNORMAL
TROPONIN T: ABNORMAL NG/ML
TROPONIN, HIGH SENSITIVITY: 57 NG/L (ref 0–14)
WBC # BLD: 6.9 K/UL (ref 3.5–11.3)
WBC # BLD: ABNORMAL 10*3/UL

## 2019-10-25 PROCEDURE — 80048 BASIC METABOLIC PNL TOTAL CA: CPT

## 2019-10-25 PROCEDURE — 36415 COLL VENOUS BLD VENIPUNCTURE: CPT

## 2019-10-25 PROCEDURE — 6370000000 HC RX 637 (ALT 250 FOR IP): Performed by: INTERNAL MEDICINE

## 2019-10-25 PROCEDURE — 6360000002 HC RX W HCPCS: Performed by: INTERNAL MEDICINE

## 2019-10-25 PROCEDURE — 97530 THERAPEUTIC ACTIVITIES: CPT

## 2019-10-25 PROCEDURE — 76937 US GUIDE VASCULAR ACCESS: CPT

## 2019-10-25 PROCEDURE — 6370000000 HC RX 637 (ALT 250 FOR IP): Performed by: STUDENT IN AN ORGANIZED HEALTH CARE EDUCATION/TRAINING PROGRAM

## 2019-10-25 PROCEDURE — 6360000002 HC RX W HCPCS: Performed by: STUDENT IN AN ORGANIZED HEALTH CARE EDUCATION/TRAINING PROGRAM

## 2019-10-25 PROCEDURE — 2580000003 HC RX 258: Performed by: FAMILY MEDICINE

## 2019-10-25 PROCEDURE — 6370000000 HC RX 637 (ALT 250 FOR IP): Performed by: FAMILY MEDICINE

## 2019-10-25 PROCEDURE — 85025 COMPLETE CBC W/AUTO DIFF WBC: CPT

## 2019-10-25 PROCEDURE — 97535 SELF CARE MNGMENT TRAINING: CPT

## 2019-10-25 PROCEDURE — G0365 VESSEL MAPPING HEMO ACCESS: HCPCS

## 2019-10-25 PROCEDURE — 83735 ASSAY OF MAGNESIUM: CPT

## 2019-10-25 PROCEDURE — 93931 UPPER EXTREMITY STUDY: CPT

## 2019-10-25 PROCEDURE — 99239 HOSP IP/OBS DSCHRG MGMT >30: CPT | Performed by: FAMILY MEDICINE

## 2019-10-25 PROCEDURE — 84484 ASSAY OF TROPONIN QUANT: CPT

## 2019-10-25 RX ORDER — POTASSIUM CHLORIDE 7.45 MG/ML
10 INJECTION INTRAVENOUS PRN
Status: DISCONTINUED | OUTPATIENT
Start: 2019-10-25 | End: 2019-10-25 | Stop reason: HOSPADM

## 2019-10-25 RX ORDER — POTASSIUM CHLORIDE 20 MEQ/1
40 TABLET, EXTENDED RELEASE ORAL PRN
Status: DISCONTINUED | OUTPATIENT
Start: 2019-10-25 | End: 2019-10-25 | Stop reason: HOSPADM

## 2019-10-25 RX ORDER — FUROSEMIDE 40 MG/1
40 TABLET ORAL DAILY
Qty: 60 TABLET | Refills: 3 | Status: SHIPPED | OUTPATIENT
Start: 2019-10-25 | End: 2019-10-25 | Stop reason: HOSPADM

## 2019-10-25 RX ORDER — HEPARIN SODIUM 1000 [USP'U]/ML
1000 INJECTION, SOLUTION INTRAVENOUS; SUBCUTANEOUS ONCE
Status: DISCONTINUED | OUTPATIENT
Start: 2019-10-25 | End: 2019-10-25 | Stop reason: HOSPADM

## 2019-10-25 RX ORDER — ATORVASTATIN CALCIUM 40 MG/1
40 TABLET, FILM COATED ORAL NIGHTLY
Qty: 30 TABLET | Refills: 3 | Status: SHIPPED | OUTPATIENT
Start: 2019-10-25 | End: 2020-02-11 | Stop reason: SDUPTHER

## 2019-10-25 RX ORDER — HEPARIN SODIUM 1000 [USP'U]/ML
500 INJECTION, SOLUTION INTRAVENOUS; SUBCUTANEOUS ONCE
Status: DISCONTINUED | OUTPATIENT
Start: 2019-10-25 | End: 2019-10-25 | Stop reason: HOSPADM

## 2019-10-25 RX ORDER — LABETALOL 200 MG/1
200 TABLET, FILM COATED ORAL EVERY 8 HOURS SCHEDULED
Qty: 60 TABLET | Refills: 3 | Status: SHIPPED | OUTPATIENT
Start: 2019-10-25 | End: 2020-02-11 | Stop reason: SDUPTHER

## 2019-10-25 RX ORDER — ASPIRIN 81 MG/1
81 TABLET ORAL DAILY
Qty: 30 TABLET | Refills: 3 | Status: SHIPPED | OUTPATIENT
Start: 2019-10-26 | End: 2019-12-09 | Stop reason: SDUPTHER

## 2019-10-25 RX ORDER — HEPARIN SODIUM 1000 [USP'U]/ML
1600 INJECTION, SOLUTION INTRAVENOUS; SUBCUTANEOUS PRN
Status: DISCONTINUED | OUTPATIENT
Start: 2019-10-25 | End: 2019-10-25 | Stop reason: HOSPADM

## 2019-10-25 RX ORDER — AMLODIPINE BESYLATE 10 MG/1
10 TABLET ORAL DAILY
Qty: 30 TABLET | Refills: 3 | Status: SHIPPED | OUTPATIENT
Start: 2019-10-26 | End: 2019-10-25 | Stop reason: HOSPADM

## 2019-10-25 RX ORDER — SERTRALINE HYDROCHLORIDE 25 MG/1
25 TABLET, FILM COATED ORAL DAILY
Qty: 30 TABLET | Refills: 3 | Status: SHIPPED | OUTPATIENT
Start: 2019-10-26 | End: 2019-10-25 | Stop reason: HOSPADM

## 2019-10-25 RX ADMIN — Medication 81 MG: at 08:29

## 2019-10-25 RX ADMIN — SODIUM CHLORIDE, PRESERVATIVE FREE 10 ML: 5 INJECTION INTRAVENOUS at 08:30

## 2019-10-25 RX ADMIN — HEPARIN SODIUM 1600 UNITS: 1000 INJECTION INTRAVENOUS; SUBCUTANEOUS at 16:26

## 2019-10-25 RX ADMIN — POTASSIUM CHLORIDE 40 MEQ: 1500 TABLET, EXTENDED RELEASE ORAL at 10:17

## 2019-10-25 RX ADMIN — LABETALOL HCL 200 MG: 200 TABLET, FILM COATED ORAL at 05:57

## 2019-10-25 RX ADMIN — AMLODIPINE BESYLATE 10 MG: 10 TABLET ORAL at 08:29

## 2019-10-25 RX ADMIN — HEPARIN SODIUM 1600 UNITS: 1000 INJECTION INTRAVENOUS; SUBCUTANEOUS at 16:28

## 2019-10-25 RX ADMIN — HEPARIN SODIUM 5000 UNITS: 5000 INJECTION INTRAVENOUS; SUBCUTANEOUS at 16:52

## 2019-10-25 RX ADMIN — FUROSEMIDE 40 MG: 10 INJECTION, SOLUTION INTRAMUSCULAR; INTRAVENOUS at 08:29

## 2019-10-25 RX ADMIN — HEPARIN SODIUM 5000 UNITS: 5000 INJECTION INTRAVENOUS; SUBCUTANEOUS at 05:57

## 2019-10-25 RX ADMIN — ALLOPURINOL 100 MG: 100 TABLET ORAL at 08:29

## 2019-10-25 RX ADMIN — GABAPENTIN 100 MG: 100 CAPSULE ORAL at 08:29

## 2019-10-25 RX ADMIN — LABETALOL HCL 200 MG: 200 TABLET, FILM COATED ORAL at 16:49

## 2019-10-25 ASSESSMENT — PAIN SCALES - GENERAL
PAINLEVEL_OUTOF10: 0
PAINLEVEL_OUTOF10: 0

## 2019-10-25 ASSESSMENT — ENCOUNTER SYMPTOMS
CHEST TIGHTNESS: 0
BLOOD IN STOOL: 0
ABDOMINAL PAIN: 0
SHORTNESS OF BREATH: 0
DIARRHEA: 0
VOMITING: 0
CONSTIPATION: 0
RHINORRHEA: 0
NAUSEA: 0
COUGH: 0
WHEEZING: 0

## 2019-10-25 ASSESSMENT — PAIN DESCRIPTION - PROGRESSION: CLINICAL_PROGRESSION: NOT CHANGED

## 2019-10-28 LAB
CULTURE: NORMAL
CULTURE: NORMAL
Lab: NORMAL
Lab: NORMAL
SPECIMEN DESCRIPTION: NORMAL
SPECIMEN DESCRIPTION: NORMAL

## 2019-11-06 ENCOUNTER — HOSPITAL ENCOUNTER (OUTPATIENT)
Age: 59
Setting detail: OUTPATIENT SURGERY
Discharge: HOME | End: 2019-11-06
Attending: SURGERY | Admitting: SURGERY
Payer: MEDICARE

## 2019-11-06 ENCOUNTER — ANESTHESIA (OUTPATIENT)
Dept: OPERATING ROOM | Age: 59
End: 2019-11-06
Payer: MEDICARE

## 2019-11-06 ENCOUNTER — ANESTHESIA EVENT (OUTPATIENT)
Dept: OPERATING ROOM | Age: 59
End: 2019-11-06
Payer: MEDICARE

## 2019-11-06 VITALS
SYSTOLIC BLOOD PRESSURE: 171 MMHG | HEIGHT: 63 IN | RESPIRATION RATE: 15 BRPM | DIASTOLIC BLOOD PRESSURE: 87 MMHG | HEART RATE: 63 BPM | BODY MASS INDEX: 33.59 KG/M2 | TEMPERATURE: 97.2 F | OXYGEN SATURATION: 98 % | WEIGHT: 189.6 LBS

## 2019-11-06 VITALS — TEMPERATURE: 95.4 F | DIASTOLIC BLOOD PRESSURE: 75 MMHG | OXYGEN SATURATION: 100 % | SYSTOLIC BLOOD PRESSURE: 171 MMHG

## 2019-11-06 DIAGNOSIS — N18.4 STAGE 4 CHRONIC KIDNEY DISEASE (HCC): Primary | ICD-10-CM

## 2019-11-06 LAB
ABSOLUTE EOS #: 0.26 K/UL (ref 0–0.44)
ABSOLUTE IMMATURE GRANULOCYTE: 0.01 K/UL (ref 0–0.3)
ABSOLUTE LYMPH #: 1.97 K/UL (ref 1.1–3.7)
ABSOLUTE MONO #: 0.43 K/UL (ref 0.1–1.2)
ANION GAP SERPL CALCULATED.3IONS-SCNC: 11 MMOL/L (ref 9–17)
BASOPHILS # BLD: 1 % (ref 0–2)
BASOPHILS ABSOLUTE: 0.05 K/UL (ref 0–0.2)
BUN BLDV-MCNC: 21 MG/DL (ref 6–20)
BUN/CREAT BLD: 4 (ref 9–20)
CALCIUM SERPL-MCNC: 9.2 MG/DL (ref 8.6–10.4)
CHLORIDE BLD-SCNC: 100 MMOL/L (ref 98–107)
CO2: 25 MMOL/L (ref 20–31)
CREAT SERPL-MCNC: 5.73 MG/DL (ref 0.5–0.9)
DIFFERENTIAL TYPE: ABNORMAL
EOSINOPHILS RELATIVE PERCENT: 4 % (ref 1–4)
GFR AFRICAN AMERICAN: 9 ML/MIN
GFR NON-AFRICAN AMERICAN: 8 ML/MIN
GFR SERPL CREATININE-BSD FRML MDRD: ABNORMAL ML/MIN/{1.73_M2}
GFR SERPL CREATININE-BSD FRML MDRD: ABNORMAL ML/MIN/{1.73_M2}
GLUCOSE BLD-MCNC: 103 MG/DL (ref 65–105)
GLUCOSE BLD-MCNC: 87 MG/DL (ref 70–99)
HCT VFR BLD CALC: 30 % (ref 36.3–47.1)
HEMOGLOBIN: 9.1 G/DL (ref 11.9–15.1)
IMMATURE GRANULOCYTES: 0 %
INR BLD: 1.1
LYMPHOCYTES # BLD: 28 % (ref 24–43)
MCH RBC QN AUTO: 28.3 PG (ref 25.2–33.5)
MCHC RBC AUTO-ENTMCNC: 30.3 G/DL (ref 28.4–34.8)
MCV RBC AUTO: 93.5 FL (ref 82.6–102.9)
MONOCYTES # BLD: 6 % (ref 3–12)
NRBC AUTOMATED: ABNORMAL PER 100 WBC
PARTIAL THROMBOPLASTIN TIME: 28.7 SEC (ref 23–31)
PDW BLD-RTO: 16.5 % (ref 11.8–14.4)
PLATELET # BLD: 391 K/UL (ref 138–453)
PLATELET ESTIMATE: ABNORMAL
PMV BLD AUTO: 8.5 FL (ref 8.1–13.5)
POTASSIUM SERPL-SCNC: 5.3 MMOL/L (ref 3.7–5.3)
PROTHROMBIN TIME: 11.1 SEC (ref 9.7–11.6)
RBC # BLD: 3.21 M/UL (ref 3.95–5.11)
RBC # BLD: ABNORMAL 10*6/UL
SEG NEUTROPHILS: 62 % (ref 36–65)
SEGMENTED NEUTROPHILS ABSOLUTE COUNT: 4.42 K/UL (ref 1.5–8.1)
SODIUM BLD-SCNC: 136 MMOL/L (ref 135–144)
WBC # BLD: 7.1 K/UL (ref 3.5–11.3)
WBC # BLD: ABNORMAL 10*3/UL

## 2019-11-06 PROCEDURE — 3700000001 HC ADD 15 MINUTES (ANESTHESIA): Performed by: SURGERY

## 2019-11-06 PROCEDURE — 2500000003 HC RX 250 WO HCPCS: Performed by: SURGERY

## 2019-11-06 PROCEDURE — 6370000000 HC RX 637 (ALT 250 FOR IP): Performed by: SURGERY

## 2019-11-06 PROCEDURE — 3700000000 HC ANESTHESIA ATTENDED CARE: Performed by: SURGERY

## 2019-11-06 PROCEDURE — 85610 PROTHROMBIN TIME: CPT

## 2019-11-06 PROCEDURE — 2500000003 HC RX 250 WO HCPCS: Performed by: NURSE ANESTHETIST, CERTIFIED REGISTERED

## 2019-11-06 PROCEDURE — C1894 INTRO/SHEATH, NON-LASER: HCPCS | Performed by: SURGERY

## 2019-11-06 PROCEDURE — 3600000012 HC SURGERY LEVEL 2 ADDTL 15MIN: Performed by: SURGERY

## 2019-11-06 PROCEDURE — 7100000010 HC PHASE II RECOVERY - FIRST 15 MIN: Performed by: SURGERY

## 2019-11-06 PROCEDURE — 2580000003 HC RX 258: Performed by: ANESTHESIOLOGY

## 2019-11-06 PROCEDURE — 2580000003 HC RX 258: Performed by: SURGERY

## 2019-11-06 PROCEDURE — 85730 THROMBOPLASTIN TIME PARTIAL: CPT

## 2019-11-06 PROCEDURE — 3600000002 HC SURGERY LEVEL 2 BASE: Performed by: SURGERY

## 2019-11-06 PROCEDURE — 85025 COMPLETE CBC W/AUTO DIFF WBC: CPT

## 2019-11-06 PROCEDURE — 82947 ASSAY GLUCOSE BLOOD QUANT: CPT

## 2019-11-06 PROCEDURE — 6360000002 HC RX W HCPCS: Performed by: NURSE ANESTHETIST, CERTIFIED REGISTERED

## 2019-11-06 PROCEDURE — 6360000002 HC RX W HCPCS: Performed by: SURGERY

## 2019-11-06 PROCEDURE — 80048 BASIC METABOLIC PNL TOTAL CA: CPT

## 2019-11-06 PROCEDURE — 2709999900 HC NON-CHARGEABLE SUPPLY: Performed by: SURGERY

## 2019-11-06 PROCEDURE — 7100000011 HC PHASE II RECOVERY - ADDTL 15 MIN: Performed by: SURGERY

## 2019-11-06 PROCEDURE — 6370000000 HC RX 637 (ALT 250 FOR IP): Performed by: ANESTHESIOLOGY

## 2019-11-06 RX ORDER — FENTANYL CITRATE 50 UG/ML
50 INJECTION, SOLUTION INTRAMUSCULAR; INTRAVENOUS EVERY 5 MIN PRN
Status: DISCONTINUED | OUTPATIENT
Start: 2019-11-06 | End: 2019-11-06 | Stop reason: HOSPADM

## 2019-11-06 RX ORDER — FENTANYL CITRATE 50 UG/ML
25 INJECTION, SOLUTION INTRAMUSCULAR; INTRAVENOUS EVERY 5 MIN PRN
Status: DISCONTINUED | OUTPATIENT
Start: 2019-11-06 | End: 2019-11-06 | Stop reason: HOSPADM

## 2019-11-06 RX ORDER — PROPOFOL 10 MG/ML
INJECTION, EMULSION INTRAVENOUS CONTINUOUS PRN
Status: DISCONTINUED | OUTPATIENT
Start: 2019-11-06 | End: 2019-11-06 | Stop reason: SDUPTHER

## 2019-11-06 RX ORDER — HEPARIN SODIUM 1000 [USP'U]/ML
INJECTION, SOLUTION INTRAVENOUS; SUBCUTANEOUS PRN
Status: DISCONTINUED | OUTPATIENT
Start: 2019-11-06 | End: 2019-11-06 | Stop reason: SDUPTHER

## 2019-11-06 RX ORDER — LIDOCAINE HYDROCHLORIDE 20 MG/ML
INJECTION, SOLUTION EPIDURAL; INFILTRATION; INTRACAUDAL; PERINEURAL PRN
Status: DISCONTINUED | OUTPATIENT
Start: 2019-11-06 | End: 2019-11-06 | Stop reason: SDUPTHER

## 2019-11-06 RX ORDER — HYDROCODONE BITARTRATE AND ACETAMINOPHEN 5; 325 MG/1; MG/1
1 TABLET ORAL ONCE
Status: COMPLETED | OUTPATIENT
Start: 2019-11-06 | End: 2019-11-06

## 2019-11-06 RX ORDER — HYDROCODONE BITARTRATE AND ACETAMINOPHEN 5; 325 MG/1; MG/1
1 TABLET ORAL EVERY 4 HOURS PRN
Qty: 42 TABLET | Refills: 0 | Status: SHIPPED | OUTPATIENT
Start: 2019-11-06 | End: 2019-11-13

## 2019-11-06 RX ORDER — LIDOCAINE HYDROCHLORIDE 10 MG/ML
INJECTION, SOLUTION EPIDURAL; INFILTRATION; INTRACAUDAL; PERINEURAL PRN
Status: DISCONTINUED | OUTPATIENT
Start: 2019-11-06 | End: 2019-11-06 | Stop reason: ALTCHOICE

## 2019-11-06 RX ORDER — SODIUM CHLORIDE 9 MG/ML
INJECTION, SOLUTION INTRAVENOUS CONTINUOUS
Status: DISCONTINUED | OUTPATIENT
Start: 2019-11-06 | End: 2019-11-06 | Stop reason: HOSPADM

## 2019-11-06 RX ORDER — ONDANSETRON 2 MG/ML
4 INJECTION INTRAMUSCULAR; INTRAVENOUS
Status: DISCONTINUED | OUTPATIENT
Start: 2019-11-06 | End: 2019-11-06 | Stop reason: HOSPADM

## 2019-11-06 RX ORDER — MIDAZOLAM HYDROCHLORIDE 1 MG/ML
INJECTION INTRAMUSCULAR; INTRAVENOUS PRN
Status: DISCONTINUED | OUTPATIENT
Start: 2019-11-06 | End: 2019-11-06 | Stop reason: SDUPTHER

## 2019-11-06 RX ADMIN — VANCOMYCIN HYDROCHLORIDE 1250 MG: 5 INJECTION, POWDER, LYOPHILIZED, FOR SOLUTION INTRAVENOUS at 15:35

## 2019-11-06 RX ADMIN — LIDOCAINE HYDROCHLORIDE 100 MG: 20 INJECTION, SOLUTION EPIDURAL; INFILTRATION; INTRACAUDAL; PERINEURAL at 15:22

## 2019-11-06 RX ADMIN — HEPARIN SODIUM 5000 UNITS: 1000 INJECTION INTRAVENOUS; SUBCUTANEOUS at 15:50

## 2019-11-06 RX ADMIN — HYDROCODONE BITARTRATE AND ACETAMINOPHEN 1 TABLET: 5; 325 TABLET ORAL at 18:15

## 2019-11-06 RX ADMIN — PROPOFOL 100 MCG/KG/MIN: 10 INJECTION, EMULSION INTRAVENOUS at 15:22

## 2019-11-06 RX ADMIN — SODIUM CHLORIDE: 9 INJECTION, SOLUTION INTRAVENOUS at 15:12

## 2019-11-06 RX ADMIN — MIDAZOLAM 2 MG: 1 INJECTION INTRAMUSCULAR; INTRAVENOUS at 15:12

## 2019-11-06 RX ADMIN — SODIUM CHLORIDE: 9 INJECTION, SOLUTION INTRAVENOUS at 14:02

## 2019-11-06 ASSESSMENT — PULMONARY FUNCTION TESTS
PIF_VALUE: 1
PIF_VALUE: 0
PIF_VALUE: 1
PIF_VALUE: 0
PIF_VALUE: 1
PIF_VALUE: 0
PIF_VALUE: 1
PIF_VALUE: 0
PIF_VALUE: 1

## 2019-11-06 ASSESSMENT — PAIN SCALES - GENERAL
PAINLEVEL_OUTOF10: 7
PAINLEVEL_OUTOF10: 0
PAINLEVEL_OUTOF10: 0
PAINLEVEL_OUTOF10: 6
PAINLEVEL_OUTOF10: 0

## 2019-11-06 ASSESSMENT — PAIN DESCRIPTION - LOCATION
LOCATION: ARM
LOCATION: ARM

## 2019-11-06 ASSESSMENT — PAIN DESCRIPTION - PROGRESSION: CLINICAL_PROGRESSION: NOT CHANGED

## 2019-11-06 ASSESSMENT — PAIN DESCRIPTION - ORIENTATION
ORIENTATION: LEFT
ORIENTATION: LEFT

## 2019-11-06 ASSESSMENT — PAIN DESCRIPTION - PAIN TYPE
TYPE: SURGICAL PAIN
TYPE: SURGICAL PAIN

## 2019-11-06 ASSESSMENT — PAIN DESCRIPTION - FREQUENCY: FREQUENCY: CONTINUOUS

## 2019-11-06 ASSESSMENT — PAIN DESCRIPTION - DESCRIPTORS
DESCRIPTORS: ACHING
DESCRIPTORS: ACHING

## 2019-11-06 ASSESSMENT — PAIN DESCRIPTION - ONSET: ONSET: ON-GOING

## 2019-11-06 ASSESSMENT — PAIN - FUNCTIONAL ASSESSMENT: PAIN_FUNCTIONAL_ASSESSMENT: 0-10

## 2019-11-14 ENCOUNTER — CARE COORDINATION (OUTPATIENT)
Dept: CARE COORDINATION | Age: 59
End: 2019-11-14

## 2019-11-25 ENCOUNTER — CARE COORDINATION (OUTPATIENT)
Dept: CARE COORDINATION | Age: 59
End: 2019-11-25

## 2019-12-09 ENCOUNTER — OFFICE VISIT (OUTPATIENT)
Dept: PRIMARY CARE CLINIC | Age: 59
End: 2019-12-09
Payer: MEDICARE

## 2019-12-09 VITALS
WEIGHT: 191.4 LBS | DIASTOLIC BLOOD PRESSURE: 83 MMHG | BODY MASS INDEX: 34.45 KG/M2 | HEART RATE: 78 BPM | SYSTOLIC BLOOD PRESSURE: 167 MMHG | TEMPERATURE: 97.2 F

## 2019-12-09 DIAGNOSIS — I50.9 ACUTE ON CHRONIC HEART FAILURE, UNSPECIFIED HEART FAILURE TYPE (HCC): ICD-10-CM

## 2019-12-09 DIAGNOSIS — N18.6 ESRD ON HEMODIALYSIS (HCC): ICD-10-CM

## 2019-12-09 DIAGNOSIS — G89.29 CHRONIC PAIN OF LEFT KNEE: ICD-10-CM

## 2019-12-09 DIAGNOSIS — E66.09 CLASS 1 OBESITY DUE TO EXCESS CALORIES WITH SERIOUS COMORBIDITY AND BODY MASS INDEX (BMI) OF 34.0 TO 34.9 IN ADULT: ICD-10-CM

## 2019-12-09 DIAGNOSIS — M25.562 CHRONIC PAIN OF LEFT KNEE: ICD-10-CM

## 2019-12-09 DIAGNOSIS — Z99.2 ESRD ON HEMODIALYSIS (HCC): ICD-10-CM

## 2019-12-09 DIAGNOSIS — I10 ESSENTIAL HYPERTENSION: Primary | ICD-10-CM

## 2019-12-09 PROCEDURE — 99214 OFFICE O/P EST MOD 30 MIN: CPT | Performed by: PHYSICIAN ASSISTANT

## 2019-12-09 PROCEDURE — G8417 CALC BMI ABV UP PARAM F/U: HCPCS | Performed by: PHYSICIAN ASSISTANT

## 2019-12-09 PROCEDURE — G8484 FLU IMMUNIZE NO ADMIN: HCPCS | Performed by: PHYSICIAN ASSISTANT

## 2019-12-09 PROCEDURE — G8427 DOCREV CUR MEDS BY ELIG CLIN: HCPCS | Performed by: PHYSICIAN ASSISTANT

## 2019-12-09 PROCEDURE — 3017F COLORECTAL CA SCREEN DOC REV: CPT | Performed by: PHYSICIAN ASSISTANT

## 2019-12-09 PROCEDURE — 1036F TOBACCO NON-USER: CPT | Performed by: PHYSICIAN ASSISTANT

## 2019-12-09 RX ORDER — ASPIRIN 81 MG/1
81 TABLET ORAL DAILY
Qty: 30 TABLET | Refills: 3 | Status: SHIPPED | OUTPATIENT
Start: 2019-12-09 | End: 2021-01-28 | Stop reason: SDUPTHER

## 2019-12-09 RX ORDER — ASPIRIN 81 MG/1
81 TABLET ORAL DAILY
Qty: 90 TABLET | Refills: 0 | COMMUNITY
Start: 2019-12-09 | End: 2021-01-28

## 2019-12-09 RX ORDER — AMLODIPINE BESYLATE 10 MG/1
10 TABLET ORAL DAILY
Qty: 7 TABLET | Refills: 0 | Status: SHIPPED | OUTPATIENT
Start: 2019-12-09 | End: 2019-12-12 | Stop reason: SDUPTHER

## 2019-12-09 ASSESSMENT — ENCOUNTER SYMPTOMS
VOMITING: 0
DIARRHEA: 1
CONSTIPATION: 1

## 2019-12-12 DIAGNOSIS — I10 ESSENTIAL HYPERTENSION: ICD-10-CM

## 2019-12-12 RX ORDER — AMLODIPINE BESYLATE 10 MG/1
10 TABLET ORAL DAILY
Qty: 30 TABLET | Refills: 0 | Status: SHIPPED | OUTPATIENT
Start: 2019-12-12 | End: 2020-04-06

## 2019-12-14 ASSESSMENT — ENCOUNTER SYMPTOMS
COUGH: 0
SHORTNESS OF BREATH: 0
BACK PAIN: 0
WHEEZING: 0
NAUSEA: 0

## 2019-12-16 ENCOUNTER — TELEPHONE (OUTPATIENT)
Dept: PRIMARY CARE CLINIC | Age: 59
End: 2019-12-16

## 2019-12-18 ENCOUNTER — TELEPHONE (OUTPATIENT)
Dept: PRIMARY CARE CLINIC | Age: 59
End: 2019-12-18

## 2019-12-23 DIAGNOSIS — S46.001A INJURY OF RIGHT ROTATOR CUFF, INITIAL ENCOUNTER: ICD-10-CM

## 2019-12-23 DIAGNOSIS — M79.2 NEUROPATHIC PAIN: ICD-10-CM

## 2019-12-23 DIAGNOSIS — G89.29 CHRONIC PAIN OF LEFT KNEE: ICD-10-CM

## 2019-12-23 DIAGNOSIS — M25.562 CHRONIC PAIN OF LEFT KNEE: ICD-10-CM

## 2019-12-24 RX ORDER — TIZANIDINE 4 MG/1
4 TABLET ORAL 2 TIMES DAILY PRN
Qty: 60 TABLET | Refills: 1 | Status: SHIPPED | OUTPATIENT
Start: 2019-12-24 | End: 2020-02-15

## 2019-12-24 RX ORDER — GABAPENTIN 100 MG/1
CAPSULE ORAL
Qty: 60 CAPSULE | Refills: 2 | Status: SHIPPED | OUTPATIENT
Start: 2019-12-30 | End: 2020-03-11

## 2020-01-03 ENCOUNTER — TELEPHONE (OUTPATIENT)
Dept: PRIMARY CARE CLINIC | Age: 60
End: 2020-01-03

## 2020-01-03 RX ORDER — BENZONATATE 100 MG/1
100 CAPSULE ORAL 3 TIMES DAILY PRN
Qty: 21 CAPSULE | Refills: 0 | Status: SHIPPED | OUTPATIENT
Start: 2020-01-03 | End: 2020-01-10

## 2020-01-03 RX ORDER — PREDNISONE 20 MG/1
20 TABLET ORAL DAILY
Qty: 7 TABLET | Refills: 0 | Status: SHIPPED | OUTPATIENT
Start: 2020-01-03 | End: 2020-01-10

## 2020-01-03 NOTE — TELEPHONE ENCOUNTER
Pt called requesting a medication for a cold. She states her sxs are chest congestion, a productive cough, and a sore throat from coughing. Pt informed that PCP may want her to be evaluated in the walk in clinic. Please advise. Health Maintenance   Topic Date Due    Diabetic retinal exam  03/21/2016    Diabetic foot exam  07/14/2016    Cervical cancer screen  02/18/2017    DTaP/Tdap/Td vaccine (1 - Tdap) 12/09/2020 (Originally 2/3/1972)    Flu vaccine (1) 12/09/2020 (Originally 9/1/2019)    Shingles Vaccine (1 of 2) 12/09/2020 (Originally 2/3/2011)    Pneumococcal 0-64 years Vaccine (1 of 3 - PCV13) 12/09/2020 (Originally 2/3/1967)    Breast cancer screen  06/13/2020    A1C test (Diabetic or Prediabetic)  09/09/2020    Lipid screen  10/18/2020    Potassium monitoring  11/06/2020    Creatinine monitoring  11/06/2020    Colon cancer screen colonoscopy  06/13/2028    Hepatitis C screen  Completed    HIV screen  Completed    HPV vaccine  Aged Out    Hepatitis B vaccine  Discontinued             (applicable per patient's age: Cancer Screenings, Depression Screening, Fall Risk Screening, Immunizations)    Hemoglobin A1C (%)   Date Value   09/09/2019 6.0   08/06/2018 5.8   03/23/2018 5.6     Microalb/Crt.  Ratio (mcg/mg creat)   Date Value   02/09/2013 664     LDL Cholesterol (mg/dL)   Date Value   10/18/2019 111     LDL Calculated (mg/dL)   Date Value   08/01/2016 116     AST (U/L)   Date Value   10/22/2019 30     ALT (U/L)   Date Value   10/22/2019 22     BUN (mg/dL)   Date Value   11/06/2019 21 (H)      (goal A1C is < 7)   (goal LDL is <100) need 30-50% reduction from baseline     BP Readings from Last 3 Encounters:   12/09/19 (!) 167/83   11/06/19 (!) 171/87   11/06/19 (!) 171/75    (goal /80)      All Future Testing planned in [facility]:  Lab Frequency Next Occurrence   BUN & Creatinine Once 08/26/2019   Calcium Once 08/26/2019   Electrolyte Panel Once 08/26/2019   CBC Auto Differential Once 08/26/2019   Magnesium Once 08/26/2019   Phosphorus Once 08/26/2019       Next Visit Date:  Future Appointments   Date Time Provider Amanda Wray   3/10/2020  1:20 PM Daysi Coy PA-C [facility]            Patient Active Problem List:     Diet-controlled diabetes mellitus ([address]     Hyperlipidemia     Microalbuminuria     Obesity, morbid ([address]     S/P gastric bypass     Anemia     Chronic kidney disease     S/P total knee arthroplasty     Hyperuricemia     Hydronephrosis of left kidney     Renal atrophy, right     Localized, primary osteoarthritis of hand     Essential hypertension     Renal insufficiency     Motion sickness     Slow transit constipation     Chest pain     Aortic dissection (HCC)     JINNY (acute kidney injury) ([address]     Respiratory acidosis     Subacromial impingement     Acute on chronic diastolic (congestive) heart failure (HCC)     Moderate mitral regurgitation     CKD stage 4 secondary to hypertension ([address]     Hypertensive emergency     Acute on chronic diastolic heart failure ([address]     Acute respiratory failure with hypoxia [facility])     Former smoker     Acute pulmonary edema ([address]     Acute on chronic congestive heart failure ([address]

## 2020-01-21 RX ORDER — PANTOPRAZOLE SODIUM 20 MG/1
TABLET, DELAYED RELEASE ORAL
Qty: 30 TABLET | Refills: 2 | Status: SHIPPED | OUTPATIENT
Start: 2020-01-21 | End: 2020-04-07

## 2020-01-21 RX ORDER — ALLOPURINOL 100 MG/1
TABLET ORAL
Qty: 30 TABLET | Refills: 2 | Status: SHIPPED | OUTPATIENT
Start: 2020-01-21 | End: 2020-04-07

## 2020-01-21 NOTE — TELEPHONE ENCOUNTER
Health Maintenance   Topic Date Due    Diabetic retinal exam  03/21/2016    Diabetic foot exam  07/14/2016    Cervical cancer screen  02/18/2017    DTaP/Tdap/Td vaccine (1 - Tdap) 12/09/2020 (Originally 2/3/1972)    Flu vaccine (1) 12/09/2020 (Originally 9/1/2019)    Shingles Vaccine (1 of 2) 12/09/2020 (Originally 2/3/2011)    Pneumococcal 0-64 years Vaccine (1 of 3 - PCV13) 12/09/2020 (Originally 2/3/1967)    Breast cancer screen  06/13/2020    A1C test (Diabetic or Prediabetic)  09/09/2020    Lipid screen  10/18/2020    Potassium monitoring  11/06/2020    Creatinine monitoring  11/06/2020    Colon cancer screen colonoscopy  06/13/2028    Hepatitis C screen  Completed    HIV screen  Completed    HPV vaccine  Aged Out    Hepatitis B vaccine  Discontinued             (applicable per patient's age: Cancer Screenings, Depression Screening, Fall Risk Screening, Immunizations)    Hemoglobin A1C (%)   Date Value   09/09/2019 6.0   08/06/2018 5.8   03/23/2018 5.6     Microalb/Crt.  Ratio (mcg/mg creat)   Date Value   02/09/2013 664     LDL Cholesterol (mg/dL)   Date Value   10/18/2019 111     LDL Calculated (mg/dL)   Date Value   08/01/2016 116     AST (U/L)   Date Value   10/22/2019 30     ALT (U/L)   Date Value   10/22/2019 22     BUN (mg/dL)   Date Value   11/06/2019 21 (H)      (goal A1C is < 7)   (goal LDL is <100) need 30-50% reduction from baseline     BP Readings from Last 3 Encounters:   12/09/19 (!) 167/83   11/06/19 (!) 171/87   11/06/19 (!) 171/75    (goal /80)      All Future Testing planned in [facility]:  Lab Frequency Next Occurrence   BUN & Creatinine Once 08/26/2019   Calcium Once 08/26/2019   Electrolyte Panel Once 08/26/2019   CBC Auto Differential Once 08/26/2019   Magnesium Once 08/26/2019   Phosphorus Once 08/26/2019       Next Visit Date:  Future Appointments   Date Time Provider Amanda Wray   3/10/2020  1:20 PM Juan Denson PA-C [address] [place] [address] Street

## 2020-01-23 ENCOUNTER — HOSPITAL ENCOUNTER (OUTPATIENT)
Age: 60
Setting detail: SPECIMEN
Discharge: HOME | End: 2020-01-23
Payer: MEDICARE

## 2020-01-23 LAB
-: NORMAL
AMORPHOUS: NORMAL
BACTERIA: NORMAL
BILIRUBIN URINE: NEGATIVE
CASTS UA: NORMAL /LPF (ref 0–8)
COLOR: YELLOW
COMMENT UA: ABNORMAL
CRYSTALS, UA: NORMAL /HPF
EPITHELIAL CELLS UA: NORMAL /HPF (ref 0–5)
GLUCOSE URINE: NEGATIVE
KETONES, URINE: NEGATIVE
LEUKOCYTE ESTERASE, URINE: NEGATIVE
MUCUS: NORMAL
NITRITE, URINE: NEGATIVE
OTHER OBSERVATIONS UA: NORMAL
PH UA: 5 (ref 5–8)
PROTEIN UA: ABNORMAL
RBC UA: NORMAL /HPF (ref 0–4)
RENAL EPITHELIAL, UA: NORMAL /HPF
SPECIFIC GRAVITY UA: 1.01 (ref 1–1.03)
TRICHOMONAS: NORMAL
TURBIDITY: CLEAR
URINE HGB: ABNORMAL
UROBILINOGEN, URINE: NORMAL
WBC UA: NORMAL /HPF (ref 0–5)
YEAST: NORMAL

## 2020-02-11 ENCOUNTER — OFFICE VISIT (OUTPATIENT)
Dept: PRIMARY CARE CLINIC | Age: 60
End: 2020-02-11
Payer: MEDICARE

## 2020-02-11 VITALS
SYSTOLIC BLOOD PRESSURE: 130 MMHG | BODY MASS INDEX: 24.63 KG/M2 | WEIGHT: 139 LBS | HEIGHT: 63 IN | TEMPERATURE: 97.5 F | DIASTOLIC BLOOD PRESSURE: 76 MMHG | HEART RATE: 95 BPM

## 2020-02-11 LAB — HBA1C MFR BLD: 5.7 %

## 2020-02-11 PROCEDURE — 1036F TOBACCO NON-USER: CPT | Performed by: PHYSICIAN ASSISTANT

## 2020-02-11 PROCEDURE — G8420 CALC BMI NORM PARAMETERS: HCPCS | Performed by: PHYSICIAN ASSISTANT

## 2020-02-11 PROCEDURE — G8427 DOCREV CUR MEDS BY ELIG CLIN: HCPCS | Performed by: PHYSICIAN ASSISTANT

## 2020-02-11 PROCEDURE — 99214 OFFICE O/P EST MOD 30 MIN: CPT | Performed by: PHYSICIAN ASSISTANT

## 2020-02-11 PROCEDURE — 3017F COLORECTAL CA SCREEN DOC REV: CPT | Performed by: PHYSICIAN ASSISTANT

## 2020-02-11 PROCEDURE — 83036 HEMOGLOBIN GLYCOSYLATED A1C: CPT | Performed by: PHYSICIAN ASSISTANT

## 2020-02-11 PROCEDURE — G8484 FLU IMMUNIZE NO ADMIN: HCPCS | Performed by: PHYSICIAN ASSISTANT

## 2020-02-11 RX ORDER — ATORVASTATIN CALCIUM 40 MG/1
40 TABLET, FILM COATED ORAL NIGHTLY
Qty: 30 TABLET | Refills: 3 | Status: SHIPPED | OUTPATIENT
Start: 2020-02-11 | End: 2020-06-04

## 2020-02-11 RX ORDER — LABETALOL 200 MG/1
200 TABLET, FILM COATED ORAL EVERY 8 HOURS SCHEDULED
Qty: 60 TABLET | Refills: 3 | Status: SHIPPED | OUTPATIENT
Start: 2020-02-11 | End: 2020-07-21 | Stop reason: SDUPTHER

## 2020-02-11 ASSESSMENT — PATIENT HEALTH QUESTIONNAIRE - PHQ9
1. LITTLE INTEREST OR PLEASURE IN DOING THINGS: 0
SUM OF ALL RESPONSES TO PHQ QUESTIONS 1-9: 0
SUM OF ALL RESPONSES TO PHQ QUESTIONS 1-9: 0
SUM OF ALL RESPONSES TO PHQ9 QUESTIONS 1 & 2: 0
2. FEELING DOWN, DEPRESSED OR HOPELESS: 0

## 2020-02-11 NOTE — PROGRESS NOTES
arthroplasty    Hyperuricemia    Hydronephrosis of left kidney    Renal atrophy, right    Localized, primary osteoarthritis of hand    Essential hypertension    Renal insufficiency    Motion sickness    Slow transit constipation    Chest pain    Aortic dissection (HCC)    JINNY (acute kidney injury) ([address]    Respiratory acidosis    Subacromial impingement    Acute on chronic diastolic (congestive) heart failure (HCC)    Moderate mitral regurgitation    Hypertensive emergency    Acute respiratory failure with hypoxia ([address]    Former smoker    Acute pulmonary edema (HCC)    Acute on chronic congestive heart failure (HCC)    ESRD on hemodialysis ([address]       Health Maintenance Due   Topic Date Due    Diabetic retinal exam  03/21/2016    Diabetic foot exam  07/14/2016    Cervical cancer screen  02/18/2017       Allergies   Allergen Reactions    Latex Hives    Penicillins Hives    Codeine Itching    Nsaids Other (See Comments)     Gastric Bypass Surgery     Tolmetin Other (See Comments)     Gastric Bypass Surgery          Current Outpatient Medications   Medication Sig Dispense Refill    labetalol (NORMODYNE) 200 MG tablet Take 1 tablet by mouth every 8 hours 60 tablet 3    atorvastatin (LIPITOR) 40 MG tablet Take 1 tablet by mouth nightly 30 tablet 3    allopurinol (ZYLOPRIM) 100 MG tablet TAKE 1 TABLET DAILY 30 tablet 2    pantoprazole (PROTONIX) 20 MG tablet TAKE 1 TABLET DAILY 30 tablet 2    gabapentin (NEURONTIN) 100 MG capsule TAKE 1 CAPSULE TWICE DAILY 60 capsule 2    Transparent Dressings (TEGADERM ABSORBENT DRESSING) MISC Provide insurance covered tegaderm dressing, use at hemodialysis 100 each 5    amLODIPine (NORVASC) 10 MG tablet Take 1 tablet by mouth daily 30 tablet 0    aspirin 81 MG EC tablet Take 1 tablet by mouth daily 30 tablet 3    aspirin EC 81 MG EC tablet Take 1 tablet by mouth daily 90 tablet 0    TRUE METRIX BLOOD GLUCOSE TEST strip       Multiple (NORMODYNE) 200 MG tablet; Take 1 tablet by mouth every 8 hours      FOLLOW UP AND INSTRUCTIONS:  Return in about 3 months (around 5/11/2020) for ESRD, HM.    · Discussed use, benefit, and side effects of prescribed medications. Barriers to medication compliance addressed. All patient questions answered. Pt voiced understanding. · Patient given educational materials - see patient instructions    Electronically signed by Jordy Warner PA-C on 2/11/20 at 2:27 PM    This note is created with the assistance of a speech-recognition program. While intendingto generate a document that actually reflects the content of the visit, the document can still have some mistakes which may not have been identified and corrected by editing.

## 2020-02-20 PROBLEM — I12.9 CKD STAGE 4 SECONDARY TO HYPERTENSION (HCC): Status: RESOLVED | Noted: 2019-10-17 | Resolved: 2020-02-20

## 2020-02-20 PROBLEM — N18.6 ESRD ON HEMODIALYSIS (HCC): Status: ACTIVE | Noted: 2020-02-20

## 2020-02-20 PROBLEM — Z99.2 ESRD ON HEMODIALYSIS (HCC): Status: ACTIVE | Noted: 2020-02-20

## 2020-02-20 PROBLEM — I50.33 ACUTE ON CHRONIC DIASTOLIC HEART FAILURE (HCC): Status: RESOLVED | Noted: 2019-10-17 | Resolved: 2020-02-20

## 2020-02-20 PROBLEM — N18.4 CKD STAGE 4 SECONDARY TO HYPERTENSION (HCC): Status: RESOLVED | Noted: 2019-10-17 | Resolved: 2020-02-20

## 2020-02-20 ASSESSMENT — ENCOUNTER SYMPTOMS
CONSTIPATION: 0
SHORTNESS OF BREATH: 0
BACK PAIN: 0
DIARRHEA: 0
VOMITING: 0
COUGH: 0
WHEEZING: 0
NAUSEA: 0

## 2020-03-02 ENCOUNTER — HOSPITAL ENCOUNTER (OUTPATIENT)
Dept: VASCULAR LAB | Age: 60
Discharge: HOME | End: 2020-03-02
Payer: MEDICARE

## 2020-03-02 PROCEDURE — 93990 DOPPLER FLOW TESTING: CPT

## 2020-03-10 ENCOUNTER — PREP FOR PROCEDURE (OUTPATIENT)
Dept: GENERAL RADIOLOGY | Age: 60
End: 2020-03-10

## 2020-03-10 RX ORDER — SODIUM CHLORIDE 9 MG/ML
INJECTION, SOLUTION INTRAVENOUS CONTINUOUS
Status: CANCELLED | OUTPATIENT
Start: 2020-03-10

## 2020-03-11 ENCOUNTER — HOSPITAL ENCOUNTER (OUTPATIENT)
Dept: INTERVENTIONAL RADIOLOGY/VASCULAR | Age: 60
Discharge: HOME | End: 2020-03-13
Payer: MEDICARE

## 2020-03-11 VITALS
SYSTOLIC BLOOD PRESSURE: 131 MMHG | BODY MASS INDEX: 34.23 KG/M2 | OXYGEN SATURATION: 93 % | HEART RATE: 67 BPM | DIASTOLIC BLOOD PRESSURE: 65 MMHG | WEIGHT: 186 LBS | HEIGHT: 62 IN | RESPIRATION RATE: 14 BRPM | TEMPERATURE: 98.2 F

## 2020-03-11 LAB
INR BLD: 1
PARTIAL THROMBOPLASTIN TIME: 27.5 SEC (ref 20.5–30.5)
PLATELET # BLD: 242 K/UL (ref 138–453)
POTASSIUM SERPL-SCNC: 4.7 MMOL/L (ref 3.7–5.3)
PROTHROMBIN TIME: 10.6 SEC (ref 9–12)

## 2020-03-11 PROCEDURE — 2709999900 HC NON-CHARGEABLE SUPPLY

## 2020-03-11 PROCEDURE — 36902 INTRO CATH DIALYSIS CIRCUIT: CPT | Performed by: RADIOLOGY

## 2020-03-11 PROCEDURE — C1887 CATHETER, GUIDING: HCPCS

## 2020-03-11 PROCEDURE — 84132 ASSAY OF SERUM POTASSIUM: CPT

## 2020-03-11 PROCEDURE — C1894 INTRO/SHEATH, NON-LASER: HCPCS

## 2020-03-11 PROCEDURE — 85610 PROTHROMBIN TIME: CPT

## 2020-03-11 PROCEDURE — 85049 AUTOMATED PLATELET COUNT: CPT

## 2020-03-11 PROCEDURE — 85730 THROMBOPLASTIN TIME PARTIAL: CPT

## 2020-03-11 PROCEDURE — C1769 GUIDE WIRE: HCPCS

## 2020-03-11 PROCEDURE — 7100000010 HC PHASE II RECOVERY - FIRST 15 MIN

## 2020-03-11 PROCEDURE — 2580000003 HC RX 258: Performed by: PHYSICIAN ASSISTANT

## 2020-03-11 PROCEDURE — 7100000011 HC PHASE II RECOVERY - ADDTL 15 MIN

## 2020-03-11 PROCEDURE — 6360000004 HC RX CONTRAST MEDICATION: Performed by: SURGERY

## 2020-03-11 PROCEDURE — 6360000002 HC RX W HCPCS: Performed by: RADIOLOGY

## 2020-03-11 PROCEDURE — C1725 CATH, TRANSLUMIN NON-LASER: HCPCS

## 2020-03-11 RX ORDER — FENTANYL CITRATE 50 UG/ML
INJECTION, SOLUTION INTRAMUSCULAR; INTRAVENOUS
Status: COMPLETED | OUTPATIENT
Start: 2020-03-11 | End: 2020-03-11

## 2020-03-11 RX ORDER — ACETAMINOPHEN 500 MG
1000 TABLET ORAL EVERY 6 HOURS PRN
COMMUNITY

## 2020-03-11 RX ORDER — HEPARIN SODIUM 5000 [USP'U]/ML
INJECTION, SOLUTION INTRAVENOUS; SUBCUTANEOUS
Status: COMPLETED | OUTPATIENT
Start: 2020-03-11 | End: 2020-03-11

## 2020-03-11 RX ORDER — SODIUM CHLORIDE 9 MG/ML
INJECTION, SOLUTION INTRAVENOUS CONTINUOUS
Status: DISCONTINUED | OUTPATIENT
Start: 2020-03-11 | End: 2020-03-14 | Stop reason: HOSPADM

## 2020-03-11 RX ORDER — ACETAMINOPHEN 325 MG/1
650 TABLET ORAL EVERY 4 HOURS PRN
Status: DISCONTINUED | OUTPATIENT
Start: 2020-03-11 | End: 2020-03-14 | Stop reason: HOSPADM

## 2020-03-11 RX ADMIN — IOVERSOL 75 ML: 509 INJECTION INTRA-ARTERIAL; INTRAVENOUS at 11:29

## 2020-03-11 RX ADMIN — FENTANYL CITRATE 50 MCG: 50 INJECTION INTRAMUSCULAR; INTRAVENOUS at 10:57

## 2020-03-11 RX ADMIN — FENTANYL CITRATE 50 MCG: 50 INJECTION INTRAMUSCULAR; INTRAVENOUS at 10:46

## 2020-03-11 RX ADMIN — HEPARIN SODIUM 0.6 ML: 5000 INJECTION INTRAVENOUS; SUBCUTANEOUS at 10:50

## 2020-03-11 RX ADMIN — SODIUM CHLORIDE: 9 INJECTION, SOLUTION INTRAVENOUS at 08:15

## 2020-03-11 RX ADMIN — FENTANYL CITRATE 50 MCG: 50 INJECTION INTRAMUSCULAR; INTRAVENOUS at 10:22

## 2020-03-11 ASSESSMENT — PAIN - FUNCTIONAL ASSESSMENT: PAIN_FUNCTIONAL_ASSESSMENT: 0-10

## 2020-03-11 ASSESSMENT — PAIN DESCRIPTION - PAIN TYPE
TYPE: ACUTE PAIN
TYPE: ACUTE PAIN

## 2020-03-11 ASSESSMENT — PAIN DESCRIPTION - LOCATION
LOCATION: ARM
LOCATION: ARM

## 2020-03-11 ASSESSMENT — PAIN DESCRIPTION - ORIENTATION
ORIENTATION: LEFT
ORIENTATION: RIGHT

## 2020-03-11 ASSESSMENT — PAIN SCALES - GENERAL
PAINLEVEL_OUTOF10: 6
PAINLEVEL_OUTOF10: 10

## 2020-03-11 NOTE — BRIEF OP NOTE
Brief Postoperative Note    Mitchell Torres  YOB: 1960  [id number]    Pre-operative Diagnosis: Poorly Functioning left AVF    Post-operative Diagnosis: Same    Procedure: Percutaneous Access left AVF    Completion fistulagram    Anesthesia: Moderate sedation    Surgeons/Assistants: MD Luke    Estimated Blood Loss: Minimal    Complications: none    Specimens: were not obtained    May use Fistula.     Cassi Estrada Redfox3/11/2020 11:10 AM

## 2020-03-11 NOTE — H&P
History and Physical    Pt Name: Zafar Velez  MRN: [id number]  YOB: 1960  Date of evaluation: 3/11/2020  Primary Care Physician: Neal Hicks PA-C  Patient evaluated at the request of  Dr. Aleksey Knutson    Reason for evaluation:  ESRD  SUBJECTIVE:   History of Chief Complaint:      Charli Fitzgerald is a 62 y.o. female ,   History was obtained from: patient. Zafar Velez is a 62 y.o. is here for follow-up for prediabetes, ESRD. Patient daughter is present. Patient states she is compliant with medications.     Pt is on hemodialysis for ESRD, goes T,TH,S.  Patient states that she will be evaluated for kidney transplant and 4/2020. Patient states \" body aches and cramping 1 hour before dialysis ends\". Patient has lost a significant amount of weight. Informed patient will obtain most recent dialysis labs. Instructed patient to follow-up with dialysis and make sure they have updated weight and her treatment plan has been adjusted due to her weight, patient voiced understanding.     Patient A1c in office is 5.7, decreased from 5.9. Discussed A1c reading in depth with patient, patient requesting medication refill.     Patient blood pressures controlled in office.     Patient has lost 52 pounds, \" made major changes in her diet\". On hemodialysis since Oct/2019       Past Medical History      has a past medical history of CKD (chronic kidney disease) stage 3, GFR 30-59 ml/min ([address] Gout, Hemodialysis patient ([address] Hyperlipidemia, Hypertension, Kidney stone, Nephrolithiasis, Osteoarthritis, and Type II or unspecified type diabetes mellitus without mention of complication, not stated as uncontrolled. Past Surgical History   has a past surgical history that includes Hysterectomy; Lithotripsy; Thyroidectomy, partial; Gastric bypass surgery; Tonsillectomy; Total knee arthroplasty (Right, 01/06/2015); joint replacement; knee surgery (Left, 12/05/2018);  Cardiac catheterization (10/21/2019); Dialysis fistula creation (2019); and Dialysis fistula creation (Left, 2019). Medications   Scheduled Meds:  Current Outpatient Rx   Medication Sig Dispense Refill    tiZANidine (ZANAFLEX) 4 MG tablet TAKE 1 TABLET BY MOUTH TWICE A DAY 60 tablet 2    labetalol (NORMODYNE) 200 MG tablet Take 1 tablet by mouth every 8 hours 60 tablet 3    atorvastatin (LIPITOR) 40 MG tablet Take 1 tablet by mouth nightly 30 tablet 3    allopurinol (ZYLOPRIM) 100 MG tablet TAKE 1 TABLET DAILY 30 tablet 2    pantoprazole (PROTONIX) 20 MG tablet TAKE 1 TABLET DAILY 30 tablet 2    gabapentin (NEURONTIN) 100 MG capsule TAKE 1 CAPSULE TWICE DAILY 60 capsule 2    Transparent Dressings (TEGADERM ABSORBENT DRESSING) MISC Provide insurance covered tegaderm dressing, use at hemodialysis 100 each 5    amLODIPine (NORVASC) 10 MG tablet Take 1 tablet by mouth daily 30 tablet 0    aspirin 81 MG EC tablet Take 1 tablet by mouth daily 30 tablet 3    aspirin EC 81 MG EC tablet Take 1 tablet by mouth daily 90 tablet 0    KLOR-CON 20 MEQ packet TAKE 1 PACKET DISSOLVED IN 4 TO 6 OUNCES OF WATER TWICE A DAY (Patient not taking: Reported on 2019) 60 packet 6    TRUE METRIX BLOOD GLUCOSE TEST strip       Multiple Vitamins-Minerals (MULTIVITAMIN ADULT EXTRA C PO) daily.  vitamin B-12 (CYANOCOBALAMIN) 1000 MCG tablet TAKE 1 TABLET BY MOUTH DAILY      calcium citrate-vitamin D (CITRACEL+D) 200-250 MG-UNIT TABS per tablet TAKE 2 TABLETS THREE TIMES A DAY      Blood Pressure KIT 1 box by Does not apply route three times daily 1 kit 0     Continuous Infusions:   sodium chloride       PRN Meds:. Allergies  is allergic to latex; penicillins; codeine; nsaids; and tolmetin. Family History    family history includes Diabetes in her mother; Heart Disease in her father and mother; High Blood Pressure in her father.     Family Status   Relation Name Status    Mother      Father           Social atraumatic. EOMI, PERRLA    Oral air way :slightly narrow Yes    NECK: neck supple, no lymphadenopathy noted, trachea midline and straight       2+ carotid, no bruit    LUNGS: Chest expands equally bilaterally upon respiration, no accessory muscle used. Ausculation reveals no adventitious breath sounds. CARDIOVASCULAR: \"Heart sounds are normal.  Regular rate and rhythm without murmur,    ABDOMEN: Bowel sounds are present in all four quadrants  , obese    GENATALIA:Deferred. NEUROLOGIC: \"CN II-XII are grossly intact. EXTREMITIES: Pitting edema:  No,    Left arm fistula `+ thrill felt , + bruit sound   Varicose veins: No     Dorsal pedal/posterior tibial pulses palpable: Yes         Strength:  Normal       Patient Active Problem List   Diagnosis    Diet-controlled diabetes mellitus ([address]    Hyperlipidemia    Microalbuminuria    S/P gastric bypass    Anemia    Chronic kidney disease    S/P total knee arthroplasty    Hyperuricemia    Hydronephrosis of left kidney    Renal atrophy, right    Localized, primary osteoarthritis of hand    Essential hypertension    Renal insufficiency    Motion sickness    Slow transit constipation    Chest pain    Aortic dissection (HCC)    JINNY (acute kidney injury) ([address]    Respiratory acidosis    Subacromial impingement    Acute on chronic diastolic (congestive) heart failure (HCC)    Moderate mitral regurgitation    Hypertensive emergency    Acute respiratory failure with hypoxia ([address]    Former smoker    Acute pulmonary edema (HCC)    Acute on chronic congestive heart failure ([address]    ESRD on hemodialysis ([address]               IMPRESSIONS:   1. ESRD  2. does not have any pertinent problems on file.     [facility]  Electronically signed 3/11/2020 at 8:10 AM       Scheduled for:      Left arm fistuagram

## 2020-03-17 ENCOUNTER — TELEPHONE (OUTPATIENT)
Dept: PRIMARY CARE CLINIC | Age: 60
End: 2020-03-17

## 2020-03-17 RX ORDER — LIDOCAINE 50 MG/G
1 PATCH TOPICAL DAILY
Qty: 5 PATCH | Refills: 0 | Status: SHIPPED | OUTPATIENT
Start: 2020-03-17 | End: 2020-03-22

## 2020-03-25 ENCOUNTER — OFFICE VISIT (OUTPATIENT)
Dept: PRIMARY CARE CLINIC | Age: 60
End: 2020-03-25
Payer: MEDICARE

## 2020-03-25 VITALS
HEART RATE: 65 BPM | BODY MASS INDEX: 35.78 KG/M2 | TEMPERATURE: 98.2 F | SYSTOLIC BLOOD PRESSURE: 141 MMHG | WEIGHT: 195.6 LBS | DIASTOLIC BLOOD PRESSURE: 72 MMHG | OXYGEN SATURATION: 98 %

## 2020-03-25 LAB
BILIRUBIN, POC: NEGATIVE
BLOOD URINE, POC: NEGATIVE
CLARITY, POC: CLEAR
COLOR, POC: YELLOW
GLUCOSE URINE, POC: NEGATIVE
KETONES, POC: NEGATIVE
LEUKOCYTE EST, POC: NEGATIVE
NITRITE, POC: NEGATIVE
PH, POC: 6
PROTEIN, POC: ABNORMAL
SPECIFIC GRAVITY, POC: 1.02
UROBILINOGEN, POC: 0.2

## 2020-03-25 PROCEDURE — G8484 FLU IMMUNIZE NO ADMIN: HCPCS | Performed by: NURSE PRACTITIONER

## 2020-03-25 PROCEDURE — 99213 OFFICE O/P EST LOW 20 MIN: CPT | Performed by: NURSE PRACTITIONER

## 2020-03-25 PROCEDURE — G8417 CALC BMI ABV UP PARAM F/U: HCPCS | Performed by: NURSE PRACTITIONER

## 2020-03-25 PROCEDURE — G8427 DOCREV CUR MEDS BY ELIG CLIN: HCPCS | Performed by: NURSE PRACTITIONER

## 2020-03-25 PROCEDURE — 3017F COLORECTAL CA SCREEN DOC REV: CPT | Performed by: NURSE PRACTITIONER

## 2020-03-25 PROCEDURE — 1036F TOBACCO NON-USER: CPT | Performed by: NURSE PRACTITIONER

## 2020-03-25 RX ORDER — HYDROCODONE BITARTRATE AND ACETAMINOPHEN 5; 325 MG/1; MG/1
1 TABLET ORAL EVERY 6 HOURS PRN
Qty: 20 TABLET | Refills: 0 | Status: SHIPPED | OUTPATIENT
Start: 2020-03-25 | End: 2020-03-30

## 2020-03-25 ASSESSMENT — ENCOUNTER SYMPTOMS
SINUS PRESSURE: 0
EYE PAIN: 0
DIARRHEA: 0
RHINORRHEA: 0
ABDOMINAL PAIN: 1
CONSTIPATION: 0
SHORTNESS OF BREATH: 0
SINUS PAIN: 0
NAUSEA: 0
VOMITING: 0
EYE REDNESS: 1

## 2020-03-25 ASSESSMENT — VISUAL ACUITY: OU: 1

## 2020-03-25 NOTE — PATIENT INSTRUCTIONS
[email or web address] and sign in to your [employer] account. Enter V210 in the [facility] box to learn more about \"Subconjunctival Hemorrhage: Care Instructions. \"     If you do not have an account, please click on the \"Sign Up Now\" link. Current as of: December 17, 2019Content Version: 12.4  © 8466-3864 [employer]. Care instructions adapted under license by [facility] ([facility]). If you have questions about a medical condition or this instruction, always ask your healthcare professional. [address] any warranty or liability for your use of this information.

## 2020-03-25 NOTE — PROGRESS NOTES
possible to manage pain 20 tablet 0    acetaminophen (TYLENOL) 500 MG tablet Take 1,000 mg by mouth every 6 hours as needed for Pain      gabapentin (NEURONTIN) 100 MG capsule TAKE 1 CAPSULE TWICE DAILY 60 capsule 2    tiZANidine (ZANAFLEX) 4 MG tablet TAKE 1 TABLET BY MOUTH TWICE A DAY 60 tablet 2    labetalol (NORMODYNE) 200 MG tablet Take 1 tablet by mouth every 8 hours 60 tablet 3    atorvastatin (LIPITOR) 40 MG tablet Take 1 tablet by mouth nightly 30 tablet 3    allopurinol (ZYLOPRIM) 100 MG tablet TAKE 1 TABLET DAILY 30 tablet 2    pantoprazole (PROTONIX) 20 MG tablet TAKE 1 TABLET DAILY 30 tablet 2    Transparent Dressings (TEGADERM ABSORBENT DRESSING) MISC Provide insurance covered tegaderm dressing, use at hemodialysis 100 each 5    amLODIPine (NORVASC) 10 MG tablet Take 1 tablet by mouth daily 30 tablet 0    aspirin 81 MG EC tablet Take 1 tablet by mouth daily 30 tablet 3    KLOR-CON 20 MEQ packet TAKE 1 PACKET DISSOLVED IN 4 TO 6 OUNCES OF WATER TWICE A DAY 60 packet 6    TRUE METRIX BLOOD GLUCOSE TEST strip       Multiple Vitamins-Minerals (MULTIVITAMIN ADULT EXTRA C PO) daily.  vitamin B-12 (CYANOCOBALAMIN) 1000 MCG tablet TAKE 1 TABLET BY MOUTH DAILY      calcium citrate-vitamin D (CITRACEL+D) 200-250 MG-UNIT TABS per tablet TAKE 2 TABLETS THREE TIMES A DAY      Blood Pressure KIT 1 box by Does not apply route three times daily 1 kit 0    aspirin EC 81 MG EC tablet Take 1 tablet by mouth daily 90 tablet 0     No current facility-administered medications for this visit. Allergies   Allergen Reactions    Latex Hives    Penicillins Hives    Codeine Itching    Nsaids Other (See Comments)     Gastric Bypass Surgery     Tolmetin Other (See Comments)     Gastric Bypass Surgery        Subjective:      Review of Systems   Constitutional: Negative for fever. HENT: Positive for ear pain. Negative for congestion, rhinorrhea, sinus pressure and sinus pain.     Eyes: Positive for redness. Negative for pain and visual disturbance. Respiratory: Negative for shortness of breath. Cardiovascular: Negative for chest pain. Gastrointestinal: Positive for abdominal pain (right side). Negative for constipation, diarrhea, nausea and vomiting. All other systems reviewed and are negative. Objective:     Physical Exam  Vitals signs and nursing note reviewed. Constitutional:       Appearance: Normal appearance. HENT:      Right Ear: Tympanic membrane normal.      Left Ear: Tympanic membrane normal.      Nose: Nose normal.   Eyes:      General: Lids are normal. Vision grossly intact. Extraocular Movements: Extraocular movements intact. Conjunctiva/sclera:      Right eye: Right conjunctiva is not injected. Hemorrhage present. Pupils: Pupils are equal, round, and reactive to light. Cardiovascular:      Rate and Rhythm: Normal rate. Pulmonary:      Effort: Pulmonary effort is normal.      Breath sounds: Normal breath sounds. Abdominal:      General: Bowel sounds are normal.      Palpations: Abdomen is soft. Tenderness: There is no abdominal tenderness. Musculoskeletal:        Arms:    Skin:     General: Skin is warm and dry. Neurological:      General: No focal deficit present. Mental Status: She is alert and oriented to person, place, and time. BP (!) 141/72 (Site: Left Upper Arm, Position: Sitting, Cuff Size: Large Adult)   Pulse 65   Temp 98.2 °F (36.8 °C) (Oral)   Wt 195 lb 9.6 oz (88.7 kg)   SpO2 98%   BMI 35.78 kg/m²   Lab Review   Hospital Outpatient Visit on 03/11/2020   Component Date Value    PTT 03/11/2020 27.5     Platelets 06/46/4806 242     Potassium 03/11/2020 4.7     Protime 03/11/2020 10.6     INR 03/11/2020 1.0    Office Visit on 02/11/2020   Component Date Value    Hemoglobin A1C 02/11/2020 5.7        Assessment:       Diagnosis Orders   1.  Right flank pain  POCT Urinalysis no Micro    HYDROcodone-acetaminophen (NORCO) 5-325 MG per tablet    CT ABDOMEN PELVIS WO CONTRAST Additional Contrast? None   2. Subconjunctival hemorrhage of right eye         Plan:      Return if symptoms worsen or fail to improve. Orders Placed This Encounter   Medications    HYDROcodone-acetaminophen (NORCO) 5-325 MG per tablet     Sig: Take 1 tablet by mouth every 6 hours as needed for Pain for up to 5 days. Intended supply: 3 days. Take lowest dose possible to manage pain     Dispense:  20 tablet     Refill:  0     Reduce doses taken as pain becomes manageable     Results for orders placed or performed in visit on 03/25/20   POCT Urinalysis no Micro   Result Value Ref Range    Color, UA yellow     Clarity, UA clear     Glucose, UA POC negative     Bilirubin, UA negative     Ketones, UA negative     Spec Grav, UA 1.020     Blood, UA POC negative     pH, UA 6.0     Protein, UA POC 2+     Urobilinogen, UA 0.2     Leukocytes, UA negative     Nitrite, UA negative      Controlled Substance Monitoring:    Acute and Chronic Pain Monitoring:   RX Monitoring 3/25/2020   Periodic Controlled Substance Monitoring No signs of potential drug abuse or diversion identified. Regarding right flank pain discussed imaging to include stat CT scan, will treat pain with Norco.    Regarding right I discussed no treatment indicated for subconjunctival hemorrhage, follow-up if symptoms worsen such as eye pain or vision changes    Follow-up with PCP, return if any worsening symptoms         Patient given educational materials - see patient instructions. Discussed use, benefit, and side effects of prescribed medications. All patientquestions answered. Pt voiced understanding. This note was transcribed using dictation with Dragon services. Efforts were made to correct any errors but some words may be misinterpreted.      Electronically signed by NAV Hammer Se, CNP on 3/25/2020at 7:42 PM

## 2020-04-07 RX ORDER — ALLOPURINOL 100 MG/1
TABLET ORAL
Qty: 30 TABLET | Refills: 2 | Status: SHIPPED | OUTPATIENT
Start: 2020-04-07 | End: 2020-07-04

## 2020-04-07 RX ORDER — PANTOPRAZOLE SODIUM 20 MG/1
TABLET, DELAYED RELEASE ORAL
Qty: 30 TABLET | Refills: 2 | Status: SHIPPED | OUTPATIENT
Start: 2020-04-07 | End: 2020-07-04

## 2020-04-08 ENCOUNTER — HOSPITAL ENCOUNTER (OUTPATIENT)
Dept: CT IMAGING | Age: 60
Discharge: HOME | End: 2020-04-10
Payer: MEDICARE

## 2020-04-08 PROCEDURE — 74176 CT ABD & PELVIS W/O CONTRAST: CPT

## 2020-04-17 RX ORDER — BENZONATATE 100 MG/1
100 CAPSULE ORAL 3 TIMES DAILY PRN
Qty: 21 CAPSULE | Refills: 0 | Status: SHIPPED | OUTPATIENT
Start: 2020-04-17 | End: 2020-04-24

## 2020-04-17 RX ORDER — BENZONATATE 100 MG/1
100 CAPSULE ORAL 3 TIMES DAILY PRN
COMMUNITY
End: 2020-04-17 | Stop reason: SDUPTHER

## 2020-04-17 NOTE — TELEPHONE ENCOUNTER
Pt requesting a refill on tessalon pearles because she states that she has a dry cough. Health Maintenance   Topic Date Due    Diabetic retinal exam  03/21/2016    Diabetic foot exam  07/14/2016    Cervical cancer screen  02/18/2017    DTaP/Tdap/Td vaccine (1 - Tdap) 12/09/2020 (Originally 2/3/1980)    Flu vaccine (Season Ended) 12/09/2020 (Originally 9/1/2020)    Shingles Vaccine (1 of 2) 12/09/2020 (Originally 2/3/2011)    Pneumococcal 0-64 years Vaccine (1 of 3 - PCV13) 12/09/2020 (Originally 2/3/1967)    Breast cancer screen  06/13/2020    Lipid screen  10/18/2020    Creatinine monitoring  11/06/2020    A1C test (Diabetic or Prediabetic)  02/11/2021    Potassium monitoring  03/11/2021    Colon cancer screen colonoscopy  06/13/2028    Hepatitis C screen  Completed    HIV screen  Completed    Hepatitis A vaccine  Aged Out    Hib vaccine  Aged Out    Meningococcal (ACWY) vaccine  Aged Out    Hepatitis B vaccine  Discontinued             (applicable per patient's age: Cancer Screenings, Depression Screening, Fall Risk Screening, Immunizations)    Hemoglobin A1C (%)   Date Value   02/11/2020 5.7   09/09/2019 6.0   08/06/2018 5.8     Microalb/Crt.  Ratio (mcg/mg creat)   Date Value   02/09/2013 664     LDL Cholesterol (mg/dL)   Date Value   10/18/2019 111     LDL Calculated (mg/dL)   Date Value   08/01/2016 116     AST (U/L)   Date Value   10/22/2019 30     ALT (U/L)   Date Value   10/22/2019 22     BUN (mg/dL)   Date Value   11/06/2019 21 (H)      (goal A1C is < 7)   (goal LDL is <100) need 30-50% reduction from baseline     BP Readings from Last 3 Encounters:   03/25/20 (!) 141/72   03/11/20 131/65   02/11/20 130/76    (goal /80)      All Future Testing planned in [facility]:  Lab Frequency Next Occurrence   BUN & Creatinine Once 08/26/2019   Calcium Once 08/26/2019   Electrolyte Panel Once 08/26/2019   CBC Auto Differential Once 08/26/2019   Magnesium Once 08/26/2019   Phosphorus Once

## 2020-05-06 RX ORDER — TIZANIDINE 4 MG/1
TABLET ORAL
Qty: 60 TABLET | Refills: 2 | Status: SHIPPED | OUTPATIENT
Start: 2020-05-06 | End: 2020-07-28

## 2020-05-06 NOTE — TELEPHONE ENCOUNTER
5/12/2020  1:40 PM Cristi Min PA-C [id number]            Patient Active Problem List:     Diet-controlled diabetes mellitus ([address]     Hyperlipidemia     Microalbuminuria     S/P gastric bypass     Anemia     Chronic kidney disease     S/P total knee arthroplasty     Hyperuricemia     Hydronephrosis of left kidney     Renal atrophy, right     Localized, primary osteoarthritis of hand     Essential hypertension     Renal insufficiency     Motion sickness     Slow transit constipation     Chest pain     Aortic dissection (HCC)     JINNY (acute kidney injury) ([address]     Respiratory acidosis     Subacromial impingement     Acute on chronic diastolic (congestive) heart failure (HCC)     Moderate mitral regurgitation     Hypertensive emergency     Acute respiratory failure with hypoxia [facility])     Former smoker     Acute pulmonary edema (HCC)     Acute on chronic congestive heart failure ([address]     ESRD on hemodialysis ([address]

## 2020-05-12 ENCOUNTER — TELEPHONE (OUTPATIENT)
Dept: INTERNAL MEDICINE | Age: 60
End: 2020-05-12

## 2020-05-12 ENCOUNTER — VIRTUAL VISIT (OUTPATIENT)
Dept: INTERNAL MEDICINE | Age: 60
End: 2020-05-12
Payer: MEDICARE

## 2020-05-12 PROCEDURE — 99214 OFFICE O/P EST MOD 30 MIN: CPT | Performed by: PHYSICIAN ASSISTANT

## 2020-05-12 NOTE — PROGRESS NOTES
congestion. Respiratory: Negative for cough, shortness of breath and wheezing. Cardiovascular: Negative for chest pain. Gastrointestinal: Negative for constipation, diarrhea, nausea and vomiting. Genitourinary: Negative for dysuria, frequency and urgency. Musculoskeletal: Positive for myalgias. Negative for back pain and neck pain. Neurological: Negative for headaches. Prior to Visit Medications    Medication Sig Taking? Authorizing Provider   allopurinol (ZYLOPRIM) 100 MG tablet TAKE 1 TABLET DAILY Yes Bruce Alejandra PA-C   pantoprazole (PROTONIX) 20 MG tablet TAKE 1 TABLET DAILY Yes Bruce Alejandra PA-C   amLODIPine (NORVASC) 10 MG tablet TAKE 1 TABLET BY MOUTH DAILY Yes Malinda Sanchez MD   acetaminophen (TYLENOL) 500 MG tablet Take 1,000 mg by mouth every 6 hours as needed for Pain Yes Historical Provider, MD   gabapentin (NEURONTIN) 100 MG capsule TAKE 1 CAPSULE TWICE DAILY Yes Bruce Alejandra PA-C   labetalol (NORMODYNE) 200 MG tablet Take 1 tablet by mouth every 8 hours Yes Bruce Alejandra PA-C   atorvastatin (LIPITOR) 40 MG tablet Take 1 tablet by mouth nightly Yes Bruce Alejandra PA-C   Transparent Dressings (TEGADERM ABSORBENT DRESSING) [address] [facility] Provide insurance covered tegaderm dressing, use at hemodialysis Yes Bruce Alejandra PA-C   aspirin 81 MG EC tablet Take 1 tablet by mouth daily Yes Bruce Alejandra PA-C   TRUE METRIX BLOOD GLUCOSE TEST strip  Yes Historical Provider, MD   Multiple Vitamins-Minerals (MULTIVITAMIN ADULT EXTRA C PO) daily.  Yes Historical Provider, MD   vitamin B-12 (CYANOCOBALAMIN) 1000 MCG tablet TAKE 1 TABLET BY MOUTH DAILY Yes Historical Provider, MD   calcium citrate-vitamin D (CITRACEL+D) 200-250 MG-UNIT TABS per tablet TAKE 2 TABLETS THREE TIMES A DAY Yes Historical Provider, MD   Blood Pressure KIT 1 box by Does not apply route three times daily Yes Bruce Alejandra PA-C   tiZANidine (ZANAFLEX) 4 MG tablet TAKE 1 TABLET BY MOUTH

## 2020-05-20 ENCOUNTER — NURSE TRIAGE (OUTPATIENT)
Dept: OTHER | Facility: CLINIC | Age: 60
End: 2020-05-20

## 2020-05-20 ASSESSMENT — ENCOUNTER SYMPTOMS
BACK PAIN: 0
DIARRHEA: 0
COUGH: 0
SHORTNESS OF BREATH: 0
VOMITING: 0
WHEEZING: 0
CONSTIPATION: 0
NAUSEA: 0

## 2020-05-20 NOTE — TELEPHONE ENCOUNTER
Soft transfer to pre-service center to schedule appointment as recommended. Please do not respond to the triage nurse through this encounter. Any subsequent communication should be directly with the patient.

## 2020-06-03 ENCOUNTER — HOSPITAL ENCOUNTER (OUTPATIENT)
Dept: ULTRASOUND IMAGING | Age: 60
Discharge: HOME | End: 2020-06-05
Payer: MEDICARE

## 2020-06-03 ENCOUNTER — HOSPITAL ENCOUNTER (OUTPATIENT)
Dept: VASCULAR LAB | Age: 60
Discharge: HOME | End: 2020-06-03
Payer: MEDICARE

## 2020-06-03 PROCEDURE — 93990 DOPPLER FLOW TESTING: CPT

## 2020-06-03 PROCEDURE — 76770 US EXAM ABDO BACK WALL COMP: CPT

## 2020-06-18 ENCOUNTER — HOSPITAL ENCOUNTER (OUTPATIENT)
Dept: PREADMISSION TESTING | Age: 60
Setting detail: SPECIMEN
Discharge: HOME | End: 2020-06-22
Payer: MEDICARE

## 2020-06-18 PROCEDURE — U0004 COV-19 TEST NON-CDC HGH THRU: HCPCS

## 2020-06-19 LAB
SARS-COV-2, PCR: NOT DETECTED
SARS-COV-2, RAPID: NORMAL
SARS-COV-2: NORMAL
SOURCE: NORMAL

## 2020-06-19 RX ORDER — SODIUM CHLORIDE 0.9 % (FLUSH) 0.9 %
10 SYRINGE (ML) INJECTION PRN
Status: CANCELLED | OUTPATIENT
Start: 2020-06-22

## 2020-06-19 RX ORDER — SODIUM CHLORIDE 9 MG/ML
INJECTION, SOLUTION INTRAVENOUS CONTINUOUS
Status: CANCELLED | OUTPATIENT
Start: 2020-06-22

## 2020-06-20 ENCOUNTER — TELEPHONE (OUTPATIENT)
Dept: PRIMARY CARE CLINIC | Age: 60
End: 2020-06-20

## 2020-06-22 ENCOUNTER — HOSPITAL ENCOUNTER (OUTPATIENT)
Dept: INTERVENTIONAL RADIOLOGY/VASCULAR | Age: 60
Discharge: HOME | End: 2020-06-24
Payer: MEDICARE

## 2020-06-22 VITALS
RESPIRATION RATE: 19 BRPM | HEART RATE: 76 BPM | OXYGEN SATURATION: 95 % | DIASTOLIC BLOOD PRESSURE: 75 MMHG | WEIGHT: 196.65 LBS | TEMPERATURE: 97 F | BODY MASS INDEX: 34.84 KG/M2 | HEIGHT: 63 IN | SYSTOLIC BLOOD PRESSURE: 157 MMHG

## 2020-06-22 LAB
INR BLD: 1.1
PARTIAL THROMBOPLASTIN TIME: 34.7 SEC (ref 23.9–33.8)
PLATELET # BLD: 252 K/UL (ref 138–453)
POTASSIUM SERPL-SCNC: 4.2 MMOL/L (ref 3.7–5.3)
PROTHROMBIN TIME: 14 SEC (ref 11.5–14.2)

## 2020-06-22 PROCEDURE — 99153 MOD SED SAME PHYS/QHP EA: CPT | Performed by: RADIOLOGY

## 2020-06-22 PROCEDURE — 2709999900 IR FISTULAGRAM

## 2020-06-22 PROCEDURE — 7100000010 HC PHASE II RECOVERY - FIRST 15 MIN

## 2020-06-22 PROCEDURE — 85730 THROMBOPLASTIN TIME PARTIAL: CPT

## 2020-06-22 PROCEDURE — 6360000004 HC RX CONTRAST MEDICATION: Performed by: RADIOLOGY

## 2020-06-22 PROCEDURE — 84132 ASSAY OF SERUM POTASSIUM: CPT

## 2020-06-22 PROCEDURE — 99152 MOD SED SAME PHYS/QHP 5/>YRS: CPT | Performed by: RADIOLOGY

## 2020-06-22 PROCEDURE — 6360000002 HC RX W HCPCS: Performed by: RADIOLOGY

## 2020-06-22 PROCEDURE — 85610 PROTHROMBIN TIME: CPT

## 2020-06-22 PROCEDURE — 7100000011 HC PHASE II RECOVERY - ADDTL 15 MIN

## 2020-06-22 PROCEDURE — 85049 AUTOMATED PLATELET COUNT: CPT

## 2020-06-22 PROCEDURE — 2580000003 HC RX 258: Performed by: RADIOLOGY

## 2020-06-22 PROCEDURE — 36901 INTRO CATH DIALYSIS CIRCUIT: CPT | Performed by: RADIOLOGY

## 2020-06-22 RX ORDER — SODIUM CHLORIDE 0.9 % (FLUSH) 0.9 %
10 SYRINGE (ML) INJECTION PRN
Status: DISCONTINUED | OUTPATIENT
Start: 2020-06-22 | End: 2020-06-25 | Stop reason: HOSPADM

## 2020-06-22 RX ORDER — SODIUM CHLORIDE 9 MG/ML
INJECTION, SOLUTION INTRAVENOUS CONTINUOUS
Status: DISCONTINUED | OUTPATIENT
Start: 2020-06-22 | End: 2020-06-25 | Stop reason: HOSPADM

## 2020-06-22 RX ORDER — IODIXANOL 320 MG/ML
INJECTION, SOLUTION INTRAVASCULAR
Status: COMPLETED | OUTPATIENT
Start: 2020-06-22 | End: 2020-06-22

## 2020-06-22 RX ORDER — FENTANYL CITRATE 50 UG/ML
INJECTION, SOLUTION INTRAMUSCULAR; INTRAVENOUS
Status: COMPLETED | OUTPATIENT
Start: 2020-06-22 | End: 2020-06-22

## 2020-06-22 RX ORDER — ACETAMINOPHEN 325 MG/1
650 TABLET ORAL EVERY 4 HOURS PRN
Status: DISCONTINUED | OUTPATIENT
Start: 2020-06-22 | End: 2020-06-25 | Stop reason: HOSPADM

## 2020-06-22 RX ADMIN — SODIUM CHLORIDE: 9 INJECTION, SOLUTION INTRAVENOUS at 08:23

## 2020-06-22 RX ADMIN — IODIXANOL 50 ML: 320 INJECTION, SOLUTION INTRAVASCULAR at 09:57

## 2020-06-22 RX ADMIN — Medication 50 MCG: at 09:43

## 2020-06-22 ASSESSMENT — PAIN SCALES - GENERAL
PAINLEVEL_OUTOF10: 0

## 2020-06-22 ASSESSMENT — PAIN - FUNCTIONAL ASSESSMENT: PAIN_FUNCTIONAL_ASSESSMENT: 0-10

## 2020-06-22 ASSESSMENT — PAIN DESCRIPTION - DESCRIPTORS: DESCRIPTORS: ACHING

## 2020-06-22 NOTE — POST SEDATION
Sedation Post Procedure Note    Patient Name: Jt Duffy   YOB: 1960  Room/Bed: Room/bed info not found  Medical Record Number: [id number]  Date: 6/22/2020   Time: 10:01 AM         Physicians/Assistants: Christina Cortes MD    Procedure Performed:  Fistulagram    Post-Sedation Vital Signs:  Vitals:    06/22/20 0955   BP: (!) 152/88   Pulse: 78   Resp: 16   Temp:    SpO2: 100%      Vital signs were reviewed and were stable after the procedure (see flow sheet for vitals)            Complications: none    Electronically signed by Christina Cortes MD on 6/22/2020 at 10:01 AM

## 2020-06-22 NOTE — PRE SEDATION
Sedation Pre-Procedure Note    Patient Name: Frantz Ott   YOB: 1960  Room/Bed: Room/bed info not found  Medical Record Number: [id number]  Date: 6/22/2020   Time: 8:50 AM       Indication:  Malfunctioning AV fistula    Consent: I have discussed with the patient and/or the patient representative the indication, alternatives, and the possible risks and/or complications of the planned procedure and the anesthesia methods. The patient and/or patient representative appear to understand and agree to proceed. Vital Signs:   Vitals:    06/22/20 0757   BP: (!) 160/79   Pulse: 71   Resp: 16   Temp: 97.7 °F (36.5 °C)   SpO2: 96%       Past Medical History:   has a past medical history of CKD (chronic kidney disease) stage 3, GFR 30-59 ml/min ([facility]), Gout, [facility] Hyperlipidemia, Hypertension, Kidney stone, Nephrolithiasis, Osteoarthritis, and Type II or unspecified type diabetes mellitus without mention of complication, not stated as uncontrolled. Past Surgical History:   has a past surgical history that includes Hysterectomy; Lithotripsy; Thyroidectomy, partial; Gastric bypass surgery; Tonsillectomy; Total knee arthroplasty (Right, 01/06/2015); joint replacement; knee surgery (Left, 12/05/2018); Cardiac catheterization (10/21/2019); Dialysis fistula creation (11/06/2019); and Dialysis fistula creation (Left, 11/6/2019). Medications:   Scheduled Meds:   Continuous Infusions:    sodium chloride 125 mL/hr at 06/22/20 0823     PRN Meds: sodium chloride flush  Home Meds:   Prior to Admission medications    Medication Sig Start Date End Date Taking?  Authorizing Provider   atorvastatin (LIPITOR) 40 MG tablet TAKE 1 TABLET NIGHTLY 6/4/20  Yes Carolin Steele PA-C   gabapentin (NEURONTIN) 100 MG capsule TAKE 1 CAPSULE TWICE DAILY 6/10/20 9/8/20 Yes Carolin Steele PA-C   tiZANidine (ZANAFLEX) 4 MG tablet TAKE 1 TABLET BY MOUTH TWICE A DAY 5/6/20  Yes Carolin Steele PA-C incapacitating    Sedation/ Anesthesia Plan:   intravenous sedation    Medications Planned:   midazolam (Versed) intravenously and fentanyl intravenously    Patient is an appropriate candidate for plan of sedation: yes    Electronically signed by Candy Galvan MD on 6/22/2020 at 8:50 AM

## 2020-06-22 NOTE — H&P
History and Physical Update    Pt Name: Amador Green  MRN: [id number]  [place]: 1960  Date of evaluation: 6/22/2020      [x] I have reviewed the progress note found in the paper chart by Dr. Mariana Vogt from 06/15/2020 which meets the criteria for an Interval History and Physical note. [x] I have examined  Amador Green a 62 y.o., female who is scheduled for a left arm fistulogram in IR by Dr. Manny Hall due to elevated velocities suggestive of AVF stenosis. The patient denies health changes since her appointment with Dr. Thomas Pugh on 06/15/2020. Pt denies fever, chills, productive cough, SOB, chest pain, open sores, rashes, and wounds. Pt denies history of diabetes. ASA was last taken on 06/16/2020. Multivitamin was last taken on 06/21/2020. Vital signs: BP (!) 160/79   Pulse 71   Temp 97.7 °F (36.5 °C) (Temporal)   Resp 16   Ht 5' 3\" (1.6 m)   Wt 196 lb 10.4 oz (89.2 kg)   SpO2 96%   BMI 34.84 kg/m²      Allergies:  Latex; Penicillins; Codeine; Nsaids; and Tolmetin    Past medical history, surgical history, social history, and family history were reviewed and updated in EPIC as indicated. Medications:    Prior to Admission medications    Medication Sig Start Date End Date Taking?  Authorizing Provider   atorvastatin (LIPITOR) 40 MG tablet TAKE 1 TABLET NIGHTLY 6/4/20  Yes Avis Lefort, PA-C   gabapentin (NEURONTIN) 100 MG capsule TAKE 1 CAPSULE TWICE DAILY 6/10/20 9/8/20 Yes Avis Lefort, PA-C   tiZANidine (ZANAFLEX) 4 MG tablet TAKE 1 TABLET BY MOUTH TWICE A DAY 5/6/20  Yes Avis Lefort, PA-C   allopurinol (ZYLOPRIM) 100 MG tablet TAKE 1 TABLET DAILY 4/7/20  Yes Avis Lefort, PA-C   pantoprazole (PROTONIX) 20 MG tablet TAKE 1 TABLET DAILY 4/7/20  Yes Avis Lefort, PA-C   amLODIPine (NORVASC) 10 MG tablet TAKE 1 TABLET BY MOUTH DAILY 4/6/20  Yes Joie Ames MD   acetaminophen (TYLENOL) 500 MG tablet Take 1,000 mg by mouth every 6 hours as needed for Pain   Yes Historical Provider, MD   labetalol (NORMODYNE) 200 MG tablet Take 1 tablet by mouth every 8 hours 2/11/20  Yes Shania Bass PA-C   Multiple Vitamins-Minerals (MULTIVITAMIN ADULT EXTRA C PO) daily. Yes Historical Provider, MD   vitamin B-12 (CYANOCOBALAMIN) 1000 MCG tablet TAKE 1 TABLET BY MOUTH DAILY 7/27/18  Yes Historical Provider, MD   calcium citrate-vitamin D (CITRACEL+D) 200-250 MG-UNIT TABS per tablet TAKE 2 TABLETS THREE TIMES A DAY 4/7/18  Yes Historical Provider, MD   Transparent Dressings (TEGADERM ABSORBENT DRESSING) [address] [facility] Provide insurance covered tegaderm dressing, use at hemodialysis 12/18/19   Shania Bass PA-C   aspirin 81 MG EC tablet Take 1 tablet by mouth daily 12/9/19   Shania Bass PA-C   aspirin EC 81 MG EC tablet Take 1 tablet by mouth daily 12/9/19 3/8/20  Shania Bass PA-C   TRUE METRIX BLOOD GLUCOSE TEST strip  6/1/18   Historical Provider, MD   Blood Pressure KIT 1 box by Does not apply route three times daily 4/6/18   Shania Bass PA-C       This is a 62 y.o. female who is pleasant, cooperative, alert and oriented x 3, in no acute distress. Left arm AV fistula thrill and bruit present. Heart: Murmur 2/6. Regular rate and rhythm without gallop or rub. Lungs: Normal respiratory effort, unlabored, and clear to auscultation without wheezes or rales bilaterally. Abdomen: Soft, non-tender, non-distended with active bowel sounds. Pedal pulses: 2+ bilaterally. Labs:  No results for input(s): HGB, HCT, WBC, MCV, PLT, NA, K, CL, CO2, BUN, CREATININE, GLUCOSE, INR, PROTIME, APTT, AST, ALT, LABALBU, HCG in the last 720 hours.     Recent Labs     06/18/20  1100   COVID19 Not Detected                     INES Francisco  Electronically signed 6/22/2020 at 8:20 AM

## 2020-07-20 ENCOUNTER — HOSPITAL ENCOUNTER (OUTPATIENT)
Dept: GENERAL RADIOLOGY | Age: 60
Discharge: HOME | End: 2020-07-22
Payer: MEDICARE

## 2020-07-20 ENCOUNTER — HOSPITAL ENCOUNTER (OUTPATIENT)
Dept: PREADMISSION TESTING | Age: 60
Discharge: HOME | End: 2020-07-24
Payer: MEDICARE

## 2020-07-20 VITALS
SYSTOLIC BLOOD PRESSURE: 139 MMHG | RESPIRATION RATE: 16 BRPM | WEIGHT: 195 LBS | HEIGHT: 63 IN | DIASTOLIC BLOOD PRESSURE: 68 MMHG | OXYGEN SATURATION: 97 % | HEART RATE: 70 BPM | BODY MASS INDEX: 34.55 KG/M2

## 2020-07-20 LAB
ANION GAP SERPL CALCULATED.3IONS-SCNC: 14 MMOL/L (ref 9–17)
BUN BLDV-MCNC: 38 MG/DL (ref 6–20)
CHLORIDE BLD-SCNC: 104 MMOL/L (ref 98–107)
CO2: 25 MMOL/L (ref 20–31)
CREAT SERPL-MCNC: 5.3 MG/DL (ref 0.5–0.9)
GFR AFRICAN AMERICAN: 10 ML/MIN
GFR NON-AFRICAN AMERICAN: 8 ML/MIN
GFR SERPL CREATININE-BSD FRML MDRD: ABNORMAL ML/MIN/{1.73_M2}
GFR SERPL CREATININE-BSD FRML MDRD: ABNORMAL ML/MIN/{1.73_M2}
GLUCOSE BLD-MCNC: 120 MG/DL (ref 70–99)
HCT VFR BLD CALC: 40.6 % (ref 36.3–47.1)
HEMOGLOBIN: 12.2 G/DL (ref 11.9–15.1)
INR BLD: 1.1
MCH RBC QN AUTO: 29 PG (ref 25.2–33.5)
MCHC RBC AUTO-ENTMCNC: 30 G/DL (ref 28.4–34.8)
MCV RBC AUTO: 96.7 FL (ref 82.6–102.9)
NRBC AUTOMATED: 0 PER 100 WBC
PARTIAL THROMBOPLASTIN TIME: 36.9 SEC (ref 23.9–33.8)
PDW BLD-RTO: 16.1 % (ref 11.8–14.4)
PLATELET # BLD: 273 K/UL (ref 138–453)
PMV BLD AUTO: 9.3 FL (ref 8.1–13.5)
POTASSIUM SERPL-SCNC: 4.2 MMOL/L (ref 3.7–5.3)
PROTHROMBIN TIME: 13.7 SEC (ref 11.5–14.2)
RBC # BLD: 4.2 M/UL (ref 3.95–5.11)
SODIUM BLD-SCNC: 143 MMOL/L (ref 135–144)
WBC # BLD: 6.1 K/UL (ref 3.5–11.3)

## 2020-07-20 PROCEDURE — 85730 THROMBOPLASTIN TIME PARTIAL: CPT

## 2020-07-20 PROCEDURE — 85027 COMPLETE CBC AUTOMATED: CPT

## 2020-07-20 PROCEDURE — 93005 ELECTROCARDIOGRAM TRACING: CPT | Performed by: SURGERY

## 2020-07-20 PROCEDURE — 80051 ELECTROLYTE PANEL: CPT

## 2020-07-20 PROCEDURE — 85610 PROTHROMBIN TIME: CPT

## 2020-07-20 PROCEDURE — 82565 ASSAY OF CREATININE: CPT

## 2020-07-20 PROCEDURE — 82947 ASSAY GLUCOSE BLOOD QUANT: CPT

## 2020-07-20 PROCEDURE — 84520 ASSAY OF UREA NITROGEN: CPT

## 2020-07-20 PROCEDURE — 71046 X-RAY EXAM CHEST 2 VIEWS: CPT

## 2020-07-20 PROCEDURE — 36415 COLL VENOUS BLD VENIPUNCTURE: CPT

## 2020-07-20 RX ORDER — CLINDAMYCIN PHOSPHATE 900 MG/50ML
900 INJECTION INTRAVENOUS ONCE
Status: CANCELLED | OUTPATIENT
Start: 2020-07-29

## 2020-07-20 NOTE — H&P
History and Physical Service   [place] [address]    HISTORY AND PHYSICAL EXAMINATION            Date of Evaluation: 7/20/2020  Patient name:  Zahida Baker  MRN:   [id number]  YOB: 1960  PCP:    Osiel Figueroa PA-C    History Obtained From:     Patient, medical records    History of Present Illness: This is Zahida Baker a 62 y.o. female who presents today for a PRE-TESTING APPOINTMENT PRIOR TO A LIGATION OF TRIBUTARIES OF THE LEFT RADIAL CEPHALIC AV FISTULA  by Dr. Cecilia Tijerina  for REPAIR OF A NON-FUNCTIONING LEFT ARM AV FISTULA. .THE AV FISTULA  HAS NO AS YET BEEN USED FOR DIALYSIS. SHE HAS A DIALYSIS CATHETER IN HER RIGHT SHOULDER AND HAS DIALYSIS Tuesday, Thursday AND SATURDAYS. SHE PRESENTS FOR FISTULA REVISION. Functional Capacity:   1) Pt is able to walk 2 city blocks or more on level ground without SOB. 2) Pt is  able to climb 2 flights of stairs without SOB. 3) Pt  is not able to walk up a hill for 1-2 city blocks without SOB. SHE STATES THAT HER KNEES WOULD BE TOO PAINFUL AND THAT SHE WOULD GET SHORT OF BREATH WALKING UP A HILL.      Past Medical History:     Past Medical History:   Diagnosis Date    CKD (chronic kidney disease) stage 3, GFR 30-59 ml/min (HCC)     per pt, caused by kid stone damage    Gout     [facility]    Hyperlipidemia     Hypertension     Kidney stone     Nephrolithiasis     Osteoarthritis     Type II or unspecified type diabetes mellitus without mention of complication, not stated as uncontrolled     patient denies        Past Surgical History:     Past Surgical History:   Procedure Laterality Date    CARDIAC CATHETERIZATION  10/21/2019    DIALYSIS FISTULA CREATION  11/06/2019    LEFT ARM AV FISTULA CREATION (Left Arm Lower)    DIALYSIS FISTULA CREATION Left 11/6/2019    LEFT ARM AV FISTULA CREATION performed by Madiha Dominguez MD at [facility] REPLACEMENT      KNEE SURGERY Left 12/05/2018    LITHOTRIPSY      THYROIDECTOMY, PARTIAL      TONSILLECTOMY      TOTAL KNEE ARTHROPLASTY Right 01/06/2015        Medications Prior to Admission:     Prior to Admission medications    Medication Sig Start Date End Date Taking? Authorizing Provider   VITAMIN E PO Take 1 capsule by mouth daily   Yes Historical Provider, MD   allopurinol (ZYLOPRIM) 100 MG tablet TAKE 1 TABLET BY MOUTH DAILY 7/4/20  Yes João Chambers PA-C   pantoprazole (PROTONIX) 20 MG tablet TAKE 1 TABLET BY MOUTH DAILY 7/4/20  Yes João Chambers PA-C   atorvastatin (LIPITOR) 40 MG tablet TAKE 1 TABLET NIGHTLY 6/4/20  Yes João Chambers PA-C   gabapentin (NEURONTIN) 100 MG capsule TAKE 1 CAPSULE TWICE DAILY 6/10/20 9/8/20 Yes João Chambers PA-C   tiZANidine (ZANAFLEX) 4 MG tablet TAKE 1 TABLET BY MOUTH TWICE A DAY 5/6/20  Yes João Chambers PA-C   amLODIPine (NORVASC) 10 MG tablet TAKE 1 TABLET BY MOUTH DAILY 4/6/20  Yes Stephen Artist, MD   acetaminophen (TYLENOL) 500 MG tablet Take 1,000 mg by mouth every 6 hours as needed for Pain   Yes Historical Provider, MD   labetalol (NORMODYNE) 200 MG tablet Take 1 tablet by mouth every 8 hours 2/11/20  Yes João Chambers PA-C   aspirin 81 MG EC tablet Take 1 tablet by mouth daily 12/9/19  Yes João Chambers PA-C   Multiple Vitamins-Minerals (MULTIVITAMIN ADULT EXTRA C PO) daily.    Yes Historical Provider, MD   vitamin B-12 (CYANOCOBALAMIN) 1000 MCG tablet TAKE 1 TABLET BY MOUTH DAILY 7/27/18  Yes Historical Provider, MD   calcium citrate-vitamin D (CITRACEL+D) 200-250 MG-UNIT TABS per tablet TAKE 2 TABLETS THREE TIMES A DAY 4/7/18  Yes Historical Provider, MD   Transparent Dressings (TEGADERM ABSORBENT DRESSING) [address] [facility] Provide insurance covered tegaderm dressing, use at hemodialysis 12/18/19   João Chambers PA-C   aspirin EC 81 MG EC tablet Take 1 tablet by mouth daily 12/9/19 3/8/20  JUAN Gomes METRIX BLOOD GLUCOSE TEST strip  6/1/18   Historical Provider, MD   Blood Pressure KIT 1 box by Does not apply route three times daily 4/6/18   Ramses Aguila PA-C        Allergies:     Latex; Penicillins; Codeine; Nsaids; and Tolmetin    Social History:     Tobacco:    reports that she quit smoking about 19 years ago. Her smoking use included cigarettes. She has a 30.00 pack-year smoking history. She has never used smokeless tobacco.  Alcohol:      reports no history of alcohol use. Drug Use:  reports no history of drug use. Family History:     Family History   Problem Relation Age of Onset    Heart Disease Mother     Diabetes Mother     Heart Disease Father     High Blood Pressure Father        Review of Systems:     Positive and Negative as described in HPI. CONSTITUTIONAL:  negative for fevers, chills, HAS sweats, fatigue, weight loss ONLY WITH DIALYSIS MACHINE. HEENT:  WEARS READING GLASSES  for vision, hearing changes, runny nose, throat pain  RESPIRATORY:  negative for shortness of breath, cough, congestion, wheezing. CARDIOVASCULAR:  negative for chest pain, palpitations. HAS HYPERTENSION  GASTROINTESTINAL:  negative for nausea, vomiting, diarrhea, constipation, change in bowel habits, abdominal pain   GENITOURINARY:  negative for difficulty of urination, burning with urination, frequency  IS ON HEMODIALYSIS FOR END STAGE KIDNEY DISEASE BUT STILL MAKES URINE   INTEGUMENT:  negative for rash, skin lesions, easy bruising   HEMATOLOGIC/LYMPHATIC:  negative for swelling/edema   ALLERGIC/IMMUNOLOGIC:  negative for urticaria , itching  ENDOCRINE:  negative increase in drinking, increase in urination, hot or cold intolerance  MUSCULOSKELETAL:  HAS ARTHRITIC  joint pains, muscle aches, swelling of joints  NEUROLOGICAL:  negative for headaches, dizziness, lightheadedness, numbness, pain, tingling extremities  BEHAVIOR/PSYCH:  negative for depression, anxiety    Physical Exam:   /68   Pulse 70 Resp 16   Ht 5' 2.5\" (1.588 m)   Wt 195 lb (88.5 kg)   SpO2 97%   BMI 35.10 kg/m²   No LMP recorded. Patient has had a hysterectomy. No obstetric history on file. No results for input(s): POCGLU in the last 72 hours. General Appearance:  alert, well appearing, and in no acute distress  Mental status: oriented to person, place, and time with normal affect  Head:  normocephalic, atraumatic. Eye: no icterus, redness, pupils equal and reactive, extraocular eye movements intact, conjunctiva clear  Ear: normal external ear, no discharge, hearing intact  Nose:  no drainage noted  Mouth: mucous membranes moist  Neck: supple, no carotid bruits, thyroid not palpable  Lungs: Bilateral equal air entry, clear to ausculation, no wheezing, rales or rhonchi, normal effort  Cardiovascular: normal rate, regular rhythm, ALEXI 2/6 murmur, gallop, rub.   Abdomen: Soft, nontender, nondistended, normal bowel sounds,X 4    Neurologic: There are no new focal motor or sensory deficits, normal muscle tone and bulk, no abnormal sensation, normal speech, cranial nerves II through XII grossly intact  Skin: No gross lesions, rashes, bruising or bleeding on exposed skin area  Extremities:  peripheral pulses palpable, no pedal edema or calf pain with palpation  Psych: normal affect     Investigations:      Laboratory Testing:  Recent Results (from the past 24 hour(s))   CBC    Collection Time: 07/20/20  1:32 PM   Result Value Ref Range    WBC 6.1 3.5 - 11.3 k/uL    RBC 4.20 3.95 - 5.11 m/uL    Hemoglobin 12.2 11.9 - 15.1 g/dL    Hematocrit 40.6 36.3 - 47.1 %    MCV 96.7 82.6 - 102.9 fL    MCH 29.0 25.2 - 33.5 pg    MCHC 30.0 28.4 - 34.8 g/dL    RDW 16.1 (H) 11.8 - 14.4 %    Platelets 091 500 - 963 k/uL    MPV 9.3 8.1 - 13.5 fL    NRBC Automated 0.0 0.0 per 100 WBC   Protime-INR    Collection Time: 07/20/20  1:32 PM   Result Value Ref Range    Protime 13.7 11.5 - 14.2 sec    INR 1.1    APTT    Collection Time: 07/20/20  1:32 PM   Result Value Ref Range    PTT 36.9 (H) 23.9 - 33.8 sec   Electrolyte Panel    Collection Time: 07/20/20  1:32 PM   Result Value Ref Range    Sodium 143 135 - 144 mmol/L    Potassium 4.2 3.7 - 5.3 mmol/L    Chloride 104 98 - 107 mmol/L    CO2 25 20 - 31 mmol/L    Anion Gap 14 9 - 17 mmol/L   BUN & Creatinine    Collection Time: 07/20/20  1:32 PM   Result Value Ref Range    BUN 38 (H) 6 - 20 mg/dL    CREATININE 5.30 (HH) 0.50 - 0.90 mg/dL    GFR Non-African American 8 (L) >60 mL/min    GFR  10 (L) >60 mL/min    GFR Comment          GFR Staging NOT REPORTED    Glucose    Collection Time: 07/20/20  1:32 PM   Result Value Ref Range    Glucose 120 (H) 70 - 99 mg/dL       Recent Labs     07/20/20  1332   HGB 12.2   HCT 40.6   WBC 6.1   MCV 96.7      K 4.2      CO2 25   BUN 38*   CREATININE 5.30*   GLUCOSE 120*   INR 1.1   PROTIME 13.7   APTT 36.9*       Imaging/Diagnostics:    EKG ON THE SHORT CHART    Diagnosis:    1. POORLY FUNCTIONING AV FISTULA  LEFT ARM    Plans:     1.  LIGATION OF TRIBUTARIES LEFT RADIAL CEPHALIC AV FISTULA      NAV Pizano CNP  7/20/2020  3:34 PM

## 2020-07-20 NOTE — PRE-PROCEDURE INSTRUCTIONS
[facility] ON Wednesday, 7/29/20 at 10:00 AM    On the Day of Surgery: Drive up to the main entrance and remain in your vehicle. Call [phone] from your vehicle for additional directions. No family members or other visitors will be able to enter the building with you. Continue to take your home medications as you normally do up to and including the night before surgery with the exception of any blood thinning medications. Please stop any blood thinning medications as directed by your surgeon or prescribing physician. Failure to stop certain medications may interfere with your scheduled surgery. These may include:  Aspirin, Warfarin (Coumadin), Clopidogrel (Plavix), Ibuprofen (Motrin, Advil), Naproxen (Aleve), Meloxicam (Mobic), Celecoxib (Celebrex), Eliquis, Pradaxa, Xarelto, Effient, Fish Oil, Herbal supplements. Vitamin E (stop 7/21)    If you are diabetic, do not take any of your diabetic medications by mouth the morning of surgery. If you are taking insulin contact the doctor that manages your diabetes for instructions about any changes to your insulin dosages the day before surgery. Do not inject insulin or other injectable diabetic medications the morning of surgery unless otherwise instructed by the doctor who manages your diabetes. Please take the following medication(s) the day of surgery with a small sip of water:  Labetalol; Amlodipine; Pantoprozole        PREPARING FOR YOUR SURGERY:     Before surgery, you can play an important role in your own health. Because skin is not sterile, we need to be sure that your skin is as free of germs as possible before surgery by carefully washing before surgery. Preparing or prepping skin before surgery can reduce the risk of a surgical site infection.   Do not shave the area of your body where your surgery will be performed unless you received specific permission from your physician.     You will need to shower at home the night prior to surgery.  Brush your teeth but do not swallow water.  Bring your eyeglasses and case with you. No contacts are to be worn the day of surgery. You also may bring your hearing aids. Most surgical procedures involving anesthesia will require that you remove your dentures prior to surgery.  If you are on C-PAP or Bi-PAP at home and plan on staying in the hospital overnight for your surgery please bring the machine with you. · Do not wear any jewelry or body piercings day of surgery. Also, NO lotion, perfume or deodorant to be used the day of surgery. No nail polish on the operative extremity (arm/leg surgeries)    · If you are staying overnight with us, please bring a small bag of necessary personal items.  Please wear loose, comfortable clothing. If you are potentially going to have a cast or brace bring clothing that will fit over them.  In case of illness - If you have cold or flu like symptoms (high fever, runny nose, sore throat, cough, etc.) rash, nausea, vomiting, loose stools, and/or recent contact with someone who has a contagious disease (chicken pox, measles, etc.) Please call your doctor before coming to the hospital.         Day of Surgery/Procedure:    As a patient at [facility] you can expect quality medical and nursing care that is centered on your individual needs. Our goal is to make your surgical experience as comfortable as possible    . Transportation After Your Surgery/Procedure: You will need a friend or family member to drive you home after your procedure. Your  must be 25years of age or older. Discharge instructions will be reviewed with family/friend by phone. A taxi cab or any other form of public transportation is not acceptable.       Someone must remain with you for the first 24 hours after your surgery if you receive anesthesia or medication. If you do not have someone to stay with you, your procedure may be cancelled.       If you have any other questions regarding your procedure or the day of surgery, please call [phone] testing appointment 7/25//20 @1:50 pm    _________________________  ____________________________

## 2020-07-20 NOTE — H&P (VIEW-ONLY)
History and Physical Service   [place] [address]    HISTORY AND PHYSICAL EXAMINATION            Date of Evaluation: 7/20/2020  Patient name:  Chandra Rowe  MRN:   [id number]  YOB: 1960  PCP:    Marcelo Childs PA-C    History Obtained From:     Patient, medical records    History of Present Illness: This is Chandra Rowe a 62 y.o. female who presents today for a PRE-TESTING APPOINTMENT PRIOR TO A LIGATION OF TRIBUTARIES OF THE LEFT RADIAL CEPHALIC AV FISTULA  by Dr. Bradford Lai  for REPAIR OF A NON-FUNCTIONING LEFT ARM AV FISTULA. .THE AV FISTULA  HAS NO AS YET BEEN USED FOR DIALYSIS. SHE HAS A DIALYSIS CATHETER IN HER RIGHT SHOULDER AND HAS DIALYSIS Tuesday, Thursday AND SATURDAYS. SHE PRESENTS FOR FISTULA REVISION. Functional Capacity:   1) Pt is able to walk 2 city blocks or more on level ground without SOB. 2) Pt is  able to climb 2 flights of stairs without SOB. 3) Pt  is not able to walk up a hill for 1-2 city blocks without SOB. SHE STATES THAT HER KNEES WOULD BE TOO PAINFUL AND THAT SHE WOULD GET SHORT OF BREATH WALKING UP A HILL.      Past Medical History:     Past Medical History:   Diagnosis Date    CKD (chronic kidney disease) stage 3, GFR 30-59 ml/min (HCC)     per pt, caused by kid stone damage    Gout     [facility]    Hyperlipidemia     Hypertension     Kidney stone     Nephrolithiasis     Osteoarthritis     Type II or unspecified type diabetes mellitus without mention of complication, not stated as uncontrolled     patient denies        Past Surgical History:     Past Surgical History:   Procedure Laterality Date    CARDIAC CATHETERIZATION  10/21/2019    DIALYSIS FISTULA CREATION  11/06/2019    LEFT ARM AV FISTULA CREATION (Left Arm Lower)    DIALYSIS FISTULA CREATION Left 11/6/2019    LEFT ARM AV FISTULA CREATION performed by Artemio Loza MD at [facility] REPLACEMENT      KNEE SURGERY Left 12/05/2018    LITHOTRIPSY      THYROIDECTOMY, PARTIAL      TONSILLECTOMY      TOTAL KNEE ARTHROPLASTY Right 01/06/2015        Medications Prior to Admission:     Prior to Admission medications    Medication Sig Start Date End Date Taking? Authorizing Provider   VITAMIN E PO Take 1 capsule by mouth daily   Yes Historical Provider, MD   allopurinol (ZYLOPRIM) 100 MG tablet TAKE 1 TABLET BY MOUTH DAILY 7/4/20  Yes João Chambers PA-C   pantoprazole (PROTONIX) 20 MG tablet TAKE 1 TABLET BY MOUTH DAILY 7/4/20  Yes João Chambers PA-C   atorvastatin (LIPITOR) 40 MG tablet TAKE 1 TABLET NIGHTLY 6/4/20  Yes João Chambers PA-C   gabapentin (NEURONTIN) 100 MG capsule TAKE 1 CAPSULE TWICE DAILY 6/10/20 9/8/20 Yes João Chambers PA-C   tiZANidine (ZANAFLEX) 4 MG tablet TAKE 1 TABLET BY MOUTH TWICE A DAY 5/6/20  Yes João Chambers PA-C   amLODIPine (NORVASC) 10 MG tablet TAKE 1 TABLET BY MOUTH DAILY 4/6/20  Yes Stephen Artist, MD   acetaminophen (TYLENOL) 500 MG tablet Take 1,000 mg by mouth every 6 hours as needed for Pain   Yes Historical Provider, MD   labetalol (NORMODYNE) 200 MG tablet Take 1 tablet by mouth every 8 hours 2/11/20  Yes João Chambers PA-C   aspirin 81 MG EC tablet Take 1 tablet by mouth daily 12/9/19  Yes João Chambers PA-C   Multiple Vitamins-Minerals (MULTIVITAMIN ADULT EXTRA C PO) daily.    Yes Historical Provider, MD   vitamin B-12 (CYANOCOBALAMIN) 1000 MCG tablet TAKE 1 TABLET BY MOUTH DAILY 7/27/18  Yes Historical Provider, MD   calcium citrate-vitamin D (CITRACEL+D) 200-250 MG-UNIT TABS per tablet TAKE 2 TABLETS THREE TIMES A DAY 4/7/18  Yes Historical Provider, MD   Transparent Dressings (TEGADERM ABSORBENT DRESSING) [address] [facility] Provide insurance covered tegaderm dressing, use at hemodialysis 12/18/19   João Chambers PA-C   aspirin EC 81 MG EC tablet Take 1 tablet by mouth daily 12/9/19 3/8/20  JUAN Gomes METRIX BLOOD GLUCOSE TEST strip  6/1/18   Historical Provider, MD   Blood Pressure KIT 1 box by Does not apply route three times daily 4/6/18   Yudelka Thornton PA-C        Allergies:     Latex; Penicillins; Codeine; Nsaids; and Tolmetin    Social History:     Tobacco:    reports that she quit smoking about 19 years ago. Her smoking use included cigarettes. She has a 30.00 pack-year smoking history. She has never used smokeless tobacco.  Alcohol:      reports no history of alcohol use. Drug Use:  reports no history of drug use. Family History:     Family History   Problem Relation Age of Onset    Heart Disease Mother     Diabetes Mother     Heart Disease Father     High Blood Pressure Father        Review of Systems:     Positive and Negative as described in HPI. CONSTITUTIONAL:  negative for fevers, chills, HAS sweats, fatigue, weight loss ONLY WITH DIALYSIS MACHINE. HEENT:  WEARS READING GLASSES  for vision, hearing changes, runny nose, throat pain  RESPIRATORY:  negative for shortness of breath, cough, congestion, wheezing. CARDIOVASCULAR:  negative for chest pain, palpitations. HAS HYPERTENSION  GASTROINTESTINAL:  negative for nausea, vomiting, diarrhea, constipation, change in bowel habits, abdominal pain   GENITOURINARY:  negative for difficulty of urination, burning with urination, frequency  IS ON HEMODIALYSIS FOR END STAGE KIDNEY DISEASE BUT STILL MAKES URINE   INTEGUMENT:  negative for rash, skin lesions, easy bruising   HEMATOLOGIC/LYMPHATIC:  negative for swelling/edema   ALLERGIC/IMMUNOLOGIC:  negative for urticaria , itching  ENDOCRINE:  negative increase in drinking, increase in urination, hot or cold intolerance  MUSCULOSKELETAL:  HAS ARTHRITIC  joint pains, muscle aches, swelling of joints  NEUROLOGICAL:  negative for headaches, dizziness, lightheadedness, numbness, pain, tingling extremities  BEHAVIOR/PSYCH:  negative for depression, anxiety    Physical Exam:   /68   Pulse 70 Resp 16   Ht 5' 2.5\" (1.588 m)   Wt 195 lb (88.5 kg)   SpO2 97%   BMI 35.10 kg/m²   No LMP recorded. Patient has had a hysterectomy. No obstetric history on file. No results for input(s): POCGLU in the last 72 hours. General Appearance:  alert, well appearing, and in no acute distress  Mental status: oriented to person, place, and time with normal affect  Head:  normocephalic, atraumatic. Eye: no icterus, redness, pupils equal and reactive, extraocular eye movements intact, conjunctiva clear  Ear: normal external ear, no discharge, hearing intact  Nose:  no drainage noted  Mouth: mucous membranes moist  Neck: supple, no carotid bruits, thyroid not palpable  Lungs: Bilateral equal air entry, clear to ausculation, no wheezing, rales or rhonchi, normal effort  Cardiovascular: normal rate, regular rhythm, ALEXI 2/6 murmur, gallop, rub.   Abdomen: Soft, nontender, nondistended, normal bowel sounds,X 4    Neurologic: There are no new focal motor or sensory deficits, normal muscle tone and bulk, no abnormal sensation, normal speech, cranial nerves II through XII grossly intact  Skin: No gross lesions, rashes, bruising or bleeding on exposed skin area  Extremities:  peripheral pulses palpable, no pedal edema or calf pain with palpation  Psych: normal affect     Investigations:      Laboratory Testing:  Recent Results (from the past 24 hour(s))   CBC    Collection Time: 07/20/20  1:32 PM   Result Value Ref Range    WBC 6.1 3.5 - 11.3 k/uL    RBC 4.20 3.95 - 5.11 m/uL    Hemoglobin 12.2 11.9 - 15.1 g/dL    Hematocrit 40.6 36.3 - 47.1 %    MCV 96.7 82.6 - 102.9 fL    MCH 29.0 25.2 - 33.5 pg    MCHC 30.0 28.4 - 34.8 g/dL    RDW 16.1 (H) 11.8 - 14.4 %    Platelets 072 792 - 504 k/uL    MPV 9.3 8.1 - 13.5 fL    NRBC Automated 0.0 0.0 per 100 WBC   Protime-INR    Collection Time: 07/20/20  1:32 PM   Result Value Ref Range    Protime 13.7 11.5 - 14.2 sec    INR 1.1    APTT    Collection Time: 07/20/20  1:32 PM   Result Value Ref Range    PTT 36.9 (H) 23.9 - 33.8 sec   Electrolyte Panel    Collection Time: 07/20/20  1:32 PM   Result Value Ref Range    Sodium 143 135 - 144 mmol/L    Potassium 4.2 3.7 - 5.3 mmol/L    Chloride 104 98 - 107 mmol/L    CO2 25 20 - 31 mmol/L    Anion Gap 14 9 - 17 mmol/L   BUN & Creatinine    Collection Time: 07/20/20  1:32 PM   Result Value Ref Range    BUN 38 (H) 6 - 20 mg/dL    CREATININE 5.30 (HH) 0.50 - 0.90 mg/dL    GFR Non-African American 8 (L) >60 mL/min    GFR  10 (L) >60 mL/min    GFR Comment          GFR Staging NOT REPORTED    Glucose    Collection Time: 07/20/20  1:32 PM   Result Value Ref Range    Glucose 120 (H) 70 - 99 mg/dL       Recent Labs     07/20/20  1332   HGB 12.2   HCT 40.6   WBC 6.1   MCV 96.7      K 4.2      CO2 25   BUN 38*   CREATININE 5.30*   GLUCOSE 120*   INR 1.1   PROTIME 13.7   APTT 36.9*       Imaging/Diagnostics:    EKG ON THE SHORT CHART    Diagnosis:    1. POORLY FUNCTIONING AV FISTULA  LEFT ARM    Plans:     1.  LIGATION OF TRIBUTARIES LEFT RADIAL CEPHALIC AV FISTULA      NAV Gonzlaez CNP  7/20/2020  3:34 PM

## 2020-07-21 ENCOUNTER — VIRTUAL VISIT (OUTPATIENT)
Dept: PRIMARY CARE CLINIC | Age: 60
End: 2020-07-21
Payer: MEDICARE

## 2020-07-21 LAB
EKG ATRIAL RATE: 70 BPM
EKG P AXIS: 61 DEGREES
EKG P-R INTERVAL: 176 MS
EKG Q-T INTERVAL: 380 MS
EKG QRS DURATION: 96 MS
EKG QTC CALCULATION (BAZETT): 410 MS
EKG R AXIS: -7 DEGREES
EKG T AXIS: 31 DEGREES
EKG VENTRICULAR RATE: 70 BPM

## 2020-07-21 PROCEDURE — 99442 PR PHYS/QHP TELEPHONE EVALUATION 11-20 MIN: CPT | Performed by: PHYSICIAN ASSISTANT

## 2020-07-21 PROCEDURE — 93010 ELECTROCARDIOGRAM REPORT: CPT | Performed by: INTERNAL MEDICINE

## 2020-07-21 RX ORDER — LABETALOL 200 MG/1
200 TABLET, FILM COATED ORAL 2 TIMES DAILY
Qty: 60 TABLET | Refills: 3 | Status: SHIPPED | OUTPATIENT
Start: 2020-07-21 | End: 2020-11-05 | Stop reason: SDUPTHER

## 2020-07-21 NOTE — PROGRESS NOTES
reviewed, refilled labetalol 200 mg. HPI    Review of Systems   Constitutional: Negative for chills and fever. HENT: Negative for congestion. Respiratory: Negative for cough, shortness of breath and wheezing. Cardiovascular: Negative for chest pain. Gastrointestinal: Negative for constipation, diarrhea, nausea and vomiting. Genitourinary: Negative for dysuria, frequency and urgency. Musculoskeletal: Negative for back pain, myalgias and neck pain. Neurological: Negative for headaches. Prior to Visit Medications    Medication Sig Taking? Authorizing Provider   labetalol (NORMODYNE) 200 MG tablet Take 1 tablet by mouth 2 times daily Yes Princess Billingsley PA-C   VITAMIN E PO Take 1 capsule by mouth daily Yes Historical Provider, MD   allopurinol (ZYLOPRIM) 100 MG tablet TAKE 1 TABLET BY MOUTH DAILY Yes Princess Billingsley PA-C   pantoprazole (PROTONIX) 20 MG tablet TAKE 1 TABLET BY MOUTH DAILY Yes Princess Billingsley PA-C   atorvastatin (LIPITOR) 40 MG tablet TAKE 1 TABLET NIGHTLY Yes Princess Billingsley PA-C   gabapentin (NEURONTIN) 100 MG capsule TAKE 1 CAPSULE TWICE DAILY Yes Princess Billingsley PA-C   amLODIPine (NORVASC) 10 MG tablet TAKE 1 TABLET BY MOUTH DAILY Yes Bowen Avelar MD   Transparent Dressings (TEGADERM ABSORBENT DRESSING) MISC Provide insurance covered tegaderm dressing, use at hemodialysis Yes Princess Billingsley PA-C   aspirin 81 MG EC tablet Take 1 tablet by mouth daily Yes Princess Billingsley PA-C   TRUE METRIX BLOOD GLUCOSE TEST strip  Yes Historical Provider, MD   Multiple Vitamins-Minerals (MULTIVITAMIN ADULT EXTRA C PO) daily.  Yes Historical Provider, MD   vitamin B-12 (CYANOCOBALAMIN) 1000 MCG tablet TAKE 1 TABLET BY MOUTH DAILY Yes Historical Provider, MD   calcium citrate-vitamin D (CITRACEL+D) 200-250 MG-UNIT TABS per tablet TAKE 2 TABLETS THREE TIMES A DAY Yes Historical Provider, MD   Blood Pressure KIT 1 box by Does not apply route three times daily Yes Efren Friend PA-C   tiZANidine (ZANAFLEX) 4 MG tablet TAKE 1 TABLET BY MOUTH TWICE A DAY  Efren Friend PA-C   acetaminophen (TYLENOL) 500 MG tablet Take 1,000 mg by mouth every 6 hours as needed for Pain  Historical Provider, MD   aspirin EC 81 MG EC tablet Take 1 tablet by mouth daily  Efren Friend PA-C       Social History     Tobacco Use    Smoking status: Former Smoker     Packs/day: 1.00     Years: 30.00     Pack years: 30.00     Types: Cigarettes     Last attempt to quit: 3/21/2001     Years since quittin.3    Smokeless tobacco: Never Used   Substance Use Topics    Alcohol use: No    Drug use: No        Past Medical History:   Diagnosis Date    CKD (chronic kidney disease) stage 3, GFR 30-59 ml/min (HCC)     per pt, caused by kid stone damage    Gout     Hemodialysis patient ([facility] [address] 10/21/2019    Hyperlipidemia     Hypertension     Kidney stone     Nephrolithiasis     Osteoarthritis     Type II or unspecified type diabetes mellitus without mention of complication, not stated as uncontrolled     patient denies              RECORD REVIEW: Previous medical records were reviewed at today's visit. PHYSICAL EXAMINATION:    Vital Signs: (As obtained by patient/caregiver at home)  There were no vitals filed for this visit. Patient-Reported Vitals 2020   Patient-Reported Systolic 627   Patient-Reported Diastolic 510          Physical Exam  Vitals signs reviewed. Constitutional:       General: She is awake. Neurological:      Mental Status: She is alert and oriented to person, place, and time. Psychiatric:         Speech: Speech normal.         Behavior: Behavior is cooperative. Thought Content: Thought content normal.         Cognition and Memory: Cognition normal.         Judgment: Judgment normal.           Other pertinent observable physical exam findings:-     RECORD REVIEW: Previous medical records were reviewed at today's visit.      The past family, medical and social histories were reviewed and unchanged with the exceptions of what is mentioned in this note. Due to this being a TeleHealth encounter, evaluation of the following organ systems is limited: Vitals/Constitutional/EENT/Resp/CV/GI//MS/Neuro/Skin/Heme-Lymph-Imm. ASSESSMENT/PLAN:  Mitchell was seen today for follow-up. Diagnoses and all orders for this visit:    Essential hypertension  -     labetalol (NORMODYNE) 200 MG tablet; Take 1 tablet by mouth 2 times daily    Myalgia    ESRD on hemodialysis ([facility] [address]        Return in about 3 months (around 10/21/2020) for ESRD, HM, Physical, MIGUELITO GAP. An  electronic signature was used to authenticate this note. --Yudelka Thornton PA-C on 7/28/2020 at 6:14 PM    This note is created with the assistance of a speech-recognition program. While intending to generate a document that actually reflects the content of the visit, the document can still have some mistakes which may not have been identified and corrected by editing. 9    Pursuant to the emergency declaration under the [address] waiver authority and the [employer] and [employer] Act, this Virtual  Visit was conducted, with patient's consent, to reduce the patient's risk of exposure to COVID-19 and provide continuity of care for an established patient. Services were provided through a video synchronous discussion virtually to substitute for in-person clinic visit.

## 2020-07-21 NOTE — PATIENT INSTRUCTIONS
Break Zanaflex in half and take, if drowsiness is still occurring to stop medication and contact PCP        CHECK OUT CALL NEEDED  SCHEDULE FOR APPT IN 3 MONTHS

## 2020-07-21 NOTE — PROGRESS NOTES
Visit Information    Have you changed or started any medications since your last visit including any over-the-counter medicines, vitamins, or herbal medicines? no   Are you having any side effects from any of your medications? -  no  Have you stopped taking any of your medications? Is so, why? -  no    Have you seen any other physician or provider since your last visit? No  Have you had any other diagnostic tests since your last visit? No  Have you been seen in the emergency room and/or had an admission to a hospital since we last saw you? No  Have you had your routine dental cleaning in the past 6 months? no    Have you activated your [employer] account? If not, what are your barriers?  Yes     Patient Care Team:  Shanti Walters PA-C as PCP - General  Shanti Walters PA-C as PCP - [facility] Provider    Medical History Review  Past Medical, Family, and Social History reviewed and does contribute to the patient presenting condition    Health Maintenance   Topic Date Due    Diabetic retinal exam  03/21/2016    Diabetic foot exam  07/14/2016    Cervical cancer screen  02/18/2017    Breast cancer screen  06/13/2020    DTaP/Tdap/Td vaccine (1 - Tdap) 12/09/2020 (Originally 2/3/1980)    Shingles Vaccine (1 of 2) 12/09/2020 (Originally 2/3/2011)    Pneumococcal 0-64 years Vaccine (1 of 3 - PCV13) 12/09/2020 (Originally 2/3/1967)    Flu vaccine (1) 09/01/2020    Lipid screen  10/18/2020    A1C test (Diabetic or Prediabetic)  02/11/2021    Potassium monitoring  07/20/2021    Creatinine monitoring  07/20/2021    Colon cancer screen colonoscopy  06/13/2028    Hepatitis C screen  Completed    HIV screen  Completed    Hepatitis A vaccine  Aged Out    Hib vaccine  Aged Out    Meningococcal (ACWY) vaccine  Aged Out    Hepatitis B vaccine  Discontinued

## 2020-07-25 ENCOUNTER — HOSPITAL ENCOUNTER (OUTPATIENT)
Dept: PREADMISSION TESTING | Age: 60
Setting detail: SPECIMEN
Discharge: HOME | End: 2020-07-29
Payer: MEDICARE

## 2020-07-25 PROCEDURE — U0003 INFECTIOUS AGENT DETECTION BY NUCLEIC ACID (DNA OR RNA); SEVERE ACUTE RESPIRATORY SYNDROME CORONAVIRUS 2 (SARS-COV-2) (CORONAVIRUS DISEASE [COVID-19]), AMPLIFIED PROBE TECHNIQUE, MAKING USE OF HIGH THROUGHPUT TECHNOLOGIES AS DESCRIBED BY CMS-2020-01-R: HCPCS

## 2020-07-27 LAB — SARS-COV-2, NAA: NOT DETECTED

## 2020-07-28 ENCOUNTER — TELEPHONE (OUTPATIENT)
Dept: PRIMARY CARE CLINIC | Age: 60
End: 2020-07-28

## 2020-07-28 ENCOUNTER — ANESTHESIA EVENT (OUTPATIENT)
Dept: OPERATING ROOM | Age: 60
End: 2020-07-28
Payer: MEDICARE

## 2020-07-28 RX ORDER — TIZANIDINE 4 MG/1
TABLET ORAL
Qty: 60 TABLET | Refills: 2 | Status: SHIPPED | OUTPATIENT
Start: 2020-07-28 | End: 2020-10-19

## 2020-07-28 ASSESSMENT — ENCOUNTER SYMPTOMS
BACK PAIN: 0
NAUSEA: 0
VOMITING: 0
COUGH: 0
DIARRHEA: 0
CONSTIPATION: 0
WHEEZING: 0
SHORTNESS OF BREATH: 0

## 2020-07-28 NOTE — TELEPHONE ENCOUNTER
mellitus ([address]     Hyperlipidemia     Microalbuminuria     S/P gastric bypass     Anemia     Chronic kidney disease     S/P total knee arthroplasty     Hyperuricemia     Hydronephrosis of left kidney     Renal atrophy, right     Localized, primary osteoarthritis of hand     Essential hypertension     Renal insufficiency     Motion sickness     Slow transit constipation     Chest pain     Aortic dissection (HCC)     JINNY (acute kidney injury) ([address]     Respiratory acidosis     Subacromial impingement     Acute on chronic diastolic (congestive) heart failure (HCC)     Moderate mitral regurgitation     Hypertensive emergency     Acute respiratory failure with hypoxia [facility])     Former smoker     Acute pulmonary edema (HCC)     Acute on chronic congestive heart failure ([address]     ESRD on hemodialysis ([facility] [address]

## 2020-07-29 ENCOUNTER — HOSPITAL ENCOUNTER (OUTPATIENT)
Age: 60
Setting detail: OUTPATIENT SURGERY
Discharge: HOME | End: 2020-07-29
Attending: SURGERY | Admitting: SURGERY
Payer: MEDICARE

## 2020-07-29 ENCOUNTER — ANESTHESIA (OUTPATIENT)
Dept: OPERATING ROOM | Age: 60
End: 2020-07-29
Payer: MEDICARE

## 2020-07-29 VITALS
HEIGHT: 63 IN | RESPIRATION RATE: 19 BRPM | DIASTOLIC BLOOD PRESSURE: 80 MMHG | SYSTOLIC BLOOD PRESSURE: 143 MMHG | BODY MASS INDEX: 34.55 KG/M2 | TEMPERATURE: 97 F | HEART RATE: 77 BPM | OXYGEN SATURATION: 96 % | WEIGHT: 195 LBS

## 2020-07-29 VITALS — DIASTOLIC BLOOD PRESSURE: 63 MMHG | OXYGEN SATURATION: 96 % | SYSTOLIC BLOOD PRESSURE: 113 MMHG

## 2020-07-29 LAB
GLUCOSE BLD-MCNC: 93 MG/DL (ref 65–105)
POTASSIUM SERPL-SCNC: 4 MMOL/L (ref 3.7–5.3)

## 2020-07-29 PROCEDURE — 84132 ASSAY OF SERUM POTASSIUM: CPT

## 2020-07-29 PROCEDURE — 7100000010 HC PHASE II RECOVERY - FIRST 15 MIN: Performed by: SURGERY

## 2020-07-29 PROCEDURE — 3600000012 HC SURGERY LEVEL 2 ADDTL 15MIN: Performed by: SURGERY

## 2020-07-29 PROCEDURE — 3700000000 HC ANESTHESIA ATTENDED CARE: Performed by: SURGERY

## 2020-07-29 PROCEDURE — 3700000001 HC ADD 15 MINUTES (ANESTHESIA): Performed by: SURGERY

## 2020-07-29 PROCEDURE — 6360000002 HC RX W HCPCS: Performed by: SURGERY

## 2020-07-29 PROCEDURE — 82947 ASSAY GLUCOSE BLOOD QUANT: CPT

## 2020-07-29 PROCEDURE — 2580000003 HC RX 258: Performed by: SURGERY

## 2020-07-29 PROCEDURE — 6360000002 HC RX W HCPCS: Performed by: NURSE ANESTHETIST, CERTIFIED REGISTERED

## 2020-07-29 PROCEDURE — 7100000011 HC PHASE II RECOVERY - ADDTL 15 MIN: Performed by: SURGERY

## 2020-07-29 PROCEDURE — 2709999900 HC NON-CHARGEABLE SUPPLY: Performed by: SURGERY

## 2020-07-29 PROCEDURE — 3600000002 HC SURGERY LEVEL 2 BASE: Performed by: SURGERY

## 2020-07-29 PROCEDURE — 2580000003 HC RX 258: Performed by: ANESTHESIOLOGY

## 2020-07-29 PROCEDURE — 2500000003 HC RX 250 WO HCPCS: Performed by: SURGERY

## 2020-07-29 PROCEDURE — 2500000003 HC RX 250 WO HCPCS: Performed by: NURSE ANESTHETIST, CERTIFIED REGISTERED

## 2020-07-29 PROCEDURE — 93971 EXTREMITY STUDY: CPT

## 2020-07-29 RX ORDER — CLINDAMYCIN PHOSPHATE 900 MG/50ML
900 INJECTION INTRAVENOUS ONCE
Status: COMPLETED | OUTPATIENT
Start: 2020-07-29 | End: 2020-07-29

## 2020-07-29 RX ORDER — LIDOCAINE HYDROCHLORIDE 10 MG/ML
INJECTION, SOLUTION EPIDURAL; INFILTRATION; INTRACAUDAL; PERINEURAL PRN
Status: DISCONTINUED | OUTPATIENT
Start: 2020-07-29 | End: 2020-07-29 | Stop reason: ALTCHOICE

## 2020-07-29 RX ORDER — FENTANYL CITRATE 50 UG/ML
25 INJECTION, SOLUTION INTRAMUSCULAR; INTRAVENOUS EVERY 5 MIN PRN
Status: DISCONTINUED | OUTPATIENT
Start: 2020-07-29 | End: 2020-07-29 | Stop reason: HOSPADM

## 2020-07-29 RX ORDER — LIDOCAINE HYDROCHLORIDE 10 MG/ML
1 INJECTION, SOLUTION EPIDURAL; INFILTRATION; INTRACAUDAL; PERINEURAL
Status: DISCONTINUED | OUTPATIENT
Start: 2020-07-30 | End: 2020-07-29 | Stop reason: HOSPADM

## 2020-07-29 RX ORDER — PROPOFOL 10 MG/ML
INJECTION, EMULSION INTRAVENOUS CONTINUOUS PRN
Status: DISCONTINUED | OUTPATIENT
Start: 2020-07-29 | End: 2020-07-29 | Stop reason: SDUPTHER

## 2020-07-29 RX ORDER — SODIUM CHLORIDE 9 MG/ML
INJECTION, SOLUTION INTRAVENOUS CONTINUOUS
Status: DISCONTINUED | OUTPATIENT
Start: 2020-07-30 | End: 2020-07-29 | Stop reason: HOSPADM

## 2020-07-29 RX ORDER — GLYCOPYRROLATE 1 MG/5 ML
SYRINGE (ML) INTRAVENOUS PRN
Status: DISCONTINUED | OUTPATIENT
Start: 2020-07-29 | End: 2020-07-29 | Stop reason: SDUPTHER

## 2020-07-29 RX ORDER — HYDROCODONE BITARTRATE AND ACETAMINOPHEN 5; 325 MG/1; MG/1
1 TABLET ORAL EVERY 4 HOURS PRN
Qty: 42 TABLET | Refills: 0 | Status: SHIPPED | OUTPATIENT
Start: 2020-07-29 | End: 2020-08-05

## 2020-07-29 RX ORDER — KETAMINE HCL IN NACL, ISO-OSM 100MG/10ML
SYRINGE (ML) INJECTION PRN
Status: DISCONTINUED | OUTPATIENT
Start: 2020-07-29 | End: 2020-07-29 | Stop reason: SDUPTHER

## 2020-07-29 RX ORDER — DEXAMETHASONE SODIUM PHOSPHATE 10 MG/ML
INJECTION INTRAMUSCULAR; INTRAVENOUS PRN
Status: DISCONTINUED | OUTPATIENT
Start: 2020-07-29 | End: 2020-07-29 | Stop reason: SDUPTHER

## 2020-07-29 RX ORDER — SODIUM CHLORIDE, SODIUM LACTATE, POTASSIUM CHLORIDE, CALCIUM CHLORIDE 600; 310; 30; 20 MG/100ML; MG/100ML; MG/100ML; MG/100ML
INJECTION, SOLUTION INTRAVENOUS CONTINUOUS
Status: DISCONTINUED | OUTPATIENT
Start: 2020-07-30 | End: 2020-07-29

## 2020-07-29 RX ORDER — LIDOCAINE HYDROCHLORIDE 20 MG/ML
INJECTION, SOLUTION EPIDURAL; INFILTRATION; INTRACAUDAL; PERINEURAL PRN
Status: DISCONTINUED | OUTPATIENT
Start: 2020-07-29 | End: 2020-07-29 | Stop reason: SDUPTHER

## 2020-07-29 RX ORDER — SODIUM CHLORIDE 0.9 % (FLUSH) 0.9 %
10 SYRINGE (ML) INJECTION PRN
Status: DISCONTINUED | OUTPATIENT
Start: 2020-07-29 | End: 2020-07-29 | Stop reason: HOSPADM

## 2020-07-29 RX ORDER — PROPOFOL 10 MG/ML
INJECTION, EMULSION INTRAVENOUS PRN
Status: DISCONTINUED | OUTPATIENT
Start: 2020-07-29 | End: 2020-07-29 | Stop reason: SDUPTHER

## 2020-07-29 RX ORDER — SODIUM CHLORIDE 0.9 % (FLUSH) 0.9 %
10 SYRINGE (ML) INJECTION EVERY 12 HOURS SCHEDULED
Status: DISCONTINUED | OUTPATIENT
Start: 2020-07-29 | End: 2020-07-29 | Stop reason: HOSPADM

## 2020-07-29 RX ORDER — ONDANSETRON 2 MG/ML
4 INJECTION INTRAMUSCULAR; INTRAVENOUS
Status: DISCONTINUED | OUTPATIENT
Start: 2020-07-29 | End: 2020-07-29 | Stop reason: HOSPADM

## 2020-07-29 RX ADMIN — Medication 0.2 MG: at 11:51

## 2020-07-29 RX ADMIN — Medication 30 MG: at 11:44

## 2020-07-29 RX ADMIN — Medication 10 MG: at 11:46

## 2020-07-29 RX ADMIN — PROPOFOL 20 MG: 10 INJECTION, EMULSION INTRAVENOUS at 12:37

## 2020-07-29 RX ADMIN — CLINDAMYCIN PHOSPHATE 900 MG: 900 INJECTION, SOLUTION INTRAVENOUS at 11:46

## 2020-07-29 RX ADMIN — DEXAMETHASONE SODIUM PHOSPHATE 10 MG: 10 INJECTION INTRAMUSCULAR; INTRAVENOUS at 12:01

## 2020-07-29 RX ADMIN — SODIUM CHLORIDE: 9 INJECTION, SOLUTION INTRAVENOUS at 11:37

## 2020-07-29 RX ADMIN — PROPOFOL 100 MCG/KG/MIN: 10 INJECTION, EMULSION INTRAVENOUS at 11:44

## 2020-07-29 RX ADMIN — LIDOCAINE HYDROCHLORIDE 100 MG: 20 INJECTION, SOLUTION EPIDURAL; INFILTRATION; INTRACAUDAL; PERINEURAL at 11:44

## 2020-07-29 RX ADMIN — SODIUM CHLORIDE: 9 INJECTION, SOLUTION INTRAVENOUS at 08:19

## 2020-07-29 RX ADMIN — Medication 10 MG: at 11:51

## 2020-07-29 ASSESSMENT — PULMONARY FUNCTION TESTS
PIF_VALUE: 1
PIF_VALUE: 0
PIF_VALUE: 1

## 2020-07-29 ASSESSMENT — ENCOUNTER SYMPTOMS: SHORTNESS OF BREATH: 0

## 2020-07-29 ASSESSMENT — PAIN - FUNCTIONAL ASSESSMENT: PAIN_FUNCTIONAL_ASSESSMENT: 0-10

## 2020-07-29 ASSESSMENT — PAIN SCALES - GENERAL: PAINLEVEL_OUTOF10: 0

## 2020-07-29 NOTE — BRIEF OP NOTE
Brief Postoperative Note      Patient: Cabrera Mao  YOB: 1960  MRN: [id number]    Date of Procedure: 7/29/2020    Pre-Op Diagnosis: DX MALFUNCTIONING AV FISTULA    Post-Op Diagnosis: Same       Procedure(s):  LIGATION TRIBUTARIES LEFT RADIAL CEPHALIC AV FISTULA   ULTRASOUND AV FISTULA    Surgeon(s):  Rell Pineda MD    Assistant:  * No surgical staff found *    Anesthesia: Monitor Anesthesia Care    Estimated Blood Loss (mL): Minimal    Complications: None    Specimens:   * No specimens in log *    Implants:  * No implants in log *      Drains: * No LDAs found *    Findings: Multiple large side branches with good flow post ligation.     Electronically signed by Rell Pineda MD on 7/29/2020 at 2:48 PM

## 2020-07-29 NOTE — ANESTHESIA POSTPROCEDURE EVALUATION
Department of Anesthesiology  Postprocedure Note    Patient: Anni Gandhi  MRN: [id number]  YOB: 1960  Date of evaluation: 7/29/2020  Time:  4:11 PM     Procedure Summary     Date:  07/29/20 Room / Location:  [facility] OR 03 / [address] [facility] [place]    Anesthesia Start:  1137 Anesthesia Stop:  1768    Procedure:  LIGATION TRIBUTARIES LEFT RADIAL CEPHALIC AV FISTULA --ULTRASOUND AV FISTULA (Left Arm Upper) Diagnosis:  (DX MALFUNCTIONING AV FISTULA)    Surgeon:  Melba Beavers MD Responsible Provider:  Samuel James MD    Anesthesia Type:  MAC ASA Status:  4          Anesthesia Type: MAC    Luzmaria Phase I:      Luzmaria Phase II: Luzmaria Score: 4    Last vitals: Reviewed and per EMR flowsheets.        Anesthesia Post Evaluation    Complications: no

## 2020-07-29 NOTE — ANESTHESIA PRE PROCEDURE
Department of Anesthesiology  Preprocedure Note       Name:  Vin Min   Age:  62 y.o.  :  1960                                          MRN:  [id number]         Date:  2020      Surgeon: Musa Leroy):  Maribel Cardona MD    Procedure: Procedure(s):  LIGATION TRIBUTARIES LEFT RADIAL CEPHALIC AV FISTULA - VAS TECH TO ANTONY IN PREOP (OK PAT VAS LAB)    Medications prior to admission:   Prior to Admission medications    Medication Sig Start Date End Date Taking? Authorizing Provider   tiZANidine (ZANAFLEX) 4 MG tablet TAKE 1 TABLET BY MOUTH TWICE A DAY 20  Yes Gregor Araiza PA-C   labetalol (NORMODYNE) 200 MG tablet Take 1 tablet by mouth 2 times daily 20  Yes Gregor Araiza PA-C   VITAMIN E PO Take 1 capsule by mouth daily   Yes Historical Provider, MD   allopurinol (ZYLOPRIM) 100 MG tablet TAKE 1 TABLET BY MOUTH DAILY 20  Yes Gregor Araiza PA-C   pantoprazole (PROTONIX) 20 MG tablet TAKE 1 TABLET BY MOUTH DAILY 20  Yes Gregor Araiza PA-C   atorvastatin (LIPITOR) 40 MG tablet TAKE 1 TABLET NIGHTLY 20  Yes Gregor Araiza PA-C   gabapentin (NEURONTIN) 100 MG capsule TAKE 1 CAPSULE TWICE DAILY 6/10/20 9/8/20 Yes Gregor Araiza PA-C   amLODIPine (NORVASC) 10 MG tablet TAKE 1 TABLET BY MOUTH DAILY 20  Yes Corry Mendoza MD   acetaminophen (TYLENOL) 500 MG tablet Take 1,000 mg by mouth every 6 hours as needed for Pain   Yes Historical Provider, MD   aspirin 81 MG EC tablet Take 1 tablet by mouth daily 19  Yes Gregor Araiza PA-C   Multiple Vitamins-Minerals (MULTIVITAMIN ADULT EXTRA C PO) daily.    Yes Historical Provider, MD   vitamin B-12 (CYANOCOBALAMIN) 1000 MCG tablet TAKE 1 TABLET BY MOUTH DAILY 18  Yes Historical Provider, MD   Transparent Dressings (TEGADERM ABSORBENT DRESSING) MISC Provide insurance covered tegaderm dressing, use at hemodialysis 19   Gregor Araiza PA-C   aspirin EC 81 MG EC tablet Take 1 tablet by mouth daily 12/9/19 3/8/20  Denis Mejia PA-C   TRUE METRIX BLOOD GLUCOSE TEST strip  6/1/18   Historical Provider, MD   calcium citrate-vitamin D (CITRACEL+D) 200-250 MG-UNIT TABS per tablet TAKE 2 TABLETS THREE TIMES A DAY 4/7/18   Historical Provider, MD   Blood Pressure KIT 1 box by Does not apply route three times daily 4/6/18   Denis Mejia PA-C       Current medications:    Current Facility-Administered Medications   Medication Dose Route Frequency Provider Last Rate Last Dose    [START ON 7/30/2020] 0.9 % sodium chloride infusion   Intravenous Continuous Cyndie Gabriel  mL/hr at 07/29/20 0819      sodium chloride flush 0.9 % injection 10 mL  10 mL Intravenous 2 times per day Teo Bai DO        sodium chloride flush 0.9 % injection 10 mL  10 mL Intravenous PRN Cyndie Gabriel DO        [START ON 7/30/2020] lidocaine PF 1 % injection 1 mL  1 mL Intradermal Once PRN Teo Bai DO        clindamycin (CLEOCIN) 900 mg in dextrose 5 % 50 mL IVPB  900 mg Intravenous Once Amita Carson MD           Allergies: Allergies   Allergen Reactions    Latex Hives    Penicillins Hives    Codeine Itching    Nsaids Other (See Comments)     Gastric Bypass Surgery     Tolmetin Other (See Comments)     Gastric Bypass Surgery        Problem List:    Patient Active Problem List   Diagnosis Code    Diet-controlled diabetes mellitus ([facility] [address] E11.9    Hyperlipidemia E78.5    Microalbuminuria R80.9    S/P gastric bypass Z98.84    Anemia D64.9    Chronic kidney disease N18.9    S/P total knee arthroplasty Z96.659    Hyperuricemia E79.0    Hydronephrosis of left kidney N13.30    Renal atrophy, right N26.1    Localized, primary osteoarthritis of hand M19.049    Essential hypertension I10    Renal insufficiency N28.9    Motion sickness T75. 3XXA    Slow transit constipation K59.01    Chest pain R07.9    Aortic dissection (HCC) I71.00    JINNY (acute kidney injury) ([facility] N17.9    Respiratory acidosis E87.2    Subacromial impingement M75.40    Acute on chronic diastolic (congestive) heart failure (HCC) I50.33    Moderate mitral regurgitation I34.0    Hypertensive emergency I16.1    Acute respiratory failure with hypoxia ([facility] J96.01    Former smoker Z87.891    Acute pulmonary edema (HCC) J81.0    Acute on chronic congestive heart failure (HCC) I50.9    ESRD on hemodialysis ([facility]) N18.6, Z99.2       Past Medical History:        Diagnosis Date    CKD (chronic kidney disease) stage 3, GFR 30-59 ml/min ([facility])     per pt, caused by kid stone damage    Gout     Hemodialysis patient ([facility] 10/21/2019    Hyperlipidemia     Hypertension     Kidney stone     Nephrolithiasis     Osteoarthritis     Type II or unspecified type diabetes mellitus without mention of complication, not stated as uncontrolled     patient denies       Past Surgical History:        Procedure Laterality Date    CARDIAC CATHETERIZATION  10/21/2019    DIALYSIS FISTULA CREATION  2019    LEFT ARM AV FISTULA CREATION (Left Arm Lower)    DIALYSIS FISTULA CREATION Left 2019    LEFT ARM AV FISTULA CREATION performed by Jennifer Marroquin MD at [place] [address] Left 2018    LITHOTRIPSY      THYROIDECTOMY, PARTIAL      TONSILLECTOMY      TOTAL KNEE ARTHROPLASTY Right 2015       Social History:    Social History     Tobacco Use    Smoking status: Former Smoker     Packs/day: 1.00     Years: 30.00     Pack years: 30.00     Types: Cigarettes     Last attempt to quit: 3/21/2001     Years since quittin.3    Smokeless tobacco: Never Used   Substance Use Topics    Alcohol use:  No                                Counseling given: Not Answered      Vital Signs (Current):   Vitals:    20 0751 20 0756   BP: (!) 143/81    Pulse: 69    Resp: 18    Temp: 97.3 °F (36.3 °C)    TempSrc: Temporal SpO2: 98%    Weight:  195 lb (88.5 kg)   Height:  5' 2.5\" (1.588 m)                                              BP Readings from Last 3 Encounters:   07/29/20 (!) 143/81   07/20/20 139/68   06/22/20 (!) 157/75       NPO Status: Time of last liquid consumption: 2345                        Time of last solid consumption: 2345                        Date of last liquid consumption: 07/28/20                        Date of last solid food consumption: 07/28/20    BMI:   Wt Readings from Last 3 Encounters:   07/29/20 195 lb (88.5 kg)   07/20/20 195 lb (88.5 kg)   06/22/20 196 lb 10.4 oz (89.2 kg)     Body mass index is 35.1 kg/m². CBC:   Lab Results   Component Value Date    WBC 6.1 07/20/2020    RBC 4.20 07/20/2020    RBC 4.29 03/13/2012    HGB 12.2 07/20/2020    HCT 40.6 07/20/2020    MCV 96.7 07/20/2020    RDW 16.1 07/20/2020     07/20/2020     03/13/2012       CMP:   Lab Results   Component Value Date     07/20/2020    K 4.0 07/29/2020     07/20/2020    CO2 25 07/20/2020    BUN 38 07/20/2020    CREATININE 5.30 07/20/2020    GFRAA 10 07/20/2020    LABGLOM 8 07/20/2020    GLUCOSE 120 07/20/2020    GLUCOSE 80 03/13/2012    PROT 6.7 10/22/2019    CALCIUM 9.2 11/06/2019    BILITOT 0.36 10/22/2019    ALKPHOS 100 10/22/2019    AST 30 10/22/2019    ALT 22 10/22/2019       POC Tests: No results for input(s): POCGLU, POCNA, POCK, POCCL, POCBUN, POCHEMO, POCHCT in the last 72 hours.     Coags:   Lab Results   Component Value Date    PROTIME 13.7 07/20/2020    INR 1.1 07/20/2020    APTT 36.9 07/20/2020       HCG (If Applicable): No results found for: PREGTESTUR, PREGSERUM, HCG, HCGQUANT     ABGs: No results found for: PHART, PO2ART, FTZ6GYH, FAN4JMB, BEART, Y2MCKZJP     Type & Screen (If Applicable):  No results found for: LABABO, LABRH    Drug/Infectious Status (If Applicable):  Lab Results   Component Value Date    HEPCAB NONREACTIVE 10/22/2019       COVID-19 Screening (If Applicable):   Lab Results   Component Value Date    COVID19 Not Detected 07/25/2020    COVID19 Not Detected 06/18/2020         Anesthesia Evaluation    Airway: Mallampati: I  TM distance: >3 FB   Neck ROM: full  Mouth opening: > = 3 FB Dental:          Pulmonary:       (-) shortness of breath                           Cardiovascular:    (+) hypertension:,     (-)  angina                Neuro/Psych:      (-) CVA           GI/Hepatic/Renal:   (+) renal disease: ESRD,           Endo/Other:    (+) Diabetes, . Abdominal:           Vascular:   + PVD, aortic or cerebral, . ROS comment: IMH. Anesthesia Plan      MAC     ASA 4             Anesthetic plan and risks discussed with patient.                       Siddharth Villeda MD   7/29/2020

## 2020-07-29 NOTE — INTERVAL H&P NOTE
tablet Take 1 tablet by mouth daily 12/9/19  Yes Lesa Blandon PA-C   Multiple Vitamins-Minerals (MULTIVITAMIN ADULT EXTRA C PO) daily. Yes Historical Provider, MD   vitamin B-12 (CYANOCOBALAMIN) 1000 MCG tablet TAKE 1 TABLET BY MOUTH DAILY 7/27/18  Yes Historical Provider, MD   Transparent Dressings (TEGADERM ABSORBENT DRESSING) MISC Provide insurance covered tegaderm dressing, use at hemodialysis 12/18/19   Lesa Blandon PA-C   aspirin EC 81 MG EC tablet Take 1 tablet by mouth daily 12/9/19 3/8/20  Lesa Blandon PA-C   TRUE METRIX BLOOD GLUCOSE TEST strip  6/1/18   Historical Provider, MD   calcium citrate-vitamin D (CITRACEL+D) 200-250 MG-UNIT TABS per tablet TAKE 2 TABLETS THREE TIMES A DAY 4/7/18   Historical Provider, MD   Blood Pressure KIT 1 box by Does not apply route three times daily 4/6/18   Lesa Blandon PA-C         This is a 62 y.o. female who is pleasant, cooperative, alert and oriented x3, in no acute distress. Heart: Heart sounds are normal.  HR 69 regular rate and rhythm with soft systolic II/VI  murmur, no gallop or rub. Lungs:  Normal respiratory effort with equal expansion, good air exchange, unlabored and clear to auscultation without wheezes or rales bilaterally   Abdomen: soft, round, nontender, nondistended with bowel sounds.    Vascular: dual lumen tunnel catheter right anterior chest  Left forearm AVF pulsation weak thrill    Labs:  Recent Labs     07/29/20  0817 07/20/20  1332   HGB  --  12.2   HCT  --  40.6   WBC  --  6.1   MCV  --  96.7   PLT  --  273   NA  --  143   K 4.0 4.2   CL  --  104   CO2  --  25   BUN  --  38*   CREATININE  --  5.30*   GLUCOSE  --  120*   INR  --  1.1   PROTIME  --  13.7   APTT  --  36.9*       Recent Labs     07/25/20  1621   COVID19 Not Detected       Aury Jiménez, ANP-BC  Electronically signed 7/29/2020 at 8:39 AM

## 2020-07-30 NOTE — OP NOTE
[address] [facility] [address]                [address] [facility], [address] [place] [address]                                OPERATIVE REPORT    PATIENT NAME: Katie Brizuela                 :        1960  MED REC NO:   [id number]                             ROOM:  ACCOUNT NO:   [de-identified]                           ADMIT DATE: 2020  PROVIDER:     Jennifer Marroquin MD    DATE OF PROCEDURE:  2020    PREOPERATIVE DIAGNOSIS:  Malfunctioning left radiocephalic AV fistula. POSTOPERATIVE DIAGNOSIS:  Malfunctioning left radiocephalic AV fistula. PROCEDURES PERFORMED:  1. Ligation of side branches, left radiocephalic AV fistula. 2.  Ultrasound imaging, left arm AV fistula. SURGEON:  Mahsa Franco. Tawny Smith MD    ANESTHESIA:  MAC.    ESTIMATED BLOOD LOSS:  Minimal.    COMPLICATIONS:  None. INDICATIONS:  The patient is a 56-year-old female who presents with a  non-maturing left arm radiocephalic AV fistula. Mobile side branches  were identified. At this time, she is being taken to the operating room  for elective ligation. TECHNIQUE:  After informed consent had been obtained, the patient was  brought to the operating room and placed in supine position and left arm  was prepped and draped in the usual sterile fashion. Ultrasound imaging  was carried out of the AV fistula and multiple side branches were marked  intraoperatively. Areas were then anesthetized with 1% Xylocaine and  small transverse incisions were made and large side branches were  identified and ligated with 4-0 Ethibond. Total seven large side  branches were ligated. Good thrill was present within the vein. There  was some overlying fat over the cephalic vein. The wounds sites were  then irrigated with bacitracin solution. The irrigation was evacuated. Skin incisions were closed with interrupted 4-0 Monocryl subcuticular  suture.   Mastisol and Steri-Strips were applied followed by a sterile  Kerlix

## 2020-08-26 NOTE — TELEPHONE ENCOUNTER
mellitus ([address]     Hyperlipidemia     Microalbuminuria     S/P gastric bypass     Anemia     Chronic kidney disease     S/P total knee arthroplasty     Hyperuricemia     Hydronephrosis of left kidney     Renal atrophy, right     Localized, primary osteoarthritis of hand     Essential hypertension     Renal insufficiency     Motion sickness     Slow transit constipation     Chest pain     Aortic dissection (HCC)     JINNY (acute kidney injury) ([address]     Respiratory acidosis     Subacromial impingement     Acute on chronic diastolic (congestive) heart failure (HCC)     Moderate mitral regurgitation     Hypertensive emergency     Acute respiratory failure with hypoxia [facility])     Former smoker     Acute pulmonary edema (HCC)     Acute on chronic congestive heart failure ([address]     ESRD on hemodialysis ([facility]

## 2020-08-27 RX ORDER — GABAPENTIN 100 MG/1
100 CAPSULE ORAL 2 TIMES DAILY
Qty: 60 CAPSULE | Refills: 1 | Status: SHIPPED | OUTPATIENT
Start: 2020-09-01 | End: 2020-10-19

## 2020-08-27 RX ORDER — ATORVASTATIN CALCIUM 40 MG/1
TABLET, FILM COATED ORAL
Qty: 30 TABLET | Refills: 2 | Status: SHIPPED | OUTPATIENT
Start: 2020-08-27 | End: 2020-11-05 | Stop reason: SDUPTHER

## 2020-09-10 ENCOUNTER — TELEPHONE (OUTPATIENT)
Dept: PRIMARY CARE CLINIC | Age: 60
End: 2020-09-10

## 2020-09-10 NOTE — TELEPHONE ENCOUNTER
Patient would like  GYN referral for area close to [facility] Medical Necessity Information: It is in your best interest to select a reason for this procedure from the list below. All of these items fulfill various CMS LCD requirements except the new and changing color options. Medical Necessity Clause: This procedure was medically necessary because the lesions that were treated were: Render Note In Bullet Format When Appropriate: No Post-Care Instructions: I reviewed with the patient in detail post-care instructions. Patient is to wear sunprotection, and avoid picking at any of the treated lesions. Pt may apply Vaseline or Aquaphor to crusted or scabbing areas. Detail Level: Detailed Consent: The patient's verbal consent was obtained including but not limited to risks of crusting, scabbing, blistering, scarring, darker or lighter pigmentary change, recurrence, incomplete removal and infection.

## 2020-09-10 NOTE — TELEPHONE ENCOUNTER
Patient would like a referral to GYN. [facility] kidney doc told her she need a PAP smear.

## 2020-09-30 RX ORDER — PANTOPRAZOLE SODIUM 20 MG/1
TABLET, DELAYED RELEASE ORAL
Qty: 28 TABLET | Refills: 2 | Status: SHIPPED | OUTPATIENT
Start: 2020-09-30 | End: 2020-12-29

## 2020-09-30 RX ORDER — ALLOPURINOL 100 MG/1
TABLET ORAL
Qty: 28 TABLET | Refills: 2 | Status: SHIPPED | OUTPATIENT
Start: 2020-09-30 | End: 2021-01-21

## 2020-09-30 NOTE — TELEPHONE ENCOUNTER
Health Maintenance   Topic Date Due    Diabetic retinal exam  03/21/2016    Diabetic foot exam  07/14/2016    Cervical cancer screen  02/18/2017    Breast cancer screen  06/13/2020    Flu vaccine (1) 09/01/2020    DTaP/Tdap/Td vaccine (1 - Tdap) 12/09/2020 (Originally 2/3/1980)    Shingles Vaccine (1 of 2) 12/09/2020 (Originally 2/3/2011)    Pneumococcal 0-64 years Vaccine (1 of 3 - PCV13) 12/09/2020 (Originally 2/3/1967)    Lipid screen  10/18/2020    A1C test (Diabetic or Prediabetic)  02/11/2021    Creatinine monitoring  07/20/2021    Potassium monitoring  07/29/2021    Colon cancer screen colonoscopy  06/13/2028    Hepatitis C screen  Completed    HIV screen  Completed    Hepatitis A vaccine  Aged Out    Hib vaccine  Aged Out    Meningococcal (ACWY) vaccine  Aged Out    Hepatitis B vaccine  Discontinued             (applicable per patient's age: Cancer Screenings, Depression Screening, Fall Risk Screening, Immunizations)    Hemoglobin A1C (%)   Date Value   02/11/2020 5.7   09/09/2019 6.0   08/06/2018 5.8     Microalb/Crt.  Ratio (mcg/mg creat)   Date Value   02/09/2013 664     LDL Cholesterol (mg/dL)   Date Value   10/18/2019 111     LDL Calculated (mg/dL)   Date Value   08/01/2016 116     AST (U/L)   Date Value   10/22/2019 30     ALT (U/L)   Date Value   10/22/2019 22     BUN (mg/dL)   Date Value   07/20/2020 38 (H)      (goal A1C is < 7)   (goal LDL is <100) need 30-50% reduction from baseline     BP Readings from Last 3 Encounters:   07/29/20 113/63   07/29/20 (!) 143/80   07/20/20 139/68    (goal /80)      All Future Testing planned in [facility]:  Lab Frequency Next Occurrence       Next Visit Date:  Future Appointments   Date Time Provider Amanda Wray   10/2/2020  1:00 PM [facility] [address] [facility] Agustina JASMINE   10/27/2020 10:00 AM Jeni Moore MD [facility] [address] [facility]            Patient Active Problem List:     Diet-controlled diabetes mellitus ([address]     Hyperlipidemia Microalbuminuria     S/P gastric bypass     Anemia     Chronic kidney disease     S/P total knee arthroplasty     Hyperuricemia     Hydronephrosis of left kidney     Renal atrophy, right     Localized, primary osteoarthritis of hand     Essential hypertension     Renal insufficiency     Motion sickness     Slow transit constipation     Chest pain     Aortic dissection (HCC)     JINNY (acute kidney injury) (HCC)     Respiratory acidosis     Subacromial impingement     Acute on chronic diastolic (congestive) heart failure (HCC)     Moderate mitral regurgitation     Hypertensive emergency     Acute respiratory failure with hypoxia [facility])     Former smoker     Acute pulmonary edema (HCC)     Acute on chronic congestive heart failure ([address]     ESRD on hemodialysis ([facility] [address]

## 2020-10-02 ENCOUNTER — HOSPITAL ENCOUNTER (OUTPATIENT)
Dept: VASCULAR LAB | Age: 60
Discharge: HOME | End: 2020-10-02
Payer: MEDICARE

## 2020-10-02 PROCEDURE — 93986 DUP-SCAN HEMO COMPL UNI STD: CPT

## 2020-10-06 ENCOUNTER — TELEPHONE (OUTPATIENT)
Dept: INTERVENTIONAL RADIOLOGY/VASCULAR | Age: 60
End: 2020-10-06

## 2020-10-12 RX ORDER — SODIUM CHLORIDE 9 MG/ML
INJECTION, SOLUTION INTRAVENOUS CONTINUOUS
Status: CANCELLED | OUTPATIENT
Start: 2020-10-12

## 2020-10-13 ENCOUNTER — HOSPITAL ENCOUNTER (OUTPATIENT)
Dept: INTERVENTIONAL RADIOLOGY/VASCULAR | Age: 60
Discharge: HOME | End: 2020-10-15
Payer: MEDICARE

## 2020-10-13 VITALS
RESPIRATION RATE: 18 BRPM | DIASTOLIC BLOOD PRESSURE: 81 MMHG | TEMPERATURE: 97 F | SYSTOLIC BLOOD PRESSURE: 155 MMHG | WEIGHT: 193 LBS | BODY MASS INDEX: 34.2 KG/M2 | HEART RATE: 67 BPM | HEIGHT: 63 IN | OXYGEN SATURATION: 93 %

## 2020-10-13 PROCEDURE — 99152 MOD SED SAME PHYS/QHP 5/>YRS: CPT

## 2020-10-13 PROCEDURE — 99153 MOD SED SAME PHYS/QHP EA: CPT

## 2020-10-13 PROCEDURE — 36901 INTRO CATH DIALYSIS CIRCUIT: CPT

## 2020-10-13 PROCEDURE — 7100000011 HC PHASE II RECOVERY - ADDTL 15 MIN

## 2020-10-13 PROCEDURE — 36902 INTRO CATH DIALYSIS CIRCUIT: CPT

## 2020-10-13 PROCEDURE — 7100000010 HC PHASE II RECOVERY - FIRST 15 MIN

## 2020-10-13 PROCEDURE — 6360000002 HC RX W HCPCS: Performed by: RADIOLOGY

## 2020-10-13 PROCEDURE — 2580000003 HC RX 258: Performed by: RADIOLOGY

## 2020-10-13 PROCEDURE — 2709999900 IR DIALYSIS FISTULAGRAM EVAL

## 2020-10-13 PROCEDURE — 6360000004 HC RX CONTRAST MEDICATION: Performed by: RADIOLOGY

## 2020-10-13 RX ORDER — FENTANYL CITRATE 50 UG/ML
INJECTION, SOLUTION INTRAMUSCULAR; INTRAVENOUS
Status: COMPLETED | OUTPATIENT
Start: 2020-10-13 | End: 2020-10-13

## 2020-10-13 RX ORDER — MIDAZOLAM HYDROCHLORIDE 1 MG/ML
INJECTION INTRAMUSCULAR; INTRAVENOUS
Status: COMPLETED | OUTPATIENT
Start: 2020-10-13 | End: 2020-10-13

## 2020-10-13 RX ORDER — ACETAMINOPHEN 325 MG/1
650 TABLET ORAL EVERY 4 HOURS PRN
Status: DISCONTINUED | OUTPATIENT
Start: 2020-10-13 | End: 2020-10-16 | Stop reason: HOSPADM

## 2020-10-13 RX ORDER — SODIUM CHLORIDE 9 MG/ML
INJECTION, SOLUTION INTRAVENOUS CONTINUOUS
Status: DISCONTINUED | OUTPATIENT
Start: 2020-10-13 | End: 2020-10-16 | Stop reason: HOSPADM

## 2020-10-13 RX ORDER — IODIXANOL 320 MG/ML
INJECTION, SOLUTION INTRAVASCULAR
Status: COMPLETED | OUTPATIENT
Start: 2020-10-13 | End: 2020-10-13

## 2020-10-13 RX ADMIN — MIDAZOLAM 0.5 MG: 1 INJECTION INTRAMUSCULAR; INTRAVENOUS at 12:35

## 2020-10-13 RX ADMIN — IODIXANOL 50 ML: 320 INJECTION, SOLUTION INTRAVASCULAR at 12:48

## 2020-10-13 RX ADMIN — Medication 50 MCG: at 12:36

## 2020-10-13 RX ADMIN — SODIUM CHLORIDE: 9 INJECTION, SOLUTION INTRAVENOUS at 10:26

## 2020-10-13 RX ADMIN — MIDAZOLAM 0.5 MG: 1 INJECTION INTRAMUSCULAR; INTRAVENOUS at 12:12

## 2020-10-13 RX ADMIN — Medication 50 MCG: at 11:56

## 2020-10-13 RX ADMIN — MIDAZOLAM 0.5 MG: 1 INJECTION INTRAMUSCULAR; INTRAVENOUS at 11:56

## 2020-10-13 RX ADMIN — MIDAZOLAM 0.5 MG: 1 INJECTION INTRAMUSCULAR; INTRAVENOUS at 12:27

## 2020-10-13 RX ADMIN — Medication 50 MCG: at 12:27

## 2020-10-13 RX ADMIN — Medication 50 MCG: at 12:43

## 2020-10-13 RX ADMIN — Medication 50 MCG: at 12:13

## 2020-10-13 ASSESSMENT — PAIN - FUNCTIONAL ASSESSMENT: PAIN_FUNCTIONAL_ASSESSMENT: 0-10

## 2020-10-13 ASSESSMENT — PAIN SCALES - GENERAL: PAINLEVEL_OUTOF10: 0

## 2020-10-13 NOTE — H&P
History and Physical Update    Pt Name: Damir Camara  MRN: [id number]  YOB: 1960  Date of evaluation: 10/13/2020      [x] I have reviewed the office note found in the pt's paper chart by Dr. Rob Stanton from 10/05/2020 which meets the criteria for an Interval History and Physical note. [x] I have examined  Damir Camara a 62 y.o., female who is scheduled for a fistulagram in IR by Dr. Castro Alfonso due to AV fistula stenosis. The patient denies health changes since her appointment with Dr. Rob Stanton on 10/05/2020. Pt has a dialysis catheter in the right upper chest.  Pt states she had dialysis yesterday. Pt denies fever, chills, productive cough, SOB, chest pain, open sores, rashes, and wounds. History of diabetes. Pt denies hypoglycemic symptoms at this time. Aspirin was last taken on 10/08/2020. Multiple vitamins-minerals tablet was last taken on 10/13/2020. Vital signs: BP (!) 156/80   Pulse 68   Temp 97.3 °F (36.3 °C) (Temporal)   Resp 20   Ht 5' 3\" (1.6 m)   Wt 193 lb (87.5 kg)   SpO2 96%   BMI 34.19 kg/m²      Allergies:  Latex; Penicillins; Codeine; Nsaids; and Tolmetin    Past medical history, surgical history, social history, and family history were reviewed and updated in EPIC as indicated. Medications:    Prior to Admission medications    Medication Sig Start Date End Date Taking? Authorizing Provider   pantoprazole (PROTONIX) 20 MG tablet TAKE 1 TABLET BY MOUTH DAILY 9/30/20  Yes Delaney Hurtado PA-C   allopurinol (ZYLOPRIM) 100 MG tablet TAKE 1 TABLET BY MOUTH DAILY 9/30/20  Yes Delaney Hurtado PA-C   atorvastatin (LIPITOR) 40 MG tablet TAKE 1 TABLET NIGHTLY 8/27/20  Yes Delaney Hurtado PA-C   gabapentin (NEURONTIN) 100 MG capsule Take 1 capsule by mouth 2 times daily for 60 days.  9/1/20 10/31/20 Yes Delaney Hurtado PA-C   amLODIPine (NORVASC) 10 MG tablet TAKE 1 TABLET BY MOUTH DAILY 7/29/20  Yes Nakul Mata MD   tiZANidine (ZANAFLEX) 4 MG tablet TAKE 1 TABLET BY MOUTH TWICE A DAY 7/28/20  Yes Herrera Villa PA-C   labetalol (NORMODYNE) 200 MG tablet Take 1 tablet by mouth 2 times daily 7/21/20  Yes Herrera Villa PA-C   VITAMIN E PO Take 1 capsule by mouth daily   Yes Historical Provider, MD   acetaminophen (TYLENOL) 500 MG tablet Take 1,000 mg by mouth every 6 hours as needed for Pain   Yes Historical Provider, MD   Multiple Vitamins-Minerals (MULTIVITAMIN ADULT EXTRA C PO) daily. Yes Historical Provider, MD   vitamin B-12 (CYANOCOBALAMIN) 1000 MCG tablet TAKE 1 TABLET BY MOUTH DAILY 7/27/18  Yes Historical Provider, MD   calcium citrate-vitamin D (CITRACEL+D) 200-250 MG-UNIT TABS per tablet TAKE 2 TABLETS THREE TIMES A DAY 4/7/18  Yes Historical Provider, MD   Transparent Dressings (TEGADERM ABSORBENT DRESSING) [address] [facility] Provide insurance covered tegaderm dressing, use at hemodialysis 12/18/19   Herrera Villa PA-C   aspirin 81 MG EC tablet Take 1 tablet by mouth daily 12/9/19   Herrera Villa PA-C   aspirin EC 81 MG EC tablet Take 1 tablet by mouth daily 12/9/19 3/8/20  Herrera Villa PA-C   TRUE METRIX BLOOD GLUCOSE TEST strip  6/1/18   Historical Provider, MD   Blood Pressure KIT 1 box by Does not apply route three times daily 4/6/18   Herrera Villa PA-C       This is a 62 y.o. female who is pleasant, cooperative, alert and oriented x 3, in no acute distress. Left lower arm fistula bruit and thrill present. Double lumen dialysis catheter in the right upper chest.  Obese. Heart: Murmur 2/6. Regular rate and rhythm without gallop or rub. Lungs: Normal respiratory effort, unlabored, and clear to auscultation without wheezes or rales bilaterally. Abdomen: Soft, non-tender, non-distended with active bowel sounds. Pedal pulses: are palpable bilaterally. Mild edema in bilateral lower extremities.          Labs:  No results for input(s): HGB, HCT, WBC, MCV, PLT, NA, K, CL, CO2, BUN, CREATININE, GLUCOSE, INR, PROTIME, APTT, AST, ALT, LABALBU, HCG in the last 720 hours. No results for input(s): COVID19 in the last 720 hours.       ANTONI Loaiza-BC  Electronically signed 10/13/2020 at 10:31 AM

## 2020-10-13 NOTE — PRE SEDATION
Sedation Pre-Procedure Note    Patient Name: Nanci Beckett   YOB: 1960  Room/Bed: Room/bed info not found  Medical Record Number: [id number]  Date: 10/13/2020   Time: 11:49 AM       Indication:  fistulagram    Consent: I have discussed with the patient and/or the patient representative the indication, alternatives, and the possible risks and/or complications of the planned procedure and the anesthesia methods. The patient and/or patient representative appear to understand and agree to proceed. Vital Signs:   Vitals:    10/13/20 1002   BP: (!) 156/80   Pulse: 68   Resp: 20   Temp: 97.3 °F (36.3 °C)   SpO2: 96%       Past Medical History:   has a past medical history of CKD (chronic kidney disease) stage 3, GFR 30-59 ml/min, Gout, Hemodialysis patient ([facility] Hyperlipidemia, Hypertension, Kidney stone, Nephrolithiasis, Osteoarthritis, and Type II or unspecified type diabetes mellitus without mention of complication, not stated as uncontrolled. Past Surgical History:   has a past surgical history that includes Hysterectomy; Lithotripsy; Thyroidectomy, partial; Gastric bypass surgery; Tonsillectomy; Total knee arthroplasty (Right, 01/06/2015); joint replacement; knee surgery (Left, 12/05/2018); Cardiac catheterization (10/21/2019); Dialysis fistula creation (11/06/2019); Dialysis fistula creation (Left, 11/6/2019); and Dialysis fistula creation (Left, 7/29/2020). Medications:   Scheduled Meds:   Continuous Infusions:    sodium chloride 20 mL/hr at 10/13/20 1026     PRN Meds:   Home Meds:   Prior to Admission medications    Medication Sig Start Date End Date Taking?  Authorizing Provider   pantoprazole (PROTONIX) 20 MG tablet TAKE 1 TABLET BY MOUTH DAILY 9/30/20  Yes Vera Romberg, PA-C   allopurinol (ZYLOPRIM) 100 MG tablet TAKE 1 TABLET BY MOUTH DAILY 9/30/20  Yes Vera Romberg, PA-C   atorvastatin (LIPITOR) 40 MG tablet TAKE 1 TABLET NIGHTLY 8/27/20  Yes Vera Romberg, PA-C gabapentin (NEURONTIN) 100 MG capsule Take 1 capsule by mouth 2 times daily for 60 days. 9/1/20 10/31/20 Yes Vera Romberg, PA-C   amLODIPine (NORVASC) 10 MG tablet TAKE 1 TABLET BY MOUTH DAILY 7/29/20  Yes Tsering Grayson MD   tiZANidine (ZANAFLEX) 4 MG tablet TAKE 1 TABLET BY MOUTH TWICE A DAY 7/28/20  Yes Vera Romberg, PA-C   labetalol (NORMODYNE) 200 MG tablet Take 1 tablet by mouth 2 times daily 7/21/20  Yes Vera Romberg, PA-C   VITAMIN E PO Take 1 capsule by mouth daily   Yes Historical Provider, MD   acetaminophen (TYLENOL) 500 MG tablet Take 1,000 mg by mouth every 6 hours as needed for Pain   Yes Historical Provider, MD   Multiple Vitamins-Minerals (MULTIVITAMIN ADULT EXTRA C PO) daily. Yes Historical Provider, MD   vitamin B-12 (CYANOCOBALAMIN) 1000 MCG tablet TAKE 1 TABLET BY MOUTH DAILY 7/27/18  Yes Historical Provider, MD   calcium citrate-vitamin D (CITRACEL+D) 200-250 MG-UNIT TABS per tablet TAKE 2 TABLETS THREE TIMES A DAY 4/7/18  Yes Historical Provider, MD   Transparent Dressings (TEGADERM ABSORBENT DRESSING) [address] [facility] Provide insurance covered tegaderm dressing, use at hemodialysis 12/18/19   Vera Romberg, PA-C   aspirin 81 MG EC tablet Take 1 tablet by mouth daily 12/9/19   Vera Romberg, PA-C   aspirin EC 81 MG EC tablet Take 1 tablet by mouth daily 12/9/19 3/8/20  Vera Romberg, PA-C   TRUE METRIX BLOOD GLUCOSE TEST strip  6/1/18   Historical Provider, MD   Blood Pressure KIT 1 box by Does not apply route three times daily 4/6/18   Vera Romberg, PA-C     Coumadin Use Last 7 Days:  no  Antiplatelet drug therapy use last 7 days: yes - ASA  Other anticoagulant use last 7 days: no     Pre-Sedation Documentation and Exam:   I have reviewed the patient's history and review of systems.     Mallampati Airway Assessment:  Mallampati Class III - (soft palate & base of uvula are visible)    Prior History of Anesthesia Complications:   none    ASA Classification:  Class

## 2020-10-13 NOTE — POST SEDATION
Sedation Post Procedure Note    Patient Name: Crispin Yip   YOB: 1960  Room/Bed: Room/bed info not found  Medical Record Number: [id number]  Date: 10/13/2020   Time: 12:57 PM         Physicians/Assistants: Steven Neville MD    Procedure Performed:  fistulagram    Post-Sedation Vital Signs:  Vitals:    10/13/20 1247   BP: (!) 152/116   Pulse: 62   Resp: 14   Temp:    SpO2: 98%      Vital signs were reviewed and were stable after the procedure (see flow sheet for vitals)            Post-Sedation Exam: pt remains stable           Complications: none    Electronically signed by Steven Neville on 10/13/2020 at 12:57 PM

## 2020-10-13 NOTE — BRIEF OP NOTE
Brief Postoperative Note    Mitchell Austin  YOB: 1960  [id number]    Pre-operative Diagnosis: dysfunctional L radiocephalic AV fistula    Post-operative Diagnosis: Same    Procedure: LUE fistulagram    Anesthesia: Local and Moderate Sedation    Surgeons/Assistants: JAMES MATTHEW    Estimated Blood Loss: less than 82NO    Complications: None    Specimens: Was Not Obtained    Findings: successful LUE fistulagram and angioplasty of arterial segment stenoses    Electronically signed by Steven Neville on 10/13/2020 at 12:57 PM

## 2020-11-05 ENCOUNTER — VIRTUAL VISIT (OUTPATIENT)
Dept: PRIMARY CARE CLINIC | Age: 60
End: 2020-11-05
Payer: MEDICARE

## 2020-11-05 PROCEDURE — 99443 PR PHYS/QHP TELEPHONE EVALUATION 21-30 MIN: CPT | Performed by: PHYSICIAN ASSISTANT

## 2020-11-05 RX ORDER — ATORVASTATIN CALCIUM 40 MG/1
40 TABLET, FILM COATED ORAL DAILY
Qty: 30 TABLET | Refills: 1 | Status: SHIPPED | OUTPATIENT
Start: 2020-11-05 | End: 2021-01-21

## 2020-11-05 RX ORDER — LABETALOL 200 MG/1
200 TABLET, FILM COATED ORAL 2 TIMES DAILY
Qty: 60 TABLET | Refills: 3 | Status: SHIPPED | OUTPATIENT
Start: 2020-11-05 | End: 2021-03-05 | Stop reason: SDUPTHER

## 2020-11-05 ASSESSMENT — ENCOUNTER SYMPTOMS: SHORTNESS OF BREATH: 1

## 2020-11-05 NOTE — PROGRESS NOTES
Visit Information    Have you changed or started any medications since your last visit including any over-the-counter medicines, vitamins, or herbal medicines? no   Are you having any side effects from any of your medications? -  no  Have you stopped taking any of your medications? Is so, why? -  no    Have you seen any other physician or provider since your last visit? No  Have you had any other diagnostic tests since your last visit? No  Have you been seen in the emergency room and/or had an admission to a hospital since we last saw you? No  Have you had your routine dental cleaning in the past 6 months? no    Have you activated your [employer] account? If not, what are your barriers?  Yes     Patient Care Team:  Rinku Rush PA-C as PCP - General  Rink[facility] Rush [facility]    Medical History Review  Past Medical, Family, and Social History reviewed and does not contribute to the patient presenting condition    Health Maintenance   Topic Date Due    Diabetic retinal exam  03/21/2016    Diabetic foot exam  07/14/2016    Cervical cancer screen  02/18/2017    Breast cancer screen  06/13/2020    Flu vaccine (1) 09/01/2020    Lipid screen  10/18/2020    DTaP/Tdap/Td vaccine (1 - Tdap) 12/09/2020 (Originally 2/3/1980)    Shingles Vaccine (1 of 2) 12/09/2020 (Originally 2/3/2011)    Pneumococcal 0-64 years Vaccine (1 of 3 - PCV13) 12/09/2020 (Originally 2/3/1967)    A1C test (Diabetic or Prediabetic)  02/11/2021    Creatinine monitoring  07/20/2021    Potassium monitoring  07/29/2021    Colon cancer screen colonoscopy  06/13/2028    Hepatitis C screen  Completed    HIV screen  Completed    Hepatitis A vaccine  Aged Out    Hib vaccine  Aged Out    Meningococcal (ACWY) vaccine  Aged Out    Hepatitis B vaccine  Discontinued

## 2020-11-05 NOTE — PROGRESS NOTES
reassess cholesterol level and if still stable decreased will decrease Lipitor dosage to 20 mg, lipid panel order will be faxed to dialysis center to be drawn. Patient requesting dressings for fistula for ESRD. Patient reporting elevated blood pressure at home. Patient requesting blood pressure medication refill. Discussed elevated blood pressure and concerns in depth with patient. Labs reviewed. Health maintenance reviewed, patient is scheduled to be seen by gynecology on 12/8/2020 for cervical cancer screening, discussed breast cancer screening in depth with patient, mammogram ordered. Medication reviewed, refill Lipitor 40 mg, labetalol 200 mg. HPI    Review of Systems   Constitutional: Negative for chills and fever. HENT: Negative for congestion. Respiratory: Positive for shortness of breath (on exertion, \"when wearing mask\"). Negative for cough and wheezing. Cardiovascular: Negative for chest pain. Gastrointestinal: Negative for constipation, diarrhea, nausea and vomiting. Genitourinary: Negative for dysuria, frequency and urgency. Musculoskeletal: Positive for myalgias. Negative for back pain and neck pain. Neurological: Negative for headaches. Prior to Visit Medications    Medication Sig Taking?  Authorizing Provider   atorvastatin (LIPITOR) 40 MG tablet Take 1 tablet by mouth daily Yes Delaney Hurtado PA-C   labetalol (NORMODYNE) 200 MG tablet Take 1 tablet by mouth 2 times daily Yes Delaney Hurtado PA-C   Transparent Dressings (TEGADERM ABSORBENT DRESSING) [facility] Provide insurance covered tegaderm dressing, use at hemodialysis Yes Delaney Hurtado PA-C   gabapentin (NEURONTIN) 100 MG capsule TAKE 1 CAPSULE BY MOUTH 2 TIMES A DAY Yes Delaney Hurtado PA-C   pantoprazole (PROTONIX) 20 MG tablet TAKE 1 TABLET BY MOUTH DAILY Yes Delaney Hurtado PA-C   allopurinol (ZYLOPRIM) 100 MG tablet TAKE 1 TABLET BY MOUTH DAILY Yes Delaney Hurtado PA-C   amLODIPine Systolic 991   Patient-Reported Diastolic 921          Physical Exam  Constitutional:       General: She is awake. Neurological:      Mental Status: She is alert and oriented to person, place, and time. Psychiatric:         Speech: Speech normal.         Behavior: Behavior is cooperative. Thought Content: Thought content normal.         Cognition and Memory: Cognition normal.         Judgment: Judgment normal.           Other pertinent observable physical exam findings:-     RECORD REVIEW: Previous medical records were reviewed at today's visit. The past family, medical and social histories were reviewed and unchanged with the exceptions of what is mentioned in this note. Due to this being a TeleHealth encounter, evaluation of the following organ systems is limited: Vitals/Constitutional/EENT/Resp/CV/GI//MS/Neuro/Skin/Heme-Lymph-Imm. ASSESSMENT/PLAN:  Mitchell was seen today for follow-up. Diagnoses and all orders for this visit:    Essential hypertension  -     atorvastatin (LIPITOR) 40 MG tablet; Take 1 tablet by mouth daily  -     labetalol (NORMODYNE) 200 MG tablet; Take 1 tablet by mouth 2 times daily  -     Lipid Panel; Future    Myalgia    ESRD on hemodialysis ([address]  -     [facility]) - Bertram Joyner MD, Nephrology, [place]  -     Transparent Dressings ([address]) MISC; Provide insurance covered tegaderm dressing, use at hemodialysis    Dissection of thoracic aorta ([address]    Acute on chronic heart failure, unspecified heart failure type ([address]    Encounter for screening mammogram for breast cancer  -     [facility] Digital Screen Bilateral [[id number]]; Future    Screening for hyperlipidemia  -     Lipid Panel; Future        Return in about 4 months (around 3/5/2021) for ESRD, HTN, Myalgia. An  electronic signature was used to authenticate this note.     --Epi Centeno PA-C on 11/15/2020 at 1:38 PM    This note is created with the assistance of [employer] speech-recognition program. While intending to generate a document that actually reflects the content of the visit, the document can still have some mistakes which may not have been identified and corrected by editing. 9    Pursuant to the emergency declaration under the [address] [place] [address], [facility] waiver authority and the [employer] and [employer] Act, this Virtual  Visit was conducted, with patient's consent, to reduce the patient's risk of exposure to COVID-19 and provide continuity of care for an established patient. Services were provided through a video synchronous discussion virtually to substitute for in-person clinic visit.

## 2020-11-05 NOTE — PATIENT INSTRUCTIONS
· Call and schedule appointment with Dr. Irvin Slade for 2nd opinion  · Call and schedule mammogram, [phone]  · Take Tegaderm script to any medical equipment supply location  · Stop Tizanidine (Zanaflex)

## 2020-11-15 ENCOUNTER — TELEPHONE (OUTPATIENT)
Dept: PRIMARY CARE CLINIC | Age: 60
End: 2020-11-15

## 2020-11-15 ASSESSMENT — ENCOUNTER SYMPTOMS
CONSTIPATION: 0
WHEEZING: 0
VOMITING: 0
COUGH: 0
DIARRHEA: 0
BACK PAIN: 0
NAUSEA: 0

## 2020-12-08 ENCOUNTER — OFFICE VISIT (OUTPATIENT)
Dept: OBGYN | Age: 60
End: 2020-12-08
Payer: MEDICARE

## 2020-12-08 VITALS
SYSTOLIC BLOOD PRESSURE: 128 MMHG | DIASTOLIC BLOOD PRESSURE: 73 MMHG | BODY MASS INDEX: 34.38 KG/M2 | WEIGHT: 194 LBS | TEMPERATURE: 96.8 F | HEIGHT: 63 IN | HEART RATE: 75 BPM

## 2020-12-08 PROCEDURE — G8482 FLU IMMUNIZE ORDER/ADMIN: HCPCS | Performed by: OBSTETRICS & GYNECOLOGY

## 2020-12-08 PROCEDURE — 99396 PREV VISIT EST AGE 40-64: CPT | Performed by: OBSTETRICS & GYNECOLOGY

## 2020-12-08 NOTE — PROGRESS NOTES
Cheryl Randall  2020              62 y.o. Chief Complaint   Patient presents with   Shanta Stauffer     annual    Gynecologic Exam       No LMP recorded. Patient has had a hysterectomy. Primary Care Physician: Marlene Arevalo PA-C    The patient was seen and examined. She has no chief complaint today and is here for her annual exam.  Her bowels are regular. There are no voiding complaints. She denies any bloating. She denies vaginal discharge . HPI : Cheryl Randall is a 62 y.o.  post menopausal since the age of about 44 when she had hysterectomy for tretmen of fibroids. . She used HRT for about 2 years afterward. .  She reports intermittent hot flashes. Denies vaginal dryness. There is a Hx of ESRD, dialysis patient.       ________________________________________________________________________  OB History   No obstetric history on file.      Past Medical History:   Diagnosis Date    CKD (chronic kidney disease) stage 3, GFR 30-59 ml/min     per pt, caused by kid stone damage    Gout     Hemodialysis patient ([facility] [address] 10/21/2019    Hyperlipidemia     Hypertension     Kidney stone     Nephrolithiasis     Osteoarthritis     Type II or unspecified type diabetes mellitus without mention of complication, not stated as uncontrolled     patient denies                                                                   Past Surgical History:   Procedure Laterality Date    CARDIAC CATHETERIZATION  10/21/2019    DIALYSIS FISTULA CREATION  2019    LEFT ARM AV FISTULA CREATION (Left Arm Lower)    DIALYSIS FISTULA CREATION Left 2019    LEFT ARM AV FISTULA CREATION performed by Lori Patel MD at [address] [place] [address] Left 2020    LIGATION TRIBUTARIES LEFT RADIAL CEPHALIC AV FISTULA --ULTRASOUND AV FISTULA performed by Lori Patel MD at [place] [address] Left 2018    LITHOTRIPSY      THYROIDECTOMY, PARTIAL      TONSILLECTOMY      TOTAL KNEE ARTHROPLASTY Right 2015     Family History   Problem Relation Age of Onset    Heart Disease Mother     Diabetes Mother     Heart Disease Father     High Blood Pressure Father      Social History     Socioeconomic History    Marital status: Single     Spouse name: Not on file    Number of children: Not on file    Years of education: Not on file    Highest education level: Not on file   Occupational History    Not on file   Social Needs    Financial resource strain: Not on file    Food insecurity     Worry: Not on file     Inability: Not on file    Transportation needs     Medical: Not on file     Non-medical: Not on file   Tobacco Use    Smoking status: Former Smoker     Packs/day: 1.00     Years: 30.00     Pack years: 30.00     Types: Cigarettes     Last attempt to quit: 3/21/2001     Years since quittin.7    Smokeless tobacco: Never Used   Substance and Sexual Activity    Alcohol use: No    Drug use: No    Sexual activity: Yes   Lifestyle    Physical activity     Days per week: Not on file     Minutes per session: Not on file    Stress: Not on file   Relationships    Social connections     Talks on phone: Not on file     Gets together: Not on file     Attends Faith service: Not on file     Active member of club or organization: Not on file     Attends meetings of clubs or organizations: Not on file     Relationship status: Not on file    Intimate partner violence     Fear of current or ex partner: Not on file     Emotionally abused: Not on file     Physically abused: Not on file     Forced sexual activity: Not on file   Other Topics Concern    Not on file   Social History Narrative    Not on file       MEDICATIONS:  Current Outpatient Medications on File Prior to Visit   Medication Sig Dispense Refill    amLODIPine (NORVASC) 10 MG tablet TAKE 1 TABLET DAILY 28 tablet 3    atorvastatin (LIPITOR) 40 MG tablet Take 1 tablet by mouth daily 30 tablet 1    labetalol (NORMODYNE) 200 MG tablet Take 1 tablet by mouth 2 times daily 60 tablet 3    gabapentin (NEURONTIN) 100 MG capsule TAKE 1 CAPSULE BY MOUTH 2 TIMES A DAY 56 capsule 1    pantoprazole (PROTONIX) 20 MG tablet TAKE 1 TABLET BY MOUTH DAILY 28 tablet 2    allopurinol (ZYLOPRIM) 100 MG tablet TAKE 1 TABLET BY MOUTH DAILY 28 tablet 2    VITAMIN E PO Take 1 capsule by mouth daily      aspirin 81 MG EC tablet Take 1 tablet by mouth daily 30 tablet 3    aspirin EC 81 MG EC tablet Take 1 tablet by mouth daily 90 tablet 0    Multiple Vitamins-Minerals (MULTIVITAMIN ADULT EXTRA C PO) daily.  vitamin B-12 (CYANOCOBALAMIN) 1000 MCG tablet TAKE 1 TABLET BY MOUTH DAILY      calcium citrate-vitamin D (CITRACEL+D) 200-250 MG-UNIT TABS per tablet TAKE 2 TABLETS THREE TIMES A DAY      Transparent Dressings (TEGADERM ABSORBENT DRESSING) MISC Provide insurance covered tegaderm dressing, use at hemodialysis 100 each 5    acetaminophen (TYLENOL) 500 MG tablet Take 1,000 mg by mouth every 6 hours as needed for Pain      TRUE METRIX BLOOD GLUCOSE TEST strip       Blood Pressure KIT 1 box by Does not apply route three times daily 1 kit 0     No current facility-administered medications on file prior to visit. ALLERGIES:  Allergies as of 12/08/2020 - Review Complete 12/08/2020   Allergen Reaction Noted    Latex Hives 04/18/2011    Penicillins Hives 09/17/2012    Codeine Itching 12/23/2014    Nsaids Other (See Comments) 04/27/2017    Tolmetin Other (See Comments) 05/04/2017     Immunization status: stated as current, Flu vaccine with at her dialysis center      Gynecologic History:  Menarche: 6 yo       No LMP recorded. Patient has had a hysterectomy.     Sexually Active: Yes    STD History: No       Hormone Replacement Exposure: Yes       Genetic Qualified Family History of Breast, Ovarian , Colon or Uterine Cancer: No If YES see scanned worksheet. Preventative Health Testing:  Date of Last Pap Smear: 2003  Abnormal Pap Smear History: denies  Colposcopy History: na  Date of Last Mammogram: 2015   Date of Last Colonoscopy: 2018  Date of Last Bone Density:2019        ________________________________________________________________________  REVIEW OF SYSTEMS:     A minimum of an eleven point review of systems was completed. Review Of Systems (11 point):  Constitutional: No fever, chills or malaise; No weight change or fatigue  Head and Eyes: No vision, Headache, Dizziness or trauma in last 12 months  ENT ROS: No hearing, Tinnitis, sinus or taste problems  Hematological and Lymphatic ROS:No Lymphoma, Von Willebrand's, Hemophillia or Bleeding History  Psych ROS: No Depression, Homicidal thoughts,suicidal thoughts, or anxiety  Breast ROS: No prior breast abnormalities or lumps  Respiratory ROS: No SOB, Pneumoniae,Cough, or Pulmonary Embolism History  Cardiovascular ROS: No Chest Pain with Exertion, Palpitations, Syncope, Edema, Arrhythmia  Gastrointestinal ROS: No Indigestion, Heartburn, Nausea, vomiting, Diarrhea, Constipation,or Bowel Changes; No Bloody Stools or melena  Genito-Urinary ROS: No Dysuria, Hematuria or Nocturia. No Urinary Incontinence or Vaginal Discharge  Musculoskeletal ROS: No Arthralgia, Arthritis,Gout,Osteoporosis or Rheumatism  Neurological ROS: No CVA, Migraines, Epilepsy, Seizure Hx, or Limb Weakness  Dermatological ROS: No Rash, Itching, Hives, Mole Changes or Cancer                                                                                                                                                                                                                                  PHYSICAL Exam:     Constitutional:  Blood pressure 128/73, pulse 75, temperature 96.8 °F (36 °C), temperature source Temporal, height 5' 3\" (1.6 m), weight 194 lb (88 kg), not currently breastfeeding.     General Appearance: This  is a well Developed, well Nourished, well groomed female. Her BMI was reviewed. Nutritional decision making was discussed. Skin:  There was a Normal Inspection of the skin without rashes or lesions. There were no rashes. (Papular, Maculopapular, Hives, Pustular, Macular)     There were no lesions (Ulcers, Erythema, Abn. Appearing Nevi)            Lymphatic:  No Lymph Nodes were Palpable in the neck , axilla or groin. Neck and EENT:  The neck was supple. There were no masses   The thyroid was not enlarged and had no masses. Throat inspected-No exudates or Masses, Nares Patent No Masses        Respiratory: The lungs were auscultated and found to be clear. There were no rales, rhonchi or wheezes. There was a good respiratory effort. Cardiovascular: The heart was in a regular rate and rhythm. . No S3 or S4. There was no murmur appreciated. Extremities: The patients extremities were without calf tenderness, edema, or varicosities. Abdomen: The abdomen was soft and non-tender. There were good bowel sounds in all quadrants and there was no guarding, rebound or rigidity. On evaluation there was no evidence of hepatosplenomegaly and there was no costal vertebral dmitri tenderness bilaterally. No hernias were appreciated. Abdominal Scars: midline vertical scar    Psych: The patient had a normal Orientation to: Time, Place, Person, and Situation  There is no Mood / Affect changes    Breast:  (Chest)  normal appearance, no masses or tenderness      Pelvic Exam:  Vulva normal without lesions  Vagina : atrophic  Cervix : absent  Uterus : absent   Adnexa: not palpable      Rectovaginal exam: negative            Musculosk:  Normal Gait and station was noted. Digits were evaluated without abnormal findings. ASSESSMENT:      62 y.o. Annual   Diagnosis Orders   1.  Well woman exam with routine gynecological exam              Chief Complaint   Patient presents maintenance per patients PCP.

## 2020-12-29 RX ORDER — PANTOPRAZOLE SODIUM 20 MG/1
TABLET, DELAYED RELEASE ORAL
Qty: 28 TABLET | Refills: 2 | Status: SHIPPED | OUTPATIENT
Start: 2020-12-29 | End: 2021-03-05 | Stop reason: SDUPTHER

## 2020-12-29 NOTE — TELEPHONE ENCOUNTER
Health Maintenance   Topic Date Due    DTaP/Tdap/Td vaccine (1 - Tdap) 02/03/1980    Shingles Vaccine (1 of 2) 02/03/2011    Cervical cancer screen  02/18/2017    Pneumococcal 0-64 years Vaccine (2 of 3 - PPSV23) 01/25/2020    Breast cancer screen  06/13/2020    Lipid screen  10/18/2020    A1C test (Diabetic or Prediabetic)  02/11/2021    Creatinine monitoring  07/20/2021    Potassium monitoring  07/29/2021    Colon cancer screen colonoscopy  06/13/2028    Flu vaccine  Completed    Hepatitis C screen  Completed    HIV screen  Completed    Hepatitis A vaccine  Aged Out    Hib vaccine  Aged Out    Meningococcal (ACWY) vaccine  Aged Out    Hepatitis B vaccine  Discontinued             (applicable per patient's age: Cancer Screenings, Depression Screening, Fall Risk Screening, Immunizations)    Hemoglobin A1C (%)   Date Value   02/11/2020 5.7   09/09/2019 6.0   08/06/2018 5.8     Microalb/Crt.  Ratio (mcg/mg creat)   Date Value   02/09/2013 664     LDL Cholesterol (mg/dL)   Date Value   10/18/2019 111     LDL Calculated (mg/dL)   Date Value   08/01/2016 116     AST (U/L)   Date Value   10/22/2019 30     ALT (U/L)   Date Value   10/22/2019 22     BUN (mg/dL)   Date Value   07/20/2020 38 (H)      (goal A1C is < 7)   (goal LDL is <100) need 30-50% reduction from baseline     BP Readings from Last 3 Encounters:   12/08/20 128/73   10/13/20 (!) 155/81   07/29/20 113/63    (goal /80)      All Future Testing planned in [facility]:  Lab Frequency Next Occurrence   ALLAN Digital Screen Bilateral [[id number]] Once 12/05/2020   Lipid Panel Once 02/13/2021       Next Visit Date:  Future Appointments   Date Time Provider Amanda Wray   3/5/2021 10:40 AM Jillian Minor PA-C [address]            Patient Active Problem List:     Hyperlipidemia     Microalbuminuria     S/P gastric bypass     Anemia     Chronic kidney disease     S/P total knee arthroplasty     Hyperuricemia     Hydronephrosis of left kidney     Renal atrophy, right     Localized, primary osteoarthritis of hand     Essential hypertension     Renal insufficiency     Motion sickness     Slow transit constipation     Chest pain     Aortic dissection (HCC)     JINNY (acute kidney injury) (HCC)     Respiratory acidosis     Subacromial impingement     Acute on chronic diastolic (congestive) heart failure (HCC)     Moderate mitral regurgitation     Hypertensive emergency     Acute respiratory failure with hypoxia [facility])     Former smoker     Acute pulmonary edema (HCC)     Acute on chronic congestive heart failure ([facility] [address]     ESRD on hemodialysis ([facility] [address]

## 2020-12-30 NOTE — TELEPHONE ENCOUNTER
Health Maintenance   Topic Date Due    DTaP/Tdap/Td vaccine (1 - Tdap) 02/03/1980    Shingles Vaccine (1 of 2) 02/03/2011    Cervical cancer screen  02/18/2017    Pneumococcal 0-64 years Vaccine (2 of 3 - PPSV23) 01/25/2020    Breast cancer screen  06/13/2020    Lipid screen  10/18/2020    A1C test (Diabetic or Prediabetic)  02/11/2021    Creatinine monitoring  07/20/2021    Potassium monitoring  07/29/2021    Colon cancer screen colonoscopy  06/13/2028    Flu vaccine  Completed    Hepatitis C screen  Completed    HIV screen  Completed    Hepatitis A vaccine  Aged Out    Hib vaccine  Aged Out    Meningococcal (ACWY) vaccine  Aged Out    Hepatitis B vaccine  Discontinued             (applicable per patient's age: Cancer Screenings, Depression Screening, Fall Risk Screening, Immunizations)    Hemoglobin A1C (%)   Date Value   02/11/2020 5.7   09/09/2019 6.0   08/06/2018 5.8     Microalb/Crt.  Ratio (mcg/mg creat)   Date Value   02/09/2013 664     LDL Cholesterol (mg/dL)   Date Value   10/18/2019 111     LDL Calculated (mg/dL)   Date Value   08/01/2016 116     AST (U/L)   Date Value   10/22/2019 30     ALT (U/L)   Date Value   10/22/2019 22     BUN (mg/dL)   Date Value   07/20/2020 38 (H)      (goal A1C is < 7)   (goal LDL is <100) need 30-50% reduction from baseline     BP Readings from Last 3 Encounters:   12/08/20 128/73   10/13/20 (!) 155/81   07/29/20 113/63    (goal /80)      All Future Testing planned in [facility]:  Lab Frequency Next Occurrence   ALLAN Digital Screen Bilateral [[id number]] Once 12/05/2020   Lipid Panel Once 02/13/2021       Next Visit Date:  Future Appointments   Date Time Provider Amanda Wray   3/5/2021 10:40 AM Wilber Dowell PA-C [address]            Patient Active Problem List:     Hyperlipidemia     Microalbuminuria     S/P gastric bypass     Anemia     Chronic kidney disease     S/P total knee arthroplasty     Hyperuricemia Hydronephrosis of left kidney     Renal atrophy, right     Localized, primary osteoarthritis of hand     Essential hypertension     Renal insufficiency     Motion sickness     Slow transit constipation     Chest pain     Aortic dissection (HCC)     JINNY (acute kidney injury) (HCC)     Respiratory acidosis     Subacromial impingement     Acute on chronic diastolic (congestive) heart failure (HCC)     Moderate mitral regurgitation     Hypertensive emergency     Acute respiratory failure with hypoxia [facility])     Former smoker     Acute pulmonary edema (HCC)     Acute on chronic congestive heart failure ([facility] [address]     ESRD on hemodialysis ([facility] [address]

## 2020-12-31 RX ORDER — BIOTIN 10 MG
1 TABLET ORAL DAILY
Qty: 30 TABLET | Refills: 5 | Status: SHIPPED | OUTPATIENT
Start: 2020-12-31 | End: 2021-03-05 | Stop reason: SDUPTHER

## 2020-12-31 RX ORDER — GABAPENTIN 100 MG/1
100 CAPSULE ORAL 2 TIMES DAILY
Qty: 60 CAPSULE | Refills: 1 | Status: SHIPPED | OUTPATIENT
Start: 2020-12-31 | End: 2021-02-18

## 2020-12-31 RX ORDER — LANOLIN ALCOHOL/MO/W.PET/CERES
CREAM (GRAM) TOPICAL
Qty: 30 TABLET | OUTPATIENT
Start: 2020-12-31

## 2020-12-31 RX ORDER — ASPIRIN 81 MG/1
81 TABLET ORAL DAILY
Qty: 30 TABLET | Refills: 3 | OUTPATIENT
Start: 2020-12-31

## 2021-01-27 DIAGNOSIS — I10 ESSENTIAL HYPERTENSION: ICD-10-CM

## 2021-01-27 NOTE — TELEPHONE ENCOUNTER
Health Maintenance   Topic Date Due    DTaP/Tdap/Td vaccine (1 - Tdap) 02/03/1980    Shingles Vaccine (1 of 2) 02/03/2011    Cervical cancer screen  02/18/2017    Pneumococcal 0-64 years Vaccine (2 of 3 - PPSV23) 01/25/2020    Breast cancer screen  06/13/2020    Lipid screen  10/18/2020    A1C test (Diabetic or Prediabetic)  02/11/2021    Creatinine monitoring  07/20/2021    Potassium monitoring  07/29/2021    Colon cancer screen colonoscopy  06/13/2028    Flu vaccine  Completed    Hepatitis C screen  Completed    HIV screen  Completed    Hepatitis A vaccine  Aged Out    Hib vaccine  Aged Out    Meningococcal (ACWY) vaccine  Aged Out    Hepatitis B vaccine  Discontinued             (applicable per patient's age: Cancer Screenings, Depression Screening, Fall Risk Screening, Immunizations)    Hemoglobin A1C (%)   Date Value   02/11/2020 5.7   09/09/2019 6.0   08/06/2018 5.8     Microalb/Crt.  Ratio (mcg/mg creat)   Date Value   02/09/2013 664     LDL Cholesterol (mg/dL)   Date Value   10/18/2019 111     LDL Calculated (mg/dL)   Date Value   08/01/2016 116     AST (U/L)   Date Value   10/22/2019 30     ALT (U/L)   Date Value   10/22/2019 22     BUN (mg/dL)   Date Value   07/20/2020 38 (H)      (goal A1C is < 7)   (goal LDL is <100) need 30-50% reduction from baseline     BP Readings from Last 3 Encounters:   12/08/20 128/73   10/13/20 (!) 155/81   07/29/20 113/63    (goal /80)      All Future Testing planned in [facility]:  Lab Frequency Next Occurrence   ALLAN Digital Screen Bilateral [[id number]] Once 12/05/2020   Lipid Panel Once 02/13/2021       Next Visit Date:  Future Appointments   Date Time Provider Amanda Wray   3/5/2021 10:40 AM Lindsay Ding PA-C [address]            Patient Active Problem List:     Hyperlipidemia     Microalbuminuria     S/P gastric bypass     Anemia     Chronic kidney disease     S/P total knee arthroplasty     Hyperuricemia     Hydronephrosis of left kidney     Renal atrophy, right     Localized, primary osteoarthritis of hand     Essential hypertension     Renal insufficiency     Motion sickness     Slow transit constipation     Chest pain     Aortic dissection (HCC)     JINNY (acute kidney injury) (HCC)     Respiratory acidosis     Subacromial impingement     Acute on chronic diastolic (congestive) heart failure (HCC)     Moderate mitral regurgitation     Hypertensive emergency     Acute respiratory failure with hypoxia [facility])     Former smoker     Acute pulmonary edema (HCC)     Acute on chronic congestive heart failure ([facility] [address]     ESRD on hemodialysis ([facility] [address]

## 2021-01-28 RX ORDER — BIOTIN 10 MG
1 TABLET ORAL DAILY
Qty: 30 TABLET | Refills: 5 | OUTPATIENT
Start: 2021-01-28

## 2021-01-28 RX ORDER — ASPIRIN 81 MG/1
81 TABLET ORAL DAILY
Qty: 30 TABLET | Refills: 3 | Status: SHIPPED | OUTPATIENT
Start: 2021-01-28 | End: 2021-03-05 | Stop reason: SDUPTHER

## 2021-02-18 DIAGNOSIS — M79.2 NEUROPATHIC PAIN: ICD-10-CM

## 2021-02-18 DIAGNOSIS — M25.562 CHRONIC PAIN OF LEFT KNEE: ICD-10-CM

## 2021-02-18 DIAGNOSIS — G89.29 CHRONIC PAIN OF LEFT KNEE: ICD-10-CM

## 2021-02-18 RX ORDER — GABAPENTIN 100 MG/1
CAPSULE ORAL
Qty: 60 CAPSULE | Refills: 0 | Status: SHIPPED | OUTPATIENT
Start: 2021-02-25 | End: 2021-03-05 | Stop reason: SDUPTHER

## 2021-02-18 NOTE — TELEPHONE ENCOUNTER
Health Maintenance   Topic Date Due    DTaP/Tdap/Td vaccine (1 - Tdap) 02/03/1980    Shingles Vaccine (1 of 2) 02/03/2011    Cervical cancer screen  02/18/2017    Pneumococcal 0-64 years Vaccine (2 of 3 - PPSV23) 01/25/2020    Breast cancer screen  06/13/2020    Lipid screen  10/18/2020    A1C test (Diabetic or Prediabetic)  02/11/2021    Creatinine monitoring  07/20/2021    Potassium monitoring  07/29/2021    Colon cancer screen colonoscopy  06/13/2028    Flu vaccine  Completed    Hepatitis C screen  Completed    HIV screen  Completed    Hepatitis A vaccine  Aged Out    Hib vaccine  Aged Out    Meningococcal (ACWY) vaccine  Aged Out             (applicable per patient's age: Cancer Screenings, Depression Screening, Fall Risk Screening, Immunizations)    Hemoglobin A1C (%)   Date Value   02/11/2020 5.7   09/09/2019 6.0   08/06/2018 5.8     Microalb/Crt.  Ratio (mcg/mg creat)   Date Value   02/09/2013 664     LDL Cholesterol (mg/dL)   Date Value   10/18/2019 111     LDL Calculated (mg/dL)   Date Value   08/01/2016 116     AST (U/L)   Date Value   10/22/2019 30     ALT (U/L)   Date Value   10/22/2019 22     BUN (mg/dL)   Date Value   07/20/2020 38 (H)      (goal A1C is < 7)   (goal LDL is <100) need 30-50% reduction from baseline     BP Readings from Last 3 Encounters:   12/08/20 128/73   10/13/20 (!) 155/81   07/29/20 113/63    (goal /80)      All Future Testing planned in [facility]:  Lab Frequency Next Occurrence   ALLAN Digital Screen Bilateral [[id number]] Once 12/05/2020   Lipid Panel Once 02/13/2021       Next Visit Date:  Future Appointments   Date Time Provider Amanda Wray   3/5/2021 10:40 AM Wilfrid Steward PA-C [address]            Patient Active Problem List:     Hyperlipidemia     Microalbuminuria     S/P gastric bypass     Anemia     Chronic kidney disease     S/P total knee arthroplasty     Hyperuricemia     Hydronephrosis of left kidney     Renal atrophy, right Localized, primary osteoarthritis of hand     Essential hypertension     Renal insufficiency     Motion sickness     Slow transit constipation     Chest pain     Aortic dissection (HCC)     JINNY (acute kidney injury) ([address]     Respiratory acidosis     Subacromial impingement     Acute on chronic diastolic (congestive) heart failure (HCC)     Moderate mitral regurgitation     Hypertensive emergency     Acute respiratory failure with hypoxia [facility])     Former smoker     Acute pulmonary edema (HCC)     Acute on chronic congestive heart failure ([address]     ESRD on hemodialysis ([address]

## 2021-03-05 ENCOUNTER — OFFICE VISIT (OUTPATIENT)
Dept: PRIMARY CARE CLINIC | Age: 61
End: 2021-03-05
Payer: MEDICARE

## 2021-03-05 VITALS
OXYGEN SATURATION: 95 % | HEART RATE: 62 BPM | SYSTOLIC BLOOD PRESSURE: 135 MMHG | DIASTOLIC BLOOD PRESSURE: 78 MMHG | BODY MASS INDEX: 34.08 KG/M2 | TEMPERATURE: 98.2 F | WEIGHT: 192.4 LBS

## 2021-03-05 DIAGNOSIS — Z99.2 ESRD ON HEMODIALYSIS (HCC): ICD-10-CM

## 2021-03-05 DIAGNOSIS — M62.838 MUSCLE SPASM: ICD-10-CM

## 2021-03-05 DIAGNOSIS — Z76.0 MEDICATION REFILL: ICD-10-CM

## 2021-03-05 DIAGNOSIS — Z12.31 ENCOUNTER FOR SCREENING MAMMOGRAM FOR BREAST CANCER: ICD-10-CM

## 2021-03-05 DIAGNOSIS — N18.6 ESRD ON HEMODIALYSIS (HCC): ICD-10-CM

## 2021-03-05 DIAGNOSIS — I10 ESSENTIAL HYPERTENSION: ICD-10-CM

## 2021-03-05 DIAGNOSIS — E66.09 CLASS 1 OBESITY DUE TO EXCESS CALORIES WITH SERIOUS COMORBIDITY AND BODY MASS INDEX (BMI) OF 34.0 TO 34.9 IN ADULT: ICD-10-CM

## 2021-03-05 DIAGNOSIS — K21.9 GASTROESOPHAGEAL REFLUX DISEASE, UNSPECIFIED WHETHER ESOPHAGITIS PRESENT: ICD-10-CM

## 2021-03-05 DIAGNOSIS — M79.2 NEUROPATHIC PAIN: ICD-10-CM

## 2021-03-05 DIAGNOSIS — E79.0 HYPERURICEMIA: ICD-10-CM

## 2021-03-05 DIAGNOSIS — Z00.00 ANNUAL PHYSICAL EXAM: Primary | ICD-10-CM

## 2021-03-05 PROCEDURE — 99396 PREV VISIT EST AGE 40-64: CPT | Performed by: PHYSICIAN ASSISTANT

## 2021-03-05 PROCEDURE — G8482 FLU IMMUNIZE ORDER/ADMIN: HCPCS | Performed by: PHYSICIAN ASSISTANT

## 2021-03-05 RX ORDER — BIOTIN 10 MG
1 TABLET ORAL DAILY
Qty: 30 TABLET | Refills: 5 | Status: SHIPPED | OUTPATIENT
Start: 2021-03-05 | End: 2021-08-24 | Stop reason: SDUPTHER

## 2021-03-05 RX ORDER — ALLOPURINOL 100 MG/1
100 TABLET ORAL DAILY
Qty: 28 TABLET | Refills: 5 | Status: SHIPPED | OUTPATIENT
Start: 2021-03-05 | End: 2021-08-24 | Stop reason: SDUPTHER

## 2021-03-05 RX ORDER — PANTOPRAZOLE SODIUM 20 MG/1
20 TABLET, DELAYED RELEASE ORAL DAILY
Qty: 28 TABLET | Refills: 5 | Status: SHIPPED | OUTPATIENT
Start: 2021-03-05 | End: 2021-08-24 | Stop reason: SDUPTHER

## 2021-03-05 RX ORDER — LABETALOL 200 MG/1
200 TABLET, FILM COATED ORAL 2 TIMES DAILY
Qty: 60 TABLET | Refills: 5 | Status: SHIPPED | OUTPATIENT
Start: 2021-03-05 | End: 2021-08-24 | Stop reason: SDUPTHER

## 2021-03-05 RX ORDER — TIZANIDINE 4 MG/1
4 TABLET ORAL 2 TIMES DAILY
Qty: 56 TABLET | Refills: 5 | Status: SHIPPED | OUTPATIENT
Start: 2021-03-05 | End: 2021-10-08 | Stop reason: SDUPTHER

## 2021-03-05 RX ORDER — ATORVASTATIN CALCIUM 40 MG/1
40 TABLET, FILM COATED ORAL DAILY
Qty: 28 TABLET | Refills: 5 | Status: SHIPPED | OUTPATIENT
Start: 2021-03-05 | End: 2021-07-16 | Stop reason: SDUPTHER

## 2021-03-05 RX ORDER — GABAPENTIN 100 MG/1
100 CAPSULE ORAL 2 TIMES DAILY
Qty: 60 CAPSULE | Refills: 3 | Status: ON HOLD | OUTPATIENT
Start: 2021-03-20 | End: 2021-09-27 | Stop reason: ALTCHOICE

## 2021-03-05 RX ORDER — ASPIRIN 81 MG/1
81 TABLET ORAL DAILY
Qty: 30 TABLET | Refills: 5 | Status: SHIPPED | OUTPATIENT
Start: 2021-03-05 | End: 2021-08-24 | Stop reason: SDUPTHER

## 2021-03-05 ASSESSMENT — PATIENT HEALTH QUESTIONNAIRE - PHQ9
SUM OF ALL RESPONSES TO PHQ QUESTIONS 1-9: 2
1. LITTLE INTEREST OR PLEASURE IN DOING THINGS: 1
SUM OF ALL RESPONSES TO PHQ QUESTIONS 1-9: 2
SUM OF ALL RESPONSES TO PHQ9 QUESTIONS 1 & 2: 2

## 2021-03-05 ASSESSMENT — ENCOUNTER SYMPTOMS: SHORTNESS OF BREATH: 1

## 2021-03-05 NOTE — PROGRESS NOTES
[facility]  [id number] [facility] [address]  Dept: [phone]  Dept Fax: [phone]    Office Progress/Follow Up Note    Date of patient's visit: 3/5/2021    Patient's Name:  Justin Adams YOB: 1960            Patient Care Team:  Wilfrid Steward PA-C as PCP - General  Wilfrid Steward PA-C as PCP - [facility] Empaneled Provider  Miguel Cox MD as Consulting Physician (Nephrology)  ================================================================    REASON FOR VISIT/CHIEF COMPLAINT:  Follow-up (4M, ESRD) and Annual Exam    HISTORY OF PRESENTING ILLNESS:  History was obtained from: patient. Justin Aadms is a 62 y.o. is here for physical, follow-up for ESRD, medication refill. Patient states she is compliant with medications. Patient states labs \"was done end of last month\" at hemodialysis center, informed patient will obtain records. Patient states fatigue recently but \"due to ESRD\", patient request handicap placard, informed patient she will need to request that from nephrology. Patient goes to hemodialysis at [facility]. Patient states muscle spasms \"has improved\". Patient requested medication refill. Patient states she was seen by cardiology, Dr. Kathya Yousif, patient completed ALICJA will obtain records. Patient blood pressures controlled in office. Patient requested medication refill. Patient weight is stable. Discussed weight loss in depth with patient, encourage patient make changes in diet and the importance of physical activity by exercising multiple times weekly. Patient requesting additional medication refills for chronic left knee pain, neuropathic pain, hyperuricemia, GERD, patient denies any side effects or complications of medication. Labs reviewed, patient states \"labs was done at hemodialysis center\", informed patient to obtain records.     [facility] maintenance reviewed, encourage patient to call and schedule mammogram for breast cancer screening. OARRS reviewed. Medication reviewed and prescribed. Patient was informed that PCP will be residing in April 2021, encourage patient to get established with another provider in office or contact PCP office to be referred to another primary care patient, patient voiced understanding.     HPI    Patient Active Problem List   Diagnosis    Hyperlipidemia    Microalbuminuria    S/P gastric bypass    Anemia    S/P total knee arthroplasty    Hyperuricemia    Hydronephrosis of left kidney    Renal atrophy, right    Localized, primary osteoarthritis of hand    Essential hypertension    Renal insufficiency    Motion sickness    Slow transit constipation    Chest pain    Aortic dissection (HCC)    Respiratory acidosis    Subacromial impingement    Acute on chronic diastolic (congestive) heart failure (HCC)    Moderate mitral regurgitation    Hypertensive emergency    Acute respiratory failure with hypoxia ([address]    Former smoker    Acute pulmonary edema (HCC)    ESRD on [facility] ([facility] [address]    Chronic pain of left knee    Neuropathic pain    Gastroesophageal reflux disease       Health Maintenance Due   Topic Date Due    COVID-19 Vaccine (1) Never done    DTaP/Tdap/Td vaccine (1 - Tdap) Never done    Shingles Vaccine (1 of 2) Never done    Breast cancer screen  06/13/2020    Lipid screen  10/18/2020    Pneumococcal 0-64 years Vaccine (2 of 3 - PPSV23) 02/03/2021    A1C test (Diabetic or Prediabetic)  02/11/2021       Allergies   Allergen Reactions    Latex Hives    Penicillins Hives    Codeine Itching    Nsaids Other (See Comments)     Gastric Bypass Surgery     Tolmetin Other (See Comments)     Gastric Bypass Surgery          Current Outpatient Medications   Medication Sig Dispense Refill    labetalol (NORMODYNE) 200 MG tablet Take 1 tablet by mouth 2 times daily 60 tablet 5    [START ON 3/20/2021] gabapentin (NEURONTIN) 100 MG capsule Take 1 capsule by mouth 2 times daily for 120 days. 60 capsule 3    aspirin 81 MG EC tablet Take 1 tablet by mouth daily 30 tablet 5    allopurinol (ZYLOPRIM) 100 MG tablet Take 1 tablet by mouth daily 28 tablet 5    atorvastatin (LIPITOR) 40 MG tablet Take 1 tablet by mouth daily 28 tablet 5    tiZANidine (ZANAFLEX) 4 MG tablet Take 1 tablet by mouth 2 times daily 56 tablet 5    Multiple Vitamins-Minerals (MULTIVITAMIN ADULT EXTRA C) CHEW Take 1 tablet by mouth daily 30 tablet 5    pantoprazole (PROTONIX) 20 MG tablet Take 1 tablet by mouth daily 28 tablet 5    lidocaine-prilocaine (EMLA) 2.5-2.5 % cream APPLY 3 TIMES WEEKLY 30-60 MINUTES BEFORE DIALYSIS WRAP WITH PLASTIC WRAP AS DIRECTED 30 g 3    amLODIPine (NORVASC) 10 MG tablet TAKE 1 TABLET DAILY 28 tablet 3    Transparent Dressings (TEGADERM ABSORBENT DRESSING) MISC Provide insurance covered tegaderm dressing, use at hemodialysis 100 each 5    VITAMIN E PO Take 1 capsule by mouth daily      acetaminophen (TYLENOL) 500 MG tablet Take 1,000 mg by mouth every 6 hours as needed for Pain      TRUE METRIX BLOOD GLUCOSE TEST strip       calcium citrate-vitamin D (CITRACEL+D) 200-250 MG-UNIT TABS per tablet TAKE 2 TABLETS THREE TIMES A DAY      Blood Pressure KIT 1 box by Does not apply route three times daily 1 kit 0     No current facility-administered medications for this visit.         Social History     Tobacco Use    Smoking status: Former Smoker     Packs/day: 1.00     Years: 30.00     Pack years: 30.00     Types: Cigarettes     Quit date: 3/21/2001     Years since quittin.9    Smokeless tobacco: Never Used   Substance Use Topics    Alcohol use: No    Drug use: No       Family History   Problem Relation Age of Onset    Heart Disease Mother     Diabetes Mother     Heart Disease Father     High Blood Pressure Father         REVIEW OFSYSTEMS:  Review of Systems   Constitutional: Negative for chills and fever. HENT: Negative for congestion, hearing loss, rhinorrhea and sore throat. Eyes: Negative for pain and visual disturbance. Respiratory: Positive for shortness of breath (on exertion). Negative for cough and wheezing. Cardiovascular: Negative for chest pain. Gastrointestinal: Negative for abdominal pain, constipation, diarrhea, nausea and vomiting. Genitourinary: Negative for difficulty urinating, dysuria, frequency and urgency. Musculoskeletal: Positive for myalgias. Negative for arthralgias and back pain. Neurological: Negative for dizziness and headaches. PHYSICAL EXAM:  Vitals:    03/05/21 1037 03/05/21 1126   BP: (!) 145/84 135/78   Site: Right Upper Arm    Position: Sitting    Cuff Size: Medium Adult    Pulse: 75 62   Temp: 98.2 °F (36.8 °C)    TempSrc: Temporal    SpO2: 95%    Weight: 192 lb 6.4 oz (87.3 kg)      BP Readings from Last 3 Encounters:   03/05/21 135/78   12/08/20 128/73   10/13/20 (!) 155/81        Physical Exam  Vitals signs reviewed. Constitutional:       Appearance: Normal appearance. She is well-developed and well-groomed. She is obese. HENT:      Head: Normocephalic and atraumatic. Eyes:      Conjunctiva/sclera: Conjunctivae normal.      Pupils: Pupils are equal, round, and reactive to light. Neck:      Musculoskeletal: Normal range of motion. Cardiovascular:      Rate and Rhythm: Normal rate and regular rhythm. Heart sounds: Murmur present. Systolic murmur present with a grade of 3/6. Pulmonary:      Effort: Pulmonary effort is normal.      Breath sounds: Normal breath sounds. Abdominal:      Palpations: Abdomen is soft. There is no mass. Tenderness: There is no abdominal tenderness. Musculoskeletal: Normal range of motion. General: No tenderness. Skin:     General: Skin is warm and dry. Neurological:      Mental Status: She is alert and oriented to person, place, and time.    Psychiatric: Behavior: Behavior is cooperative. DIAGNOSTIC FINDINGS:  CBC:  Lab Results   Component Value Date    WBC 6.1 07/20/2020    HGB 12.2 07/20/2020     07/20/2020     03/13/2012       BMP:    Lab Results   Component Value Date     07/20/2020    K 4.0 07/29/2020     07/20/2020    CO2 25 07/20/2020    BUN 38 07/20/2020    CREATININE 5.30 07/20/2020    GLUCOSE 120 07/20/2020    GLUCOSE 80 03/13/2012       HEMOGLOBIN A1C:   Lab Results   Component Value Date    LABA1C 5.7 02/11/2020       FASTING LIPID PANEL:  Lab Results   Component Value Date    CHOL 186 10/18/2019    HDL 50 10/18/2019    TRIG 124 10/18/2019       ASSESSMENT AND PLAN:  Ashlee Huerta was seen today for follow-up and annual exam.    Diagnoses and all orders for this visit:    Annual physical exam    ESRD on hemodialysis ([facility] [address]    Class 1 obesity due to excess calories with serious comorbidity and body mass index (BMI) of 34.0 to 34.9 in adult    Essential hypertension  -     labetalol (NORMODYNE) 200 MG tablet; Take 1 tablet by mouth 2 times daily  -     atorvastatin (LIPITOR) 40 MG tablet; Take 1 tablet by mouth daily    Chronic pain of left knee  -     gabapentin (NEURONTIN) 100 MG capsule; Take 1 capsule by mouth 2 times daily for 120 days. Neuropathic pain  -     gabapentin (NEURONTIN) 100 MG capsule; Take 1 capsule by mouth 2 times daily for 120 days. Muscle spasm  -     tiZANidine (ZANAFLEX) 4 MG tablet; Take 1 tablet by mouth 2 times daily    Hyperuricemia  -     allopurinol (ZYLOPRIM) 100 MG tablet; Take 1 tablet by mouth daily    Gastroesophageal reflux disease, unspecified whether esophagitis present  -     pantoprazole (PROTONIX) 20 MG tablet; Take 1 tablet by mouth daily    Encounter for screening mammogram for breast cancer    Medication refill  -     labetalol (NORMODYNE) 200 MG tablet; Take 1 tablet by mouth 2 times daily  -     gabapentin (NEURONTIN) 100 MG capsule;  Take 1 capsule by mouth 2 times daily for 120 days. -     aspirin 81 MG EC tablet; Take 1 tablet by mouth daily  -     allopurinol (ZYLOPRIM) 100 MG tablet; Take 1 tablet by mouth daily  -     atorvastatin (LIPITOR) 40 MG tablet; Take 1 tablet by mouth daily  -     tiZANidine (ZANAFLEX) 4 MG tablet; Take 1 tablet by mouth 2 times daily  -     Multiple Vitamins-Minerals (MULTIVITAMIN ADULT EXTRA C) CHEW; Take 1 tablet by mouth daily  -     pantoprazole (PROTONIX) 20 MG tablet; Take 1 tablet by mouth daily      FOLLOW UP AND INSTRUCTIONS:  Return if symptoms worsen or fail to improve. · Mitchell received counseling on the following healthy behaviors:nutrition and exercise    · Discussed use, benefit, and side effects of prescribed medications. Barriers to medication compliance addressed. All patient questions answered. Pt voiced understanding. · Patient given educational materials - see patient instructions    Electronically signed by Eliza Diaz PA-C on 3/5/21 at 10:56 AM EST    This note is created with the assistance of a speech-recognition program. While intending to generate a document that actually reflects the content of the visit, the document can still have some mistakes which may not have been identified and corrected by editing.

## 2021-03-05 NOTE — PROGRESS NOTES
Visit Information    Have you changed or started any medications since your last visit including any over-the-counter medicines, vitamins, or herbal medicines? no   Are you having any side effects from any of your medications? -  no  Have you stopped taking any of your medications? Is so, why? -  no    Have you seen any other physician or provider since your last visit? No  Have you had any other diagnostic tests since your last visit? No  Have you been seen in the emergency room and/or had an admission to a hospital since we last saw you? No  Have you had your routine dental cleaning in the past 6 months? no    Have you activated your [employer] account? If not, what are your barriers?  Yes     Patient Care Team:  Rafael Cervantes PA-C as PCP - General  Rafael Cervantes PA-C as PCP - [facility]    Medical History Review  Past Medical, Family, and Social History reviewed and does not contribute to the patient presenting condition    Health Maintenance   Topic Date Due    COVID-19 Vaccine (1 of 2) Never done    DTaP/Tdap/Td vaccine (1 - Tdap) Never done    Shingles Vaccine (1 of 2) Never done    Cervical cancer screen  02/18/2017    Breast cancer screen  06/13/2020    Lipid screen  10/18/2020    Pneumococcal 0-64 years Vaccine (2 of 3 - PPSV23) 02/03/2021    A1C test (Diabetic or Prediabetic)  02/11/2021    Creatinine monitoring  07/20/2021    Potassium monitoring  07/29/2021    Colon cancer screen colonoscopy  06/13/2028    Flu vaccine  Completed    Hepatitis C screen  Completed    HIV screen  Completed    Hepatitis A vaccine  Aged Out    Hib vaccine  Aged Out    Meningococcal (ACWY) vaccine  Aged Out

## 2021-03-05 NOTE — PATIENT INSTRUCTIONS
· Call and schedule mammogram for breast cancer, [phone]  · Confirm pharmacy has received Multivitamin and Asprin prescription  · Call and schedule follow up with cardiology, [phone]

## 2021-03-14 ENCOUNTER — TELEPHONE (OUTPATIENT)
Dept: PRIMARY CARE CLINIC | Age: 61
End: 2021-03-14

## 2021-03-14 PROBLEM — M79.2 NEUROPATHIC PAIN: Status: ACTIVE | Noted: 2021-03-14

## 2021-03-14 PROBLEM — G89.29 CHRONIC PAIN OF LEFT KNEE: Status: ACTIVE | Noted: 2021-03-14

## 2021-03-14 PROBLEM — K21.9 GASTROESOPHAGEAL REFLUX DISEASE: Status: ACTIVE | Noted: 2021-03-14

## 2021-03-14 PROBLEM — M25.562 CHRONIC PAIN OF LEFT KNEE: Status: ACTIVE | Noted: 2021-03-14

## 2021-03-14 ASSESSMENT — ENCOUNTER SYMPTOMS
BACK PAIN: 0
SORE THROAT: 0
WHEEZING: 0
COUGH: 0
EYE PAIN: 0
CONSTIPATION: 0
ABDOMINAL PAIN: 0
VOMITING: 0
DIARRHEA: 0
NAUSEA: 0
RHINORRHEA: 0

## 2021-05-20 ENCOUNTER — HOSPITAL ENCOUNTER (OUTPATIENT)
Dept: MAMMOGRAPHY | Age: 61
Discharge: HOME | End: 2021-05-22
Payer: MEDICARE

## 2021-05-20 DIAGNOSIS — Z12.31 ENCOUNTER FOR SCREENING MAMMOGRAM FOR BREAST CANCER: ICD-10-CM

## 2021-05-20 PROCEDURE — 77067 SCR MAMMO BI INCL CAD: CPT

## 2021-05-20 PROCEDURE — 77063 BREAST TOMOSYNTHESIS BI: CPT

## 2021-07-16 DIAGNOSIS — I10 ESSENTIAL HYPERTENSION: ICD-10-CM

## 2021-07-16 DIAGNOSIS — Z76.0 MEDICATION REFILL: ICD-10-CM

## 2021-07-16 RX ORDER — ATORVASTATIN CALCIUM 40 MG/1
40 TABLET, FILM COATED ORAL DAILY
Qty: 28 TABLET | Refills: 1 | Status: SHIPPED | OUTPATIENT
Start: 2021-07-16 | End: 2021-12-10 | Stop reason: SDUPTHER

## 2021-07-28 LAB
AVERAGE GLUCOSE: NORMAL
HBA1C MFR BLD: 11.2 %

## 2021-07-30 LAB
AVERAGE GLUCOSE: NORMAL
HBA1C MFR BLD: 5.3 %

## 2021-08-24 ENCOUNTER — OFFICE VISIT (OUTPATIENT)
Dept: PRIMARY CARE CLINIC | Age: 61
End: 2021-08-24
Payer: MEDICARE

## 2021-08-24 VITALS
HEART RATE: 72 BPM | OXYGEN SATURATION: 96 % | TEMPERATURE: 98.2 F | DIASTOLIC BLOOD PRESSURE: 82 MMHG | BODY MASS INDEX: 31.89 KG/M2 | SYSTOLIC BLOOD PRESSURE: 145 MMHG | WEIGHT: 180 LBS

## 2021-08-24 DIAGNOSIS — G89.29 CHRONIC PAIN OF LEFT KNEE: ICD-10-CM

## 2021-08-24 DIAGNOSIS — M79.10 MYALGIA: ICD-10-CM

## 2021-08-24 DIAGNOSIS — I10 ESSENTIAL HYPERTENSION: ICD-10-CM

## 2021-08-24 DIAGNOSIS — N18.6 ESRD ON HEMODIALYSIS (HCC): ICD-10-CM

## 2021-08-24 DIAGNOSIS — M25.562 CHRONIC PAIN OF LEFT KNEE: ICD-10-CM

## 2021-08-24 DIAGNOSIS — Z99.2 ESRD ON HEMODIALYSIS (HCC): ICD-10-CM

## 2021-08-24 DIAGNOSIS — Z76.0 MEDICATION REFILL: ICD-10-CM

## 2021-08-24 DIAGNOSIS — I71.019 DISSECTION OF THORACIC AORTA: ICD-10-CM

## 2021-08-24 DIAGNOSIS — Z13.220 SCREENING FOR HYPERLIPIDEMIA: Primary | ICD-10-CM

## 2021-08-24 DIAGNOSIS — E79.0 HYPERURICEMIA: ICD-10-CM

## 2021-08-24 DIAGNOSIS — R73.03 PREDIABETES: ICD-10-CM

## 2021-08-24 DIAGNOSIS — M79.2 NEUROPATHIC PAIN: ICD-10-CM

## 2021-08-24 DIAGNOSIS — K21.9 GASTROESOPHAGEAL REFLUX DISEASE, UNSPECIFIED WHETHER ESOPHAGITIS PRESENT: ICD-10-CM

## 2021-08-24 DIAGNOSIS — R53.82 CHRONIC FATIGUE: ICD-10-CM

## 2021-08-24 DIAGNOSIS — I50.9 ACUTE ON CHRONIC HEART FAILURE, UNSPECIFIED HEART FAILURE TYPE (HCC): ICD-10-CM

## 2021-08-24 PROBLEM — N18.9 CHRONIC KIDNEY DISEASE: Status: ACTIVE | Noted: 2017-09-14

## 2021-08-24 PROCEDURE — 1036F TOBACCO NON-USER: CPT | Performed by: NURSE PRACTITIONER

## 2021-08-24 PROCEDURE — G8417 CALC BMI ABV UP PARAM F/U: HCPCS | Performed by: NURSE PRACTITIONER

## 2021-08-24 PROCEDURE — 99214 OFFICE O/P EST MOD 30 MIN: CPT | Performed by: NURSE PRACTITIONER

## 2021-08-24 PROCEDURE — 3017F COLORECTAL CA SCREEN DOC REV: CPT | Performed by: NURSE PRACTITIONER

## 2021-08-24 PROCEDURE — G8427 DOCREV CUR MEDS BY ELIG CLIN: HCPCS | Performed by: NURSE PRACTITIONER

## 2021-08-24 RX ORDER — PANTOPRAZOLE SODIUM 20 MG/1
20 TABLET, DELAYED RELEASE ORAL DAILY
Qty: 28 TABLET | Refills: 5 | Status: SHIPPED | OUTPATIENT
Start: 2021-08-24 | End: 2022-02-17

## 2021-08-24 RX ORDER — LABETALOL 200 MG/1
200 TABLET, FILM COATED ORAL 2 TIMES DAILY
Qty: 60 TABLET | Refills: 5 | Status: SHIPPED | OUTPATIENT
Start: 2021-08-24 | End: 2022-02-17

## 2021-08-24 RX ORDER — FUROSEMIDE 20 MG/1
40 TABLET ORAL DAILY
COMMUNITY
End: 2022-04-20

## 2021-08-24 RX ORDER — BIOTIN 10 MG
1 TABLET ORAL DAILY
Qty: 30 TABLET | Refills: 5 | Status: SHIPPED | OUTPATIENT
Start: 2021-08-24 | End: 2022-08-11

## 2021-08-24 RX ORDER — ASPIRIN 81 MG/1
81 TABLET ORAL DAILY
Qty: 30 TABLET | Refills: 5 | Status: SHIPPED | OUTPATIENT
Start: 2021-08-24 | End: 2022-02-17

## 2021-08-24 RX ORDER — LIDOCAINE 50 MG/G
OINTMENT TOPICAL
Qty: 240 G | Refills: 1 | Status: SHIPPED | OUTPATIENT
Start: 2021-08-24 | End: 2022-08-11

## 2021-08-24 RX ORDER — HYDRALAZINE HYDROCHLORIDE 50 MG/1
50 TABLET, FILM COATED ORAL 3 TIMES DAILY
COMMUNITY

## 2021-08-24 RX ORDER — ALLOPURINOL 100 MG/1
100 TABLET ORAL DAILY
Qty: 28 TABLET | Refills: 5 | Status: SHIPPED | OUTPATIENT
Start: 2021-08-24 | End: 2022-02-17

## 2021-08-24 SDOH — ECONOMIC STABILITY: FOOD INSECURITY: WITHIN THE PAST 12 MONTHS, THE FOOD YOU BOUGHT JUST DIDN'T LAST AND YOU DIDN'T HAVE MONEY TO GET MORE.: NEVER TRUE

## 2021-08-24 SDOH — ECONOMIC STABILITY: FOOD INSECURITY: WITHIN THE PAST 12 MONTHS, YOU WORRIED THAT YOUR FOOD WOULD RUN OUT BEFORE YOU GOT MONEY TO BUY MORE.: NEVER TRUE

## 2021-08-24 ASSESSMENT — ENCOUNTER SYMPTOMS
SHORTNESS OF BREATH: 1
ABDOMINAL PAIN: 0
CONSTIPATION: 0
SORE THROAT: 0
VOMITING: 0
NAUSEA: 0
BACK PAIN: 0
EYE PAIN: 0
WHEEZING: 0
COUGH: 0
RHINORRHEA: 0
DIARRHEA: 0

## 2021-08-24 ASSESSMENT — SOCIAL DETERMINANTS OF HEALTH (SDOH): HOW HARD IS IT FOR YOU TO PAY FOR THE VERY BASICS LIKE FOOD, HOUSING, MEDICAL CARE, AND HEATING?: NOT HARD AT ALL

## 2021-08-24 ASSESSMENT — PATIENT HEALTH QUESTIONNAIRE - PHQ9
2. FEELING DOWN, DEPRESSED OR HOPELESS: 0
SUM OF ALL RESPONSES TO PHQ9 QUESTIONS 1 & 2: 0
SUM OF ALL RESPONSES TO PHQ QUESTIONS 1-9: 0
1. LITTLE INTEREST OR PLEASURE IN DOING THINGS: 0

## 2021-08-24 NOTE — PROGRESS NOTES
[facility]  [id number] [facility] [address]  Dept: [phone]  Dept Fax: [phone]    Patient Care Team:  NAV Wood - CNP as PCP - General (Family Medicine)  Shyann Reynoso MD as Consulting Physician (Nephrology)    2021    Latia Rose (:  1960) geovanny 62 y.o. female, here for evaluation of the following medical concerns:   Chief Complaint   Patient presents with    Established New Doctor    Medication Refill     Latia Rose is a 81-year-old female here today to establish care. Last PCP visit on 3/5/2021. Known history of end-stage renal disease on dialysis, hypertension and CHF. Dialysis completed at [address] in [facility]. Also follows with Dr. Amandeep Anne, her cardiologist.      C/o overall joint pain days after dailysis, takes full day to resolve. Tried tylenol, doesn't help. \" I feel it in my whole body\"         Review of Systems   Constitutional: Negative for chills and fever. HENT: Negative for congestion, hearing loss, rhinorrhea and sore throat. Eyes: Negative for pain and visual disturbance. Respiratory: Positive for shortness of breath (on exertion). Negative for cough and wheezing. Cardiovascular: Negative for chest pain. Gastrointestinal: Negative for abdominal pain, constipation, diarrhea, nausea and vomiting. Genitourinary: Negative for difficulty urinating, dysuria, frequency and urgency. Musculoskeletal: Positive for arthralgias and myalgias. Negative for back pain. Skin: Negative for rash and wound. Neurological: Negative for dizziness, tremors, syncope and headaches. Psychiatric/Behavioral: Negative for dysphoric mood. Prior to Visit Medications    Medication Sig Taking?  Authorizing Provider   hydrALAZINE (APRESOLINE) 50 MG tablet Take 50 mg by mouth 3 times daily Yes Historical Provider, MD   furosemide (LASIX) 20 MG tablet Take 40 mg by mouth daily Non-dialysis days Yes Historical Provider, MD   Multiple Vitamins-Minerals (MULTIVITAMIN ADULT EXTRA C) CHEW Take 1 tablet by mouth daily Yes NAV Chan CNP   allopurinol (ZYLOPRIM) 100 MG tablet Take 1 tablet by mouth daily Yes NAV Chan CNP   aspirin 81 MG EC tablet Take 1 tablet by mouth daily Yes NAV Chan CNP   labetalol (NORMODYNE) 200 MG tablet Take 1 tablet by mouth 2 times daily Yes NAV Chan CNP   pantoprazole (PROTONIX) 20 MG tablet Take 1 tablet by mouth daily Yes NAV Chan CNP   lidocaine (XYLOCAINE) 5 % ointment Apply topically BID as needed. Yes NAV Chan CNP   atorvastatin (LIPITOR) 40 MG tablet Take 1 tablet by mouth daily Yes NAV Chan CNP   amLODIPine (NORVASC) 10 MG tablet TAKE 1 TABLET DAILY Yes Polly Phipps MD   lidocaine-prilocaine (EMLA) 2.5-2.5 % cream APPLY 3 TIMES WEEKLY 30-60 MINUTES BEFORE DIALYSIS WRAP WITH PLASTIC WRAP AS DIRECTED Yes Polly Phipps MD   VITAMIN E-200 200 units capsule TAKE 1 CAPSULE BY MOUTH DAILY Yes Polly Phipps MD   tiZANidine (ZANAFLEX) 4 MG tablet Take 1 tablet by mouth 2 times daily Yes Gelacio Martel PA-C   Transparent Dressings (TEGADERM ABSORBENT DRESSING) [address] [facility] Provide insurance covered tegaderm dressing, use at hemodialysis Yes Gelacio Martel PA-C   acetaminophen (TYLENOL) 500 MG tablet Take 1,000 mg by mouth every 6 hours as needed for Pain Yes Historical Provider, MD   TRUE METRIX BLOOD GLUCOSE TEST strip  Yes Historical Provider, MD   calcium citrate-vitamin D (CITRACEL+D) 200-250 MG-UNIT TABS per tablet TAKE 2 TABLETS THREE TIMES A DAY Yes Historical Provider, MD   Blood Pressure KIT 1 box by Does not apply route three times daily Yes Gelacio Martel PA-C   gabapentin (NEURONTIN) 100 MG capsule Take 1 capsule by mouth 2 times daily for 120 days.   Patient not taking: Reported on 8/24/2021  Krishna An Roland Coyne PA-C        Allergies   Allergen Reactions    Latex Hives    Penicillins Hives    Codeine Itching    Nsaids Other (See Comments)     Gastric Bypass Surgery     Tolmetin Other (See Comments)     Gastric Bypass Surgery        Past Medical History:   Diagnosis Date    CKD (chronic kidney disease) stage 3, GFR 30-59 ml/min (HCC)     per pt, caused by kid stone damage    Gout     Hemodialysis patient ([address] 10/21/2019    Hyperlipidemia     Hypertension     Kidney stone     Nephrolithiasis     Osteoarthritis     Type II or unspecified type diabetes mellitus without mention of complication, not stated as uncontrolled     patient denies       Past Surgical History:   Procedure Laterality Date    CARDIAC CATHETERIZATION  10/21/2019    DIALYSIS FISTULA CREATION  2019    LEFT ARM AV FISTULA CREATION (Left Arm Lower)    DIALYSIS FISTULA CREATION Left 2019    LEFT ARM AV FISTULA CREATION performed by Yane Frazier MD at [address] Left 2020    LIGATION TRIBUTARIES LEFT RADIAL CEPHALIC AV FISTULA --ULTRASOUND AV FISTULA performed by Yane Frazier MD at [address], TOTAL ABDOMINAL      JOINT REPLACEMENT      KNEE SURGERY Left 2018    LITHOTRIPSY      THYROIDECTOMY, PARTIAL      TONSILLECTOMY      TOTAL KNEE ARTHROPLASTY Right 2015       Social History     Socioeconomic History    Marital status: Single     Spouse name: Not on file    Number of children: Not on file    Years of education: Not on file    Highest education level: Not on file   Occupational History    Not on file   Tobacco Use    Smoking status: Former Smoker     Packs/day: 1.00     Years: 30.00     Pack years: 30.00     Types: Cigarettes     Quit date: 3/21/2001     Years since quittin.4    Smokeless tobacco: Never Used   Vaping Use    Vaping Use: Never used   Substance and Sexual Activity    Alcohol use: No    Drug use: No    Sexual activity: Yes   Other Topics Concern    Not on file   Social History Narrative    Not on file     Social Determinants of Health     Financial Resource Strain: Low Risk     Difficulty of Paying Living Expenses: Not hard at all   Food Insecurity: No Food Insecurity    Worried About Running Out of Food in the Last Year: Never true    [address] in the Last Year: Never true   Transportation Needs:     Lack of Transportation (Medical):  Lack of Transportation (Non-Medical):    Physical Activity:     Days of Exercise per Week:     Minutes of Exercise per Session:    Stress:     Feeling of Stress :    Social Connections:     Frequency of Communication with Friends and Family:     Frequency of Social Gatherings with Friends and Family:     Attends Synagogue Services:     Active Member of Clubs or Organizations:     Attends Club or Organization Meetings:     Marital Status:    Intimate Partner Violence:     Fear of Current or Ex-Partner:     Emotionally Abused:     Physically Abused:     Sexually Abused:         Family History   Problem Relation Age of Onset    Heart Disease Mother     Diabetes Mother     Heart Disease Father     High Blood Pressure Father        Vitals:    08/24/21 1251 08/24/21 1348   BP: (!) 143/83 (!) 145/82   Site: Right Upper Arm    Position: Sitting    Cuff Size: Medium Adult    Pulse: 68 72   Temp: 98.2 °F (36.8 °C)    TempSrc: Temporal    SpO2: 96%    Weight: 180 lb (81.6 kg)      Estimated body mass index is 31.89 kg/m² as calculated from the following:    Height as of 12/8/20: 5' 3\" (1.6 m). Weight as of this encounter: 180 lb (81.6 kg). Physical Exam  Vitals reviewed. Constitutional:       Appearance: Normal appearance. She is well-developed and well-groomed. She is obese. HENT:      Head: Normocephalic and atraumatic. Eyes:      Conjunctiva/sclera: Conjunctivae normal.      Pupils: Pupils are equal, round, and reactive to light. Cardiovascular:      Rate and Rhythm: Normal rate and regular rhythm. Heart sounds: Murmur heard. Systolic murmur is present with a grade of 3/6. Pulmonary:      Effort: Pulmonary effort is normal.      Breath sounds: Normal breath sounds. Abdominal:      Palpations: Abdomen is soft. There is no mass. Tenderness: There is no abdominal tenderness. Musculoskeletal:         General: No tenderness. Normal range of motion. Cervical back: Normal range of motion. Skin:     General: Skin is warm and dry. Neurological:      Mental Status: She is alert and oriented to person, place, and time. Psychiatric:         Behavior: Behavior is cooperative. ASSESSMENT     Diagnosis Orders   1. Screening for hyperlipidemia  Lipid, Fasting   2. Essential hypertension  labetalol (NORMODYNE) 200 MG tablet   3. Dissection of thoracic aorta ([address]     4. Acute on chronic heart failure, unspecified heart failure type ([address]     5. ESRD on hemodialysis ([facility])  Vitamin B12   6. Neuropathic pain     7. Medication refill  Multiple Vitamins-Minerals (MULTIVITAMIN ADULT EXTRA C) CHEW    allopurinol (ZYLOPRIM) 100 MG tablet    aspirin 81 MG EC tablet    labetalol (NORMODYNE) 200 MG tablet    pantoprazole (PROTONIX) 20 MG tablet   8. Hyperuricemia  allopurinol (ZYLOPRIM) 100 MG tablet   9. Gastroesophageal reflux disease, unspecified whether esophagitis present  pantoprazole (PROTONIX) 20 MG tablet   10. Prediabetes  Hemoglobin A1C   11. Myalgia  Vitamin B12   12. Chronic fatigue  Vitamin B12   13.  Chronic pain of left knee  lidocaine (XYLOCAINE) 5 % ointment          PLAN:  Orders Placed This Encounter   Medications    Multiple Vitamins-Minerals (MULTIVITAMIN ADULT EXTRA C) CHEW     Sig: Take 1 tablet by mouth daily     Dispense:  30 tablet     Refill:  5    allopurinol (ZYLOPRIM) 100 MG tablet     Sig: Take 1 tablet by mouth daily     Dispense:  28 tablet     Refill:  5    aspirin 81 MG EC tablet     Sig: Take 1 tablet by mouth daily     Dispense:  30 tablet     Refill:  5    labetalol (NORMODYNE) 200 MG tablet     Sig: Take 1 tablet by mouth 2 times daily     Dispense:  60 tablet     Refill:  5    pantoprazole (PROTONIX) 20 MG tablet     Sig: Take 1 tablet by mouth daily     Dispense:  28 tablet     Refill:  5    lidocaine (XYLOCAINE) 5 % ointment     Sig: Apply topically BID as needed. Dispense:  240 g     Refill:  1         1. Encourage patient to contact cardiology, likely due for follow-up. 2. Patient complaining of overall myalgias and body aches, history of gastric bypass surgery. Check B12 level, likely deficient. 3. Topical lidocaine prescribed for knee and hip pain. 4. History of prediabetes prior to gastric bypass, due for A1c. FOLLOW UP AND INSTRUCTIONS:  Return in about 3 months (around 11/24/2021) for CHF, HTN (prefers virtual). · Mitchell received counseling on the following healthy behaviors:nutrition and medication adherence    · Discussed use, benefit, and side effects of prescribed medications. Barriers to  medication compliance addressed. All patient questions answered. Pt voiced  understanding. · Patient given educational materials - see patient instructions    · Patient advised to contact scheduling offices for any referrals or imaging orders  placed today if they have not been contacted in 48 hours. Return in about 3 months (around 11/24/2021) for CHF, HTN (prefers virtual). An  electronic signature was used to authenticate this note. --NAV Geronimo - CNP on 8/24/2021 at 2:48 PM  Visit Information    Have you seen any other physician or provider since your last visit? No  Have you had any other diagnostic tests since your last visit? No  Have you been seen in the emergency room and/or had an admission to a hospital since we last saw you? No  Have you had your routine dental cleaning in the past 6 months? no    Have you activated your [employer] account? If not, what are your barriers?  Yes     Patient Care Team:  NAV Hook - CNP as PCP - General (Family Medicine)  Segundo Ohara MD as Consulting Physician (Nephrology)    Medical History Review  Past Medical, Family, and Social History reviewed and does not contribute to the patient presenting condition    Health Maintenance   Topic Date Due    DTaP/Tdap/Td vaccine (1 - Tdap) Never done    Shingles Vaccine (1 of 2) Never done    Lipid screen  10/18/2020    Pneumococcal 0-64 years Vaccine (2 of 4 - PPSV23) 02/03/2021    A1C test (Diabetic or Prediabetic)  02/11/2021    Creatinine monitoring  07/20/2021    Potassium monitoring  07/29/2021    Flu vaccine (1) 09/01/2021    Breast cancer screen  05/20/2023    Colon cancer screen colonoscopy  06/13/2028    COVID-19 Vaccine  Completed    Hepatitis C screen  Completed    HIV screen  Completed    Hepatitis A vaccine  Aged Out    Hib vaccine  Aged Out    Meningococcal (ACWY) vaccine  Aged Out

## 2021-09-24 ENCOUNTER — TELEPHONE (OUTPATIENT)
Dept: FAMILY MEDICINE CLINIC | Age: 61
End: 2021-09-24

## 2021-09-24 NOTE — TELEPHONE ENCOUNTER
Request for Hemoglobin A1c results for Deanna Roque have been faxed to [address]    Results have not been obtained at this time.     Fly Rojas

## 2021-09-27 ENCOUNTER — APPOINTMENT (OUTPATIENT)
Dept: GENERAL RADIOLOGY | Age: 61
End: 2021-09-27
Payer: MEDICARE

## 2021-09-27 ENCOUNTER — APPOINTMENT (OUTPATIENT)
Dept: CT IMAGING | Age: 61
End: 2021-09-27
Payer: MEDICARE

## 2021-09-27 ENCOUNTER — HOSPITAL ENCOUNTER (OUTPATIENT)
Age: 61
Setting detail: OBSERVATION
Discharge: HOME | End: 2021-09-28
Attending: EMERGENCY MEDICINE | Admitting: EMERGENCY MEDICINE
Payer: MEDICARE

## 2021-09-27 DIAGNOSIS — R07.9 CHEST PAIN, UNSPECIFIED TYPE: Primary | ICD-10-CM

## 2021-09-27 LAB
ABSOLUTE EOS #: 0.18 K/UL (ref 0–0.44)
ABSOLUTE IMMATURE GRANULOCYTE: <0.03 K/UL (ref 0–0.3)
ABSOLUTE LYMPH #: 1.21 K/UL (ref 1.1–3.7)
ABSOLUTE MONO #: 0.47 K/UL (ref 0.1–1.2)
ANION GAP SERPL CALCULATED.3IONS-SCNC: 12 MMOL/L (ref 9–17)
BASOPHILS # BLD: 0 % (ref 0–2)
BASOPHILS ABSOLUTE: <0.03 K/UL (ref 0–0.2)
BNP INTERPRETATION: ABNORMAL
BUN BLDV-MCNC: 13 MG/DL (ref 8–23)
BUN/CREAT BLD: ABNORMAL (ref 9–20)
CALCIUM SERPL-MCNC: 8.7 MG/DL (ref 8.6–10.4)
CHLORIDE BLD-SCNC: 102 MMOL/L (ref 98–107)
CO2: 25 MMOL/L (ref 20–31)
CREAT SERPL-MCNC: 2.87 MG/DL (ref 0.5–0.9)
DIFFERENTIAL TYPE: ABNORMAL
EOSINOPHILS RELATIVE PERCENT: 3 % (ref 1–4)
GFR AFRICAN AMERICAN: 20 ML/MIN
GFR NON-AFRICAN AMERICAN: 17 ML/MIN
GFR SERPL CREATININE-BSD FRML MDRD: ABNORMAL ML/MIN/{1.73_M2}
GFR SERPL CREATININE-BSD FRML MDRD: ABNORMAL ML/MIN/{1.73_M2}
GLUCOSE BLD-MCNC: 154 MG/DL (ref 70–99)
HCT VFR BLD CALC: 34 % (ref 36.3–47.1)
HEMOGLOBIN: 10.6 G/DL (ref 11.9–15.1)
IMMATURE GRANULOCYTES: 0 %
LYMPHOCYTES # BLD: 19 % (ref 24–43)
MAGNESIUM: 1.8 MG/DL (ref 1.6–2.6)
MCH RBC QN AUTO: 29.4 PG (ref 25.2–33.5)
MCHC RBC AUTO-ENTMCNC: 31.2 G/DL (ref 28.4–34.8)
MCV RBC AUTO: 94.2 FL (ref 82.6–102.9)
MONOCYTES # BLD: 7 % (ref 3–12)
NRBC AUTOMATED: 0 PER 100 WBC
PDW BLD-RTO: 15.7 % (ref 11.8–14.4)
PLATELET # BLD: 264 K/UL (ref 138–453)
PLATELET ESTIMATE: ABNORMAL
PMV BLD AUTO: 9.8 FL (ref 8.1–13.5)
POTASSIUM SERPL-SCNC: 3.3 MMOL/L (ref 3.7–5.3)
PRO-BNP: 7569 PG/ML
RBC # BLD: 3.61 M/UL (ref 3.95–5.11)
RBC # BLD: ABNORMAL 10*6/UL
SARS-COV-2, RAPID: NOT DETECTED
SEG NEUTROPHILS: 71 % (ref 36–65)
SEGMENTED NEUTROPHILS ABSOLUTE COUNT: 4.64 K/UL (ref 1.5–8.1)
SODIUM BLD-SCNC: 139 MMOL/L (ref 135–144)
SPECIMEN DESCRIPTION: NORMAL
TROPONIN INTERP: ABNORMAL
TROPONIN INTERP: ABNORMAL
TROPONIN T: ABNORMAL NG/ML
TROPONIN T: ABNORMAL NG/ML
TROPONIN, HIGH SENSITIVITY: 32 NG/L (ref 0–14)
TROPONIN, HIGH SENSITIVITY: 36 NG/L (ref 0–14)
WBC # BLD: 6.5 K/UL (ref 3.5–11.3)
WBC # BLD: ABNORMAL 10*3/UL

## 2021-09-27 PROCEDURE — 2580000003 HC RX 258: Performed by: STUDENT IN AN ORGANIZED HEALTH CARE EDUCATION/TRAINING PROGRAM

## 2021-09-27 PROCEDURE — 71275 CT ANGIOGRAPHY CHEST: CPT

## 2021-09-27 PROCEDURE — 6360000004 HC RX CONTRAST MEDICATION: Performed by: STUDENT IN AN ORGANIZED HEALTH CARE EDUCATION/TRAINING PROGRAM

## 2021-09-27 PROCEDURE — 6370000000 HC RX 637 (ALT 250 FOR IP): Performed by: NURSE PRACTITIONER

## 2021-09-27 PROCEDURE — G0378 HOSPITAL OBSERVATION PER HR: HCPCS

## 2021-09-27 PROCEDURE — 80048 BASIC METABOLIC PNL TOTAL CA: CPT

## 2021-09-27 PROCEDURE — 87635 SARS-COV-2 COVID-19 AMP PRB: CPT

## 2021-09-27 PROCEDURE — 83735 ASSAY OF MAGNESIUM: CPT

## 2021-09-27 PROCEDURE — 99284 EMERGENCY DEPT VISIT MOD MDM: CPT

## 2021-09-27 PROCEDURE — 96372 THER/PROPH/DIAG INJ SC/IM: CPT

## 2021-09-27 PROCEDURE — 85025 COMPLETE CBC W/AUTO DIFF WBC: CPT

## 2021-09-27 PROCEDURE — 93005 ELECTROCARDIOGRAM TRACING: CPT | Performed by: STUDENT IN AN ORGANIZED HEALTH CARE EDUCATION/TRAINING PROGRAM

## 2021-09-27 PROCEDURE — 6370000000 HC RX 637 (ALT 250 FOR IP): Performed by: STUDENT IN AN ORGANIZED HEALTH CARE EDUCATION/TRAINING PROGRAM

## 2021-09-27 PROCEDURE — 83880 ASSAY OF NATRIURETIC PEPTIDE: CPT

## 2021-09-27 PROCEDURE — 6360000002 HC RX W HCPCS: Performed by: STUDENT IN AN ORGANIZED HEALTH CARE EDUCATION/TRAINING PROGRAM

## 2021-09-27 PROCEDURE — 71045 X-RAY EXAM CHEST 1 VIEW: CPT

## 2021-09-27 PROCEDURE — 84484 ASSAY OF TROPONIN QUANT: CPT

## 2021-09-27 PROCEDURE — 93005 ELECTROCARDIOGRAM TRACING: CPT | Performed by: NURSE PRACTITIONER

## 2021-09-27 RX ORDER — LABETALOL 200 MG/1
200 TABLET, FILM COATED ORAL 2 TIMES DAILY
Status: DISCONTINUED | OUTPATIENT
Start: 2021-09-27 | End: 2021-09-28 | Stop reason: HOSPADM

## 2021-09-27 RX ORDER — SODIUM CHLORIDE 9 MG/ML
INJECTION, SOLUTION INTRAVENOUS CONTINUOUS
Status: DISCONTINUED | OUTPATIENT
Start: 2021-09-27 | End: 2021-09-28 | Stop reason: HOSPADM

## 2021-09-27 RX ORDER — HYDRALAZINE HYDROCHLORIDE 50 MG/1
50 TABLET, FILM COATED ORAL 3 TIMES DAILY
Status: DISCONTINUED | OUTPATIENT
Start: 2021-09-27 | End: 2021-09-27

## 2021-09-27 RX ORDER — METOPROLOL TARTRATE 5 MG/5ML
5 INJECTION INTRAVENOUS EVERY 5 MIN PRN
Status: DISCONTINUED | OUTPATIENT
Start: 2021-09-27 | End: 2021-09-27

## 2021-09-27 RX ORDER — ACETAMINOPHEN 650 MG/1
650 SUPPOSITORY RECTAL EVERY 6 HOURS PRN
Status: DISCONTINUED | OUTPATIENT
Start: 2021-09-27 | End: 2021-09-28 | Stop reason: HOSPADM

## 2021-09-27 RX ORDER — ACETAMINOPHEN 325 MG/1
650 TABLET ORAL EVERY 6 HOURS PRN
Status: DISCONTINUED | OUTPATIENT
Start: 2021-09-27 | End: 2021-09-28 | Stop reason: HOSPADM

## 2021-09-27 RX ORDER — AMLODIPINE BESYLATE 10 MG/1
10 TABLET ORAL DAILY
Status: DISCONTINUED | OUTPATIENT
Start: 2021-09-27 | End: 2021-09-28 | Stop reason: HOSPADM

## 2021-09-27 RX ORDER — ATORVASTATIN CALCIUM 80 MG/1
40 TABLET, FILM COATED ORAL DAILY
Status: DISCONTINUED | OUTPATIENT
Start: 2021-09-27 | End: 2021-09-28 | Stop reason: HOSPADM

## 2021-09-27 RX ORDER — ALBUTEROL SULFATE 90 UG/1
2 AEROSOL, METERED RESPIRATORY (INHALATION) PRN
Status: DISCONTINUED | OUTPATIENT
Start: 2021-09-27 | End: 2021-09-27

## 2021-09-27 RX ORDER — NITROGLYCERIN 0.4 MG/1
0.4 TABLET SUBLINGUAL EVERY 5 MIN PRN
Status: DISCONTINUED | OUTPATIENT
Start: 2021-09-27 | End: 2021-09-27

## 2021-09-27 RX ORDER — PANTOPRAZOLE SODIUM 20 MG/1
20 TABLET, DELAYED RELEASE ORAL DAILY
Status: DISCONTINUED | OUTPATIENT
Start: 2021-09-27 | End: 2021-09-28 | Stop reason: HOSPADM

## 2021-09-27 RX ORDER — SODIUM CHLORIDE 0.9 % (FLUSH) 0.9 %
5-40 SYRINGE (ML) INJECTION PRN
Status: DISCONTINUED | OUTPATIENT
Start: 2021-09-27 | End: 2021-09-28 | Stop reason: HOSPADM

## 2021-09-27 RX ORDER — HYDRALAZINE HYDROCHLORIDE 50 MG/1
50 TABLET, FILM COATED ORAL 2 TIMES DAILY
Status: DISCONTINUED | OUTPATIENT
Start: 2021-09-27 | End: 2021-09-28 | Stop reason: HOSPADM

## 2021-09-27 RX ORDER — SODIUM CHLORIDE 9 MG/ML
500 INJECTION, SOLUTION INTRAVENOUS CONTINUOUS PRN
Status: DISCONTINUED | OUTPATIENT
Start: 2021-09-27 | End: 2021-09-27

## 2021-09-27 RX ORDER — AMINOPHYLLINE DIHYDRATE 25 MG/ML
50 INJECTION, SOLUTION INTRAVENOUS PRN
Status: DISCONTINUED | OUTPATIENT
Start: 2021-09-27 | End: 2021-09-27

## 2021-09-27 RX ORDER — ATROPINE SULFATE 0.1 MG/ML
0.5 INJECTION INTRAVENOUS EVERY 5 MIN PRN
Status: DISCONTINUED | OUTPATIENT
Start: 2021-09-27 | End: 2021-09-27

## 2021-09-27 RX ORDER — POLYETHYLENE GLYCOL 3350 17 G/17G
17 POWDER, FOR SOLUTION ORAL DAILY PRN
Status: DISCONTINUED | OUTPATIENT
Start: 2021-09-27 | End: 2021-09-28 | Stop reason: HOSPADM

## 2021-09-27 RX ORDER — POTASSIUM CHLORIDE 20 MEQ/1
40 TABLET, EXTENDED RELEASE ORAL PRN
Status: DISCONTINUED | OUTPATIENT
Start: 2021-09-27 | End: 2021-09-27

## 2021-09-27 RX ORDER — TIZANIDINE 4 MG/1
4 TABLET ORAL 2 TIMES DAILY
Status: DISCONTINUED | OUTPATIENT
Start: 2021-09-27 | End: 2021-09-28 | Stop reason: HOSPADM

## 2021-09-27 RX ORDER — POTASSIUM CHLORIDE 7.45 MG/ML
10 INJECTION INTRAVENOUS PRN
Status: DISCONTINUED | OUTPATIENT
Start: 2021-09-27 | End: 2021-09-27

## 2021-09-27 RX ORDER — FUROSEMIDE 20 MG/1
40 TABLET ORAL DAILY
Status: DISCONTINUED | OUTPATIENT
Start: 2021-09-27 | End: 2021-09-28 | Stop reason: HOSPADM

## 2021-09-27 RX ORDER — SODIUM CHLORIDE 9 MG/ML
25 INJECTION, SOLUTION INTRAVENOUS PRN
Status: DISCONTINUED | OUTPATIENT
Start: 2021-09-27 | End: 2021-09-28 | Stop reason: HOSPADM

## 2021-09-27 RX ORDER — SODIUM CHLORIDE 0.9 % (FLUSH) 0.9 %
5-40 SYRINGE (ML) INJECTION EVERY 12 HOURS SCHEDULED
Status: DISCONTINUED | OUTPATIENT
Start: 2021-09-27 | End: 2021-09-28 | Stop reason: HOSPADM

## 2021-09-27 RX ORDER — ONDANSETRON 2 MG/ML
4 INJECTION INTRAMUSCULAR; INTRAVENOUS EVERY 4 HOURS PRN
Status: DISCONTINUED | OUTPATIENT
Start: 2021-09-27 | End: 2021-09-28 | Stop reason: HOSPADM

## 2021-09-27 RX ORDER — SODIUM CHLORIDE 0.9 % (FLUSH) 0.9 %
5-40 SYRINGE (ML) INJECTION PRN
Status: DISCONTINUED | OUTPATIENT
Start: 2021-09-27 | End: 2021-09-27

## 2021-09-27 RX ADMIN — PANTOPRAZOLE SODIUM 20 MG: 20 TABLET, DELAYED RELEASE ORAL at 18:59

## 2021-09-27 RX ADMIN — AMLODIPINE BESYLATE 10 MG: 10 TABLET ORAL at 19:22

## 2021-09-27 RX ADMIN — SODIUM CHLORIDE, PRESERVATIVE FREE 10 ML: 5 INJECTION INTRAVENOUS at 22:04

## 2021-09-27 RX ADMIN — ENOXAPARIN SODIUM 30 MG: 30 INJECTION SUBCUTANEOUS at 18:58

## 2021-09-27 RX ADMIN — HYDRALAZINE HYDROCHLORIDE 50 MG: 50 TABLET, FILM COATED ORAL at 22:06

## 2021-09-27 RX ADMIN — FUROSEMIDE 40 MG: 20 TABLET ORAL at 18:35

## 2021-09-27 RX ADMIN — TIZANIDINE 4 MG: 4 TABLET ORAL at 22:03

## 2021-09-27 RX ADMIN — IOPAMIDOL 100 ML: 755 INJECTION, SOLUTION INTRAVENOUS at 13:58

## 2021-09-27 RX ADMIN — SODIUM CHLORIDE 125 ML/HR: 9 INJECTION, SOLUTION INTRAVENOUS at 18:25

## 2021-09-27 RX ADMIN — LABETALOL HYDROCHLORIDE 200 MG: 200 TABLET, FILM COATED ORAL at 22:03

## 2021-09-27 RX ADMIN — ATORVASTATIN CALCIUM 40 MG: 80 TABLET, FILM COATED ORAL at 18:34

## 2021-09-27 ASSESSMENT — ENCOUNTER SYMPTOMS
COUGH: 0
VOMITING: 0
SHORTNESS OF BREATH: 1
TROUBLE SWALLOWING: 0
BACK PAIN: 1
NAUSEA: 0
WHEEZING: 0
ABDOMINAL PAIN: 0
DIARRHEA: 0
CHEST TIGHTNESS: 0

## 2021-09-27 ASSESSMENT — PAIN DESCRIPTION - ORIENTATION: ORIENTATION: MID

## 2021-09-27 ASSESSMENT — PAIN DESCRIPTION - FREQUENCY: FREQUENCY: CONTINUOUS

## 2021-09-27 ASSESSMENT — PAIN DESCRIPTION - PAIN TYPE: TYPE: ACUTE PAIN

## 2021-09-27 ASSESSMENT — PAIN DESCRIPTION - DESCRIPTORS: DESCRIPTORS: ACHING

## 2021-09-27 ASSESSMENT — PAIN DESCRIPTION - LOCATION: LOCATION: CHEST

## 2021-09-27 NOTE — H&P
[facility]  CDU / OBSERVATION ENCOUNTER  ATTENDING NOTE     Pt Name: Crispin Yip  MRN: [id number]  [place] 1960  Date of evaluation: 9/27/21  Patient's PCP is :  NAV Bowens CNP    CHIEF COMPLAINT       Chief Complaint   Patient presents with    Chest Pain    Shortness of Breath         HISTORY OF PRESENT ILLNESS    Crispin Yip is a 62 y.o. female who presents with complaints of chest pain and shortness of breath of the past 2 days. Patient has a history of renal failure hyperlipidemia, hypertension, diabetes, and aortic dissection. Patient has had prior MI. Patient was seen in the past for chest pain resulting in catheterization showing 67% RCA stenosis. Patient had recommended medical therapy of her last catheterization. Patient with 2 days worth of intermittent chest pain and shortness of breath. Patient describes the pain is worse with exertion and relieved with rest.  Patient has midsternal pain wrapping around to back. Patient does admit to increased shortness of breath with exertion over the past couple weeks. Patient is not having any GI symptoms. She is unsure what blood thinner she is on. Patient has a history of some congestion and is improved with oxygen in the ED. Location/Symptom: Chest pain and shortness of breath  Timing/Onset: Worse over the past 2 days.   Provocation: Exertion  Quality: Pressure-like pain  Radiation: None  Severity: Moderate to severe  Timing/Duration: Days  Modifying Factors: Improved with oxygen worse with exertion    REVIEW OF SYSTEMS        General ROS - No fevers, No malaise   Ophthalmic ROS - No discharge, No changes in vision  ENT ROS -  No sore throat, No rhinorrhea,   Respiratory ROS - no shortness of breath, no cough, no  wheezing  Cardiovascular ROS -chest pain, dyspnea on exertion, cough  Gastrointestinal ROS - No abdominal pain, no nausea or vomiting, no change in bowel habits, no black or bloody stools  Genito-Urinary ROS - No dysuria, trouble voiding, or hematuria  Musculoskeletal ROS - No myalgias, No arthalgias  Neurological ROS - No headache, no dizziness/lightheadedness, No focal weakness, no loss of sensation  Dermatological ROS - No lesions, No rash     (PQRS) Advance directives on face sheet per hospital policy. No change unless specifically mentioned in chart    PAST MEDICAL HISTORY    has a past medical history of CKD (chronic kidney disease) stage 3, GFR 30-59 ml/min ([facility] [address] Gout, Hemodialysis patient ([facility] Hyperlipidemia, Hypertension, Kidney stone, Nephrolithiasis, Osteoarthritis, and Type II or unspecified type diabetes mellitus without mention of complication, not stated as uncontrolled. I have reviewed the past medical history with the patient and it is  pertinent to this complaint. SURGICAL HISTORY      has a past surgical history that includes Hysterectomy, total abdominal; Lithotripsy; Thyroidectomy, partial; Gastric bypass surgery; Tonsillectomy; Total knee arthroplasty (Right, 2015); joint replacement; knee surgery (Left, 2018); Cardiac catheterization (10/21/2019); Dialysis fistula creation (2019); Dialysis fistula creation (Left, 2019); and Dialysis fistula creation (Left, 2020). I have reviewed and agree with Surgical History entered and it is  pertinent to this complaint. CURRENT MEDICATIONS     No current facility-administered medications for this encounter. All medication charted and reviewed. ALLERGIES     is allergic to latex, penicillins, codeine, nsaids, and tolmetin. FAMILY HISTORY     She indicated that her mother is . She indicated that her father is . family history includes Diabetes in her mother; Heart Disease in her father and mother; High Blood Pressure in her father. The patient denies any pertinent family history. I have reviewed and agree with the family history entered.   I have reviewed the Family History and it is not significant to the case    SOCIAL HISTORY      reports that she quit smoking about 20 years ago. Her smoking use included cigarettes. She has a 30.00 pack-year smoking history. She has never used smokeless tobacco. She reports that she does not drink alcohol and does not use drugs. I have reviewed and agree with all Social.  There are no  concerns for substance abuse/use. PHYSICAL EXAM     INITIAL VITALS:  weight is 180 lb (81.6 kg). Her oral temperature is 98.5 °F (36.9 °C). Her blood pressure is 170/81 (abnormal) and her pulse is 70. Her respiration is 17 and oxygen saturation is 93%. CONSTITUTIONAL: AOx4, resting comfortably on my evaluation. HEAD: normocephalic, atraumatic   EYES: PERRLA, EOMI    ENT: moist mucous membranes, uvula midline   NECK: supple, symmetric   BACK: symmetric   LUNGS: clear to auscultation bilaterally   CARDIOVASCULAR: regular rate and rhythm, no murmurs, rubs or gallops   ABDOMEN: soft, non-tender, non-distended with normal active bowel sounds   NEUROLOGIC:  MAEx4, no focal sensory or motor deficits   MUSCULOSKELETAL: no clubbing, cyanosis or edema   SKIN: no rash or wounds       DIFFERENTIAL DIAGNOSIS/MDM:     Patient with history of multiple medical problems including aortic dissection presents with complaints of chest discomfort. Patient with symptoms possibly consistent with angina. Patient states she had recent stress testing at [facility]. Will get [facility] records.     DIAGNOSTIC RESULTS     EKG: All EKG's are interpreted by the Observation Physician who either signs or Co-signs this chart in the absence of a cardiologist.    EKG Interpretation    Interpreted by observation physician    Rhythm: normal sinus   Rate: normal  Axis: normal  Ectopy: none  Conduction: normal  ST Segments: no acute change  T Waves: no acute change  Q Waves: none    Clinical Impression: no acute changes    Tristen Mata MD         RADIOLOGY:   I directly visualized the following  images and reviewed the radiologist interpretations:    CTA CHEST W WO CONTRAST    Result Date: 9/27/2021  EXAMINATION: CTA OF THE CHEST WITH AND WITHOUT CONTRAST 9/27/2021 1:52 pm TECHNIQUE: CTA of the chest was performed before and after the administration of intravenous contrast.  Multiplanar reformatted images are provided for review. MIP images are provided for review. Dose modulation, iterative reconstruction, and/or weight based adjustment of the mA/kV was utilized to reduce the radiation dose to as low as reasonably achievable. COMPARISON: None. HISTORY: ORDERING SYSTEM PROVIDED HISTORY: hx of dissection, pain TECHNOLOGIST PROVIDED HISTORY: hx of dissection Reason for Exam: chest pain, sob FINDINGS: Aorta: Chronic type B dissection of the thoracic aorta is noted with the patent stent in place. Stable fusiform ectasia of the ascending thoracic aorta measuring 4.3 cm in diameter. Atherosclerosis of the thoracic aorta and coronary arteries is seen. Mediastinum: No filling defect in the pulmonary arteries to suggest pulmonary embolism. Enlarged main pulmonary trunk, compatible with pulmonary hypertension. Mild cardiomegaly. Trace pericardial fluid, decreased compared to the prior study. Lungs/Pleura: No pneumothorax. No pleural effusion. Mosaic attenuation in the bilateral lungs, suggestive of small airway disease. A small stable 0.4 cm subpleural nodule in the right lower lobe, image number 65 axial series. This is benign and does not require further follow-up imaging. Minimal dependent atelectasis in the right lower lobe. Upper Abdomen: Simple cyst in the left lobe of the liver is redemonstrated, measuring up to 2.8 cm. Status post cholecystectomy. Soft Tissues/Bones: Multilevel thoracic spondylosis. A patent stent noted across the patient's known type B aortic dissection. Stable fusiform ectasia of the ascending thoracic aorta. No evidence of pulmonary embolism. Cardiomegaly. Trace residual pericardial fluid. Mosaic attenuation in the bilateral lungs, suggestive of small airway disease. A stable benign 0.4 cm nodule in the right lower lobe. XR CHEST PORTABLE    Result Date: 9/27/2021  EXAMINATION: ONE XRAY VIEW OF THE CHEST 9/27/2021 12:43 pm COMPARISON: July 20, 2020, chest examination HISTORY: ORDERING SYSTEM PROVIDED HISTORY: CP TECHNOLOGIST PROVIDED HISTORY: CP FINDINGS: Interval removal of right IJ catheter. Stable cardiomegaly/aortic stent graft There is mild prominence and indistinctness of the pulmonary vascularity/interstitial markings No significant pleural process Degenerative changes of the thoracic spine/shoulders     Cardiomegaly with mild prominence of the pulmonary vascularity/interstitial markings, findings suggest mild vascular congestion       LABS:  I have reviewed and interpreted all available lab results.   Labs Reviewed   CBC WITH AUTO DIFFERENTIAL - Abnormal; Notable for the following components:       Result Value    RBC 3.61 (*)     Hemoglobin 10.6 (*)     Hematocrit 34.0 (*)     RDW 15.7 (*)     Seg Neutrophils 71 (*)     Lymphocytes 19 (*)     All other components within normal limits   BASIC METABOLIC PANEL W/ REFLEX TO MG FOR LOW K - Abnormal; Notable for the following components:    Glucose 154 (*)     CREATININE 2.87 (*)     Potassium 3.3 (*)     GFR Non- 17 (*)     GFR  20 (*)     All other components within normal limits   TROPONIN - Abnormal; Notable for the following components:    Troponin, High Sensitivity 36 (*)     All other components within normal limits   BRAIN NATRIURETIC PEPTIDE - Abnormal; Notable for the following components:    Pro-BNP 7,569 (*)     All other components within normal limits   TROPONIN - Abnormal; Notable for the following components:    Troponin, High Sensitivity 32 (*)     All other components within normal limits   MAGNESIUM         CDU IMPRESSION / PLAN      Mitchell Sheppard is a 62

## 2021-09-27 NOTE — PROGRESS NOTES
Dr. Froy Plunkett came to CT and said he spoke with Dr Cyndie Ghotar who approved contrast for this patient.  100ml for dissection

## 2021-09-27 NOTE — PROGRESS NOTES
Pharmacy Note     Renal Dose Adjustment    Lupe Tolentino is a 62 y.o. female. Pharmacist assessment of renally cleared medications. Recent Labs     09/27/21  1238   BUN 13       Recent Labs     09/27/21  1238   CREATININE 2.87*       Estimated Creatinine Clearance: 21 mL/min (A) (based on SCr of 2.87 mg/dL (H)).     Height:   Ht Readings from Last 1 Encounters:   09/27/21 5' 2\" (1.575 m)     Weight:  Wt Readings from Last 1 Encounters:   09/27/21 180 lb (81.6 kg)       The following medication dose has been adjusted based upon renal function per P&T Guidelines:             Potassium PRN protocol discontinued for CrCl <30mL/min  Lovenox decreased to 30mg daily     Silas Dickinson PharmD 9/27/2021 6:31 PM

## 2021-09-27 NOTE — ED PROVIDER NOTES
least one if not all key elements of the E/M (history, physical exam, and MDM). Additional findings are as noted. For APC cases I have personally evaluated and examined the patient in conjunction with the APC and agree with the treatment plan and disposition of the patient as recorded by the APC.     Radha Trotter MD  Attending Emergency  Physician       Zulema Ott MD  09/27/21 0787

## 2021-09-27 NOTE — ED NOTES
Pt arrived with complaints of chest pain and SOB that started this weekend. Pt stated that the pain is in the middle of her back and in the middle of her chest. Pt stated that she does have a cardiac history. Pt stated that she is a dialysis pt. Pt stated that she got a treatment today. Pt placed on full cardiac monitor. Ekg performed. Call light within reach.  Will continue plan of care      Omar Garner RN  09/27/21 [id number]

## 2021-09-27 NOTE — ED PROVIDER NOTES
[facility]  Emergency Department Encounter  EmergencyMedicine Resident     Pt Luberta Osler  MRN: [id number]  [place] 1960  Date of evaluation: 9/27/21  PCP:  NAV Castellano CNP    This patient was evaluated in the Emergency Department for symptoms described in the history of present illness. The patient was evaluated in the context of the global COVID-19 pandemic, which necessitated consideration that the patient might be at risk for infection with the SARS-CoV-2 virus that causes COVID-19. Institutional protocols and algorithms that pertain to the evaluation of patients at risk for COVID-19 are in a state of rapid change based on information released by regulatory bodies including the CDC and federal and state organizations. These policies and algorithms were followed during the patient's care in the ED. CHIEF COMPLAINT       Chief Complaint   Patient presents with    Chest Pain    Shortness of Breath       HISTORY OF PRESENT ILLNESS  (Location/Symptom, Timing/Onset, Context/Setting, Quality, Duration, Modifying Factors, Severity.)      James Conrad is a 62 y.o. female who presents chief complaint of chest pain shortness of breath for the past 2 days. Past medical history significant for hemodialysis, hyperlipidemia, hypertension, diabetes, aortic dissection, MI. Patient was seen for chest pain for the past resulting in catheterization that showed 60 to 70% RCA stenosis. No stents were placed that time recommended medical therapy. Patient coming today with 2 days of intermittent chest pain shortness of breath. States that the chest pain is worse on exertion relieved with rest.  Describes it as midsternal wrapping around the chest to the back. Describes it almost as a pressure. Denies any ripping or tearing to the back. Denies any diaphoresis. Does admit to increase shortness of breath on exertion for the past couple weeks.   Patient is unsure when she had an aortic dissection. Patient states that she is on a blood thinner was unsure what it is. Denies any history of blood clots, unilateral leg sling, hemoptysis, fever, chills, nausea, vomiting, diarrhea, abdominal pain, headache, blurry vision, double vision. Patient does state that she recently got over a cold. But states she is vaccinated against COVID-19. PAST MEDICAL / SURGICAL / SOCIAL / FAMILY HISTORY      has a past medical history of CKD (chronic kidney disease) stage 3, GFR 30-59 ml/min ([facility] [address] Gout, Hemodialysis patient ([facility] [address] Hyperlipidemia, Hypertension, Kidney stone, Nephrolithiasis, Osteoarthritis, and Type II or unspecified type diabetes mellitus without mention of complication, not stated as uncontrolled. has a past surgical history that includes Hysterectomy, total abdominal; Lithotripsy; Thyroidectomy, partial; Gastric bypass surgery; Tonsillectomy; Total knee arthroplasty (Right, 2015); joint replacement; knee surgery (Left, 2018); Cardiac catheterization (10/21/2019); Dialysis fistula creation (2019); Dialysis fistula creation (Left, 2019); and Dialysis fistula creation (Left, 2020).       Social History     Socioeconomic History    Marital status: Single     Spouse name: Not on file    Number of children: Not on file    Years of education: Not on file    Highest education level: Not on file   Occupational History    Not on file   Tobacco Use    Smoking status: Former Smoker     Packs/day: 1.00     Years: 30.00     Pack years: 30.00     Types: Cigarettes     Quit date: 3/21/2001     Years since quittin.5    Smokeless tobacco: Never Used   Vaping Use    Vaping Use: Never used   Substance and Sexual Activity    Alcohol use: No    Drug use: No    Sexual activity: Yes   Other Topics Concern    Not on file   Social History Narrative    Not on file     Social Determinants of Health     Financial Resource Strain: Low Risk     Difficulty of Paying Living Expenses: Not hard at all   Food Insecurity: No Food Insecurity    Worried About [facility] in the Last Year: Never true    Ran Out of Food in the Last Year: Never true   Transportation Needs:     Lack of Transportation (Medical):  Lack of Transportation (Non-Medical):    Physical Activity:     Days of Exercise per Week:     Minutes of Exercise per Session:    Stress:     Feeling of Stress :    Social Connections:     Frequency of Communication with Friends and Family:     Frequency of Social Gatherings with Friends and Family:     Attends Taoism Services:     Active Member of Clubs or Organizations:     Attends Club or Organization Meetings:     Marital Status:    Intimate Partner Violence:     Fear of Current or Ex-Partner:     Emotionally Abused:     Physically Abused:     Sexually Abused:        Family History   Problem Relation Age of Onset    Heart Disease Mother     Diabetes Mother     Heart Disease Father     High Blood Pressure Father        Allergies:  Latex, Penicillins, Codeine, Nsaids, and Tolmetin    Home Medications:  Prior to Admission medications    Medication Sig Start Date End Date Taking?  Authorizing Provider   hydrALAZINE (APRESOLINE) 50 MG tablet Take 50 mg by mouth 3 times daily    Historical Provider, MD   furosemide (LASIX) 20 MG tablet Take 40 mg by mouth daily Non-dialysis days    Historical Provider, MD   Multiple Vitamins-Minerals (MULTIVITAMIN ADULT EXTRA C) CHEW Take 1 tablet by mouth daily 8/24/21   Marien Homans, APRN - CNP   allopurinol (ZYLOPRIM) 100 MG tablet Take 1 tablet by mouth daily 8/24/21   Marien Homans, APRN - CNP   aspirin 81 MG EC tablet Take 1 tablet by mouth daily 8/24/21   Marien Homans, APRN - CNP   labetalol (NORMODYNE) 200 MG tablet Take 1 tablet by mouth 2 times daily 8/24/21   Marien Homans, APRN - CNP   pantoprazole (PROTONIX) 20 MG tablet Take 1 tablet by mouth daily 8/24/21   Marien Homans, APRN - CNP lidocaine (XYLOCAINE) 5 % ointment Apply topically BID as needed. 8/24/21   NAV Gordon CNP   atorvastatin (LIPITOR) 40 MG tablet Take 1 tablet by mouth daily 7/16/21   NAV Gordon - CNP   amLODIPine (NORVASC) 10 MG tablet TAKE 1 TABLET DAILY 6/30/21   [facility], MD   lidocaine-prilocaine (EMLA) 2.5-2.5 % cream APPLY 3 TIMES WEEKLY 30-60 MINUTES BEFORE DIALYSIS WRAP WITH PLASTIC WRAP AS DIRECTED 6/16/21   [facility], MD   VITAMIN E-200 200 units capsule TAKE 1 CAPSULE BY MOUTH DAILY 4/13/21   Raffaele Mitchell MD   gabapentin (NEURONTIN) 100 MG capsule Take 1 capsule by mouth 2 times daily for 120 days. Patient not taking: Reported on 8/24/2021 3/20/21 7/18/21  Dafne Calzada PA-C   tiZANidine (ZANAFLEX) 4 MG tablet Take 1 tablet by mouth 2 times daily 3/5/21   Dafne Calzada PA-C   Transparent Dressings (TEGADERM ABSORBENT DRESSING) MISC Provide insurance covered tegaderm dressing, use at hemodialysis 11/5/20   Dafne Calzada PA-C   acetaminophen (TYLENOL) 500 MG tablet Take 1,000 mg by mouth every 6 hours as needed for Pain    Historical Provider, MD   TRUE METRIX BLOOD GLUCOSE TEST strip  6/1/18   Historical Provider, MD   calcium citrate-vitamin D (CITRACEL+D) 200-250 MG-UNIT TABS per tablet TAKE 2 TABLETS THREE TIMES A DAY 4/7/18   Historical Provider, MD   Blood Pressure KIT 1 box by Does not apply route three times daily 4/6/18   Dafne Calzada PA-C       REVIEW OF SYSTEMS    (2-9 systems for level 4, 10 or more for level 5)      Review of Systems   Constitutional: Negative for chills and fever. HENT: Negative for trouble swallowing. Eyes: Negative for visual disturbance. Respiratory: Positive for shortness of breath. Negative for cough, chest tightness and wheezing. Cardiovascular: Positive for chest pain. Negative for palpitations and leg swelling. Gastrointestinal: Negative for abdominal pain, diarrhea, nausea and vomiting. Musculoskeletal: Positive for back pain. Negative for neck pain. Skin: Negative for rash. Neurological: Negative for weakness and headaches. PHYSICAL EXAM   (up to 7 for level 4, 8 or more for level 5)      INITIAL VITALS:   BP (!) 190/92   Pulse 81   Temp 98.8 °F (37.1 °C) (Oral)   Resp 18   Ht 5' 2\" (1.575 m)   Wt 180 lb (81.6 kg)   SpO2 98%   BMI 32.92 kg/m²     Physical Exam  Vitals reviewed. Constitutional:       General: She is not in acute distress. Appearance: She is obese. She is not ill-appearing or toxic-appearing. HENT:      Head: Normocephalic. Cardiovascular:      Rate and Rhythm: Normal rate and regular rhythm. Pulses: Normal pulses. Heart sounds: No murmur heard. No friction rub. No gallop. Pulmonary:      Effort: Pulmonary effort is normal. No respiratory distress. Breath sounds: Normal breath sounds. No wheezing, rhonchi or rales. Abdominal:      General: There is distension. Palpations: Abdomen is soft. Tenderness: There is no abdominal tenderness. There is no guarding or rebound. Hernia: No hernia is present. Skin:     General: Skin is warm and dry. Capillary Refill: Capillary refill takes less than 2 seconds. Neurological:      Mental Status: She is oriented to person, place, and time. DIFFERENTIAL  DIAGNOSIS     PLAN (LABS / IMAGING / EKG):  Orders Placed This Encounter   Procedures    COVID-19, Rapid    XR CHEST PORTABLE    CTA CHEST W WO CONTRAST    NM Cardiac Stress Test Nuclear Imaging    CBC Auto Differential    Basic Metabolic Panel w/ Reflex to MG    Troponin    Brain Natriuretic Peptide    Troponin    Magnesium    Basic Metabolic Panel w/ Reflex to MG    Troponin    Diet NPO    ADULT DIET;  Regular    Diet NPO    Vital signs per unit routine    Telemetry monitoring - 72 hour duration    Notify physician    Up as tolerated    Place intermittent pneumatic compression device    Beta Blocker Management Prior to Cardiac Stress Test    Full Code    Inpatient consult to Nephrology    Consult to Cardiology    Initiate Oxygen Therapy Protocol    Nasal Cannula Oxygen    Nasal Cannula Oxygen    Cardiac Stress Test- W Pharm    EKG 12 Lead    EKG 12 lead    EKG 12 Lead    PATIENT STATUS (FROM ED OR OR/PROCEDURAL) Observation       MEDICATIONS ORDERED:  Orders Placed This Encounter   Medications    iopamidol (ISOVUE-370) 76 % injection 100 mL    amLODIPine (NORVASC) tablet 10 mg    atorvastatin (LIPITOR) tablet 40 mg    hydrALAZINE (APRESOLINE) tablet 50 mg    labetalol (NORMODYNE) tablet 200 mg    pantoprazole (PROTONIX) tablet 20 mg    tiZANidine (ZANAFLEX) tablet 4 mg    furosemide (LASIX) tablet 40 mg    0.9 % sodium chloride infusion    sodium chloride flush 0.9 % injection 5-40 mL    sodium chloride flush 0.9 % injection 5-40 mL    0.9 % sodium chloride infusion    DISCONTD: potassium chloride (KLOR-CON M) extended release tablet 40 mEq    DISCONTD: potassium bicarb-citric acid (EFFER-K) effervescent tablet 40 mEq    DISCONTD: potassium chloride 10 mEq/100 mL IVPB (Peripheral Line)    ondansetron (ZOFRAN) injection 4 mg    polyethylene glycol (GLYCOLAX) packet 17 g    OR Linked Order Group     acetaminophen (TYLENOL) tablet 650 mg     acetaminophen (TYLENOL) suppository 650 mg    enoxaparin (LOVENOX) injection 30 mg    DISCONTD: regadenoson (LEXISCAN) injection 0.4 mg    DISCONTD: sodium chloride flush 0.9 % injection 5-40 mL    DISCONTD: 0.9 % sodium chloride infusion    DISCONTD: albuterol sulfate  (90 Base) MCG/ACT inhaler 2 puff     Order Specific Question:   Initiate RT Bronchodilator Protocol     Answer:    Yes    DISCONTD: atropine injection 0.5 mg    DISCONTD: nitroGLYCERIN (NITROSTAT) SL tablet 0.4 mg    DISCONTD: metoprolol (LOPRESSOR) injection 5 mg    DISCONTD: aminophylline injection 50 mg       DDX: ACS, MI, pneumonia, aortic dissection, pneumothorax, CKD, PE, costochondritis, atypical chest pain    DIAGNOSTIC RESULTS / EMERGENCY DEPARTMENT COURSE / MDM   LAB RESULTS:  Results for orders placed or performed during the hospital encounter of 09/27/21   COVID-19, Rapid    Specimen: Nasopharyngeal Swab   Result Value Ref Range    Specimen Description . NASOPHARYNGEAL SWAB     SARS-CoV-2, Rapid Not Detected Not Detected   CBC Auto Differential   Result Value Ref Range    WBC 6.5 3.5 - 11.3 k/uL    RBC 3.61 (L) 3.95 - 5.11 m/uL    Hemoglobin 10.6 (L) 11.9 - 15.1 g/dL    Hematocrit 34.0 (L) 36.3 - 47.1 %    MCV 94.2 82.6 - 102.9 fL    MCH 29.4 25.2 - 33.5 pg    MCHC 31.2 28.4 - 34.8 g/dL    RDW 15.7 (H) 11.8 - 14.4 %    Platelets 939 328 - 833 k/uL    MPV 9.8 8.1 - 13.5 fL    NRBC Automated 0.0 0.0 per 100 WBC    Differential Type NOT REPORTED     Seg Neutrophils 71 (H) 36 - 65 %    Lymphocytes 19 (L) 24 - 43 %    Monocytes 7 3 - 12 %    Eosinophils % 3 1 - 4 %    Basophils 0 0 - 2 %    Immature Granulocytes 0 0 %    Segs Absolute 4.64 1.50 - 8.10 k/uL    Absolute Lymph # 1.21 1.10 - 3.70 k/uL    Absolute Mono # 0.47 0.10 - 1.20 k/uL    Absolute Eos # 0.18 0.00 - 0.44 k/uL    Basophils Absolute <0.03 0.00 - 0.20 k/uL    Absolute Immature Granulocyte <0.03 0.00 - 0.30 k/uL    WBC Morphology NOT REPORTED     RBC Morphology ANISOCYTOSIS PRESENT     Platelet Estimate NOT REPORTED    Basic Metabolic Panel w/ Reflex to MG   Result Value Ref Range    Glucose 154 (H) 70 - 99 mg/dL    BUN 13 8 - 23 mg/dL    CREATININE 2.87 (H) 0.50 - 0.90 mg/dL    Bun/Cre Ratio NOT REPORTED 9 - 20    Calcium 8.7 8.6 - 10.4 mg/dL    Sodium 139 135 - 144 mmol/L    Potassium 3.3 (L) 3.7 - 5.3 mmol/L    Chloride 102 98 - 107 mmol/L    CO2 25 20 - 31 mmol/L    Anion Gap 12 9 - 17 mmol/L    GFR Non-African American 17 (L) >60 mL/min    GFR African American 20 (L) >60 mL/min    GFR Comment          GFR Staging NOT REPORTED    Troponin   Result Value Ref Range    Troponin, High Sensitivity 36 (H) 0 - 14 ng/L    Troponin T NOT REPORTED <0.03 ng/mL    Troponin Interp NOT REPORTED    Brain Natriuretic Peptide   Result Value Ref Range    Pro-BNP 7,569 (H) <300 pg/mL    BNP Interpretation Pro-BNP Reference Range:    Troponin   Result Value Ref Range    Troponin, High Sensitivity 32 (H) 0 - 14 ng/L    Troponin T NOT REPORTED <0.03 ng/mL    Troponin Interp NOT REPORTED    Magnesium   Result Value Ref Range    Magnesium 1.8 1.6 - 2.6 mg/dL   EKG 12 Lead   Result Value Ref Range    Ventricular Rate 79 BPM    Atrial Rate 79 BPM    P-R Interval 170 ms    QRS Duration 92 ms    Q-T Interval 386 ms    QTc Calculation (Bazett) 442 ms    P Axis 63 degrees    R Axis -2 degrees    T Axis 63 degrees       IMPRESSION: Atypical chest pain    RADIOLOGY:  CTA CHEST W WO CONTRAST    Result Date: 9/27/2021  EXAMINATION: CTA OF THE CHEST WITH AND WITHOUT CONTRAST 9/27/2021 1:52 pm TECHNIQUE: CTA of the chest was performed before and after the administration of intravenous contrast.  Multiplanar reformatted images are provided for review. MIP images are provided for review. Dose modulation, iterative reconstruction, and/or weight based adjustment of the mA/kV was utilized to reduce the radiation dose to as low as reasonably achievable. COMPARISON: None. HISTORY: ORDERING SYSTEM PROVIDED HISTORY: hx of dissection, pain TECHNOLOGIST PROVIDED HISTORY: hx of dissection Reason for Exam: chest pain, sob FINDINGS: Aorta: Chronic type B dissection of the thoracic aorta is noted with the patent stent in place. Stable fusiform ectasia of the ascending thoracic aorta measuring 4.3 cm in diameter. Atherosclerosis of the thoracic aorta and coronary arteries is seen. Mediastinum: No filling defect in the pulmonary arteries to suggest pulmonary embolism. Enlarged main pulmonary trunk, compatible with pulmonary hypertension. Mild cardiomegaly. Trace pericardial fluid, decreased compared to the prior study. Lungs/Pleura: No pneumothorax. No pleural effusion. Mosaic attenuation in the bilateral lungs, suggestive of small airway disease. A small stable 0.4 cm subpleural nodule in the right lower lobe, image number 65 axial series. This is benign and does not require further follow-up imaging. Minimal dependent atelectasis in the right lower lobe. Upper Abdomen: Simple cyst in the left lobe of the liver is redemonstrated, measuring up to 2.8 cm. Status post cholecystectomy. Soft Tissues/Bones: Multilevel thoracic spondylosis. A patent stent noted across the patient's known type B aortic dissection. Stable fusiform ectasia of the ascending thoracic aorta. No evidence of pulmonary embolism. Cardiomegaly. Trace residual pericardial fluid. Mosaic attenuation in the bilateral lungs, suggestive of small airway disease. A stable benign 0.4 cm nodule in the right lower lobe. XR CHEST PORTABLE    Result Date: 9/27/2021  EXAMINATION: ONE XRAY VIEW OF THE CHEST 9/27/2021 12:43 pm COMPARISON: July 20, 2020, chest examination HISTORY: ORDERING SYSTEM PROVIDED HISTORY: CP TECHNOLOGIST PROVIDED HISTORY: CP FINDINGS: Interval removal of right IJ catheter. Stable cardiomegaly/aortic stent graft There is mild prominence and indistinctness of the pulmonary vascularity/interstitial markings No significant pleural process Degenerative changes of the thoracic spine/shoulders     Cardiomegaly with mild prominence of the pulmonary vascularity/interstitial markings, findings suggest mild vascular congestion     EKG  EKG shows normal sinus rhythm with sinus arrhythmia, possible left axis deviation, nonspecific T wave abnormalities in V4 5 and 6. No acute ST elevation noted, no Q waves. All EKG's are interpreted by the Emergency Department Physician who either signs or Co-signs this chart in the absence of a cardiologist.    EMERGENCY DEPARTMENT COURSE:  Patient is a 67-year-old female that presents with chief complaint of chest pain from dialysis today.   Given patient's family history as well as personal history heart score of 5 indicating admission. There was concern for aortic dissection given patient's past medical history as well as complaint of pain rating to the back. CT of the chest does not show any acute aortic dissection with stable surgical changes. EKG did not show any concern for MI and troponin was close to baseline for patient with dialysis. Therefore she was admitted to observation unit for cardiology consultation as well as stress test tomorrow morning. Pulmonary embolism likelihood minimal as patient is mobile, no recent travel, no history of cancer, no history of pulmonary embolism or hemoptysis. This is most likely an atypical chest pain but given her heart score requires cardiology consultation. Chest x-ray rules out pneumonia as well as pneumothorax  PROCEDURES:  N/A    CONSULTS:  IP CONSULT TO NEPHROLOGY  IP CONSULT TO CARDIOLOGY    CRITICAL CARE:  N/A    FINAL IMPRESSION      1. Chest pain, unspecified type          DISPOSITION / [id number] Admitted 09/27/2021 03:25:27 PM      PATIENT REFERRED TO:  No follow-up provider specified.     DISCHARGE MEDICATIONS:  Current Discharge Medication List          Sharyle Qualia, DO  Emergency Medicine Resident    (Please note that portions of thisnote were completed with a voice recognition program.  Efforts were made to edit the dictations but occasionally words are mis-transcribed.)       Aj Raza DO  Resident  09/27/21 [id number]

## 2021-09-27 NOTE — ED NOTES
No change in pt status, cont to await room assignment. Pt denies needs, call light within reach. Will cont to monitor.       Latesha Howell RN  09/27/21 [id number]

## 2021-09-28 ENCOUNTER — APPOINTMENT (OUTPATIENT)
Dept: NUCLEAR MEDICINE | Age: 61
End: 2021-09-28
Payer: MEDICARE

## 2021-09-28 VITALS
HEART RATE: 75 BPM | TEMPERATURE: 98.1 F | HEIGHT: 62 IN | OXYGEN SATURATION: 97 % | DIASTOLIC BLOOD PRESSURE: 82 MMHG | RESPIRATION RATE: 16 BRPM | BODY MASS INDEX: 33.13 KG/M2 | SYSTOLIC BLOOD PRESSURE: 135 MMHG | WEIGHT: 180 LBS

## 2021-09-28 LAB
ANION GAP SERPL CALCULATED.3IONS-SCNC: 15 MMOL/L (ref 9–17)
BUN BLDV-MCNC: 19 MG/DL (ref 8–23)
BUN/CREAT BLD: ABNORMAL (ref 9–20)
CALCIUM SERPL-MCNC: 8.3 MG/DL (ref 8.6–10.4)
CHLORIDE BLD-SCNC: 104 MMOL/L (ref 98–107)
CO2: 23 MMOL/L (ref 20–31)
CREAT SERPL-MCNC: 4.01 MG/DL (ref 0.5–0.9)
EKG ATRIAL RATE: 69 BPM
EKG ATRIAL RATE: 77 BPM
EKG ATRIAL RATE: 79 BPM
EKG P AXIS: 60 DEGREES
EKG P AXIS: 63 DEGREES
EKG P AXIS: 72 DEGREES
EKG P-R INTERVAL: 164 MS
EKG P-R INTERVAL: 170 MS
EKG P-R INTERVAL: 178 MS
EKG Q-T INTERVAL: 376 MS
EKG Q-T INTERVAL: 386 MS
EKG Q-T INTERVAL: 486 MS
EKG QRS DURATION: 92 MS
EKG QRS DURATION: 92 MS
EKG QRS DURATION: 94 MS
EKG QTC CALCULATION (BAZETT): 402 MS
EKG QTC CALCULATION (BAZETT): 442 MS
EKG QTC CALCULATION (BAZETT): 549 MS
EKG R AXIS: -2 DEGREES
EKG R AXIS: -9 DEGREES
EKG T AXIS: 52 DEGREES
EKG T AXIS: 62 DEGREES
EKG T AXIS: 63 DEGREES
EKG VENTRICULAR RATE: 69 BPM
EKG VENTRICULAR RATE: 77 BPM
EKG VENTRICULAR RATE: 79 BPM
GFR AFRICAN AMERICAN: 14 ML/MIN
GFR NON-AFRICAN AMERICAN: 11 ML/MIN
GFR SERPL CREATININE-BSD FRML MDRD: ABNORMAL ML/MIN/{1.73_M2}
GFR SERPL CREATININE-BSD FRML MDRD: ABNORMAL ML/MIN/{1.73_M2}
GLUCOSE BLD-MCNC: 86 MG/DL (ref 70–99)
LV EF: 55 %
LVEF MODALITY: NORMAL
POTASSIUM SERPL-SCNC: 3.6 MMOL/L (ref 3.7–5.3)
SODIUM BLD-SCNC: 142 MMOL/L (ref 135–144)
TROPONIN INTERP: ABNORMAL
TROPONIN T: ABNORMAL NG/ML
TROPONIN, HIGH SENSITIVITY: 35 NG/L (ref 0–14)

## 2021-09-28 PROCEDURE — 80048 BASIC METABOLIC PNL TOTAL CA: CPT

## 2021-09-28 PROCEDURE — 84484 ASSAY OF TROPONIN QUANT: CPT

## 2021-09-28 PROCEDURE — 6370000000 HC RX 637 (ALT 250 FOR IP): Performed by: NURSE PRACTITIONER

## 2021-09-28 PROCEDURE — 93306 TTE W/DOPPLER COMPLETE: CPT

## 2021-09-28 PROCEDURE — G0378 HOSPITAL OBSERVATION PER HR: HCPCS

## 2021-09-28 PROCEDURE — 6370000000 HC RX 637 (ALT 250 FOR IP): Performed by: STUDENT IN AN ORGANIZED HEALTH CARE EDUCATION/TRAINING PROGRAM

## 2021-09-28 PROCEDURE — 6360000002 HC RX W HCPCS: Performed by: STUDENT IN AN ORGANIZED HEALTH CARE EDUCATION/TRAINING PROGRAM

## 2021-09-28 PROCEDURE — 36415 COLL VENOUS BLD VENIPUNCTURE: CPT

## 2021-09-28 PROCEDURE — 6370000000 HC RX 637 (ALT 250 FOR IP): Performed by: PEDIATRICS

## 2021-09-28 PROCEDURE — 93005 ELECTROCARDIOGRAM TRACING: CPT | Performed by: STUDENT IN AN ORGANIZED HEALTH CARE EDUCATION/TRAINING PROGRAM

## 2021-09-28 PROCEDURE — 96372 THER/PROPH/DIAG INJ SC/IM: CPT

## 2021-09-28 RX ORDER — ATROPINE SULFATE 0.1 MG/ML
0.5 INJECTION INTRAVENOUS EVERY 5 MIN PRN
Status: ACTIVE | OUTPATIENT
Start: 2021-09-28 | End: 2021-09-28

## 2021-09-28 RX ORDER — ALBUTEROL SULFATE 90 UG/1
2 AEROSOL, METERED RESPIRATORY (INHALATION) PRN
Status: ACTIVE | OUTPATIENT
Start: 2021-09-28 | End: 2021-09-28

## 2021-09-28 RX ORDER — POTASSIUM CHLORIDE 20 MEQ/1
20 TABLET, EXTENDED RELEASE ORAL ONCE
Status: COMPLETED | OUTPATIENT
Start: 2021-09-28 | End: 2021-09-28

## 2021-09-28 RX ORDER — AMINOPHYLLINE DIHYDRATE 25 MG/ML
50 INJECTION, SOLUTION INTRAVENOUS PRN
Status: ACTIVE | OUTPATIENT
Start: 2021-09-28 | End: 2021-09-28

## 2021-09-28 RX ORDER — METOPROLOL TARTRATE 5 MG/5ML
5 INJECTION INTRAVENOUS EVERY 5 MIN PRN
Status: ACTIVE | OUTPATIENT
Start: 2021-09-28 | End: 2021-09-28

## 2021-09-28 RX ORDER — SODIUM CHLORIDE 0.9 % (FLUSH) 0.9 %
5-40 SYRINGE (ML) INJECTION PRN
Status: ACTIVE | OUTPATIENT
Start: 2021-09-28 | End: 2021-09-28

## 2021-09-28 RX ORDER — SODIUM CHLORIDE 9 MG/ML
500 INJECTION, SOLUTION INTRAVENOUS CONTINUOUS PRN
Status: ACTIVE | OUTPATIENT
Start: 2021-09-28 | End: 2021-09-28

## 2021-09-28 RX ORDER — NITROGLYCERIN 0.4 MG/1
0.4 TABLET SUBLINGUAL EVERY 5 MIN PRN
Status: ACTIVE | OUTPATIENT
Start: 2021-09-28 | End: 2021-09-28

## 2021-09-28 RX ADMIN — TIZANIDINE 4 MG: 4 TABLET ORAL at 10:41

## 2021-09-28 RX ADMIN — ENOXAPARIN SODIUM 30 MG: 30 INJECTION SUBCUTANEOUS at 10:41

## 2021-09-28 RX ADMIN — PANTOPRAZOLE SODIUM 20 MG: 20 TABLET, DELAYED RELEASE ORAL at 10:41

## 2021-09-28 RX ADMIN — ATORVASTATIN CALCIUM 40 MG: 80 TABLET, FILM COATED ORAL at 10:41

## 2021-09-28 RX ADMIN — AMLODIPINE BESYLATE 10 MG: 10 TABLET ORAL at 10:41

## 2021-09-28 RX ADMIN — FUROSEMIDE 40 MG: 20 TABLET ORAL at 10:41

## 2021-09-28 RX ADMIN — LABETALOL HYDROCHLORIDE 200 MG: 200 TABLET, FILM COATED ORAL at 10:41

## 2021-09-28 RX ADMIN — HYDRALAZINE HYDROCHLORIDE 50 MG: 50 TABLET, FILM COATED ORAL at 10:44

## 2021-09-28 RX ADMIN — POTASSIUM CHLORIDE 20 MEQ: 1500 TABLET, EXTENDED RELEASE ORAL at 07:46

## 2021-09-28 ASSESSMENT — PAIN SCALES - GENERAL
PAINLEVEL_OUTOF10: 0

## 2021-09-28 NOTE — H&P
that is baseline  Neurological ROS - No headache, no dizziness/lightheadedness, No focal weakness, no loss of sensation  Dermatological ROS - No lesions, No rash     (PQRS) Advance directives on face sheet per hospital policy. No change unless specifically mentioned in chart    PAST MEDICAL HISTORY    has a past medical history of CKD (chronic kidney disease) stage 3, GFR 30-59 ml/min ([address] Gout, Hemodialysis patient ([address] Hyperlipidemia, Hypertension, Kidney stone, Nephrolithiasis, Osteoarthritis, and Type II or unspecified type diabetes mellitus without mention of complication, not stated as uncontrolled. I have reviewed the past medical history with the patient and it is pertinent to this complaint. SURGICAL HISTORY      has a past surgical history that includes Hysterectomy, total abdominal; Lithotripsy; Thyroidectomy, partial; Gastric bypass surgery; Tonsillectomy; Total knee arthroplasty (Right, 01/06/2015); joint replacement; knee surgery (Left, 12/05/2018); Cardiac catheterization (10/21/2019); Dialysis fistula creation (11/06/2019); Dialysis fistula creation (Left, 11/06/2019); and Dialysis fistula creation (Left, 07/29/2020). I have reviewed and agree with Surgical History entered and it is pertinent to this complaint.      CURRENT MEDICATIONS     0.9 % sodium chloride infusion, Continuous PRN  albuterol sulfate  (90 Base) MCG/ACT inhaler 2 puff, PRN  aminophylline injection 50 mg, PRN  atropine injection 0.5 mg, Q5 Min PRN  metoprolol (LOPRESSOR) injection 5 mg, Q5 Min PRN  nitroGLYCERIN (NITROSTAT) SL tablet 0.4 mg, Q5 Min PRN  regadenoson (LEXISCAN) injection 0.4 mg, ONCE PRN  sodium chloride flush 0.9 % injection 5-40 mL, PRN  potassium chloride (KLOR-CON M) extended release tablet 20 mEq, Once  amLODIPine (NORVASC) tablet 10 mg, Daily  atorvastatin (LIPITOR) tablet 40 mg, Daily  labetalol (NORMODYNE) tablet 200 mg, BID  pantoprazole (PROTONIX) tablet 20 mg, Daily  tiZANidine auscultation bilaterally despite witnessed cough. Cough is nonproductive. CARDIOVASCULAR: regular rate and rhythm, no murmurs, rubs or gallops   ABDOMEN: soft, non-tender, non-distended with normal active bowel sounds   NEUROLOGIC:  MAEx4, no focal sensory or motor deficits   MUSCULOSKELETAL: no clubbing, cyanosis or edema   SKIN: no rash or wounds       DIFFERENTIAL DIAGNOSIS/MDM:   Cough:  DDX: pneumonia, sinusitis, foreign body, URI, viral illness, asthma/ COPD, allergic rhinitis, GERD, ACE- inhibitor use, HIV (TB, PCP)  Chest Pain:  DDX: Emergent: ACS/NSTEMI/STEMI/angina, arrhythmia, trauma, aortic dissection,  PE, PNA, pneumothroax, esophageal rupture, tamponade, Cocaine use  Nonemergent: pneumonia, pericarditis, GERD, MSK, Endocarditis, anxiety  Evaluated for: diaphoresis, present chest pain, tachypnea, BP both arms, heart sounds, JVD, tender chest wall, wheezing  Back Pain:  DDX: PE, vertebral fracture, epidural abscess  Lower: cauda equina, herniated disc   Mid: AAA rupture, pyelonephritis, kidney stone, pancreatitis, PUD  Upper: pneumothorax, aortic dissection, PE, nonspecific    Shortness of Breath:  DDX: sob/wheezing/cough evaluate for COPD exacerbation (home O2, PNA, hospitalization), asthma (past intubation, hospitalization, tobacco history), Pneumothorax, anaphylaxis, anxiety, PE (FH, OCP, tobacco use, BMI, immobilization, recent surgery, cancer, past DVT/ PE), pericardial effusion, CHF, ACS/MI, atelectasis, lower airway obstruction, aspiration, sudden onset, fever, cough, leg swelling.     DIAGNOSTIC RESULTS     EKG: All EKG's are interpreted by the Observation Physician who either signs or Co-signs this chart in the absence of a cardiologist.    EKG Interpretation    Interpreted by observation physician    Rhythm: normal sinus   Rate: normal  Axis: normal  Ectopy: none  Conduction: normal  ST Segments: nonspecific changes  T Waves: non specific changes  Q Waves: none    Clinical Impression: Abnormal EKG    Herman Cohn MD      RADIOLOGY:   I directly visualized the following  images and reviewed the radiologist interpretations:    CTA CHEST W WO CONTRAST    Result Date: 9/27/2021  EXAMINATION: CTA OF THE CHEST WITH AND WITHOUT CONTRAST 9/27/2021 1:52 pm TECHNIQUE: CTA of the chest was performed before and after the administration of intravenous contrast.  Multiplanar reformatted images are provided for review. MIP images are provided for review. Dose modulation, iterative reconstruction, and/or weight based adjustment of the mA/kV was utilized to reduce the radiation dose to as low as reasonably achievable. COMPARISON: None. HISTORY: ORDERING SYSTEM PROVIDED HISTORY: hx of dissection, pain TECHNOLOGIST PROVIDED HISTORY: hx of dissection Reason for Exam: chest pain, sob FINDINGS: Aorta: Chronic type B dissection of the thoracic aorta is noted with the patent stent in place. Stable fusiform ectasia of the ascending thoracic aorta measuring 4.3 cm in diameter. Atherosclerosis of the thoracic aorta and coronary arteries is seen. Mediastinum: No filling defect in the pulmonary arteries to suggest pulmonary embolism. Enlarged main pulmonary trunk, compatible with pulmonary hypertension. Mild cardiomegaly. Trace pericardial fluid, decreased compared to the prior study. Lungs/Pleura: No pneumothorax. No pleural effusion. Mosaic attenuation in the bilateral lungs, suggestive of small airway disease. A small stable 0.4 cm subpleural nodule in the right lower lobe, image number 65 axial series. This is benign and does not require further follow-up imaging. Minimal dependent atelectasis in the right lower lobe. Upper Abdomen: Simple cyst in the left lobe of the liver is redemonstrated, measuring up to 2.8 cm. Status post cholecystectomy. Soft Tissues/Bones: Multilevel thoracic spondylosis. A patent stent noted across the patient's known type B aortic dissection.  Stable fusiform ectasia of the ascending thoracic aorta. No evidence of pulmonary embolism. Cardiomegaly. Trace residual pericardial fluid. Mosaic attenuation in the bilateral lungs, suggestive of small airway disease. A stable benign 0.4 cm nodule in the right lower lobe. XR CHEST PORTABLE    Result Date: 9/27/2021  EXAMINATION: ONE XRAY VIEW OF THE CHEST 9/27/2021 12:43 pm COMPARISON: July 20, 2020, chest examination HISTORY: ORDERING SYSTEM PROVIDED HISTORY: CP TECHNOLOGIST PROVIDED HISTORY: CP FINDINGS: Interval removal of right IJ catheter. Stable cardiomegaly/aortic stent graft There is mild prominence and indistinctness of the pulmonary vascularity/interstitial markings No significant pleural process Degenerative changes of the thoracic spine/shoulders     Cardiomegaly with mild prominence of the pulmonary vascularity/interstitial markings, findings suggest mild vascular congestion       LABS:  I have reviewed and interpreted all available lab results.   Labs Reviewed   CBC WITH AUTO DIFFERENTIAL - Abnormal; Notable for the following components:       Result Value    RBC 3.61 (*)     Hemoglobin 10.6 (*)     Hematocrit 34.0 (*)     RDW 15.7 (*)     Seg Neutrophils 71 (*)     Lymphocytes 19 (*)     All other components within normal limits   BASIC METABOLIC PANEL W/ REFLEX TO MG FOR LOW K - Abnormal; Notable for the following components:    Glucose 154 (*)     CREATININE 2.87 (*)     Potassium 3.3 (*)     GFR Non- 17 (*)     GFR  20 (*)     All other components within normal limits   TROPONIN - Abnormal; Notable for the following components:    Troponin, High Sensitivity 36 (*)     All other components within normal limits   BRAIN NATRIURETIC PEPTIDE - Abnormal; Notable for the following components:    Pro-BNP 7,569 (*)     All other components within normal limits   TROPONIN - Abnormal; Notable for the following components:    Troponin, High Sensitivity 32 (*)     All other components within normal limits   TROPONIN - Abnormal; Notable for the following components:    Troponin, High Sensitivity 35 (*)     All other components within normal limits   COVID-19, RAPID   MAGNESIUM   BASIC METABOLIC PANEL W/ REFLEX TO MG FOR LOW K       SCREENING TOOLS:    HEART Risk Score for Chest Pain Patients   History and Physical Exam Suspicion Level  (Nausea, Vomiting, Diaphoresis, Radiation, Exertion)   Slightly Suspicious (0 pts)   Moderately Suspicious (1 pt)   Highly Suspicious (2 pts)   EKG Interpretation   Normal (0 pts)   Non-Specific Repolarization Disturbance (1 pt)   Significant ST-Depression (2 pts)   Age of Patient (in years)   = 39 (0 pts)   46-64 (1 pt)   = 65 (2 pts)   Risk Factors   No Risk Factors (0 pts)   1-2 Risk Factors (1 pt)   = 3 Risk Factors (2 pts)   Risk Factors Include:   Hypercholesterolemia   Hypertension   Diabetes Mellitus   Cigarette smoking   Positive family history   Obesity   CAD   (SLE, CKDz, HIV, Cocaine abuse)   Troponin Levels   = Normal Limit (0 pts)   1-3 Times Normal Limit (1 pt)   > 3 Times Normal Limit (2 pts)  TOTAL:8    Percent Risk for Major Adverse Cardiac Event (MACE)  0-3 pts indicates low risk for MACE   2.5% (DISCHARGE)   4-7 pts indicates moderate risk for MACE  20.3% (OBS)  8-10 pts indicates high risk for MACE  72.7% (EARLY INVASIVE TX)    CDU IMPRESSION / PLAN      Belkis Umanzor is a 62 y.o. female who presents with chest pain. Heart score 8. Initial troponin 36, than 32. BNP 7569. Hypokalemia 3.3. Elevated creatinine, patient has CKD, Cr 2.87.     · Cardiology consulted -appreciate recommendations  · ECHO first  · Replace potassium  · stress test possibly after  · Nephrology consulted -appreciate recommendations  · Continue home medications and pain control  · Monitor vitals, labs, and imaging  · DISPO: pending consults and clinical improvement    CONSULTS:    IP CONSULT TO NEPHROLOGY  IP CONSULT TO CARDIOLOGY    PROCEDURES:  Not indicated       PATIENT REFERRED TO:    No follow-up provider specified. --  Ray Lawton MD   Emergency Medicine Resident     This dictation was generated by voice recognition computer software. Although all attempts are made to edit the dictation for accuracy, there may be errors in the transcription that are not intended.

## 2021-09-28 NOTE — CONSULTS
[facility]    Consult / H&P               Today's Date: 9/28/2021  Patient Name: Kehinde Booker  Date of admission: 9/27/2021 12:09 PM  Patient's age: 62 y.o., 1960  Admission Dx: Chest pain [R07.9]  Chest pain, unspecified type [R07.9]    Reason for Consult:  Cardiac evaluation of chest pain and SOB    Requesting Physician: Adam Soto MD    CHIEF COMPLAINT:  Chest pain and SOB    History Obtained From:  patient, electronic medical record    HISTORY OF PRESENT ILLNESS:      The patient is a 62 y.o.  female who is admitted to the hospital for Chest Pain  Mitchell Chaudhary complains of chest pain. Onset was 3 days ago. Symptoms have been stable since that time. The patient's pain is intermittent. The patient describes the pain as heaviness, squeezing and radiates to the upper back. Patient rates pain as a 6/10 in intensity. Patient states that her chest pain is similar to one she had in 2019. It is nonreproducible and she states that it does not increase on deep breathing. Associated symptoms are: dyspnea, exertional chest pressure/discomfort, fatigue, near-syncope, palpitations and tachypnea. Aggravating factors are: deep inspiration, exercise and walking. Alleviating factors are: position change and rest.  She also complains of shortness of breath, which increases on walking. She has been avoiding to walk for last couple of days. She endorses orthopnea and PND episodes. Pedal edema present. Crackles appreciated on ausculation. She has been having malaise, sinusitis, headache and runny nose. Covid test came back negative. Patient's cardiac risk factors are: diabetes mellitus, dyslipidemia, family history of premature cardiovascular disease, hypertension, microalbuminuria, obesity (BMI >= 30 kg/m2), sedentary lifestyle and smoking/ tobacco exposure. Patient's risk factors for DVT/PE: none.   Previous cardiac testing: coronary angiography, date 10/21/2019, location [facility], echocardiogram and electrocardiogram (ECG). She has PMH significant of HTN, HFpEF (65% EF), CAD s/p cardiac cath on 10/21/2019 which showed minimal disease in LAD and LCx and RCA with  moderate stenosis 60 to 70%. Past Medical History:   has a past medical history of CKD (chronic kidney disease) stage 3, GFR 30-59 ml/min ([address] Gout, Hemodialysis patient ([facility] [address] Hyperlipidemia, Hypertension, Kidney stone, Nephrolithiasis, Osteoarthritis, and Type II or unspecified type diabetes mellitus without mention of complication, not stated as uncontrolled. Past Surgical History:   has a past surgical history that includes Hysterectomy, total abdominal; Lithotripsy; Thyroidectomy, partial; Gastric bypass surgery; Tonsillectomy; Total knee arthroplasty (Right, 01/06/2015); joint replacement; knee surgery (Left, 12/05/2018); Cardiac catheterization (10/21/2019); Dialysis fistula creation (11/06/2019); Dialysis fistula creation (Left, 11/06/2019); and Dialysis fistula creation (Left, 07/29/2020). Home Medications:    Prior to Admission medications    Medication Sig Start Date End Date Taking?  Authorizing Provider   hydrALAZINE (APRESOLINE) 50 MG tablet Take 50 mg by mouth 2 times daily     Historical Provider, MD   furosemide (LASIX) 20 MG tablet Take 40 mg by mouth daily Non-dialysis days    Historical Provider, MD   Multiple Vitamins-Minerals (MULTIVITAMIN ADULT EXTRA C) CHEW Take 1 tablet by mouth daily 8/24/21   Verónica Milder, APRN - CNP   allopurinol (ZYLOPRIM) 100 MG tablet Take 1 tablet by mouth daily 8/24/21   Verónica Milder, APRN - CNP   aspirin 81 MG EC tablet Take 1 tablet by mouth daily 8/24/21   Veróniac Milder, APRN - CNP   labetalol (NORMODYNE) 200 MG tablet Take 1 tablet by mouth 2 times daily 8/24/21   Verónica Milder, APRN - CNP   pantoprazole (PROTONIX) 20 MG tablet Take 1 tablet by mouth daily 8/24/21   Verónica Milder, APRN - CNP   lidocaine (XYLOCAINE) 5 % ointment Apply topically BID as needed.  8/24/21   NAV Dozier - CNP   atorvastatin (LIPITOR) 40 MG tablet Take 1 tablet by mouth daily 7/16/21   NAV Dozier - CNP   amLODIPine (NORVASC) 10 MG tablet TAKE 1 TABLET DAILY 6/30/21   [facility], MD   lidocaine-prilocaine (EMLA) 2.5-2.5 % cream APPLY 3 TIMES WEEKLY 30-60 MINUTES BEFORE DIALYSIS WRAP WITH PLASTIC WRAP AS DIRECTED 6/16/21   [facility], MD   VITAMIN E-200 200 units capsule TAKE 1 CAPSULE BY MOUTH DAILY 4/13/21   Raffaele Pearce MD   tiZANidine (ZANAFLEX) 4 MG tablet Take 1 tablet by mouth 2 times daily 3/5/21   Sultana Huggins PA-C   Transparent Dressings (TEGADERM ABSORBENT DRESSING) MISC Provide insurance covered tegaderm dressing, use at hemodialysis 11/5/20   Sultana Huggins PA-C   acetaminophen (TYLENOL) 500 MG tablet Take 1,000 mg by mouth every 6 hours as needed for Pain    Historical Provider, MD   TRUE METRIX BLOOD GLUCOSE TEST strip  6/1/18   Historical Provider, MD   calcium citrate-vitamin D (CITRACEL+D) 200-250 MG-UNIT TABS per tablet TAKE 2 TABLETS THREE TIMES A DAY 4/7/18   Historical Provider, MD   Blood Pressure KIT 1 box by Does not apply route three times daily 4/6/18   Sultana Huggins PA-C      Current Facility-Administered Medications: 0.9 % sodium chloride infusion, 500 mL, IntraVENous, Continuous PRN  albuterol sulfate  (90 Base) MCG/ACT inhaler 2 puff, 2 puff, Inhalation, PRN  aminophylline injection 50 mg, 50 mg, IntraVENous, PRN  atropine injection 0.5 mg, 0.5 mg, IntraVENous, Q5 Min PRN  metoprolol (LOPRESSOR) injection 5 mg, 5 mg, IntraVENous, Q5 Min PRN  nitroGLYCERIN (NITROSTAT) SL tablet 0.4 mg, 0.4 mg, SubLINGual, Q5 Min PRN  regadenoson (LEXISCAN) injection 0.4 mg, 0.4 mg, IntraVENous, ONCE PRN  sodium chloride flush 0.9 % injection 5-40 mL, 5-40 mL, IntraVENous, PRN  amLODIPine (NORVASC) tablet 10 mg, 10 mg, Oral, Daily  atorvastatin (LIPITOR) tablet 40 mg, 40 mg, Oral, Daily  labetalol (NORMODYNE) tablet 200 mg, 200 mg, Oral, BID  pantoprazole (PROTONIX) tablet 20 mg, 20 mg, Oral, Daily  tiZANidine (ZANAFLEX) tablet 4 mg, 4 mg, Oral, BID  furosemide (LASIX) tablet 40 mg, 40 mg, Oral, Daily  0.9 % sodium chloride infusion, , IntraVENous, Continuous  sodium chloride flush 0.9 % injection 5-40 mL, 5-40 mL, IntraVENous, 2 times per day  sodium chloride flush 0.9 % injection 5-40 mL, 5-40 mL, IntraVENous, PRN  0.9 % sodium chloride infusion, 25 mL, IntraVENous, PRN  ondansetron (ZOFRAN) injection 4 mg, 4 mg, IntraVENous, Q4H PRN  polyethylene glycol (GLYCOLAX) packet 17 g, 17 g, Oral, Daily PRN  acetaminophen (TYLENOL) tablet 650 mg, 650 mg, Oral, Q6H PRN **OR** acetaminophen (TYLENOL) suppository 650 mg, 650 mg, Rectal, Q6H PRN  enoxaparin (LOVENOX) injection 30 mg, 30 mg, SubCUTAneous, Daily  hydrALAZINE (APRESOLINE) tablet 50 mg, 50 mg, Oral, BID    Allergies:  Latex, Penicillins, Codeine, Nsaids, and Tolmetin    Social History:   reports that she quit smoking about 20 years ago. Her smoking use included cigarettes. She has a 30.00 pack-year smoking history. She has never used smokeless tobacco. She reports that she does not drink alcohol and does not use drugs. Family History: family history includes Diabetes in her mother; Heart Disease in her father and mother; High Blood Pressure in her father. No h/o sudden cardiac death. Yes for premature CAD    REVIEW OF SYSTEMS:    · Constitutional: there has been no unanticipated weight loss. There's been No change in energy level, No change in activity level. · Eyes: No visual changes or diplopia. No scleral icterus. · ENT: No Headaches  · Cardiovascular: As above and below  · Respiratory: Cough, congestion present. · Gastrointestinal: No abdominal pain. No change in bowel or bladder habits. · Genitourinary: No dysuria, trouble voiding, or hematuria.   · Musculoskeletal:  No gait disturbance, No weakness or joint complaints. · Integumentary: No rash or pruritis. · Neurological: No headache, diplopia, change in muscle strength, numbness or tingling. No change in gait, balance, coordination, mood, affect, memory, mentation, behavior. · Psychiatric: No anxiety, or depression. · Endocrine: No temperature intolerance. No excessive thirst, fluid intake, or urination. No tremor. · Hematologic/Lymphatic: No abnormal bruising or bleeding, blood clots or swollen lymph nodes. · Allergic/Immunologic: No nasal congestion or hives. PHYSICAL EXAM:      BP (!) 190/92   Pulse 81   Temp 98.8 °F (37.1 °C) (Oral)   Resp 18   Ht 5' 2\" (1.575 m)   Wt 180 lb (81.6 kg)   SpO2 98%   BMI 32.92 kg/m²    Constitutional and General Appearance: alert, cooperative, no distress and appears stated age  HEENT: PERRL, no cervical lymphadenopathy. No masses palpable. Normal oral mucosa  Respiratory:  · Normal excursion and expansion without use of accessory muscles  · Resp Auscultation: Good respiratory effort. No for increased work of breathing. On auscultation: clear to auscultation bilaterally  Cardiovascular:  · The apical impulse is not displaced  · Heart tones are crisp and normal. regular S1 and S2.  · Jugular venous pulsation Normal  · The carotid upstroke is normal in amplitude and contour without delay or bruit  · Peripheral pulses are symmetrical and full   Abdomen:   · No masses or tenderness  · Bowel sounds present  Extremities:  ·  No Cyanosis or Clubbing  ·  Lower extremity edema: No  ·  Skin: Warm and dry  Neurological:  · Alert and oriented.   · Moves all extremities well  · No abnormalities of mood, affect, memory, mentation, or behavior are noted    DATA:    Diagnostics:      EKG:   Normal sinus rhythm  Possible Left atrial enlargement  Nonspecific T wave abnormality  Abnormal ECG  When compared with ECG of 27-SEP-2021 20:59,  Premature ventricular complexes are no longer Present    Sinus rhythm with occasional Premature ventricular complexes  Possible Left atrial enlargement  Nonspecific T wave abnormality  Abnormal ECG  When compared with ECG of 27-SEP-2021 12:15,  Premature ventricular complexes are now Present  QT has shortened    ECHO:   reviewed. 11/7/19:   EF 65%, grade II DD, LA is severely dilated, moderate AI, moderate-severe TR, RVSP is 46 mmHg consistent with mild PHTN, moderate PI. Stress Test:   ordered, but not yet obtained. Cardiac Angiography:   Reviewed. 10/21/19:    Minimal disease in LAD and LCX   Difficult to engage of the RCA with moderate 60-70% stenosis    Labs:     CBC:   Recent Labs     09/27/21  1238   WBC 6.5   HGB 10.6*   HCT 34.0*        BMP:   Recent Labs     09/27/21  1238      K 3.3*   CO2 25   BUN 13   CREATININE 2.87*   LABGLOM 17*   GLUCOSE 154*     BNP: No results for input(s): BNP in the last 72 hours. PT/INR: No results for input(s): PROTIME, INR in the last 72 hours. APTT:No results for input(s): APTT in the last 72 hours. CARDIAC ENZYMES:No results for input(s): CKTOTAL, CKMB, CKMBINDEX, TROPONINI in the last 72 hours. FASTING LIPID PANEL:  Lab Results   Component Value Date    HDL 50 10/18/2019    LDLCALC 116 08/01/2016    TRIG 124 10/18/2019     LIVER PROFILE:No results for input(s): AST, ALT, LABALBU in the last 72 hours. IMPRESSION:    1. Typical chest pain   2. EKG- unremarkable  3. Trop- 43,45,62  4. Pro-BNP 7569  5. CXR- Cardiomegaly with mild vascular congestion   6. Acute on chronic CHF/ HFpEF  7. CAD s/p Cath 10/21/2019, PCI not needed. Minimal disease in LAD and LCX and Difficult to engage of the RCA with moderate 60-70% stenosis. 8. H/o thoracic mural hematoma s/p TEVAR pm 3/18  9. HTN, on Norvasc 10 mg, hydralazine 50 mg twice daily, labetalol 200 mg  10. DM  11. HLD, on Lipitor 40 mg  12. ESRD, on hemodialysis, Lasix (20 mg; 40 mg on nondialysis days. )  13. Gastric bypass  14. Chronic tobacco exposure, quit since 2001  15.  Anemia 10.6     Patient Active Problem List   Diagnosis    Hyperlipidemia    Primary osteoarthritis of left knee    Microalbuminuria    S/P gastric bypass    Anemia    S/P total knee arthroplasty    Hyperuricemia    Hydronephrosis of left kidney    Renal atrophy, right    Localized, primary osteoarthritis of hand    Essential hypertension    Chronic kidney disease    Motion sickness    Slow transit constipation    Chest pain    Aortic dissection (HCC)    Respiratory acidosis    Subacromial impingement    Acute on chronic diastolic (congestive) heart failure (HCC)    Moderate mitral regurgitation    Hypertensive emergency    Acute respiratory failure with hypoxia ([facility] [address]    Former smoker    Acute pulmonary edema (HCC)    ESRD on hemodialysis ([facility] [address]    Chronic pain of left knee    Neuropathic pain    Gastroesophageal reflux disease       RECOMMENDATIONS:  1. Continue aspirin, statin, beta-blocker. 2. For hypertension, continue Norvasc 10 mg, hydralazine 50 mg twice daily, labetalol 200 mg.  3. 2D echo? 4. Keep K above 4 and Mg above 2.  5. Rest management as per primary and other teams. Will discuss with rounding attending Dr. Leonela Levine for final recommendations. Dalton Benjamin MD  Cardiology Service  Internal Medicine Residency Program, PGY-1  [facility]        Attending Physician Statement:    I have discussed the care of  Nick Bello , including pertinent history and exam findings, with the Cardiology fellow/resident. I have seen and examined the patient and the key elements of all parts of the encounter have been performed by me. I agree with the assessment, plan and orders as documented by the fellow/resident, after I modified exam findings and plan of treatments, and the final version is my approved version of the assessment. Additional Comments: The patient was seen and examined agree with the evaluation and plan above.   Presented with chest tightness and shortness of breath and severe hypertension. ECG shows no acute changes and troponin within normal limits. He had a recent stress test at [facility] which showed no ischemia. We will continue current medications. She does have end-stage renal disease and she might need volume management through dialysis.

## 2021-09-28 NOTE — ED NOTES
Note completed and faxed to HH.    Report given to Jazlyn Fields RN 3C.       Viet Broussard RN  09/27/21 2019

## 2021-09-28 NOTE — PROGRESS NOTES
[address]  CDU / OBSERVATION ENCOUNTER  ATTENDING NOTE       I performed a history and physical examination of the patient and discussed management with the resident or midlevel provider. I reviewed the resident or midlevel provider's note and agree with the documented findings and plan of care. Any areas of disagreement are noted on the chart. I was personally present for the key portions of any procedures. I have documented in the chart those procedures where I was not present during the key portions. I have reviewed the nurses notes. I agree with the chief complaint, past medical history, past surgical history, allergies, medications, social and family history as documented unless otherwise noted below. The Family history, social history, and ROS are effectively unchanged since admission unless noted elsewhere in the chart. Appreciate cardiology evaluation. Patient had improvement of symptoms since yesterday. Patient has done better after dialysis. Patient has had recent stress testing done at [facility] which was reviewed by cardiology. Patient will be have further evaluation with echocardiogram.  Patient will have further work-up based on results.   Patient currently well while awaiting echocardiogram.  Appreciate cardiology input    Debbie Torres MD  Attending Emergency  Physician

## 2021-09-28 NOTE — PROGRESS NOTES
[facility]  Documentation Note                Admission Dx: Chest pain [R07.9]  Chest pain, unspecified type [R07.9]    Past Medical History:   has a past medical history of CKD (chronic kidney disease) stage 3, GFR 30-59 ml/min ([facility] Gout, Hemodialysis patient ([facility] [address] [facility] Hyperlipidemia, Hypertension, Kidney stone, Nephrolithiasis, Osteoarthritis, and Type II or unspecified type diabetes mellitus without mention of complication, not stated as uncontrolled. Previous Testing:     STRESS 12/16/2020: No ischemia/infarct. EF 65%. ([facility])    ECHO 12/16/2020: EF 60%, grade II DD. ([facility])    CATH 10/21/19:    Minimal disease in LAD and LCX   Difficult to engage of the RCA with moderate 60-70% stenosis    Previous office/hospital visit:   DEREK Pena NP 12/13/19:   1. ECHO 3/27/18: EF 55%, mild AI, mod MR, mild PI/TR  2. Cardiac cath 10/21/19- LM normal, LAD & LCx mild irreg 20-30%, RCA - anterior take-off with mid 60-70% stenosis. med management  3. Developed JINNY on CKD, pulmonary edema. Started on HD with AVF creation 11/6/19    Plan --   1. CAD with 60-70% RCA (anterior take-off) with medical management plans at this time - stable and doing well. Continue PO ASA, statin, & BB. Discussed importance of diet, exercise, med compliance, BP control, and follow-up. 2. HTN - stable. Continue BB & CCB.   3. ESRD on HD - managed by Dr. Riana Kan, [facility]

## 2021-09-28 NOTE — CARE COORDINATION
Case Management Initial Discharge Plan  Tania Matos,             Met with:patient & daughter Corina Soto to discuss discharge plans. Information verified: address, contacts, phone number, , insurance Yes  Insurance Provider: [employer]    Emergency Contact/Next of Kin name & number: daughters  Who are involved in patient's support system? Family     PCP: NAV Flores CNP  Date of last visit: last month      Discharge Planning    Living Arrangements:  Lives with daughter opal     Home has 1 stories  few stairs to climb to get into front door,     Patient able to perform ADL's:Independent    Current Services (outpatient & in home) [address] [facility] mwf @ 4:30 am  DME equipment: none  DME provider: none    Is patient receiving oral anticoagulation therapy? No    Potential Assistance Needed:  F/u pcp       Evaluation: no    Expected Discharge date:  21    Patient expects to be discharged to:   home    If home: is the family and/or caregiver wiling & able to provide support at home? Yes   Who will be providing this support? daughter    Follow Up Appointment: Best Day/ Time:     Transportation provider: family   Transportation arrangements needed for discharge: no    Readmission Risk              Risk of Unplanned Readmission:  0             Does patient have a readmission risk score greater than 14?: No  If yes, follow-up appointment must be made within 7 days of discharge.      Goals of Care: safe transition plan       Educated yes on transitional options, provided freedom of choice and are agreeable with plan      Discharge Plan: return home with daughter           Electronically signed by Alex Ruiz RN on 21 at 11:01 AM EDT

## 2021-09-28 NOTE — PROGRESS NOTES
Nephrology Consult Note    Reason for Consult: End-stage renal disease on maintenance hemodialysis  Requesting Physician: Dr. Fischer All    Chief Complaint: Chest pain  History Obtained From:  {Patient and chart review  History of Present Illness: This is a 62 y.o. female who has a past medical history significant for longstanding diabetes, coronary artery disease and previous intervention and stent placement in the past.  Patient also has a history of type B aortic dissection and stent was placed. Patient also has end-stage renal disease and receives dialysis under the care of Dr. Den Snow at [facility] dialysis unit. According to patient 3 days prior to this hospitalization she was having intermittent chest pain and tightness both anteriorly and in between her scapula. She presented to ER with a complaint of chest tightness and chest pain. She underwent CTA which was negative for PE. Patient states that her chest pain and tightness has been improved but she is feeling a little short of breath. Patient was seen by cardiology. She recently had a stress test and echocardiogram was ordered. Her regular dialysis days are Monday Wednesday Friday. She was dialyzed yesterday. She denies any shortness of breath at present. She denies any nausea or vomiting. Her most recent BMP shows a potassium of 3.6 and a creatinine of 4.0. Her bicarbonate was 32.       Past Medical History:        Diagnosis Date    CKD (chronic kidney disease) stage 3, GFR 30-59 ml/min (HCC)     per pt, caused by kid stone damage    Gout     Hemodialysis patient ([facility] [address] 10/21/2019    Hyperlipidemia     Hypertension     Kidney stone     Nephrolithiasis     Osteoarthritis     Type II or unspecified type diabetes mellitus without mention of complication, not stated as uncontrolled     patient denies       Past Surgical History:        Procedure Laterality Date    CARDIAC CATHETERIZATION  10/21/2019    DIALYSIS FISTULA Smokeless tobacco: Never Used   Vaping Use    Vaping Use: Never used   Substance and Sexual Activity    Alcohol use: No    Drug use: No    Sexual activity: Yes   Other Topics Concern    Not on file   Social History Narrative    Not on file     Social Determinants of Health     Financial Resource Strain: Low Risk     Difficulty of Paying Living Expenses: Not hard at all   Food Insecurity: No Food Insecurity    Worried About Running Out of Food in the Last Year: Never true    Rose of Food in the Last Year: Never true   Transportation Needs:     Lack of Transportation (Medical):  Lack of Transportation (Non-Medical):    Physical Activity:     Days of Exercise per Week:     Minutes of Exercise per Session:    Stress:     Feeling of Stress :    Social Connections:     Frequency of Communication with Friends and Family:     Frequency of Social Gatherings with Friends and Family:     Attends Bahai Services:     Active Member of Clubs or Organizations:     Attends Club or Organization Meetings:     Marital Status:    Intimate Partner Violence:     Fear of Current or Ex-Partner:     Emotionally Abused:     Physically Abused:     Sexually Abused:        Family History:   Family History   Problem Relation Age of Onset    Heart Disease Mother     Diabetes Mother     Heart Disease Father     High Blood Pressure Father        Review of Systems:    Constitutional: No fever or chills  HEENT:  No headache or sore throat  Cardiac:  No chest pain no PND  Chest: No cough, chest tightness intermittent in nature  Abdomen:  No abdominal pain  Neuro:  No focal weakness, abnormal movements orseizure like activity. Skin:   No rashes, no itching. :   No hematuria, no pyuria, no dysuria, no flank pain. Extremities:  Increasing leg swelling.       Objective:  CURRENT TEMPERATURE:  Temp: 97.2 °F (36.2 °C)  MAXIMUM TEMPERATURE OVER 24HRS:  Temp (24hrs), Av °F (36.7 °C), Min:97.2 °F (36.2 °C), Max:98.8 °F (37.1 °C)    CURRENT RESPIRATORY RATE:  Resp: 20  CURRENT PULSE:  Pulse: 68  CURRENT BLOOD PRESSURE:  BP: (!) 162/77  24HR BLOOD PRESSURE RANGE:  Systolic (25AOO), SUP:630 , Min:148 , WMV:331   ; Diastolic (49BLV), XIB:22, Min:76, Max:92    24HR INTAKE/OUTPUT:  No intake or output data in the 24 hours ending 09/28/21 1418  Patient Vitals for the past 96 hrs (Last 3 readings):   Weight   09/27/21 1222 180 lb (81.6 kg)     Physical Exam:  General appearance: Awake and alert x3  Skin: Warm to touch and no erythema  Eyes: Sclera was nonicteric  ENT: No thrush  Neck: No carotid bruit or thyromegaly  Pulmonary: Lateral air entry few rales at the right base otherwise unremarkable examination cardiovascular: Normal S1 & S2,    Abdomen: soft nontender, bowel sounds present, no organomegaly,  no ascites  Extremities: No edema labs:   CBC:  Recent Labs     09/27/21  1238   WBC 6.5   RBC 3.61*   HGB 10.6*   HCT 34.0*   MCV 94.2   MCH 29.4   MCHC 31.2   RDW 15.7*      MPV 9.8      BMP:   Recent Labs     09/27/21  1238 09/28/21  0619    142   K 3.3* 3.6*    104   CO2 25 23   BUN 13 19   CREATININE 2.87* 4.01*   GLUCOSE 154* 86   CALCIUM 8.7 8.3*        Magnesium:   Recent Labs     09/27/21  1238   MG 1.8     Urinalysis:  U/A:   Lab Results   Component Value Date    NITRU NEGATIVE 01/23/2020    COLORU yellow 03/25/2020    COLORU YELLOW 01/23/2020    PHUR 6.0 03/25/2020    PHUR 5.0 01/23/2020    WBCUA 0 TO 2 01/23/2020    RBCUA 2 TO 5 01/23/2020    MUCUS NOT REPORTED 01/23/2020    TRICHOMONAS NOT REPORTED 01/23/2020    YEAST NOT REPORTED 01/23/2020    BACTERIA NOT REPORTED 01/23/2020    CLARITYU clear 03/25/2020    CLARITYU Clear 08/26/2019    SPECGRAV 1.020 03/25/2020    SPECGRAV 1.009 01/23/2020    LEUKOCYTESUR negative 03/25/2020    LEUKOCYTESUR NEGATIVE 01/23/2020    UROBILINOGEN Normal 01/23/2020    BILIRUBINUR negative 03/25/2020    BLOODU negative 03/25/2020    BLOODU Negative 08/26/2019    GLUCOSEU negative 03/25/2020    GLUCOSEU NEGATIVE 01/23/2020    KETUA negative 03/25/2020    [address] NEGATIVE 01/23/2020    AMORPHOUS NOT REPORTED 01/23/2020         Radiology:  Reviewed as available. Assessment:  1. End-stage renal disease on maintenance hemodialysis regular dialysis days are Monday Wednesday Fridays. 2.  Prior history of coronary artery disease and stent placement  3. Secondary hyperparathyroidism  4. History of hypertension      Plan:  1. BMP in a.m. 2.  CBC in a.m.  3.  Dialysis in a.m. if patient remains in the hospital.  4.  Resume home medications. Thank you for the consultation. Please do not hesitate to call with questions.     Electronically signed by Collette Garza MD on 9/28/2021 at 2:18 PM

## 2021-10-02 NOTE — DISCHARGE SUMMARY
CDU Discharge Summary        Patient:  Crissy Martin  YOB: 1960    MRN: [id number]   Acct: [de-identified]    Primary Care Physician: NAV La CNP    Admit date:  9/27/2021 12:09 PM  Discharge date: 9/28/2021  4:45 PM     Discharge Diagnoses:     Acute chest pain due to CHF exacerbation. Cardiology nephrology consulted. Recommended outpatient follow-up. Echo performed during encounter unchanged from prior. Improved with symptomatic management. Follow-up:  Call today/tomorrow for a follow up appointment with NAV La CNP , or return to the Emergency Room with worsening symptoms    Stressed to patient the importance of following up with primary care doctor for further workup/management of symptoms. Pt verbalizes understanding and agrees with plan. Discharge Medications:  Changes to medications          [facility] Medication Instructions [id number]    Printed on:10/02/21 8834   Medication Information                      acetaminophen (TYLENOL) 500 MG tablet  Take 1,000 mg by mouth every 6 hours as needed for Pain             allopurinol (ZYLOPRIM) 100 MG tablet  Take 1 tablet by mouth daily             amLODIPine (NORVASC) 10 MG tablet  TAKE 1 TABLET DAILY             aspirin 81 MG EC tablet  Take 1 tablet by mouth daily             atorvastatin (LIPITOR) 40 MG tablet  Take 1 tablet by mouth daily             Blood Pressure KIT  1 box by Does not apply route three times daily             calcium citrate-vitamin D (CITRACEL+D) 200-250 MG-UNIT TABS per tablet  TAKE 2 TABLETS THREE TIMES A DAY             furosemide (LASIX) 20 MG tablet  Take 40 mg by mouth daily Non-dialysis days             hydrALAZINE (APRESOLINE) 50 MG tablet  Take 50 mg by mouth 2 times daily              labetalol (NORMODYNE) 200 MG tablet  Take 1 tablet by mouth 2 times daily             lidocaine (XYLOCAINE) 5 % ointment  Apply topically BID as needed. 0.00 - 0.20 k/uL    Absolute Immature Granulocyte <0.03 0.00 - 0.30 k/uL    WBC Morphology NOT REPORTED     RBC Morphology ANISOCYTOSIS PRESENT     Platelet Estimate NOT REPORTED    Basic Metabolic Panel w/ Reflex to MG   Result Value Ref Range    Glucose 154 (H) 70 - 99 mg/dL    BUN 13 8 - 23 mg/dL    CREATININE 2.87 (H) 0.50 - 0.90 mg/dL    Bun/Cre Ratio NOT REPORTED 9 - 20    Calcium 8.7 8.6 - 10.4 mg/dL    Sodium 139 135 - 144 mmol/L    Potassium 3.3 (L) 3.7 - 5.3 mmol/L    Chloride 102 98 - 107 mmol/L    CO2 25 20 - 31 mmol/L    Anion Gap 12 9 - 17 mmol/L    GFR Non-African American 17 (L) >60 mL/min    GFR African American 20 (L) >60 mL/min    GFR Comment          GFR Staging NOT REPORTED    Troponin   Result Value Ref Range    Troponin, High Sensitivity 36 (H) 0 - 14 ng/L    Troponin T NOT REPORTED <0.03 ng/mL    Troponin Interp NOT REPORTED    Brain Natriuretic Peptide   Result Value Ref Range    Pro-BNP 7,569 (H) <300 pg/mL    BNP Interpretation Pro-BNP Reference Range:    Troponin   Result Value Ref Range    Troponin, High Sensitivity 32 (H) 0 - 14 ng/L    Troponin T NOT REPORTED <0.03 ng/mL    Troponin Interp NOT REPORTED    Magnesium   Result Value Ref Range    Magnesium 1.8 1.6 - 2.6 mg/dL   Basic Metabolic Panel w/ Reflex to MG   Result Value Ref Range    Glucose 86 70 - 99 mg/dL    BUN 19 8 - 23 mg/dL    CREATININE 4.01 (H) 0.50 - 0.90 mg/dL    Bun/Cre Ratio NOT REPORTED 9 - 20    Calcium 8.3 (L) 8.6 - 10.4 mg/dL    Sodium 142 135 - 144 mmol/L    Potassium 3.6 (L) 3.7 - 5.3 mmol/L    Chloride 104 98 - 107 mmol/L    CO2 23 20 - 31 mmol/L    Anion Gap 15 9 - 17 mmol/L    GFR Non-African American 11 (L) >60 mL/min    GFR  14 (L) >60 mL/min    GFR Comment          GFR Staging NOT REPORTED    Troponin   Result Value Ref Range    Troponin, High Sensitivity 35 (H) 0 - 14 ng/L    Troponin T NOT REPORTED <0.03 ng/mL    Troponin Interp NOT REPORTED    EKG 12 Lead   Result Value Ref Range Ventricular Rate 77 BPM    Atrial Rate 77 BPM    P-R Interval 164 ms    QRS Duration 94 ms    Q-T Interval 486 ms    QTc Calculation (Bazett) 549 ms    P Axis 60 degrees    R Axis -9 degrees    T Axis 52 degrees   EKG 12 Lead   Result Value Ref Range    Ventricular Rate 79 BPM    Atrial Rate 79 BPM    P-R Interval 170 ms    QRS Duration 92 ms    Q-T Interval 386 ms    QTc Calculation (Bazett) 442 ms    P Axis 63 degrees    R Axis -2 degrees    T Axis 63 degrees   EKG 12 lead   Result Value Ref Range    Ventricular Rate 69 BPM    Atrial Rate 69 BPM    P-R Interval 178 ms    QRS Duration 92 ms    Q-T Interval 376 ms    QTc Calculation (Bazett) 402 ms    P Axis 72 degrees    T Axis 62 degrees     ECHO Complete 2D W Doppler W Color    Result Date: 9/29/2021  Transthoracic Echocardiography Report (TTE)  Patient Name Cesar Quach     Date of Study               09/28/2021               Kamaljit Smart Y   Date of      1960  Gender                      Female  Birth   Age          62 year(s)  Race                        Black   Room Number  3258        Height:                     62 inch, 157.48 cm   Corporate ID [id number]    Weight:                     180 pounds, 81.6 kg  #   Patient Acct [de-identified]   BSA:          1.83 m^2      BMI:      32.92  #                                                              kg/m^2   MR #         [id number]     Sonographer                 Ann Lee   Accession #  [id number]  Interpreting Physician      [address] [place] [address]   Fellow                   Referring Nurse                           Practitioner   Interpreting             Referring Physician         Fanta Lewis  Fellow  Type of Study   TTE procedure:2D Echocardiogram, M-Mode, Doppler, Color Doppler. Procedure Date Date: 09/28/2021 Start: 02:22 PM Study Location: [facility] Technical Quality: Good visualization Indications:Chest pain and Dyspnea/SOB. History / Tech. Comments: Procedure explained to patient.  Patient Status: Inpatient Height: 62 inches Weight: 180 pounds BSA: 1.83 m^2 BMI: 32.92 kg/m^2 HR: 75 bpm Allergies   - Codeine:(Latex, nsaids,Codeine). - Latex. - Penicillin. - Codeine.   - NSAIDS.   - *Unlisted:(tolmetin). CONCLUSIONS Summary Left ventricle is dilated. Global left ventricular systolic function is normal. Calculated EF via 3D Heart Model is 55 %. Moderate left ventricular hypertrophy. Grade II (moderate) left ventricular diastolic dysfunction. Left atrium is severely dilated. Right atrial dilatation. Mildly dilated right ventricular cavity. Right ventricular function appears normal . Moderate aortic insufficiency. Moderate tricuspid regurgitation. Mild pulmonary hypertension with an estimated right ventricular systolic pressure of 49 mmHg. Moderate pulmonic insufficiency. Trivial pericardial effusion. The ascending aorta is mildly dilated. IVC Increased diameter, but still has inspiratory variation suggesting upper normal or mildly elevated RA filling pressure (i.e. CVP) . Signature ----------------------------------------------------------------------------  Electronically signed by Ann Lee(Sonographer) on 09/28/2021  03:45 PM ---------------------------------------------------------------------------- ----------------------------------------------------------------------------  Electronically signed by Maikel Cesar(Interpreting physician) on 09/29/2021  08:36 AM ---------------------------------------------------------------------------- FINDINGS Left Atrium Left atrium is severely dilated. Left Ventricle Left ventricle is dilated. Global left ventricular systolic function is normal. Estimated ejection fraction is 55 % . Calculated EF via 3D Heart Model is 55 %. Moderate left ventricular hypertrophy. Grade II (moderate) left ventricular diastolic dysfunction. Right Atrium Right atrial dilatation. Right Ventricle Mildly dilated right ventricular cavity.  Right ventricular function appears normal . Velocity: 0.80 m/s  MR VTI: 170 cm   Tricuspid:                              Pulmonic:   Estimated RVSP: 49 mmHg                 Peak Velocity: 1.35 m/s  Peak TR Velocity: 3.14 m/s              Peak Gradient: 7.29 mmHg  Peak TR Gradient: 39.4384 mmHg  Estimated RA Pressure: 10 mmHg                                           Estimated PASP: 49.44 mmHg  Diastology / Tissue Doppler Septal Wall E' velocity:0.06 m/s Septal Wall E/E':22.1 Lateral Wall E' velocity:0.08 m/s Lateral Wall E/E':16.4    CTA CHEST W WO CONTRAST    Result Date: 9/27/2021  EXAMINATION: CTA OF THE CHEST WITH AND WITHOUT CONTRAST 9/27/2021 1:52 pm TECHNIQUE: CTA of the chest was performed before and after the administration of intravenous contrast.  Multiplanar reformatted images are provided for review. MIP images are provided for review. Dose modulation, iterative reconstruction, and/or weight based adjustment of the mA/kV was utilized to reduce the radiation dose to as low as reasonably achievable. COMPARISON: None. HISTORY: ORDERING SYSTEM PROVIDED HISTORY: hx of dissection, pain TECHNOLOGIST PROVIDED HISTORY: hx of dissection Reason for Exam: chest pain, sob FINDINGS: Aorta: Chronic type B dissection of the thoracic aorta is noted with the patent stent in place. Stable fusiform ectasia of the ascending thoracic aorta measuring 4.3 cm in diameter. Atherosclerosis of the thoracic aorta and coronary arteries is seen. Mediastinum: No filling defect in the pulmonary arteries to suggest pulmonary embolism. Enlarged main pulmonary trunk, compatible with pulmonary hypertension. Mild cardiomegaly. Trace pericardial fluid, decreased compared to the prior study. Lungs/Pleura: No pneumothorax. No pleural effusion. Mosaic attenuation in the bilateral lungs, suggestive of small airway disease. A small stable 0.4 cm subpleural nodule in the right lower lobe, image number 65 axial series. This is benign and does not require further follow-up imaging. Minimal dependent atelectasis in the right lower lobe. Upper Abdomen: Simple cyst in the left lobe of the liver is redemonstrated, measuring up to 2.8 cm. Status post cholecystectomy. Soft Tissues/Bones: Multilevel thoracic spondylosis. A patent stent noted across the patient's known type B aortic dissection. Stable fusiform ectasia of the ascending thoracic aorta. No evidence of pulmonary embolism. Cardiomegaly. Trace residual pericardial fluid. Mosaic attenuation in the bilateral lungs, suggestive of small airway disease. A stable benign 0.4 cm nodule in the right lower lobe. XR CHEST PORTABLE    Result Date: 9/27/2021  EXAMINATION: ONE XRAY VIEW OF THE CHEST 9/27/2021 12:43 pm COMPARISON: July 20, 2020, chest examination HISTORY: ORDERING SYSTEM PROVIDED HISTORY: CP TECHNOLOGIST PROVIDED HISTORY: CP FINDINGS: Interval removal of right IJ catheter. Stable cardiomegaly/aortic stent graft There is mild prominence and indistinctness of the pulmonary vascularity/interstitial markings No significant pleural process Degenerative changes of the thoracic spine/shoulders     Cardiomegaly with mild prominence of the pulmonary vascularity/interstitial markings, findings suggest mild vascular congestion           Physical Exam:  CONSTITUTIONAL: AOx4, no apparent distress, appears stated age   HEAD: normocephalic, atraumatic   EYES: PERRLA, EOMI    ENT: moist mucous membranes, uvula midline   NECK: supple, symmetric   BACK: symmetric   LUNGS: clear to auscultation bilaterally despite witnessed cough. Cough is nonproductive. CARDIOVASCULAR: regular rate and rhythm, no murmurs, rubs or gallops   ABDOMEN: soft, non-tender, non-distended with normal active bowel sounds   NEUROLOGIC:  MAEx4, no focal sensory or motor deficits   MUSCULOSKELETAL: no clubbing, cyanosis or edema   SKIN: no rash or wounds          Hospital Course:  Clinical course has improved, labs and imaging reviewed.      Marisol Ahuja originally presented to the hospital on 9/27/2021 12:09 PM. with chest pain. At that time it was determined that She required further observation and cardiology evaluation nephrology evaluation and diuresis as well as echocardiogram. She was admitted and labs and imaging were followed daily. Imaging results as above. She is medically stable to be discharged. Disposition: Home    Patient stated that they will not drive themselves home from the hospital if they have gotten pain killers/ narcotics earlier that day and that they will arrange for transportation on their own or work with the  for a ride. Patient counseled NOT to drive while under the influence of narcotics/ pain killers. Condition: Good    Patient stable and ready for discharge home. I have discussed plan of care with patient and they are in understanding. They were instructed to read discharge paperwork. All of their questions and concerns were addressed. Time Spent: 0 day      --  Kristie Gallardo MD  Emergency Medicine Resident Physician    This dictation was generated by voice recognition computer software. Although all attempts are made to edit the dictation for accuracy, there may be errors in the transcription that are not intended.

## 2021-10-08 DIAGNOSIS — Z76.0 MEDICATION REFILL: ICD-10-CM

## 2021-10-08 DIAGNOSIS — M62.838 MUSCLE SPASM: ICD-10-CM

## 2021-10-08 RX ORDER — TIZANIDINE 4 MG/1
4 TABLET ORAL 2 TIMES DAILY
Qty: 56 TABLET | Refills: 5 | Status: SHIPPED | OUTPATIENT
Start: 2021-10-08 | End: 2022-03-15

## 2021-10-08 NOTE — TELEPHONE ENCOUNTER
Health Maintenance   Topic Date Due    DTaP/Tdap/Td vaccine (1 - Tdap) Never done    Shingles Vaccine (1 of 2) Never done    Lipid screen  10/18/2020    Pneumococcal 0-64 years Vaccine (2 of 4 - PPSV23) 02/03/2021    Flu vaccine (1) 09/01/2021    Potassium monitoring  09/28/2022    Creatinine monitoring  09/28/2022    Breast cancer screen  05/20/2023    Colon cancer screen colonoscopy  06/13/2028    COVID-19 Vaccine  Completed    Hepatitis C screen  Completed    HIV screen  Completed    Hepatitis A vaccine  Aged Out    Hib vaccine  Aged Out    Meningococcal (ACWY) vaccine  Aged Out             (applicable per patient's age: Cancer Screenings, Depression Screening, Fall Risk Screening, Immunizations)    Hemoglobin A1C (%)   Date Value   07/30/2021 5.3   07/28/2021 11.2   02/11/2020 5.7     Microalb/Crt.  Ratio (mcg/mg creat)   Date Value   02/09/2013 664     LDL Cholesterol (mg/dL)   Date Value   10/18/2019 111     LDL Calculated (mg/dL)   Date Value   08/01/2016 116     AST (U/L)   Date Value   10/22/2019 30     ALT (U/L)   Date Value   10/22/2019 22     BUN (mg/dL)   Date Value   09/28/2021 19      (goal A1C is < 7)   (goal LDL is <100) need 30-50% reduction from baseline     BP Readings from Last 3 Encounters:   09/28/21 135/82   08/24/21 (!) 145/82   03/05/21 135/78    (goal /80)      All Future Testing planned in [facility]:  Lab Frequency Next Occurrence   Lipid Panel Once 02/13/2021   Hemoglobin A1C Once 12/01/2021   Lipid, Fasting Once 01/01/2022   Vitamin B12 Once 12/01/2021       Next Visit Date:  Future Appointments   Date Time Provider Amanda Wray   11/26/2021  1:00 PM Ting Wilkes APRN - [address]             Patient Active Problem List:     Hyperlipidemia     Primary osteoarthritis of left knee     Microalbuminuria     S/P gastric bypass     Anemia     S/P total knee arthroplasty     Hyperuricemia     Hydronephrosis of left kidney     Renal atrophy, right

## 2021-10-21 NOTE — TELEPHONE ENCOUNTER
Request for Hemoglobin A1c results for Elease Chimera have been faxed to [email or web address]    Results Have been obtained and scanned into chart.     Ward Popper

## 2021-12-02 ENCOUNTER — VIRTUAL VISIT (OUTPATIENT)
Dept: PRIMARY CARE CLINIC | Age: 61
End: 2021-12-02
Payer: MEDICARE

## 2021-12-02 DIAGNOSIS — K59.00 CONSTIPATION, UNSPECIFIED CONSTIPATION TYPE: ICD-10-CM

## 2021-12-02 DIAGNOSIS — R10.9 ABDOMINAL CRAMPING: ICD-10-CM

## 2021-12-02 DIAGNOSIS — R14.0 BLOATING: Primary | ICD-10-CM

## 2021-12-02 PROCEDURE — 99442 PR PHYS/QHP TELEPHONE EVALUATION 11-20 MIN: CPT | Performed by: NURSE PRACTITIONER

## 2021-12-02 RX ORDER — SIMETHICONE 80 MG
80 TABLET,CHEWABLE ORAL 4 TIMES DAILY PRN
Qty: 180 TABLET | Refills: 0 | Status: SHIPPED | OUTPATIENT
Start: 2021-12-02 | End: 2022-08-11

## 2021-12-02 RX ORDER — DOCUSATE SODIUM 100 MG/1
100 CAPSULE, LIQUID FILLED ORAL 2 TIMES DAILY PRN
Qty: 60 CAPSULE | Refills: 0 | Status: SHIPPED | OUTPATIENT
Start: 2021-12-02 | End: 2022-08-11

## 2021-12-02 ASSESSMENT — ENCOUNTER SYMPTOMS
CHEST TIGHTNESS: 0
SORE THROAT: 0
BLOATING: 1
WHEEZING: 0
CONSTIPATION: 1
NAUSEA: 0
SINUS PAIN: 0
RHINORRHEA: 0
VOMITING: 0
EYE PAIN: 0
ABDOMINAL PAIN: 1
SHORTNESS OF BREATH: 1
BACK PAIN: 0
TROUBLE SWALLOWING: 0
COUGH: 0
DIARRHEA: 0
BLOOD IN STOOL: 0

## 2021-12-02 ASSESSMENT — PATIENT HEALTH QUESTIONNAIRE - PHQ9
SUM OF ALL RESPONSES TO PHQ QUESTIONS 1-9: 2
1. LITTLE INTEREST OR PLEASURE IN DOING THINGS: 1
SUM OF ALL RESPONSES TO PHQ QUESTIONS 1-9: 2
SUM OF ALL RESPONSES TO PHQ QUESTIONS 1-9: 2
2. FEELING DOWN, DEPRESSED OR HOPELESS: 1
SUM OF ALL RESPONSES TO PHQ9 QUESTIONS 1 & 2: 2

## 2021-12-02 NOTE — PROGRESS NOTES
Chante Thorpe is a 62 y.o. female evaluated virtual visit via telephone on 2021. Consent:  She and/or health care decision maker is aware that that she may receive a bill for this telephone service, depending on her insurance coverage, and has provided verbal consent to proceed: Yes      Documentation:  I communicated with the patient and/or health care decision maker about cramping, pain. Details of this discussion including any medical advice provided below      I affirm this is a Patient Initiated Episode with an Established Patient who has not had a related appointment within my department in the past 7 days or scheduled within the next 24 hours. Total Time: minutes: 11-20 minutes    Note: not billable if this call serves to triage the patient into an appointment for the relevant concern      NAV Olivas CNP                  2021    TELEHEALTH EVALUATION -- Audio/Visual (During TSK-40 public health emergency)    Patient and physician are located in their individual homes    HPI:    Chante Thorpe (:  1960) has requested an audio only/video evaluation for the following concern(s): At [facility] on 21    Given medication for cough and nasal spray  Neg for Covid  Still has a mild cough but she is feeling better overall, no SOB. Constipation  This is a recurrent problem. The current episode started in the past 7 days. The problem is unchanged. The stool is described as pellet like and firm. Associated symptoms include abdominal pain and bloating. Pertinent negatives include no back pain, diarrhea, difficulty urinating, fever, nausea or vomiting. Risk factors include obesity. Review of Systems   Constitutional: Negative for chills and fever. HENT: Negative for congestion, hearing loss, rhinorrhea, sinus pain, sore throat and trouble swallowing. Eyes: Negative for pain and visual disturbance.    Respiratory: Positive for shortness of breath (on exertion). Negative for cough, chest tightness and wheezing. Cardiovascular: Negative for chest pain. Gastrointestinal: Positive for abdominal pain, bloating and constipation. Negative for blood in stool, diarrhea, nausea and vomiting. Genitourinary: Negative for difficulty urinating, dysuria, frequency and urgency. Musculoskeletal: Positive for arthralgias and myalgias. Negative for back pain. Skin: Negative for rash and wound. Neurological: Negative for dizziness, tremors, syncope and headaches. Psychiatric/Behavioral: Negative for dysphoric mood. Prior to Visit Medications    Medication Sig Taking? Authorizing Provider   lidocaine-prilocaine (EMLA) 2.5-2.5 % cream APPLY 3 TIMES WEEKLY 30-60 MINUTES BEFORE DIALYSIS WRAP WITH PLASTIC WRAP AS DIRECTED Yes Sayda Shahid MD   amLODIPine (NORVASC) 10 MG tablet TAKE 1 TABLET BY MOUTH DAILY Yes Sayda Shahid MD   VITAMIN E-200 90 MG (200 UNIT) CAPS capsule TAKE 1 CAPSULE BY MOUTH DAILY Yes Sayda Shahid MD   tiZANidine (ZANAFLEX) 4 MG tablet Take 1 tablet by mouth 2 times daily Yes Ardia Gottron, APRN - CNP   hydrALAZINE (APRESOLINE) 50 MG tablet Take 50 mg by mouth 3 times daily  Yes Historical Provider, MD   furosemide (LASIX) 20 MG tablet Take 40 mg by mouth daily Non-dialysis days Yes Historical Provider, MD   Multiple Vitamins-Minerals (MULTIVITAMIN ADULT EXTRA C) CHEW Take 1 tablet by mouth daily Yes Ardia Gottron, APRN - CNP   allopurinol (ZYLOPRIM) 100 MG tablet Take 1 tablet by mouth daily Yes Ardia Gottron, APRN - CNP   aspirin 81 MG EC tablet Take 1 tablet by mouth daily Yes Ardia Gottron, APRN - CNP   labetalol (NORMODYNE) 200 MG tablet Take 1 tablet by mouth 2 times daily Yes Ardia Gottron, APRN - CNP   pantoprazole (PROTONIX) 20 MG tablet Take 1 tablet by mouth daily Yes Ardia Gottron, APRN - CNP   lidocaine (XYLOCAINE) 5 % ointment Apply topically BID as needed.  Yes Ardia Gottron, APRN - CNP atorvastatin (LIPITOR) 40 MG tablet Take 1 tablet by mouth daily Yes NAV Haque - CNP   Transparent Dressings ([facility] [address] [facility] Provide insurance covered tegaderm dressing, use at hemodialysis Yes Joseph Randall PA-C   acetaminophen (TYLENOL) 500 MG tablet Take 1,000 mg by mouth every 6 hours as needed for Pain Yes Historical Provider, MD   TRUE METRIX BLOOD GLUCOSE TEST strip  Yes Historical Provider, MD   calcium citrate-vitamin D (CITRACEL+D) 200-250 MG-UNIT TABS per tablet TAKE 2 TABLETS THREE TIMES A DAY Yes Historical Provider, MD   Blood Pressure KIT 1 box by Does not apply route three times daily Yes Joseph Randall PA-C       Social History     Tobacco Use    Smoking status: Former Smoker     Packs/day: 1.00     Years: 30.00     Pack years: 30.00     Types: Cigarettes     Quit date: 3/21/2001     Years since quittin.7    Smokeless tobacco: Never Used   Vaping Use    Vaping Use: Never used   Substance Use Topics    Alcohol use: No    Drug use: No        Past Medical History:   Diagnosis Date    CKD (chronic kidney disease) stage 3, GFR 30-59 ml/min (HCC)     per pt, caused by kid stone damage    Gout     [facility]    Hyperlipidemia     Hypertension     Kidney stone     Nephrolithiasis     Osteoarthritis     Type II or unspecified type diabetes mellitus without mention of complication, not stated as uncontrolled     patient denies        Allergies   Allergen Reactions    Latex Hives    Penicillins Hives    Codeine Itching    Nsaids Other (See Comments)     Gastric Bypass Surgery     Tolmetin Other (See Comments)     Gastric Bypass Surgery    ,   Past Medical History:   Diagnosis Date    CKD (chronic kidney disease) stage 3, GFR 30-59 ml/min (HCC)     per pt, caused by kid stone damage    Gout     [facility]    Hyperlipidemia     Hypertension     Kidney stone     Nephrolithiasis     Osteoarthritis     Type II or unspecified type diabetes mellitus without mention of complication, not stated as uncontrolled     patient denies   ,   Past Surgical History:   Procedure Laterality Date    CARDIAC CATHETERIZATION  10/21/2019    DIALYSIS FISTULA CREATION  2019    LEFT ARM AV FISTULA CREATION (Left Arm Lower)    DIALYSIS FISTULA CREATION Left 2019    LEFT ARM AV FISTULA CREATION performed by Elise Mireles MD at [facility] Left 2020    LIGATION TRIBUTARIES LEFT RADIAL CEPHALIC AV FISTULA --ULTRASOUND AV FISTULA performed by Elise Mireles MD at [facility], TOTAL ABDOMINAL      JOINT REPLACEMENT      KNEE SURGERY Left 2018    LITHOTRIPSY      THYROIDECTOMY, PARTIAL      TONSILLECTOMY      TOTAL KNEE ARTHROPLASTY Right 2015   ,   Social History     Tobacco Use    Smoking status: Former Smoker     Packs/day: 1.00     Years: 30.00     Pack years: 30.00     Types: Cigarettes     Quit date: 3/21/2001     Years since quittin.7    Smokeless tobacco: Never Used   Vaping Use    Vaping Use: Never used   Substance Use Topics    Alcohol use: No    Drug use: No       CBC:  Lab Results   Component Value Date    WBC 6.5 2021    HGB 10.6 2021     2021     2012       BMP:    Lab Results   Component Value Date     2021    K 3.6 2021     2021    CO2 23 2021    BUN 19 2021    CREATININE 4.01 2021    GLUCOSE 86 2021    GLUCOSE 80 2012       HEMOGLOBIN A1C:   Lab Results   Component Value Date    LABA1C 5.3 2021       FASTING LIPID PANEL:  Lab Results   Component Value Date    CHOL 186 10/18/2019    HDL 50 10/18/2019    TRIG 124 10/18/2019         RECORD REVIEW: Previous medical records were reviewed at today's visit.     PHYSICAL EXAMINATION:    Vital Signs: (As obtained by patient/caregiver at home)    Patient-Reported Vitals 7/21/2020   Patient-Reported Systolic 940   Patient-Reported Diastolic 505              RECORD REVIEW: Previous medical records were reviewed at today's visit. The past family, medical and social histories were reviewed and unchanged with the exceptions of what is mentioned in this note. Due to this being a TeleHealth encounter, evaluation of the following organ systems is limited: Vitals/Constitutional/EENT/Resp/CV/GI//MS/Neuro/Skin/Heme-Lymph-Imm. ASSESSMENT/PLAN:  Mitchell was seen today for discuss labs, uri and medication refill. Diagnoses and all orders for this visit:    Bloating    Abdominal cramping    Constipation, unspecified constipation type    It appears pain and cramping is consistent with constipation. Start simethicone to alleviate gas pain, encourage patient to utilize prune juice for constipation. Also will start on docusate. Urged patient to complete labs ordered at last visit, need lipids and A1c evaluated. Return in about 4 months (around 4/2/2022) for HTN, CHF. An  electronic signature was used to authenticate this note. --NAV Joseph CNP on 12/2/2021 at 3:16 PM    This note is created with the assistance of a speech-recognition program. While intending to generate a document that actually reflects the content of the visit, the document can still have some mistakes which may not have been identified and corrected by editing. 9    Pursuant to the emergency declaration under the [facility], [facility] waiver authority and the [employer] and [employer] General Act, this Virtual  Visit was conducted, with patient's consent, to reduce the patient's risk of exposure to COVID-19 and provide continuity of care for an established patient. Services were provided through a video synchronous discussion virtually to substitute for in-person clinic visit.

## 2021-12-02 NOTE — PROGRESS NOTES
Visit Information    Have you changed or started any medications since your last visit including any over-the-counter medicines, vitamins, or herbal medicines? no   Are you having any side effects from any of your medications? -  no  Have you stopped taking any of your medications? Is so, why? -  no    Have you seen any other physician or provider since your last visit? No  Have you had any other diagnostic tests since your last visit? No  Have you been seen in the emergency room and/or had an admission to a hospital since we last saw you? No  Have you had your routine dental cleaning in the past 6 months? no    Have you activated your [employer] account? If not, what are your barriers?  Yes     Patient Care Team:  NAV Mcclure CNP as PCP - General (Family Medicine)  NAV Mcclure CNP as PCP [facility]  Carol Cook MD as Consulting Physician (Nephrology)    Medical History Review  Past Medical, Family, and Social History reviewed and does not contribute to the patient presenting condition    Health Maintenance   Topic Date Due    DTaP/Tdap/Td vaccine (1 - Tdap) Never done    Shingles Vaccine (1 of 2) Never done    Lipid screen  10/18/2020    Pneumococcal 0-64 years Vaccine (2 of 4 - PPSV23) 02/03/2021    Flu vaccine (1) 09/01/2021    COVID-19 Vaccine (3 - Booster for Moderna series) 10/06/2021    Potassium monitoring  09/28/2022    Creatinine monitoring  09/28/2022    Breast cancer screen  05/20/2023    Colon cancer screen colonoscopy  06/13/2028    Hepatitis C screen  Completed    HIV screen  Completed    Hepatitis A vaccine  Aged Out    Hib vaccine  Aged Out    Meningococcal (ACWY) vaccine  Aged Out    Hepatitis B vaccine  Discontinued

## 2021-12-10 DIAGNOSIS — Z76.0 MEDICATION REFILL: ICD-10-CM

## 2021-12-10 DIAGNOSIS — I10 ESSENTIAL HYPERTENSION: ICD-10-CM

## 2021-12-10 RX ORDER — ATORVASTATIN CALCIUM 40 MG/1
40 TABLET, FILM COATED ORAL DAILY
Qty: 28 TABLET | Refills: 2 | Status: SHIPPED | OUTPATIENT
Start: 2021-12-10 | End: 2022-02-17

## 2021-12-10 NOTE — TELEPHONE ENCOUNTER
Hemoglobin A1C (%)   Date Value   07/30/2021 5.3   07/28/2021 11.2   02/11/2020 5.7             ( goal A1C is < 7)   Microalb/Crt.  Ratio (mcg/mg creat)   Date Value   02/09/2013 664     LDL Cholesterol (mg/dL)   Date Value   10/18/2019 111     LDL Calculated (mg/dL)   Date Value   08/01/2016 116       (goal LDL is <100)   AST (U/L)   Date Value   10/22/2019 30     ALT (U/L)   Date Value   10/22/2019 22     BUN (mg/dL)   Date Value   09/28/2021 19     BP Readings from Last 3 Encounters:   09/28/21 135/82   08/24/21 (!) 145/82   03/05/21 135/78          (goal 120/80)        Patient Active Problem List:     Hyperlipidemia     Primary osteoarthritis of left knee     Microalbuminuria     S/P gastric bypass     Anemia     S/P total knee arthroplasty     Hyperuricemia     Hydronephrosis of left kidney     Renal atrophy, right     Localized, primary osteoarthritis of hand     Essential hypertension     Chronic kidney disease     Motion sickness     Slow transit constipation     Chest pain     Aortic dissection (HCC)     Respiratory acidosis     Subacromial impingement     Acute on chronic diastolic (congestive) heart failure (HCC)     Moderate mitral regurgitation     Hypertensive emergency     Acute respiratory failure with hypoxia ([address]     Former smoker     Acute pulmonary edema ([address]     ESRD on hemodialysis ([address]     Chronic pain of left knee     Neuropathic pain     Gastroesophageal reflux disease      ----Kermit Brown

## 2022-02-16 ENCOUNTER — TELEPHONE (OUTPATIENT)
Dept: PRIMARY CARE CLINIC | Age: 62
End: 2022-02-16

## 2022-02-16 DIAGNOSIS — Z12.31 BREAST CANCER SCREENING BY MAMMOGRAM: Primary | ICD-10-CM

## 2022-02-16 NOTE — TELEPHONE ENCOUNTER
Placed call to patient and relayed information to call [facility] where she has previously been for pap smear, no referral needed.  Patient is requesting order for screening mammogram.

## 2022-02-16 NOTE — TELEPHONE ENCOUNTER
----- Message from Pearcy Arielle sent at 2/16/2022 12:19 PM EST -----  Subject: Referral Request    QUESTIONS   Reason for referral request? OBGYN   Has the physician seen you for this condition before? No   Preferred Specialist (if applicable)? Do you already have an appointment scheduled? No  Additional Information for Provider? Patient wants a pap smear and   mammogram preformed. ---------------------------------------------------------------------------  --------------  Bindu DALLAS  What is the best way for the office to contact you? OK to leave message on   voicemail  Preferred Call Back Phone Number?  [phone]

## 2022-02-17 DIAGNOSIS — I10 ESSENTIAL HYPERTENSION: ICD-10-CM

## 2022-02-17 DIAGNOSIS — K21.9 GASTROESOPHAGEAL REFLUX DISEASE, UNSPECIFIED WHETHER ESOPHAGITIS PRESENT: ICD-10-CM

## 2022-02-17 DIAGNOSIS — E79.0 HYPERURICEMIA: ICD-10-CM

## 2022-02-17 DIAGNOSIS — Z76.0 MEDICATION REFILL: ICD-10-CM

## 2022-02-17 RX ORDER — LABETALOL 200 MG/1
200 TABLET, FILM COATED ORAL 2 TIMES DAILY
Qty: 56 TABLET | Refills: 5 | Status: SHIPPED | OUTPATIENT
Start: 2022-02-17 | End: 2022-04-20 | Stop reason: SDUPTHER

## 2022-02-17 RX ORDER — ALLOPURINOL 100 MG/1
100 TABLET ORAL DAILY
Qty: 28 TABLET | Refills: 5 | Status: SHIPPED | OUTPATIENT
Start: 2022-02-17 | End: 2022-04-20 | Stop reason: SDUPTHER

## 2022-02-17 RX ORDER — ATORVASTATIN CALCIUM 40 MG/1
40 TABLET, FILM COATED ORAL DAILY
Qty: 28 TABLET | Refills: 2 | Status: SHIPPED | OUTPATIENT
Start: 2022-02-17 | End: 2022-04-20 | Stop reason: SDUPTHER

## 2022-02-17 RX ORDER — ASPIRIN 81 MG/1
TABLET, COATED ORAL
Qty: 28 TABLET | Refills: 5 | Status: SHIPPED | OUTPATIENT
Start: 2022-02-17 | End: 2022-04-20 | Stop reason: SDUPTHER

## 2022-02-17 RX ORDER — PANTOPRAZOLE SODIUM 20 MG/1
20 TABLET, DELAYED RELEASE ORAL DAILY
Qty: 28 TABLET | Refills: 5 | Status: SHIPPED | OUTPATIENT
Start: 2022-02-17 | End: 2022-04-20 | Stop reason: SDUPTHER

## 2022-02-17 NOTE — TELEPHONE ENCOUNTER
Health Maintenance   Topic Date Due    DTaP/Tdap/Td vaccine (1 - Tdap) Never done    Shingles Vaccine (1 of 2) Never done    Lipid screen  10/18/2020    Pneumococcal 0-64 years Vaccine (2 of 4 - PPSV23) 02/03/2021    Flu vaccine (1) 09/01/2021    COVID-19 Vaccine (3 - Booster for Moderna series) 09/06/2021    Potassium monitoring  09/28/2022    Creatinine monitoring  09/28/2022    Depression Screen  12/02/2022    Breast cancer screen  05/20/2023    Colorectal Cancer Screen  06/13/2028    Hepatitis C screen  Completed    HIV screen  Completed    Hepatitis A vaccine  Aged Out    Hib vaccine  Aged Out    Meningococcal (ACWY) vaccine  Aged Out    Hepatitis B vaccine  Discontinued             (applicable per patient's age: Cancer Screenings, Depression Screening, Fall Risk Screening, Immunizations)    Hemoglobin A1C (%)   Date Value   07/30/2021 5.3   07/28/2021 11.2   02/11/2020 5.7     Microalb/Crt.  Ratio (mcg/mg creat)   Date Value   02/09/2013 664     LDL Cholesterol (mg/dL)   Date Value   10/18/2019 111     LDL Calculated (mg/dL)   Date Value   08/01/2016 116     AST (U/L)   Date Value   10/22/2019 30     ALT (U/L)   Date Value   10/22/2019 22     BUN (mg/dL)   Date Value   09/28/2021 19      (goal A1C is < 7)   (goal LDL is <100) need 30-50% reduction from baseline     BP Readings from Last 3 Encounters:   09/28/21 135/82   08/24/21 (!) 145/82   03/05/21 135/78    (goal /80)      All Future Testing planned in [facility]:  Lab Frequency Next Occurrence   Hemoglobin A1C Once 12/01/2021   Lipid, Fasting Once 01/01/2022   Vitamin B12 Once 12/01/2021   ALLAN DIGITAL SCREEN W OR WO CAD BILATERAL Once 05/27/2022       Next Visit Date:  Future Appointments   Date Time Provider Amanda Wray   4/4/2022  2:45 PM NAV Moseley - CNP [facility]            Patient Active Problem List:     Hyperlipidemia     Primary osteoarthritis of left knee     Microalbuminuria     S/P gastric bypass Anemia     S/P total knee arthroplasty     Hyperuricemia     Hydronephrosis of left kidney     Renal atrophy, right     Localized, primary osteoarthritis of hand     Essential hypertension     Chronic kidney disease     Motion sickness     Slow transit constipation     Chest pain     Aortic dissection (HCC)     Respiratory acidosis     Subacromial impingement     Acute on chronic diastolic (congestive) heart failure (HCC)     Moderate mitral regurgitation     Hypertensive emergency     Acute respiratory failure with hypoxia [facility])     Former smoker     Acute pulmonary edema (HCC)     ESRD on hemodialysis (HCC)     Chronic pain of left knee     Neuropathic pain     Gastroesophageal reflux disease

## 2022-03-15 DIAGNOSIS — M62.838 MUSCLE SPASM: ICD-10-CM

## 2022-03-15 DIAGNOSIS — Z76.0 MEDICATION REFILL: ICD-10-CM

## 2022-03-15 RX ORDER — TIZANIDINE 4 MG/1
4 TABLET ORAL 2 TIMES DAILY
Qty: 56 TABLET | Refills: 5 | Status: SHIPPED | OUTPATIENT
Start: 2022-03-15

## 2022-03-15 NOTE — TELEPHONE ENCOUNTER
Health Maintenance   Topic Date Due    DTaP/Tdap/Td vaccine (1 - Tdap) Never done    Shingles Vaccine (1 of 2) Never done    Lipid screen  10/18/2020    Pneumococcal 0-64 years Vaccine (2 of 4 - PPSV23) 02/03/2021    Flu vaccine (1) 09/01/2021    COVID-19 Vaccine (3 - Booster for Moderna series) 09/06/2021    Potassium monitoring  09/28/2022    Creatinine monitoring  09/28/2022    Depression Screen  12/02/2022    Breast cancer screen  05/20/2023    Colorectal Cancer Screen  06/13/2028    Hepatitis C screen  Completed    HIV screen  Completed    Hepatitis A vaccine  Aged Out    Hib vaccine  Aged Out    Meningococcal (ACWY) vaccine  Aged Out    Hepatitis B vaccine  Discontinued             (applicable per patient's age: Cancer Screenings, Depression Screening, Fall Risk Screening, Immunizations)    Hemoglobin A1C (%)   Date Value   07/30/2021 5.3   07/28/2021 11.2   02/11/2020 5.7     Microalb/Crt.  Ratio (mcg/mg creat)   Date Value   02/09/2013 664     LDL Cholesterol (mg/dL)   Date Value   10/18/2019 111     LDL Calculated (mg/dL)   Date Value   08/01/2016 116     AST (U/L)   Date Value   10/22/2019 30     ALT (U/L)   Date Value   10/22/2019 22     BUN (mg/dL)   Date Value   09/28/2021 19      (goal A1C is < 7)   (goal LDL is <100) need 30-50% reduction from baseline     BP Readings from Last 3 Encounters:   09/28/21 135/82   08/24/21 (!) 145/82   03/05/21 135/78    (goal /80)      All Future Testing planned in [facility]:  Lab Frequency Next Occurrence   Hemoglobin A1C Once 12/01/2021   Lipid, Fasting Once 01/01/2022   Vitamin B12 Once 12/01/2021   ALLAN DIGITAL SCREEN W OR WO CAD BILATERAL Once 05/27/2022       Next Visit Date:  Future Appointments   Date Time Provider Amanda Wray   4/4/2022  2:45 PM NAV Lorenz - CNP [facility]            Patient Active Problem List:     Hyperlipidemia     Primary osteoarthritis of left knee     Microalbuminuria     S/P gastric bypass Anemia     S/P total knee arthroplasty     Hyperuricemia     Hydronephrosis of left kidney     Renal atrophy, right     Localized, primary osteoarthritis of hand     Essential hypertension     Chronic kidney disease     Motion sickness     Slow transit constipation     Chest pain     Aortic dissection (HCC)     Respiratory acidosis     Subacromial impingement     Acute on chronic diastolic (congestive) heart failure (HCC)     Moderate mitral regurgitation     Hypertensive emergency     Acute respiratory failure with hypoxia [facility])     Former smoker     Acute pulmonary edema (HCC)     ESRD on hemodialysis (HCC)     Chronic pain of left knee     Neuropathic pain     Gastroesophageal reflux disease

## 2022-04-20 ENCOUNTER — OFFICE VISIT (OUTPATIENT)
Dept: PRIMARY CARE CLINIC | Age: 62
End: 2022-04-20
Payer: MEDICARE

## 2022-04-20 VITALS
OXYGEN SATURATION: 97 % | WEIGHT: 168 LBS | BODY MASS INDEX: 30.73 KG/M2 | TEMPERATURE: 70 F | HEART RATE: 73 BPM | SYSTOLIC BLOOD PRESSURE: 139 MMHG | DIASTOLIC BLOOD PRESSURE: 78 MMHG

## 2022-04-20 DIAGNOSIS — I50.9 ACUTE ON CHRONIC HEART FAILURE, UNSPECIFIED HEART FAILURE TYPE (HCC): ICD-10-CM

## 2022-04-20 DIAGNOSIS — R73.03 PREDIABETES: ICD-10-CM

## 2022-04-20 DIAGNOSIS — E11.65 UNCONTROLLED TYPE 2 DIABETES MELLITUS WITH HYPERGLYCEMIA (HCC): ICD-10-CM

## 2022-04-20 DIAGNOSIS — K21.9 GASTROESOPHAGEAL REFLUX DISEASE, UNSPECIFIED WHETHER ESOPHAGITIS PRESENT: ICD-10-CM

## 2022-04-20 DIAGNOSIS — Z76.0 MEDICATION REFILL: ICD-10-CM

## 2022-04-20 DIAGNOSIS — I10 ESSENTIAL HYPERTENSION: Primary | ICD-10-CM

## 2022-04-20 DIAGNOSIS — E79.0 HYPERURICEMIA: ICD-10-CM

## 2022-04-20 LAB — HBA1C MFR BLD: 5.1 %

## 2022-04-20 PROCEDURE — 3044F HG A1C LEVEL LT 7.0%: CPT | Performed by: NURSE PRACTITIONER

## 2022-04-20 PROCEDURE — 83036 HEMOGLOBIN GLYCOSYLATED A1C: CPT | Performed by: NURSE PRACTITIONER

## 2022-04-20 PROCEDURE — G8427 DOCREV CUR MEDS BY ELIG CLIN: HCPCS | Performed by: NURSE PRACTITIONER

## 2022-04-20 PROCEDURE — G8417 CALC BMI ABV UP PARAM F/U: HCPCS | Performed by: NURSE PRACTITIONER

## 2022-04-20 PROCEDURE — 3017F COLORECTAL CA SCREEN DOC REV: CPT | Performed by: NURSE PRACTITIONER

## 2022-04-20 PROCEDURE — 2022F DILAT RTA XM EVC RTNOPTHY: CPT | Performed by: NURSE PRACTITIONER

## 2022-04-20 PROCEDURE — 1036F TOBACCO NON-USER: CPT | Performed by: NURSE PRACTITIONER

## 2022-04-20 PROCEDURE — 99213 OFFICE O/P EST LOW 20 MIN: CPT | Performed by: NURSE PRACTITIONER

## 2022-04-20 RX ORDER — PANTOPRAZOLE SODIUM 20 MG/1
20 TABLET, DELAYED RELEASE ORAL DAILY
Qty: 28 TABLET | Refills: 5 | Status: SHIPPED | OUTPATIENT
Start: 2022-04-20

## 2022-04-20 RX ORDER — AMLODIPINE BESYLATE 10 MG/1
TABLET ORAL
Qty: 30 TABLET | Refills: 6 | Status: SHIPPED | OUTPATIENT
Start: 2022-04-20 | End: 2022-10-12 | Stop reason: ALTCHOICE

## 2022-04-20 RX ORDER — FUROSEMIDE 80 MG
TABLET ORAL
COMMUNITY
Start: 2022-04-12 | End: 2022-08-11 | Stop reason: ALTCHOICE

## 2022-04-20 RX ORDER — ALLOPURINOL 100 MG/1
100 TABLET ORAL DAILY
Qty: 28 TABLET | Refills: 5 | Status: SHIPPED | OUTPATIENT
Start: 2022-04-20

## 2022-04-20 RX ORDER — PEN NEEDLE, DIABETIC 31 G X1/4"
1 NEEDLE, DISPOSABLE MISCELLANEOUS DAILY
Qty: 100 EACH | Refills: 3 | Status: SHIPPED | OUTPATIENT
Start: 2022-04-20 | End: 2022-04-20 | Stop reason: CLARIF

## 2022-04-20 RX ORDER — LANCETS 30 GAUGE
1 EACH MISCELLANEOUS DAILY
Qty: 300 EACH | Refills: 5 | Status: SHIPPED | OUTPATIENT
Start: 2022-04-20 | End: 2022-10-12

## 2022-04-20 RX ORDER — LABETALOL 200 MG/1
200 TABLET, FILM COATED ORAL 2 TIMES DAILY
Qty: 56 TABLET | Refills: 5 | Status: SHIPPED | OUTPATIENT
Start: 2022-04-20

## 2022-04-20 RX ORDER — ASPIRIN 81 MG/1
TABLET ORAL
Qty: 28 TABLET | Refills: 5 | Status: SHIPPED | OUTPATIENT
Start: 2022-04-20 | End: 2022-10-12 | Stop reason: ALTCHOICE

## 2022-04-20 RX ORDER — GLUCOSAMINE HCL/CHONDROITIN SU 500-400 MG
1 CAPSULE ORAL DAILY
Qty: 300 STRIP | Refills: 1 | Status: SHIPPED | OUTPATIENT
Start: 2022-04-20 | End: 2022-04-20 | Stop reason: CLARIF

## 2022-04-20 RX ORDER — INSULIN GLARGINE 100 [IU]/ML
10 INJECTION, SOLUTION SUBCUTANEOUS NIGHTLY
Qty: 5 PEN | Refills: 1 | Status: SHIPPED | OUTPATIENT
Start: 2022-04-20 | End: 2022-04-20 | Stop reason: CLARIF

## 2022-04-20 RX ORDER — ATORVASTATIN CALCIUM 40 MG/1
40 TABLET, FILM COATED ORAL DAILY
Qty: 28 TABLET | Refills: 2 | Status: SHIPPED | OUTPATIENT
Start: 2022-04-20 | End: 2022-08-01

## 2022-04-20 ASSESSMENT — ENCOUNTER SYMPTOMS
RHINORRHEA: 0
ABDOMINAL PAIN: 0
COUGH: 0
WHEEZING: 0
VOMITING: 0
SHORTNESS OF BREATH: 1
EYE PAIN: 0
BACK PAIN: 0
CONSTIPATION: 0
SORE THROAT: 0
DIARRHEA: 0
NAUSEA: 0

## 2022-04-20 ASSESSMENT — PATIENT HEALTH QUESTIONNAIRE - PHQ9
SUM OF ALL RESPONSES TO PHQ QUESTIONS 1-9: 0
SUM OF ALL RESPONSES TO PHQ QUESTIONS 1-9: 0
SUM OF ALL RESPONSES TO PHQ9 QUESTIONS 1 & 2: 0
SUM OF ALL RESPONSES TO PHQ QUESTIONS 1-9: 0
2. FEELING DOWN, DEPRESSED OR HOPELESS: 0
1. LITTLE INTEREST OR PLEASURE IN DOING THINGS: 0
SUM OF ALL RESPONSES TO PHQ QUESTIONS 1-9: 0

## 2022-04-20 NOTE — PROGRESS NOTES
[facility]  [id number] [facility] [address]  Dept: [phone]  Dept Fax: [phone]    Patient Care Team:  NAV Gordon CNP as PCP - General (Family Medicine)  NAV Gordon CNP as PCP - [facility] Empaneled Provider  Greta Roberts MD as Consulting Physician (Nephrology)    2022     Froy Zhong (:  1960)is a 65 y.o. female, here for evaluation of the following medical concerns:   Chief Complaint   Patient presents with    Hypertension     4M F/U    Congestive Heart Failure    Generalized Body Aches     Dot Runner Allegra Dickens is a 62-year-old female here today for routine follow-up for chronic conditions. Last seen in office on 2021, last virtual visit on 2021. History of end-stage renal disease on dialysis, hypertension, and CHF. Dialysis completed through [facility]. Dr. Edin Carter follows for cardiology. Lipids, B12, and A1c not completed as ordered after last visit. Froy Zhong presents today for routine follow-up for hypertension    She states since last visit, she was seen by her cardiologist.  Medication changes today. Continues to have issues with generalized muscle and body aches, asking for Tylenol 3 to manage pain. .    Review of Systems   Constitutional: Negative for chills and fever. HENT: Negative for congestion, hearing loss, rhinorrhea and sore throat. Eyes: Negative for pain and visual disturbance. Respiratory: Positive for shortness of breath (on exertion). Negative for cough and wheezing. Cardiovascular: Negative for chest pain. Gastrointestinal: Negative for abdominal pain, constipation, diarrhea, nausea and vomiting. Genitourinary: Negative for difficulty urinating, dysuria, frequency and urgency. Musculoskeletal: Positive for arthralgias and myalgias. Negative for back pain. Skin: Negative for rash and wound. Neurological: Negative for dizziness, tremors, syncope and headaches. Psychiatric/Behavioral: Negative for dysphoric mood. Prior to Visit Medications    Medication Sig Taking? Authorizing Provider   furosemide (LASIX) 80 MG tablet  Yes Historical Provider, MD   Lancets MISC 1 each by Does not apply route daily Yes NAV Haynes CNP   allopurinol (ZYLOPRIM) 100 MG tablet Take 1 tablet by mouth daily Yes NAV Haynes CNP   amLODIPine (NORVASC) 10 MG tablet TAKE 1 TABLET BY MOUTH DAILY Yes NAV Haynes CNP   atorvastatin (LIPITOR) 40 MG tablet Take 1 tablet by mouth daily Yes NAV Haynes CNP   aspirin (ASPIRIN LOW DOSE) 81 MG EC tablet TAKE 1 TABLET BY MOUTH DAILY Yes NAV Haynes CNP   labetalol (NORMODYNE) 200 MG tablet Take 1 tablet by mouth 2 times daily Yes NAV Haynes CNP   pantoprazole (PROTONIX) 20 MG tablet Take 1 tablet by mouth daily Yes NAV Haynes CNP   tiZANidine (ZANAFLEX) 4 MG tablet TAKE 1 TABLET BY MOUTH 2 TIMES DAILY Yes NAV Haynes CNP   simethicone (MYLICON) 80 MG chewable tablet Take 1 tablet by mouth 4 times daily as needed for Flatulence Yes NAV Haynes CNP   docusate sodium (COLACE) 100 MG capsule Take 1 capsule by mouth 2 times daily as needed for Constipation Yes NAV Haynes CNP   lidocaine-prilocaine (EMLA) 2.5-2.5 % cream APPLY 3 TIMES WEEKLY 30-60 MINUTES BEFORE DIALYSIS WRAP WITH PLASTIC WRAP AS DIRECTED Yes Fior Godoy MD   VITAMIN E-200 90 MG (200 UNIT) CAPS capsule TAKE 1 CAPSULE BY MOUTH DAILY Yes Fior Godoy MD   hydrALAZINE (APRESOLINE) 50 MG tablet Take 50 mg by mouth 3 times daily  Yes Historical Provider, MD   Multiple Vitamins-Minerals (MULTIVITAMIN ADULT EXTRA C) CHEW Take 1 tablet by mouth daily Yes NAV Haynes CNP   lidocaine (XYLOCAINE) 5 % ointment Apply topically BID as needed.  Yes NAV Haynes CNP Transparent Dressings (TEGADERM ABSORBENT DRESSING) MISC Provide insurance covered tegaderm dressing, use at hemodialysis Yes Yo Tay PA-C   acetaminophen (TYLENOL) 500 MG tablet Take 1,000 mg by mouth every 6 hours as needed for Pain Yes Historical Provider, MD   TRUE METRIX BLOOD GLUCOSE TEST strip  Yes Historical Provider, MD   calcium citrate-vitamin D (CITRACEL+D) 200-250 MG-UNIT TABS per tablet TAKE 2 TABLETS THREE TIMES A DAY Yes Historical Provider, MD        Social History     Tobacco Use    Smoking status: Former Smoker     Packs/day: 1.00     Years: 30.00     Pack years: 30.00     Types: Cigarettes     Quit date: 3/21/2001     Years since quittin.0    Smokeless tobacco: Never Used   Substance Use Topics    Alcohol use: No        Vitals:    22 1415 22 1504   BP: (!) 144/72 139/78   Site: Right Upper Arm    Position: Sitting    Pulse: 73    Temp: 98.1 °F (36.7 °C) (!) 70 °F (21.1 °C)   TempSrc: Temporal    SpO2: 97%    Weight: 168 lb (76.2 kg)      Estimated body mass index is 30.73 kg/m² as calculated from the following:    Height as of 21: 5' 2\" (1.575 m). Weight as of this encounter: 168 lb (76.2 kg). DIAGNOSTIC FINDINGS:  CBC:  Lab Results   Component Value Date    WBC 6.5 2021    HGB 10.6 2021     2021     2012       BMP:    Lab Results   Component Value Date     2021    K 3.6 2021     2021    CO2 23 2021    BUN 19 2021    CREATININE 4.01 2021    GLUCOSE 86 2021    GLUCOSE 80 2012       HEMOGLOBIN A1C:   Lab Results   Component Value Date    LABA1C 5.1 2022       FASTING LIPID PANEL:  Lab Results   Component Value Date    CHOL 186 10/18/2019    HDL 50 10/18/2019    TRIG 124 10/18/2019       Physical Exam  Vitals reviewed. Constitutional:       Appearance: Normal appearance. She is well-developed and well-groomed. She is obese.    HENT:      Head: Normocephalic and atraumatic. Eyes:      Conjunctiva/sclera: Conjunctivae normal.      Pupils: Pupils are equal, round, and reactive to light. Cardiovascular:      Rate and Rhythm: Normal rate and regular rhythm. Heart sounds: Murmur heard. Systolic murmur is present with a grade of 3/6. Pulmonary:      Effort: Pulmonary effort is normal.      Breath sounds: Normal breath sounds. Abdominal:      Palpations: Abdomen is soft. There is no mass. Tenderness: There is no abdominal tenderness. Musculoskeletal:         General: No tenderness. Normal range of motion. Cervical back: Normal range of motion. Skin:     General: Skin is warm and dry. Neurological:      Mental Status: She is alert and oriented to person, place, and time. Psychiatric:         Behavior: Behavior is cooperative. ASSESSMENT     Diagnosis Orders   1. Essential hypertension  amLODIPine (NORVASC) 10 MG tablet    atorvastatin (LIPITOR) 40 MG tablet    labetalol (NORMODYNE) 200 MG tablet   2. Acute on chronic heart failure, unspecified heart failure type ([facility] [address]     3. Prediabetes  POCT glycosylated hemoglobin (Hb A1C)   4. Uncontrolled type 2 diabetes mellitus with hyperglycemia (HCC)  Lancets MISC    DISCONTINUED: insulin glargine (LANTUS SOLOSTAR) 100 UNIT/ML injection pen    DISCONTINUED: Insulin Pen Needle ([employer] PEN NEEDLES) 31G X 6 MM MISC    DISCONTINUED: Blood Gluc Meter Disp-Strips (BLOOD GLUCOSE METER DISPOSABLE) CLAYTON    DISCONTINUED: blood glucose monitor strips   5. Medication refill  allopurinol (ZYLOPRIM) 100 MG tablet    atorvastatin (LIPITOR) 40 MG tablet    aspirin (ASPIRIN LOW DOSE) 81 MG EC tablet    labetalol (NORMODYNE) 200 MG tablet    pantoprazole (PROTONIX) 20 MG tablet   6. Hyperuricemia  allopurinol (ZYLOPRIM) 100 MG tablet   7.  Gastroesophageal reflux disease, unspecified whether esophagitis present  pantoprazole (PROTONIX) 20 MG tablet          PLAN:  Orders Placed This Encounter Medications    DISCONTD: insulin glargine (LANTUS SOLOSTAR) 100 UNIT/ML injection pen     Sig: Inject 10 Units into the skin nightly     Dispense:  5 pen     Refill:  1    DISCONTD: Insulin Pen Needle ([employer] PEN NEEDLES) 31G X 6 MM MISC     Si each by Does not apply route daily     Dispense:  100 each     Refill:  3    DISCONTD: Blood Gluc Meter Disp-Strips (BLOOD GLUCOSE METER DISPOSABLE) CLAYTON     Sig: Daily and as needed     Dispense:  100 each     Refill:  0    DISCONTD: blood glucose monitor strips     Si strip by Other route daily Test 1 times a day & as needed for symptoms of irregular blood glucose. Dispense:  300 strip     Refill:  1    Lancets MISC     Si each by Does not apply route daily     Dispense:  300 each     Refill:  5    allopurinol (ZYLOPRIM) 100 MG tablet     Sig: Take 1 tablet by mouth daily     Dispense:  28 tablet     Refill:  5    amLODIPine (NORVASC) 10 MG tablet     Sig: TAKE 1 TABLET BY MOUTH DAILY     Dispense:  30 tablet     Refill:  6    atorvastatin (LIPITOR) 40 MG tablet     Sig: Take 1 tablet by mouth daily     Dispense:  28 tablet     Refill:  2    aspirin (ASPIRIN LOW DOSE) 81 MG EC tablet     Sig: TAKE 1 TABLET BY MOUTH DAILY     Dispense:  28 tablet     Refill:  5    labetalol (NORMODYNE) 200 MG tablet     Sig: Take 1 tablet by mouth 2 times daily     Dispense:  56 tablet     Refill:  5    pantoprazole (PROTONIX) 20 MG tablet     Sig: Take 1 tablet by mouth daily     Dispense:  28 tablet     Refill:  5         1. Patient with history of prediabetes, A1c in office at 5.3.  2. Blood pressure at goal, refills provided today  3. Encouraged patient to complete lab work as ordered to evaluate B12 level given myalgias and history bariatric surgery    FOLLOW UP AND INSTRUCTIONS:  Return in about 4 months (around 2022) for CHF.     · Mitchell received counseling on the following healthy behaviors:medication adherence    · Discussed use, benefit, and side effects of prescribed medications. Barriers to  medication compliance addressed. All patient questions answered. Pt  verbalized understanding of all instructions given. · Patient given educational materials - see patient instructions      · Patient advised to contact scheduling offices for any referrals or imaging orders  placed today if they have not been contacted in 48 hours. Return in about 4 months (around 8/20/2022) for CHF. An electronic signature was used to authenticate this note. --NAV Bradford CNP on 4/20/2022 at 4:52 PM  Visit Information    Have you changed or started any medications since your last visit including any over-the-counter medicines, vitamins, or herbal medicines? no   Are you having any side effects from any of your medications? -  no  Have you stopped taking any of your medications? Is so, why? -  no    Have you seen any other physician or provider since your last visit? No  Have you had any other diagnostic tests since your last visit? No  Have you been seen in the emergency room and/or had an admission to a hospital since we last saw you? No  Have you had your routine dental cleaning in the past 6 months? no    Have you activated your [employer] account? If not, what are your barriers?  Yes     Patient Care Team:  NAV Bradford CNP as PCP - General (Family Medicine)  NAV Bradford CNP as PCP - [facility] Empaneled Provider  Yasmin Baron MD as Consulting Physician (Nephrology)    Medical History Review  Past Medical, Family, and Social History reviewed and does not contribute to the patient presenting condition    Health Maintenance   Topic Date Due    DTaP/Tdap/Td vaccine (1 - Tdap) Never done    Shingles Vaccine (1 of 2) Never done    COVID-19 Vaccine (3 - Booster for Moderna series) 09/06/2021    Lipids  11/09/2021    Pneumococcal 0-64 years Vaccine (2 - PPSV23 or PCV20) 12/09/2021    Flu vaccine (Season Ended) 09/01/2022    Potassium  09/28/2022    Creatinine  09/28/2022    Depression Screen  12/02/2022    Breast cancer screen  05/20/2023    Colorectal Cancer Screen  06/13/2028    Hepatitis C screen  Completed    HIV screen  Completed    Hepatitis A vaccine  Aged Out    Hib vaccine  Aged Out    Meningococcal (ACWY) vaccine  Aged Out    Hepatitis B vaccine  Discontinued

## 2022-06-20 ENCOUNTER — HOSPITAL ENCOUNTER (EMERGENCY)
Age: 62
Discharge: HOME | End: 2022-06-20
Attending: EMERGENCY MEDICINE
Payer: MEDICARE

## 2022-06-20 ENCOUNTER — HOSPITAL ENCOUNTER (EMERGENCY)
Age: 62
Discharge: LEFT BEFORE DISCHARGE | End: 2022-06-20
Attending: EMERGENCY MEDICINE

## 2022-06-20 ENCOUNTER — APPOINTMENT (OUTPATIENT)
Dept: GENERAL RADIOLOGY | Age: 62
End: 2022-06-20

## 2022-06-20 VITALS
DIASTOLIC BLOOD PRESSURE: 86 MMHG | BODY MASS INDEX: 28.53 KG/M2 | RESPIRATION RATE: 18 BRPM | SYSTOLIC BLOOD PRESSURE: 160 MMHG | HEART RATE: 68 BPM | TEMPERATURE: 98.4 F | WEIGHT: 161 LBS | OXYGEN SATURATION: 96 % | HEIGHT: 63 IN

## 2022-06-20 VITALS
DIASTOLIC BLOOD PRESSURE: 71 MMHG | WEIGHT: 151 LBS | BODY MASS INDEX: 27.62 KG/M2 | HEART RATE: 64 BPM | SYSTOLIC BLOOD PRESSURE: 152 MMHG | OXYGEN SATURATION: 97 % | RESPIRATION RATE: 16 BRPM | TEMPERATURE: 98.1 F

## 2022-06-20 DIAGNOSIS — Z78.9 PROBLEM WITH VASCULAR ACCESS: Primary | ICD-10-CM

## 2022-06-20 LAB
ABSOLUTE EOS #: 0.14 K/UL (ref 0–0.44)
ABSOLUTE IMMATURE GRANULOCYTE: <0.03 K/UL (ref 0–0.3)
ABSOLUTE LYMPH #: 0.97 K/UL (ref 1.1–3.7)
ABSOLUTE MONO #: 0.37 K/UL (ref 0.1–1.2)
ANION GAP SERPL CALCULATED.3IONS-SCNC: 16 MMOL/L (ref 9–17)
BASOPHILS # BLD: 0 % (ref 0–2)
BASOPHILS ABSOLUTE: <0.03 K/UL (ref 0–0.2)
BUN BLDV-MCNC: 65 MG/DL (ref 8–23)
C-REACTIVE PROTEIN: <3 MG/L (ref 0–5)
CALCIUM SERPL-MCNC: 9.2 MG/DL (ref 8.6–10.4)
CHLORIDE BLD-SCNC: 105 MMOL/L (ref 98–107)
CO2: 19 MMOL/L (ref 20–31)
CREAT SERPL-MCNC: 9.52 MG/DL (ref 0.5–0.9)
EOSINOPHILS RELATIVE PERCENT: 2 % (ref 1–4)
GFR AFRICAN AMERICAN: 5 ML/MIN
GFR NON-AFRICAN AMERICAN: 4 ML/MIN
GFR SERPL CREATININE-BSD FRML MDRD: ABNORMAL ML/MIN/{1.73_M2}
GLUCOSE BLD-MCNC: 91 MG/DL (ref 70–99)
HCT VFR BLD CALC: 39.4 % (ref 36.3–47.1)
HEMOGLOBIN: 12.5 G/DL (ref 11.9–15.1)
IMMATURE GRANULOCYTES: 0 %
INR BLD: 1
LYMPHOCYTES # BLD: 16 % (ref 24–43)
MCH RBC QN AUTO: 31.3 PG (ref 25.2–33.5)
MCHC RBC AUTO-ENTMCNC: 31.7 G/DL (ref 28.4–34.8)
MCV RBC AUTO: 98.7 FL (ref 82.6–102.9)
MONOCYTES # BLD: 6 % (ref 3–12)
NRBC AUTOMATED: 0 PER 100 WBC
PARTIAL THROMBOPLASTIN TIME: 28 SEC (ref 20.5–30.5)
PDW BLD-RTO: 17.5 % (ref 11.8–14.4)
PLATELET # BLD: 267 K/UL (ref 138–453)
PMV BLD AUTO: 9.1 FL (ref 8.1–13.5)
POTASSIUM SERPL-SCNC: 4.1 MMOL/L (ref 3.7–5.3)
PROTHROMBIN TIME: 10.3 SEC (ref 9.1–12.3)
RBC # BLD: 3.99 M/UL (ref 3.95–5.11)
RBC # BLD: ABNORMAL 10*6/UL
SEDIMENTATION RATE, ERYTHROCYTE: 32 MM/HR (ref 0–30)
SEG NEUTROPHILS: 76 % (ref 36–65)
SEGMENTED NEUTROPHILS ABSOLUTE COUNT: 4.47 K/UL (ref 1.5–8.1)
SODIUM BLD-SCNC: 140 MMOL/L (ref 135–144)
WBC # BLD: 6 K/UL (ref 3.5–11.3)

## 2022-06-20 PROCEDURE — 99283 EMERGENCY DEPT VISIT LOW MDM: CPT

## 2022-06-20 PROCEDURE — 85610 PROTHROMBIN TIME: CPT

## 2022-06-20 PROCEDURE — 86140 C-REACTIVE PROTEIN: CPT

## 2022-06-20 PROCEDURE — 85025 COMPLETE CBC W/AUTO DIFF WBC: CPT

## 2022-06-20 PROCEDURE — 85652 RBC SED RATE AUTOMATED: CPT

## 2022-06-20 PROCEDURE — 80048 BASIC METABOLIC PNL TOTAL CA: CPT

## 2022-06-20 PROCEDURE — 85730 THROMBOPLASTIN TIME PARTIAL: CPT

## 2022-06-20 PROCEDURE — 99254 IP/OBS CNSLTJ NEW/EST MOD 60: CPT | Performed by: SURGERY

## 2022-06-20 ASSESSMENT — ENCOUNTER SYMPTOMS
COUGH: 0
VOMITING: 0
SINUS PAIN: 0
SHORTNESS OF BREATH: 1
CHEST TIGHTNESS: 0
ABDOMINAL DISTENTION: 0
COLOR CHANGE: 0
VOMITING: 0
SHORTNESS OF BREATH: 0
CONSTIPATION: 0
DIARRHEA: 0
EYES NEGATIVE: 1
APNEA: 0
NAUSEA: 0
ABDOMINAL PAIN: 0
COLOR CHANGE: 0

## 2022-06-20 ASSESSMENT — PAIN DESCRIPTION - ORIENTATION: ORIENTATION: LEFT

## 2022-06-20 ASSESSMENT — PAIN - FUNCTIONAL ASSESSMENT: PAIN_FUNCTIONAL_ASSESSMENT: 0-10

## 2022-06-20 ASSESSMENT — PAIN DESCRIPTION - LOCATION: LOCATION: ARM

## 2022-06-20 ASSESSMENT — PAIN SCALES - GENERAL: PAINLEVEL_OUTOF10: 8

## 2022-06-20 NOTE — CONSULTS
[address]      Patient's Name/ Date of Birth/ Gender: James Conrad / 1960 (65 y.o.) / female     Referring Physician: Alexa Copeland MD    Consulting Physician: Dr. Anay Martin    History of present Illness: Pt is a 65 y.o. female with ESRD on HD due to HTN that presents after elopement from [address] [place] [address] for evaluation of her left radiocephalic AV fistula, created in 2018 by Dr. Anay Martin. Patient states she has had to undergo IR fistulagram several times in the past due to poor clearance during dialysis MWF, and her last IR fistulagram was in 10/2020. Patient had to have a suture placed into her distal access site in early May due to post HD bleeding and states she has had difficulty ever since with bleeding after HD. Patient otherwise states she feels well. On exam patient VSS and hypertensive, bruising and aneurysmal degeneration of two areas of her LUE radiocephalic fistula, on ultrasound significant stenosis of the aneurysmal sections due to intimal hyperplasia coupled with areas of acute thrombus. Fistula remains open through collateral circulation at this time, however it is pulsatile with whistle. Past Medical History:  has a past medical history of CKD (chronic kidney disease) stage 3, GFR 30-59 ml/min ([address] Gout, Hemodialysis patient ([facility] [address] Hyperlipidemia, Hypertension, Kidney stone, Nephrolithiasis, Osteoarthritis, and Type II or unspecified type diabetes mellitus without mention of complication, not stated as uncontrolled.     Past Surgical History:   Past Surgical History:   Procedure Laterality Date    CARDIAC CATHETERIZATION  10/21/2019    DIALYSIS FISTULA CREATION  11/06/2019    LEFT ARM AV FISTULA CREATION (Left Arm Lower)    DIALYSIS FISTULA CREATION Left 11/06/2019    LEFT ARM AV FISTULA CREATION performed by Danya Boone MD at [facility] Left 07/29/2020    LIGATION TRIBUTARIES LEFT RADIAL CEPHALIC AV FISTULA --ULTRASOUND AV FISTULA performed by Sonia Bronson MD at [address] SURGERY Left 12/05/2018    LITHOTRIPSY      THYROIDECTOMY, PARTIAL      TONSILLECTOMY      TOTAL ABDOMINAL HYSTERECTOMY      TOTAL KNEE ARTHROPLASTY Right 01/06/2015       Social History:  reports that she quit smoking about 21 years ago. Her smoking use included cigarettes. She has a 30.00 pack-year smoking history. She has never used smokeless tobacco. She reports that she does not drink alcohol and does not use drugs. Family History: family history includes Diabetes in her mother; Heart Disease in her father and mother; High Blood Pressure in her father. Review of Systems:   General: denies fevers, sweats, chills, endorses chronic fatigue  HEENT: Denies sore throat, sinus problems, allergic rhinosinusitis  Card: hx of HTN, HLD  Pulm: endorses occasional dry cough  GI: denies history of constipation, diarrhea, hematochezia or melena  : ESRD on HD via LUE radiocephalic fistula, hx kidney stones requiring lithotripsy  Endo: Denies diabetes, thyroid problems. Heme: Denies anemia, h/o bleeding or clotting problems. Neuro: Denies h/o CVA, TIA  Skin: Denies rashes, ulcers  Musculoskeletal: Denies muscle, joint, back pain. Allergies: Latex, Penicillins, Codeine, Nsaids, and Tolmetin    Current Meds:No current facility-administered medications for this encounter.     Current Outpatient Medications:     vitamin E 90 MG (200 UNIT) CAPS capsule, TAKE 1 CAPSULE BY MOUTH DAILY, Disp: 28 capsule, Rfl: 6    furosemide (LASIX) 80 MG tablet, , Disp: , Rfl:     Lancets MISC, 1 each by Does not apply route daily, Disp: 300 each, Rfl: 5    allopurinol (ZYLOPRIM) 100 MG tablet, Take 1 tablet by mouth daily, Disp: 28 tablet, Rfl: 5    amLODIPine (NORVASC) 10 MG tablet, TAKE 1 TABLET BY MOUTH DAILY, Disp: 30 tablet, Rfl: 6    atorvastatin (LIPITOR) 40 MG tablet, Take 1 tablet by mouth daily, Disp: 28 tablet, Rfl: 2    aspirin (ASPIRIN LOW DOSE) 81 MG EC tablet, TAKE 1 TABLET BY MOUTH DAILY, Disp: 28 tablet, Rfl: 5    labetalol (NORMODYNE) 200 MG tablet, Take 1 tablet by mouth 2 times daily, Disp: 56 tablet, Rfl: 5    pantoprazole (PROTONIX) 20 MG tablet, Take 1 tablet by mouth daily, Disp: 28 tablet, Rfl: 5    tiZANidine (ZANAFLEX) 4 MG tablet, TAKE 1 TABLET BY MOUTH 2 TIMES DAILY, Disp: 56 tablet, Rfl: 5    simethicone (MYLICON) 80 MG chewable tablet, Take 1 tablet by mouth 4 times daily as needed for Flatulence, Disp: 180 tablet, Rfl: 0    docusate sodium (COLACE) 100 MG capsule, Take 1 capsule by mouth 2 times daily as needed for Constipation, Disp: 60 capsule, Rfl: 0    lidocaine-prilocaine (EMLA) 2.5-2.5 % cream, APPLY 3 TIMES WEEKLY 30-60 MINUTES BEFORE DIALYSIS WRAP WITH PLASTIC WRAP AS DIRECTED, Disp: 30 g, Rfl: 3    hydrALAZINE (APRESOLINE) 50 MG tablet, Take 50 mg by mouth 3 times daily , Disp: , Rfl:     Multiple Vitamins-Minerals (MULTIVITAMIN ADULT EXTRA C) CHEW, Take 1 tablet by mouth daily, Disp: 30 tablet, Rfl: 5    lidocaine (XYLOCAINE) 5 % ointment, Apply topically BID as needed. , Disp: 240 g, Rfl: 1    Transparent Dressings (TEGADERM ABSORBENT DRESSING) MISC, Provide insurance covered tegaderm dressing, use at hemodialysis, Disp: 100 each, Rfl: 5    acetaminophen (TYLENOL) 500 MG tablet, Take 1,000 mg by mouth every 6 hours as needed for Pain, Disp: , Rfl:     TRUE METRIX BLOOD GLUCOSE TEST strip, , Disp: , Rfl:     calcium citrate-vitamin D (CITRACEL+D) 200-250 MG-UNIT TABS per tablet, TAKE 2 TABLETS THREE TIMES A DAY, Disp: , Rfl:     Vital Signs:  Vitals:    06/20/22 1211   BP: (!) 160/86   Pulse: 68   Resp: 20   Temp: 98.4 °F (36.9 °C)   SpO2: 96%       Physical Exam:  Vital signs and Nurse's note reviewed  Gen:  A&Ox3, NAD  HEENT: PERRLA, EOMI, no scleral icterus, oral mucosa moist  Neck: Supple  Chest: Symmetric rise with inhalation, no evidence of trauma  CVS: Regular rate and rhythm  Resp: Good bilateral air entry, no stridor or wheeze  Abd: soft, non-tender, non-distended, bowel sounds present. Ext: No clubbing, cyanosis, peripheral pulses 2+ Rad/Fem, trace edema LUE w/ aneurysmal radiocephalic fistula and pulsatility with whistle  CNS: Moves all extremities, no gross focal motor deficits  Skin: No erythema or ulcerations     Labs:   Lab Results   Component Value Date    WBC 6.5 09/27/2021    HGB 10.6 09/27/2021    HCT 34.0 09/27/2021    MCV 94.2 09/27/2021     09/27/2021     03/13/2012     Lab Results   Component Value Date     09/28/2021    K 3.6 09/28/2021     09/28/2021    CO2 23 09/28/2021    BUN 19 09/28/2021    CREATININE 4.01 09/28/2021    GLUCOSE 86 09/28/2021    GLUCOSE 80 03/13/2012    CALCIUM 8.3 09/28/2021     Lab Results   Component Value Date    INR 1.1 07/20/2020       Imaging:No results found. Impression:  Patient Active Problem List   Diagnosis    Hyperlipidemia    Primary osteoarthritis of left knee    Microalbuminuria    S/P gastric bypass    Anemia    S/P total knee arthroplasty    Hyperuricemia    Hydronephrosis of left kidney    Renal atrophy, right    Localized, primary osteoarthritis of hand    Essential hypertension    Chronic kidney disease    Motion sickness    Slow transit constipation    Chest pain    Aortic dissection (HCC)    Respiratory acidosis    Subacromial impingement    Acute on chronic diastolic (congestive) heart failure (HCC)    Moderate mitral regurgitation    Hypertensive emergency    Acute respiratory failure with hypoxia ([address]    Former smoker    Acute pulmonary edema ([address]    ESRD on hemodialysis ([address]    Chronic pain of left knee    Neuropathic pain    Gastroesophageal reflux disease       1. Left radiocephalic AV fistula malfunction    Recommendation:    1.  Recommend patient be evaluated by IR for fistulagram and dilation of the areas of stenosis to restore enough outflow for dialysis to resume  2. Patient will need appointment in the next week with Dr. Helen Frey to discuss potential need for operative intervention vs. New access creation  3. If IR unable to reopen her current fistula then patient will need tunneled access line until more definitive repair or new access can be established  4. Patient educated on importance of having HD nurses use different areas on her fistula for access, as the aneurysmal portions are likely the root cause of her current malfunction, patient expressed understanding  5. Patient will be ok for d/c from vascular standpoint once fistulagram and/or tunneled catheter placed, if unable to perform procedure today recommend patient get appointment with [address] [place] [address] IR for procedure if possible.       Electronically signed by Gabbie Brian DO on 6/20/2022 at 12:24 PM

## 2022-06-20 NOTE — ED NOTES
The following labs labeled with pt sticker and tubed to lab:     [x] Blue     [x] Lavender   [] on ice  [x] Green/yellow  [] Green/black [] on ice  [x] Yellow  [] Red  [] Pink      [] COVID-19 swab    [] Rapid  [] PCR  [] Flu swab  [] Peds Viral Panel     [] Urine Sample  [] Pelvic Cultures  [] Blood Cultures            Frank Arellano RN  06/20/22 4889

## 2022-06-20 NOTE — ED PROVIDER NOTES
[facility]     Emergency Department     Faculty Attestation    I performed a history and physical examination of the patient and discussed management with the resident. I reviewed the resident´s note and agree with the documented findings and plan of care. Any areas of disagreement are noted on the chart. I was personally present for the key portions of any procedures. I have documented in the chart those procedures where I was not present during the key portions. I have reviewed the emergency nurses triage note. I agree with the chief complaint, past medical history, past surgical history, allergies, medications, social and family history as documented unless otherwise noted below. For Physician Assistant/ Nurse Practitioner cases/documentation I have personally evaluated this patient and have completed at least one if not all key elements of the E/M (history, physical exam, and MDM). Additional findings are as noted. Last completed dialysis was last Wednesday, left forearm AV fistula is pulsatile without thrill. No purulent drainage. Mild erythema and tenderness over the graft.      Angel Dobbs MD  06/20/22 8509

## 2022-06-20 NOTE — ED NOTES
Vascular resident perfect served and notified that IR is unable to do patients dialysis fistula today. Awaiting response.       Juan Carlos Mijares RN  06/20/22 5087

## 2022-06-20 NOTE — ED PROVIDER NOTES
arthroplasty (Right, 2015); joint replacement; knee surgery (Left, 2018); Cardiac catheterization (10/21/2019); Dialysis fistula creation (2019); Dialysis fistula creation (Left, 2019); and Dialysis fistula creation (Left, 2020). Social History     Socioeconomic History    Marital status: Single     Spouse name: Not on file    Number of children: Not on file    Years of education: Not on file    Highest education level: Not on file   Occupational History    Not on file   Tobacco Use    Smoking status: Former Smoker     Packs/day: 1.00     Years: 30.00     Pack years: 30.00     Types: Cigarettes     Quit date: 3/21/2001     Years since quittin.2    Smokeless tobacco: Never Used   Vaping Use    Vaping Use: Never used   Substance and Sexual Activity    Alcohol use: No    Drug use: No    Sexual activity: Yes   Other Topics Concern    Not on file   Social History Narrative    Not on file     Social Determinants of Health     Financial Resource Strain: Low Risk     Difficulty of Paying Living Expenses: Not hard at all   Food Insecurity: No Food Insecurity    Worried About Running Out of Food in the Last Year: Never true    Rose of Food in the Last Year: Never true   Transportation Needs:     Lack of Transportation (Medical): Not on file    Lack of Transportation (Non-Medical):  Not on file   Physical Activity:     Days of Exercise per Week: Not on file    Minutes of Exercise per Session: Not on file   Stress:     Feeling of Stress : Not on file   Social Connections:     Frequency of Communication with Friends and Family: Not on file    Frequency of Social Gatherings with Friends and Family: Not on file    Attends Holiness Services: Not on file    Active Member of Clubs or Organizations: Not on file    Attends Club or Organization Meetings: Not on file    Marital Status: Not on file   Intimate Partner Violence:     Fear of Current or Ex-Partner: Not on file    Emotionally Abused: Not on file    Physically Abused: Not on file    Sexually Abused: Not on file   Housing Stability:     Unable to Pay for Housing in the Last Year: Not on file    Number of Places Lived in the Last Year: Not on file    Unstable Housing in the Last Year: Not on file       Family History   Problem Relation Age of Onset    Heart Disease Mother     Diabetes Mother     Heart Disease Father     High Blood Pressure Father        Allergies:  Latex, Penicillins, Codeine, Nsaids, and Tolmetin    Home Medications:  Prior to Admission medications    Medication Sig Start Date End Date Taking?  Authorizing Provider   vitamin E 90 MG (200 UNIT) CAPS capsule TAKE 1 CAPSULE BY MOUTH DAILY 5/9/22   Froy Calderon MD   furosemide (LASIX) 80 MG tablet  4/12/22   Historical Provider, MD   Lancets MISC 1 each by Does not apply route daily 4/20/22 5/20/22  NAV Groves CNP   allopurinol (ZYLOPRIM) 100 MG tablet Take 1 tablet by mouth daily 4/20/22   NAV Groves CNP   amLODIPine (NORVASC) 10 MG tablet TAKE 1 TABLET BY MOUTH DAILY 4/20/22   NAV Groves CNP   atorvastatin (LIPITOR) 40 MG tablet Take 1 tablet by mouth daily 4/20/22   NAV Groves CNP   aspirin (ASPIRIN LOW DOSE) 81 MG EC tablet TAKE 1 TABLET BY MOUTH DAILY 4/20/22   NAV Groves CNP   labetalol (NORMODYNE) 200 MG tablet Take 1 tablet by mouth 2 times daily 4/20/22   NAV Groves CNP   pantoprazole (PROTONIX) 20 MG tablet Take 1 tablet by mouth daily 4/20/22   NAV Groves CNP   tiZANidine (ZANAFLEX) 4 MG tablet TAKE 1 TABLET BY MOUTH 2 TIMES DAILY 3/15/22   NAV Groves CNP   simethicone (MYLICON) 80 MG chewable tablet Take 1 tablet by mouth 4 times daily as needed for Flatulence 12/2/21   NAV Groves CNP   docusate sodium (COLACE) 100 MG capsule Take 1 capsule by mouth 2 times daily as needed for Constipation 12/2/21   Lauren Delgado NAV Sanders CNP   lidocaine-prilocaine (EMLA) 2.5-2.5 % cream APPLY 3 TIMES WEEKLY 30-60 MINUTES BEFORE DIALYSIS WRAP WITH PLASTIC WRAP AS DIRECTED 11/3/21   Raffaele Alvarez MD   hydrALAZINE (APRESOLINE) 50 MG tablet Take 50 mg by mouth 3 times daily     Historical Provider, MD   Multiple Vitamins-Minerals (MULTIVITAMIN ADULT EXTRA C) CHEW Take 1 tablet by mouth daily 8/24/21   NAV Chan CNP   lidocaine (XYLOCAINE) 5 % ointment Apply topically BID as needed. 8/24/21   NAV Chan CNP   Transparent Dressings ([address]) [address] [facility] Provide insurance covered tegaderm dressing, use at hemodialysis 11/5/20   Gelacio Martel PA-C   acetaminophen (TYLENOL) 500 MG tablet Take 1,000 mg by mouth every 6 hours as needed for Pain    Historical Provider, MD   TRUE METRIX BLOOD GLUCOSE TEST strip  6/1/18   Historical Provider, MD   calcium citrate-vitamin D (CITRACEL+D) 200-250 MG-UNIT TABS per tablet TAKE 2 TABLETS THREE TIMES A DAY 4/7/18   Historical Provider, MD       REVIEW OF SYSTEMS    (2-9 systems for level 4, 10 or more for level 5)      Review of Systems   Constitutional: Negative for fever. HENT: Negative for congestion. Respiratory: Negative for cough and shortness of breath. Cardiovascular: Negative for chest pain. Gastrointestinal: Negative for abdominal pain, nausea and vomiting. Genitourinary:        Positive for history of end-stage renal disease   Musculoskeletal: Negative for myalgias. Positive for left upper extremity pain   Skin: Negative for color change and rash. Neurological: Negative for headaches. Psychiatric/Behavioral: Negative for confusion. PHYSICAL EXAM   (up to 7 for level 4, 8 or more for level 5)     INITIAL VITALS:    height is 5' 3\" (1.6 m) and weight is 161 lb (73 kg). Her oral temperature is 98.4 °F (36.9 °C). Her blood pressure is 160/86 (abnormal) and her pulse is 68.  Her respiration is 18 and oxygen saturation is 96%. Physical Exam  Constitutional:       Comments: Alert oriented person, place, time, resting comfortably, no acute distress   Eyes:      Pupils: Pupils are equal, round, and reactive to light. Cardiovascular:      Rate and Rhythm: Normal rate and regular rhythm. Pulses: Normal pulses. Pulmonary:      Effort: Pulmonary effort is normal. No respiratory distress. Breath sounds: Normal breath sounds. No wheezing. Abdominal:      General: Abdomen is flat. Palpations: Abdomen is soft. Tenderness: There is no abdominal tenderness. There is no guarding or rebound. Musculoskeletal:      Comments: Left upper extremity fistula in place, no purulent drainage. Fistula has palpable pulse, no continuous thrill appreciated. Patient is tender to palpation along the length of her AV fistula   Skin:     General: Skin is warm and dry. Findings: No erythema. Psychiatric:         Mood and Affect: Mood normal.         Behavior: Behavior normal.         DIFFERENTIAL  DIAGNOSIS     PLAN (LABS / IMAGING / EKG):  Orders Placed This Encounter   Procedures    IR FISTULAGRAM    CBC with Auto Differential    Basic Metabolic Panel    Sedimentation Rate    C-Reactive Protein    Protime-INR    APTT    Inpatient consult to Vascular Surgery    Inpatient consult to IR       MEDICATIONS ORDERED:  No orders of the defined types were placed in this encounter. DDX: Phlebitis, cellulitis, failure of AV fistula    Initial MDM/Plan: 65 y.o. female who presents with left upper extremity pain and difficulties with vascular access of her left upper extremity fistula for dialysis. Last dialyzed 5 days ago. Seen and examined, no acute distress, vitals are unremarkable, patient is well-appearing, speaking full sentences. No acute distress. Physical examination is remarkable for left AV fistula with a palpable pulse, there is no continuous thrill appreciated concerning for AV fistula failure.   No erythema or purulent drainage noted on examination. Patient is afebrile however will obtain inflammatory markers and CBC to evaluate for possible infection, more likely phlebitis with failure of AV fistula, will also consult vascular surgery. DIAGNOSTIC RESULTS / EMERGENCY DEPARTMENT COURSE / MDM     LABS:  Labs Reviewed   CBC WITH AUTO DIFFERENTIAL - Abnormal; Notable for the following components:       Result Value    RDW 17.5 (*)     Seg Neutrophils 76 (*)     Lymphocytes 16 (*)     Absolute Lymph # 0.97 (*)     All other components within normal limits   BASIC METABOLIC PANEL - Abnormal; Notable for the following components:    BUN 65 (*)     CREATININE 9.52 (*)     CO2 19 (*)     GFR Non- 4 (*)     GFR  5 (*)     All other components within normal limits   SEDIMENTATION RATE - Abnormal; Notable for the following components:    Sed Rate 32 (*)     All other components within normal limits   C-REACTIVE PROTEIN   PROTIME-INR   APTT         RADIOLOGY:  No results found. EMERGENCY DEPARTMENT COURSE:     Laboratory work unremarkable. Vascular surgery consulted, recommended IR consult for fistulogram.  IR was consulted unfortunately no schedule openings today, they did book a slot for the patient tomorrow, patient to arrive at the hospital main entrance at 6:30 AM, remain n.p.o. midnight for fistulogram.  Patient was updated on plan of care, instructed that she needs to return to the hospital tomorrow at 6:30 AM for outpatient fistulogram and had nothing to eat or drink after midnight. Verbalized understanding. She was also instructed that she needs to have a ride to and from the procedure. Verbalized understanding. Discharged. PROCEDURES:  None    CONSULTS:  IP CONSULT TO VASCULAR SURGERY  IP CONSULT TO INTERVENTIONAL RADIOLOGY    CRITICAL CARE:  Please see attending note    FINAL IMPRESSION      1.  Problem with vascular access          DISPOSITION / PLAN DISPOSITION Decision To Discharge 06/20/2022 03:05:33 PM        PATIENTREFERRED TO:  [address] [address]  [phone]  Go in 1 day  at [address] [place] [address] Rd., [address] am, main entrance, for Larisa Lo [address]:  Discharge Medication List as of 6/20/2022  3:07 PM          Francheska Higgins DO  EmergencyMedicine Resident    (Please note that portions of this note were completed with a voice recognition program.  Efforts were made to edit the dictations but occasionally words are mis-transcribed.)        Francheska Higgins DO  Resident  06/20/22 [id number]

## 2022-06-20 NOTE — ED PROVIDER NOTES
EMERGENCY DEPARTMENT ENCOUNTER    Pt Name: Elisa Barnes  MRN: [id number]  [place] 1960  Date of evaluation: 6/20/22  CHIEF COMPLAINT       Chief Complaint   Patient presents with    Shortness of Breath     onset 1 1/2 hours ago    Arm Pain     left arm fistula, had problems last friday during dialysis, session cut short, possible \"blood clot/blockage\"     HISTORY OF PRESENT ILLNESS   58-year-old female presents emergency room for AV fistula issue. Patient is on hemodialysis and has been having pain to the fistula site. She did go to dialysis on Friday but was only able to tolerate an hour due to discomfort with access. She was told by staff that she may have some clotting in the fistula. She does have some mild shortness of breath. REVIEW OF SYSTEMS     Review of Systems   Constitutional: Negative for activity change, chills and diaphoresis. HENT: Negative for congestion, sinus pain and tinnitus. Eyes: Negative. Respiratory: Positive for shortness of breath. Negative for apnea and chest tightness. Gastrointestinal: Negative for abdominal distention, constipation, diarrhea and vomiting. Musculoskeletal: Negative for arthralgias and myalgias. Skin: Negative for color change and rash. Hematological: Negative. Psychiatric/Behavioral: Negative.         PASTMEDICAL HISTORY     Past Medical History:   Diagnosis Date    CKD (chronic kidney disease) stage 3, GFR 30-59 ml/min (HCC)     per pt, caused by kid stone damage    Gout     [facility]    Hyperlipidemia     Hypertension     Kidney stone     Nephrolithiasis     Osteoarthritis     Type II or unspecified type diabetes mellitus without mention of complication, not stated as uncontrolled     patient denies     Past Problem List  Patient Active Problem List   Diagnosis Code    Hyperlipidemia E78.5    Primary osteoarthritis of left knee M17.12    Microalbuminuria R80.9    S/P gastric bypass Z98.84    Anemia D64.9    S/P total knee arthroplasty Z96.659    Hyperuricemia E79.0    Hydronephrosis of left kidney N13.30    Renal atrophy, right N26.1    Localized, primary osteoarthritis of hand M19.049    Essential hypertension I10    Chronic kidney disease N18.9    Motion sickness T75. 3XXA    Slow transit constipation K59.01    Chest pain R07.9    Aortic dissection (HCC) I71.00    Respiratory acidosis E87.2    Subacromial impingement M75.40    Acute on chronic diastolic (congestive) heart failure (HCC) I50.33    Moderate mitral regurgitation I34.0    Hypertensive emergency I16.1    Acute respiratory failure with hypoxia ([facility]) J96.01    Former smoker Z87.891    Acute pulmonary edema ([facility]) J81.0    ESRD on hemodialysis (HCC) N18.6, Z99.2    Chronic pain of left knee M25.562, G89.29    Neuropathic pain M79.2    Gastroesophageal reflux disease K21.9    Problem with vascular access Z78.9     SURGICAL HISTORY       Past Surgical History:   Procedure Laterality Date    CARDIAC CATHETERIZATION  10/21/2019    DIALYSIS FISTULA CREATION  11/06/2019    LEFT ARM AV FISTULA CREATION (Left Arm Lower)    DIALYSIS FISTULA CREATION Left 11/06/2019    LEFT ARM AV FISTULA CREATION performed by Vasquez Medina MD at [facility] Left 07/29/2020    LIGATION TRIBUTARIES LEFT RADIAL CEPHALIC AV FISTULA --ULTRASOUND AV FISTULA performed by Vasquez Medina MD at [address] [place] [address], TOTAL ABDOMINAL (CERVIX REMOVED)      IR TUNNELED CATHETER PLACEMENT GREATER THAN 5 YEARS  6/21/2022    IR TUNNELED CATHETER PLACEMENT GREATER THAN 5 YEARS 6/21/2022 Roderick Monsivais MD [facility]    JOINT REPLACEMENT      KNEE SURGERY Left 12/05/2018    LITHOTRIPSY      THYROIDECTOMY, PARTIAL      TONSILLECTOMY      TOTAL KNEE ARTHROPLASTY Right 01/06/2015     CURRENT MEDICATIONS       Discharge Medication List as of 6/20/2022  4:31 AM CONTINUE these medications which have NOT CHANGED    Details   vitamin E 90 MG (200 UNIT) CAPS capsule TAKE 1 CAPSULE BY MOUTH DAILY, Disp-28 capsule, R-6Normal      furosemide (LASIX) 80 MG tablet Historical Med      Lancets MIS DAILY Starting Wed 4/20/2022, Until Fri 5/20/2022, For 30 days, Disp-300 each, R-5, Normal      allopurinol (ZYLOPRIM) 100 MG tablet Take 1 tablet by mouth daily, Disp-28 tablet, R-5Normal      amLODIPine (NORVASC) 10 MG tablet TAKE 1 TABLET BY MOUTH DAILY, Disp-30 tablet, R-6Normal      atorvastatin (LIPITOR) 40 MG tablet Take 1 tablet by mouth daily, Disp-28 tablet, R-2Normal      aspirin (ASPIRIN LOW DOSE) 81 MG EC tablet TAKE 1 TABLET BY MOUTH DAILY, Disp-28 tablet, R-5Normal      labetalol (NORMODYNE) 200 MG tablet Take 1 tablet by mouth 2 times daily, Disp-56 tablet, R-5Normal      pantoprazole (PROTONIX) 20 MG tablet Take 1 tablet by mouth daily, Disp-28 tablet, R-5Normal      tiZANidine (ZANAFLEX) 4 MG tablet TAKE 1 TABLET BY MOUTH 2 TIMES DAILY, Disp-56 tablet, R-5Normal      simethicone (MYLICON) 80 MG chewable tablet Take 1 tablet by mouth 4 times daily as needed for Flatulence, Disp-180 tablet, R-0Normal      docusate sodium (COLACE) 100 MG capsule Take 1 capsule by mouth 2 times daily as needed for Constipation, Disp-60 capsule, R-0Normal      lidocaine-prilocaine (EMLA) 2.5-2.5 % cream APPLY 3 TIMES WEEKLY 30-60 MINUTES BEFORE DIALYSIS WRAP WITH PLASTIC WRAP AS DIRECTED, Disp-30 g, R-3, Normal      hydrALAZINE (APRESOLINE) 50 MG tablet Take 50 mg by mouth 3 times daily Historical Med      Multiple Vitamins-Minerals (MULTIVITAMIN ADULT EXTRA C) CHEW Take 1 tablet by mouth daily, Disp-30 tablet, R-5Normal      lidocaine (XYLOCAINE) 5 % ointment Apply topically BID as needed. , Disp-240 g, R-1, Normal      Transparent Dressings (TEGADERM ABSORBENT DRESSING) MISC Disp-100 each,R-5, PrintProvide insurance covered tegaderm dressing, use at hemodialysis      acetaminophen (TYLENOL) 500 MG tablet Take 1,000 mg by mouth every 6 hours as needed for PainHistorical Med      TRUE METRIX BLOOD GLUCOSE TEST strip DAWHistorical Med      calcium citrate-vitamin D (CITRACEL+D) 200-250 MG-UNIT TABS per tablet TAKE 2 TABLETS THREE TIMES A DAYHistorical Med           ALLERGIES     is allergic to latex, penicillins, codeine, nsaids, and tolmetin. FAMILY HISTORY     She indicated that her mother is . She indicated that her father is . SOCIAL HISTORY       Social History     Tobacco Use    Smoking status: Former Smoker     Packs/day: 1.00     Years: 30.00     Pack years: 30.00     Types: Cigarettes     Quit date: 3/21/2001     Years since quittin.2    Smokeless tobacco: Never Used   Vaping Use    Vaping Use: Never used   Substance Use Topics    Alcohol use: No    Drug use: No     PHYSICAL EXAM     INITIAL VITALS: BP (!) 152/71   Pulse 64   Temp 98.1 °F (36.7 °C) (Oral)   Resp 16   Wt 151 lb (68.5 kg)   SpO2 97%   BMI 27.62 kg/m²    Physical Exam  Constitutional:       General: She is not in acute distress. Appearance: She is well-developed. HENT:      Head: Normocephalic. Eyes:      Pupils: Pupils are equal, round, and reactive to light. Cardiovascular:      Rate and Rhythm: Normal rate and regular rhythm. Heart sounds: Normal heart sounds. Arteriovenous access: left arteriovenous access is present. Comments: Patient has AV fistula to the left upper extremity. There is a palpable thrill. She has tenderness along the access site as well as some swelling   Pulmonary:      Effort: Pulmonary effort is normal. No respiratory distress. Breath sounds: Normal breath sounds. Abdominal:      General: Bowel sounds are normal.      Palpations: Abdomen is soft. Tenderness: There is no abdominal tenderness. Musculoskeletal:         General: Normal range of motion. Skin:     General: Skin is warm and dry.    Neurological:      Mental Status: She is alert and oriented to person, place, and time. MEDICAL DECISION MAKIN-year-old female presenting to the emergency room for issue with her AV fistula. She has pain to the site along with pain and swelling in the upper arm. Patient ended up eloping before assessment and treatment. She did not want to be poked with needles and due to her health history she is difficult to gain IV access. CRITICAL CARE:       PROCEDURES:    Procedures    DIAGNOSTIC RESULTS   EKG:All EKG's are interpreted by the Emergency Department Physician who either signs or Co-signs this chart in the absence of a cardiologist.        RADIOLOGY:All plain film, CT, MRI, and formal ultrasound images (except ED bedside ultrasound) are read by the radiologist, see reports below, unless otherwisenoted in MDM or here. No orders to display     LABS: All lab results were reviewed by myself, and all abnormals are listed below. Labs Reviewed - No data to display    EMERGENCY DEPARTMENTCOURSE:         Vitals:    Vitals:    22 0358   BP: (!) 152/71   Pulse: 64   Resp: 16   Temp: 98.1 °F (36.7 °C)   TempSrc: Oral   SpO2: 97%   Weight: 151 lb (68.5 kg)       The patient was given the following medications while in the emergency department:  No orders of the defined types were placed in this encounter. CONSULTS:  None    FINAL IMPRESSION    No diagnosis found. DISPOSITION/PLAN   DISPOSITION Eloped - Left Before Treatment Complete 2022 04:29:01 AM      PATIENT REFERRED TO:  No follow-up provider specified. DISCHARGE MEDICATIONS:  Discharge Medication List as of 2022  4:31 AM        Cecy Oliveira MD  Attending Emergency Physician      Care during this encounter was due to an unprecedented national emergency due to COVID-19.             Hermilo Gutierrez MD  22 [id number]

## 2022-06-21 ENCOUNTER — HOSPITAL ENCOUNTER (OUTPATIENT)
Dept: INTERVENTIONAL RADIOLOGY/VASCULAR | Age: 62
Discharge: HOME | End: 2022-06-23
Payer: MEDICARE

## 2022-06-21 VITALS
TEMPERATURE: 97.9 F | RESPIRATION RATE: 18 BRPM | OXYGEN SATURATION: 93 % | HEART RATE: 71 BPM | BODY MASS INDEX: 28.53 KG/M2 | HEIGHT: 63 IN | WEIGHT: 161 LBS | SYSTOLIC BLOOD PRESSURE: 146 MMHG | DIASTOLIC BLOOD PRESSURE: 71 MMHG

## 2022-06-21 DIAGNOSIS — N18.6 ESRD (END STAGE RENAL DISEASE) (HCC): ICD-10-CM

## 2022-06-21 PROCEDURE — 36902 INTRO CATH DIALYSIS CIRCUIT: CPT

## 2022-06-21 PROCEDURE — 2580000003 HC RX 258: Performed by: PHYSICIAN ASSISTANT

## 2022-06-21 PROCEDURE — 7100000010 HC PHASE II RECOVERY - FIRST 15 MIN

## 2022-06-21 PROCEDURE — C1750 CATH, HEMODIALYSIS,LONG-TERM: HCPCS

## 2022-06-21 PROCEDURE — C1725 CATH, TRANSLUMIN NON-LASER: HCPCS

## 2022-06-21 PROCEDURE — C1769 GUIDE WIRE: HCPCS

## 2022-06-21 PROCEDURE — 77001 FLUOROGUIDE FOR VEIN DEVICE: CPT

## 2022-06-21 PROCEDURE — 7100000011 HC PHASE II RECOVERY - ADDTL 15 MIN

## 2022-06-21 PROCEDURE — 36558 INSERT TUNNELED CV CATH: CPT

## 2022-06-21 PROCEDURE — 76937 US GUIDE VASCULAR ACCESS: CPT

## 2022-06-21 PROCEDURE — C1894 INTRO/SHEATH, NON-LASER: HCPCS

## 2022-06-21 PROCEDURE — 6360000004 HC RX CONTRAST MEDICATION: Performed by: RADIOLOGY

## 2022-06-21 PROCEDURE — 6360000002 HC RX W HCPCS: Performed by: PHYSICIAN ASSISTANT

## 2022-06-21 PROCEDURE — 2500000003 HC RX 250 WO HCPCS: Performed by: PHYSICIAN ASSISTANT

## 2022-06-21 PROCEDURE — 6360000002 HC RX W HCPCS: Performed by: RADIOLOGY

## 2022-06-21 PROCEDURE — 2709999900 HC NON-CHARGEABLE SUPPLY

## 2022-06-21 RX ORDER — HEPARIN SODIUM (PORCINE) LOCK FLUSH IV SOLN 100 UNIT/ML 100 UNIT/ML
SOLUTION INTRAVENOUS
Status: COMPLETED | OUTPATIENT
Start: 2022-06-21 | End: 2022-06-21

## 2022-06-21 RX ORDER — SODIUM CHLORIDE 9 MG/ML
INJECTION, SOLUTION INTRAVENOUS CONTINUOUS
Status: DISCONTINUED | OUTPATIENT
Start: 2022-06-21 | End: 2022-06-24 | Stop reason: HOSPADM

## 2022-06-21 RX ORDER — MIDAZOLAM HYDROCHLORIDE 5 MG/ML
INJECTION INTRAMUSCULAR; INTRAVENOUS
Status: COMPLETED | OUTPATIENT
Start: 2022-06-21 | End: 2022-06-21

## 2022-06-21 RX ORDER — CLINDAMYCIN PHOSPHATE 600 MG/50ML
600 INJECTION INTRAVENOUS
Status: COMPLETED | OUTPATIENT
Start: 2022-06-21 | End: 2022-06-21

## 2022-06-21 RX ORDER — MIDAZOLAM HYDROCHLORIDE 2 MG/2ML
INJECTION, SOLUTION INTRAMUSCULAR; INTRAVENOUS
Status: COMPLETED | OUTPATIENT
Start: 2022-06-21 | End: 2022-06-21

## 2022-06-21 RX ORDER — ACETAMINOPHEN 325 MG/1
650 TABLET ORAL EVERY 4 HOURS PRN
Status: DISCONTINUED | OUTPATIENT
Start: 2022-06-21 | End: 2022-06-24 | Stop reason: HOSPADM

## 2022-06-21 RX ORDER — FENTANYL CITRATE 50 UG/ML
INJECTION, SOLUTION INTRAMUSCULAR; INTRAVENOUS
Status: COMPLETED | OUTPATIENT
Start: 2022-06-21 | End: 2022-06-21

## 2022-06-21 RX ORDER — DIPHENHYDRAMINE HYDROCHLORIDE 50 MG/ML
INJECTION INTRAMUSCULAR; INTRAVENOUS
Status: COMPLETED | OUTPATIENT
Start: 2022-06-21 | End: 2022-06-21

## 2022-06-21 RX ADMIN — HEPARIN 1600 UNITS: 100 SYRINGE at 11:17

## 2022-06-21 RX ADMIN — FENTANYL CITRATE 50 MCG: 50 INJECTION, SOLUTION INTRAMUSCULAR; INTRAVENOUS at 09:32

## 2022-06-21 RX ADMIN — Medication 25 MG: at 09:50

## 2022-06-21 RX ADMIN — FENTANYL CITRATE 50 MCG: 50 INJECTION, SOLUTION INTRAMUSCULAR; INTRAVENOUS at 09:18

## 2022-06-21 RX ADMIN — MIDAZOLAM HYDROCHLORIDE 0.5 MG: 1 INJECTION, SOLUTION INTRAMUSCULAR; INTRAVENOUS at 10:42

## 2022-06-21 RX ADMIN — FENTANYL CITRATE 50 MCG: 50 INJECTION, SOLUTION INTRAMUSCULAR; INTRAVENOUS at 08:50

## 2022-06-21 RX ADMIN — MIDAZOLAM HYDROCHLORIDE 0.5 MG: 5 INJECTION INTRAMUSCULAR; INTRAVENOUS at 09:13

## 2022-06-21 RX ADMIN — HEPARIN 1600 UNITS: 100 SYRINGE at 11:18

## 2022-06-21 RX ADMIN — MIDAZOLAM HYDROCHLORIDE 0.5 MG: 1 INJECTION, SOLUTION INTRAMUSCULAR; INTRAVENOUS at 09:32

## 2022-06-21 RX ADMIN — MIDAZOLAM HYDROCHLORIDE 5 MG: 5 INJECTION INTRAMUSCULAR; INTRAVENOUS at 09:05

## 2022-06-21 RX ADMIN — FENTANYL CITRATE 50 MCG: 50 INJECTION, SOLUTION INTRAMUSCULAR; INTRAVENOUS at 10:37

## 2022-06-21 RX ADMIN — FENTANYL CITRATE 50 MCG: 50 INJECTION, SOLUTION INTRAMUSCULAR; INTRAVENOUS at 09:58

## 2022-06-21 RX ADMIN — CLINDAMYCIN PHOSPHATE 600 MG: 600 INJECTION, SOLUTION INTRAVENOUS at 07:14

## 2022-06-21 RX ADMIN — MIDAZOLAM HYDROCHLORIDE 0.5 MG: 1 INJECTION, SOLUTION INTRAMUSCULAR; INTRAVENOUS at 09:50

## 2022-06-21 RX ADMIN — FENTANYL CITRATE 50 MCG: 50 INJECTION, SOLUTION INTRAMUSCULAR; INTRAVENOUS at 08:44

## 2022-06-21 RX ADMIN — IOPAMIDOL 110 ML: 755 INJECTION, SOLUTION INTRAVENOUS at 11:38

## 2022-06-21 RX ADMIN — SODIUM CHLORIDE: 9 INJECTION, SOLUTION INTRAVENOUS at 07:06

## 2022-06-21 ASSESSMENT — PAIN - FUNCTIONAL ASSESSMENT: PAIN_FUNCTIONAL_ASSESSMENT: 0-10

## 2022-06-21 ASSESSMENT — PAIN DESCRIPTION - DESCRIPTORS: DESCRIPTORS: ACHING

## 2022-06-21 ASSESSMENT — PAIN SCALES - GENERAL: PAINLEVEL_OUTOF10: 0

## 2022-06-21 NOTE — PRE SEDATION
Sedation Pre-Procedure Note    Patient Name: French Dong   YOB: 1960  Room/Bed: Room/bed info not found  Medical Record Number: [id number]  Date: 6/21/2022   Time: 8:16 AM       Indication:  Fistulagram    Consent: I have discussed with the patient and/or the patient representative the indication, alternatives, and the possible risks and/or complications of the planned procedure and the anesthesia methods. The patient and/or patient representative appear to understand and agree to proceed. Vital Signs:   Vitals:    06/21/22 0653   BP: (!) 135/58   Pulse: 71   Resp: 18   Temp: 97.9 °F (36.6 °C)   SpO2: 97%       Past Medical History:   has a past medical history of CKD (chronic kidney disease) stage 3, GFR 30-59 ml/min ([facility]), Gout, [facility] Hyperlipidemia, Hypertension, Kidney stone, Nephrolithiasis, Osteoarthritis, and Type II or unspecified type diabetes mellitus without mention of complication, not stated as uncontrolled. Past Surgical History:   has a past surgical history that includes Hysterectomy, total abdominal; Lithotripsy; Thyroidectomy, partial; Gastric bypass surgery; Tonsillectomy; Total knee arthroplasty (Right, 01/06/2015); joint replacement; knee surgery (Left, 12/05/2018); Cardiac catheterization (10/21/2019); Dialysis fistula creation (11/06/2019); Dialysis fistula creation (Left, 11/06/2019); and Dialysis fistula creation (Left, 07/29/2020). Medications:   Scheduled Meds:   Continuous Infusions:    sodium chloride 20 mL/hr at 06/21/22 0706     PRN Meds:   Home Meds:   Prior to Admission medications    Medication Sig Start Date End Date Taking?  Authorizing Provider   vitamin E 90 MG (200 UNIT) CAPS capsule TAKE 1 CAPSULE BY MOUTH DAILY 5/9/22   Davide Burden MD   furosemide (LASIX) 80 MG tablet  4/12/22   Historical Provider, MD   Lancets MISC 1 each by Does not apply route daily 4/20/22 5/20/22  NAV Sandoval - PRISCILLA   allopurinol (ZYLOPRIM) 100 MG tablet Take 1 tablet by mouth daily 4/20/22   Kirsten Sera, APRN - CNP   amLODIPine (NORVASC) 10 MG tablet TAKE 1 TABLET BY MOUTH DAILY 4/20/22   Bee Sera, APRN - CNP   atorvastatin (LIPITOR) 40 MG tablet Take 1 tablet by mouth daily 4/20/22   Bee Sera, APRN - CNP   aspirin (ASPIRIN LOW DOSE) 81 MG EC tablet TAKE 1 TABLET BY MOUTH DAILY 4/20/22   Bee Sera, APRN - CNP   labetalol (NORMODYNE) 200 MG tablet Take 1 tablet by mouth 2 times daily 4/20/22   Bee Sera, APRN - CNP   pantoprazole (PROTONIX) 20 MG tablet Take 1 tablet by mouth daily 4/20/22   Bee Sera, APRN - CNP   tiZANidine (ZANAFLEX) 4 MG tablet TAKE 1 TABLET BY MOUTH 2 TIMES DAILY 3/15/22   Bee Sera, APRN - CNP   simethicone (MYLICON) 80 MG chewable tablet Take 1 tablet by mouth 4 times daily as needed for Flatulence 12/2/21   Bee Sera, APRN - CNP   docusate sodium (COLACE) 100 MG capsule Take 1 capsule by mouth 2 times daily as needed for Constipation 12/2/21   Bee Sera, APRN - CNP   lidocaine-prilocaine (EMLA) 2.5-2.5 % cream APPLY 3 TIMES WEEKLY 30-60 MINUTES BEFORE DIALYSIS WRAP WITH PLASTIC WRAP AS DIRECTED 11/3/21   Juanito Ulloa MD   hydrALAZINE (APRESOLINE) 50 MG tablet Take 50 mg by mouth 3 times daily     Historical Provider, MD   Multiple Vitamins-Minerals (MULTIVITAMIN ADULT EXTRA C) CHEW Take 1 tablet by mouth daily 8/24/21   Bee Sera, APRN - CNP   lidocaine (XYLOCAINE) 5 % ointment Apply topically BID as needed.  8/24/21   Bee Sera, APRN - CNP   Transparent Dressings ([address]) [address] [facility] Provide insurance covered tegaderm dressing, use at hemodialysis 11/5/20   Khurram Lanza PA-C   acetaminophen (TYLENOL) 500 MG tablet Take 1,000 mg by mouth every 6 hours as needed for Pain    Historical Provider, MD   TRUE METRIX BLOOD GLUCOSE TEST strip  6/1/18   Historical Provider, MD   calcium citrate-vitamin D (CITRACEL+D) 200-250 MG-UNIT TABS per tablet TAKE 2 TABLETS THREE TIMES A DAY 4/7/18   Historical Provider, MD     Coumadin Use Last 7 Days:  no  Antiplatelet drug therapy use last 7 days: no  Other anticoagulant use last 7 days: no  Additional Medication Information:  See med [facility]      Pre-Sedation Documentation and Exam:   I have reviewed the patient's history and review of systems.     Mallampati Airway Assessment:  Mallampati Class II - (soft palate, fauces & uvula are visible)    Prior History of Anesthesia Complications:   none    ASA Classification:  Class 2 - A normal healthy patient with mild systemic disease    Sedation/ Anesthesia Plan:   intravenous sedation    Medications Planned:   midazolam (Versed) intravenously and fentanyl intravenously    Patient is an appropriate candidate for plan of sedation: yes    Electronically signed by NELLIE Castro on 6/21/2022 at 8:16 AM

## 2022-06-21 NOTE — POST SEDATION
Sedation Post Procedure Note    Patient Name: Delaney Thomas   YOB: 1960  Room/Bed: Room/bed info not found  Medical Record Number: [id number]  Date: 6/21/2022   Time: 11:27 AM         Physicians/Assistants: NELLIE Pittman    Procedure Performed:  Fistulagram    Post-Sedation Vital Signs:  Vitals:    06/21/22 1110   BP: (!) 158/84   Pulse: 76   Resp: 18   Temp:    SpO2: 99%      Vital signs were reviewed and were stable after the procedure (see flow sheet for vitals)            Post-Sedation Exam: Pt remains stable           Complications: none    Electronically signed by NELLIE Tirado on 6/21/2022 at 11:27 AM

## 2022-06-21 NOTE — PROGRESS NOTES
1135 returned post procedure skin warm color pink r chest catheter with clear opsite dressing dry intact  No drainage noted left arm with 3 band aids dry intact with strong pulses to fistula noted

## 2022-06-21 NOTE — BRIEF OP NOTE
Brief Postoperative Note    Mitchell Hightower  YOB: 1960  [id number]    Pre-operative Diagnosis: Poorly Functioning left    Post-operative Diagnosis: Same    Procedure: Fistulagram; Tunneled dialysis catheter     Medication Given: fentanyl and versed    Surgeons/Assistants: Clarence Pittman MD and NELLIE Martin    Estimated Blood Loss: Minimal    Complications: none    Specimens: were not obtained     Do NOT use fistula. Swelling in arm around previous access site. 14 Fr x 19 cm tip to cuff palindrome tunneled HD Catheter placed successfully via the Site:  Right Internal Jugular Vein. Catheter secured to skin, dressing applied. Catheter locked with Heparin. May use HD cath for dialysis.     Electronically signed by NELLIE Huang on 6/21/2022 at 11:24 AM

## 2022-06-21 NOTE — PROGRESS NOTES
Discharge criteria met instructions given assessment unchanged iv discontinued site clear 150 ml ns discarded

## 2022-06-21 NOTE — OR NURSING
BANDAIDS TO LEFT ARM FISTULAGRAM SHEATH SITES DRY AND INTACT. NO BLEEDING OR HEMATOMA NOTED.  RT. NECK CHEST PREPPED FOR TUNNELED DIALYSIS  CATH PLACEMENT

## 2022-07-15 ENCOUNTER — INITIAL CONSULT (OUTPATIENT)
Dept: VASCULAR SURGERY | Age: 62
End: 2022-07-15
Payer: MEDICARE

## 2022-07-15 VITALS
HEIGHT: 63 IN | SYSTOLIC BLOOD PRESSURE: 163 MMHG | OXYGEN SATURATION: 93 % | RESPIRATION RATE: 18 BRPM | BODY MASS INDEX: 29.06 KG/M2 | HEART RATE: 74 BPM | WEIGHT: 164 LBS | DIASTOLIC BLOOD PRESSURE: 77 MMHG

## 2022-07-15 DIAGNOSIS — N18.6 ENCOUNTER REGARDING VASCULAR ACCESS FOR DIALYSIS FOR END-STAGE RENAL DISEASE (HCC): Primary | ICD-10-CM

## 2022-07-15 DIAGNOSIS — Z99.2 ENCOUNTER REGARDING VASCULAR ACCESS FOR DIALYSIS FOR END-STAGE RENAL DISEASE (HCC): Primary | ICD-10-CM

## 2022-07-15 PROCEDURE — 99204 OFFICE O/P NEW MOD 45 MIN: CPT | Performed by: SURGERY

## 2022-07-15 NOTE — PROGRESS NOTES
performed by Salo Mae MD at [address] Left 07/29/2020    LIGATION TRIBUTARIES LEFT RADIAL CEPHALIC AV FISTULA --ULTRASOUND AV FISTULA performed by Salo Mae MD at [address], TOTAL ABDOMINAL (CERVIX REMOVED)      IR TUNNELED CATHETER PLACEMENT GREATER THAN 5 YEARS  6/21/2022    IR TUNNELED CATHETER PLACEMENT GREATER THAN 5 YEARS 6/21/2022 Nevaeh Rivera MD [facility] SPECIAL PROCEDURES    JOINT REPLACEMENT      KNEE SURGERY Left 12/05/2018    LITHOTRIPSY      THYROIDECTOMY, PARTIAL      TONSILLECTOMY      TOTAL KNEE ARTHROPLASTY Right 01/06/2015       Family History:     Family History   Problem Relation Age of Onset    Heart Disease Mother     Diabetes Mother     Heart Disease Father     High Blood Pressure Father        Allergies:       Latex, Penicillins, Codeine, Nsaids, and Tolmetin    Medications:      Current Outpatient Medications   Medication Sig Dispense Refill    vitamin E 90 MG (200 UNIT) CAPS capsule TAKE 1 CAPSULE BY MOUTH DAILY 28 capsule 6    furosemide (LASIX) 80 MG tablet       allopurinol (ZYLOPRIM) 100 MG tablet Take 1 tablet by mouth daily 28 tablet 5    amLODIPine (NORVASC) 10 MG tablet TAKE 1 TABLET BY MOUTH DAILY 30 tablet 6    atorvastatin (LIPITOR) 40 MG tablet Take 1 tablet by mouth daily 28 tablet 2    aspirin (ASPIRIN LOW DOSE) 81 MG EC tablet TAKE 1 TABLET BY MOUTH DAILY 28 tablet 5    labetalol (NORMODYNE) 200 MG tablet Take 1 tablet by mouth 2 times daily 56 tablet 5    pantoprazole (PROTONIX) 20 MG tablet Take 1 tablet by mouth daily 28 tablet 5    tiZANidine (ZANAFLEX) 4 MG tablet TAKE 1 TABLET BY MOUTH 2 TIMES DAILY 56 tablet 5    simethicone (MYLICON) 80 MG chewable tablet Take 1 tablet by mouth 4 times daily as needed for Flatulence 180 tablet 0    docusate sodium (COLACE) 100 MG capsule Take 1 capsule by mouth 2 times daily as needed for Constipation 60 capsule 0    lidocaine-prilocaine (EMLA) Sitting, Cuff Size: Large Adult)   Resp 18   Ht 5' 3\" (1.6 m)   Wt 164 lb (74.4 kg)   BMI 29.05 kg/m²     Physical Exam  Constitutional:       Appearance: She is well-developed and well-groomed. Eyes:      Extraocular Movements: Extraocular movements intact. Conjunctiva/sclera: Conjunctivae normal.   Neck:      Vascular: No carotid bruit. Cardiovascular:      Rate and Rhythm: Normal rate and regular rhythm. Pulses:           Radial pulses are 2+ on the right side and 2+ on the left side. Arteriovenous access: Left arteriovenous access is present. Comments: Good thrill over fistula  Pulmonary:      Effort: Pulmonary effort is normal. No respiratory distress. Chest:      Comments: Tunneled hemodialysis catheter in place  Abdominal:      Palpations: Abdomen is soft. Tenderness: There is no abdominal tenderness. Musculoskeletal:      Left upper arm: No swelling or tenderness. Left forearm: Swelling and tenderness present. Left hand: No swelling or tenderness. Normal strength. Normal sensation. Normal capillary refill. Cervical back: Full passive range of motion without pain. Right lower leg: No edema. Left lower leg: No edema. Feet:      Right foot:      Skin integrity: No ulcer or skin breakdown. Left foot:      Skin integrity: No ulcer or skin breakdown. Skin:     General: Skin is warm. Capillary Refill: Capillary refill takes less than 2 seconds. Neurological:      Mental Status: She is alert and oriented to person, place, and time. GCS: GCS eye subscore is 4. GCS verbal subscore is 5. GCS motor subscore is 6. Sensory: Sensation is intact. Motor: Motor function is intact. Psychiatric:         Mood and Affect: Mood normal.         Speech: Speech normal.         Behavior: Behavior normal.         Thought Content:  Thought content normal.         Good thrill of fistula in upper arm, tortuous and hard aneurysmal changes in forearm    Imaging/Labs:     Fistulagram in June 2022 by IR, shows occluded cephalic vein in upper arm that was recanalized by IR. Assessment and Plan:     ESRD on hemodialysis, malfunctioning AV fistula  We discussed revision of her fistula and moving it to her AC fossa if vein is viable still which I think it will be based on exam and duplex performed in the office  May need to consider AV graft if vein is not viable or needs mid segment replaced  Also discussed ligating fistula at her wrist and excising the aneurysmal changes, risks of pain, infection discussed, questions answered and she consented to proceed  Will plan on doing this with MAC anesthesia and a left axillary block by anesthesia  Ok to use tunneled catheter for IV access during surgery    Electronically signed by Rm Skinner MD on 7/15/22 at 11:05 AM EDT      [address] [facility]  Office: [phone]  Cell: [phone]  Email: [email or web address]

## 2022-07-17 ASSESSMENT — ENCOUNTER SYMPTOMS
SHORTNESS OF BREATH: 0
ALLERGIC/IMMUNOLOGIC NEGATIVE: 1
COLOR CHANGE: 0
ABDOMINAL PAIN: 0
CHEST TIGHTNESS: 0

## 2022-07-22 ENCOUNTER — ANESTHESIA (OUTPATIENT)
Dept: OPERATING ROOM | Age: 62
End: 2022-07-22
Payer: MEDICARE

## 2022-07-22 ENCOUNTER — ANESTHESIA EVENT (OUTPATIENT)
Dept: OPERATING ROOM | Age: 62
End: 2022-07-22
Payer: MEDICARE

## 2022-07-22 ENCOUNTER — HOSPITAL ENCOUNTER (OUTPATIENT)
Age: 62
Setting detail: OUTPATIENT SURGERY
Discharge: HOME | End: 2022-07-22
Attending: SURGERY | Admitting: SURGERY
Payer: MEDICARE

## 2022-07-22 VITALS
BODY MASS INDEX: 29.23 KG/M2 | HEART RATE: 65 BPM | TEMPERATURE: 97.8 F | SYSTOLIC BLOOD PRESSURE: 182 MMHG | WEIGHT: 165 LBS | HEIGHT: 63 IN | RESPIRATION RATE: 18 BRPM | OXYGEN SATURATION: 97 % | DIASTOLIC BLOOD PRESSURE: 82 MMHG

## 2022-07-22 DIAGNOSIS — M79.602 PAIN OF LEFT UPPER EXTREMITY: Primary | ICD-10-CM

## 2022-07-22 LAB
GFR NON-AFRICAN AMERICAN: 9 ML/MIN
GFR SERPL CREATININE-BSD FRML MDRD: 11 ML/MIN
GFR SERPL CREATININE-BSD FRML MDRD: ABNORMAL ML/MIN/{1.73_M2}
GLUCOSE BLD-MCNC: 86 MG/DL (ref 74–100)
POC BUN: 25 MG/DL (ref 8–26)
POC CHLORIDE: 110 MMOL/L (ref 98–107)
POC CREATININE: 4.87 MG/DL (ref 0.51–1.19)
POC HEMATOCRIT: 37 % (ref 36–46)
POC HEMOGLOBIN: 12.7 G/DL (ref 12–16)
POC POTASSIUM: 4.1 MMOL/L (ref 3.5–4.5)
POC SODIUM: 146 MMOL/L (ref 138–146)

## 2022-07-22 PROCEDURE — 3700000000 HC ANESTHESIA ATTENDED CARE: Performed by: SURGERY

## 2022-07-22 PROCEDURE — 6360000002 HC RX W HCPCS: Performed by: SURGERY

## 2022-07-22 PROCEDURE — 3600000004 HC SURGERY LEVEL 4 BASE: Performed by: SURGERY

## 2022-07-22 PROCEDURE — 7100000011 HC PHASE II RECOVERY - ADDTL 15 MIN: Performed by: SURGERY

## 2022-07-22 PROCEDURE — 6360000002 HC RX W HCPCS

## 2022-07-22 PROCEDURE — 3700000001 HC ADD 15 MINUTES (ANESTHESIA): Performed by: SURGERY

## 2022-07-22 PROCEDURE — 84520 ASSAY OF UREA NITROGEN: CPT

## 2022-07-22 PROCEDURE — 7100000011 HC PHASE II RECOVERY - ADDTL 15 MIN

## 2022-07-22 PROCEDURE — 64415 NJX AA&/STRD BRCH PLXS IMG: CPT | Performed by: ANESTHESIOLOGY

## 2022-07-22 PROCEDURE — 84295 ASSAY OF SERUM SODIUM: CPT

## 2022-07-22 PROCEDURE — 82947 ASSAY GLUCOSE BLOOD QUANT: CPT

## 2022-07-22 PROCEDURE — 84132 ASSAY OF SERUM POTASSIUM: CPT

## 2022-07-22 PROCEDURE — 85014 HEMATOCRIT: CPT

## 2022-07-22 PROCEDURE — 82565 ASSAY OF CREATININE: CPT

## 2022-07-22 PROCEDURE — 2500000003 HC RX 250 WO HCPCS: Performed by: SURGERY

## 2022-07-22 PROCEDURE — 36832 AV FISTULA REVISION OPEN: CPT | Performed by: SURGERY

## 2022-07-22 PROCEDURE — 2500000003 HC RX 250 WO HCPCS

## 2022-07-22 PROCEDURE — 7100000010 HC PHASE II RECOVERY - FIRST 15 MIN

## 2022-07-22 PROCEDURE — 2500000003 HC RX 250 WO HCPCS: Performed by: ANESTHESIOLOGY

## 2022-07-22 PROCEDURE — 82435 ASSAY OF BLOOD CHLORIDE: CPT

## 2022-07-22 PROCEDURE — 6370000000 HC RX 637 (ALT 250 FOR IP): Performed by: SURGERY

## 2022-07-22 PROCEDURE — A4217 STERILE WATER/SALINE, 500 ML: HCPCS | Performed by: SURGERY

## 2022-07-22 PROCEDURE — 2580000003 HC RX 258: Performed by: SURGERY

## 2022-07-22 PROCEDURE — 3600000014 HC SURGERY LEVEL 4 ADDTL 15MIN: Performed by: SURGERY

## 2022-07-22 PROCEDURE — 7100000010 HC PHASE II RECOVERY - FIRST 15 MIN: Performed by: SURGERY

## 2022-07-22 PROCEDURE — 2709999900 HC NON-CHARGEABLE SUPPLY: Performed by: SURGERY

## 2022-07-22 RX ORDER — BUPIVACAINE HYDROCHLORIDE 5 MG/ML
INJECTION, SOLUTION EPIDURAL; INTRACAUDAL
Status: COMPLETED | OUTPATIENT
Start: 2022-07-22 | End: 2022-07-22

## 2022-07-22 RX ORDER — FENTANYL CITRATE 50 UG/ML
25 INJECTION, SOLUTION INTRAMUSCULAR; INTRAVENOUS EVERY 5 MIN PRN
Status: DISCONTINUED | OUTPATIENT
Start: 2022-07-22 | End: 2022-07-22 | Stop reason: HOSPADM

## 2022-07-22 RX ORDER — DIPHENHYDRAMINE HYDROCHLORIDE 50 MG/ML
12.5 INJECTION INTRAMUSCULAR; INTRAVENOUS
Status: DISCONTINUED | OUTPATIENT
Start: 2022-07-22 | End: 2022-07-22 | Stop reason: HOSPADM

## 2022-07-22 RX ORDER — GLYCOPYRROLATE 0.2 MG/ML
INJECTION INTRAMUSCULAR; INTRAVENOUS PRN
Status: DISCONTINUED | OUTPATIENT
Start: 2022-07-22 | End: 2022-07-22 | Stop reason: SDUPTHER

## 2022-07-22 RX ORDER — LIDOCAINE HYDROCHLORIDE 10 MG/ML
INJECTION, SOLUTION EPIDURAL; INFILTRATION; INTRACAUDAL; PERINEURAL PRN
Status: DISCONTINUED | OUTPATIENT
Start: 2022-07-22 | End: 2022-07-22 | Stop reason: ALTCHOICE

## 2022-07-22 RX ORDER — BUPIVACAINE HYDROCHLORIDE 5 MG/ML
INJECTION, SOLUTION PERINEURAL
Status: COMPLETED
Start: 2022-07-22 | End: 2022-07-22

## 2022-07-22 RX ORDER — PROPOFOL 10 MG/ML
INJECTION, EMULSION INTRAVENOUS
Status: COMPLETED
Start: 2022-07-22 | End: 2022-07-22

## 2022-07-22 RX ORDER — SODIUM CHLORIDE 9 MG/ML
INJECTION, SOLUTION INTRAVENOUS CONTINUOUS
Status: DISCONTINUED | OUTPATIENT
Start: 2022-07-22 | End: 2022-07-22 | Stop reason: HOSPADM

## 2022-07-22 RX ORDER — CLINDAMYCIN PHOSPHATE 900 MG/50ML
INJECTION INTRAVENOUS PRN
Status: DISCONTINUED | OUTPATIENT
Start: 2022-07-22 | End: 2022-07-22 | Stop reason: SDUPTHER

## 2022-07-22 RX ORDER — LORAZEPAM 2 MG/ML
0.5 INJECTION INTRAMUSCULAR
Status: DISCONTINUED | OUTPATIENT
Start: 2022-07-22 | End: 2022-07-22 | Stop reason: RX

## 2022-07-22 RX ORDER — HEPARIN SODIUM 1000 [USP'U]/ML
1600 INJECTION, SOLUTION INTRAVENOUS; SUBCUTANEOUS ONCE
Status: COMPLETED | OUTPATIENT
Start: 2022-07-22 | End: 2022-07-22

## 2022-07-22 RX ORDER — MAGNESIUM HYDROXIDE 1200 MG/15ML
LIQUID ORAL CONTINUOUS PRN
Status: COMPLETED | OUTPATIENT
Start: 2022-07-22 | End: 2022-07-22

## 2022-07-22 RX ORDER — CLINDAMYCIN PHOSPHATE 900 MG/50ML
INJECTION INTRAVENOUS
Status: COMPLETED
Start: 2022-07-22 | End: 2022-07-22

## 2022-07-22 RX ORDER — OXYCODONE HYDROCHLORIDE AND ACETAMINOPHEN 5; 325 MG/1; MG/1
1 TABLET ORAL EVERY 6 HOURS PRN
Qty: 20 TABLET | Refills: 0 | Status: SHIPPED | OUTPATIENT
Start: 2022-07-22 | End: 2022-07-27

## 2022-07-22 RX ORDER — LABETALOL HYDROCHLORIDE 5 MG/ML
10 INJECTION, SOLUTION INTRAVENOUS
Status: DISCONTINUED | OUTPATIENT
Start: 2022-07-22 | End: 2022-07-22 | Stop reason: HOSPADM

## 2022-07-22 RX ORDER — PROCHLORPERAZINE EDISYLATE 5 MG/ML
5 INJECTION INTRAMUSCULAR; INTRAVENOUS
Status: DISCONTINUED | OUTPATIENT
Start: 2022-07-22 | End: 2022-07-22 | Stop reason: HOSPADM

## 2022-07-22 RX ORDER — SODIUM CHLORIDE 0.9 % (FLUSH) 0.9 %
5-40 SYRINGE (ML) INJECTION EVERY 12 HOURS SCHEDULED
Status: DISCONTINUED | OUTPATIENT
Start: 2022-07-22 | End: 2022-07-22 | Stop reason: HOSPADM

## 2022-07-22 RX ORDER — FENTANYL CITRATE 50 UG/ML
INJECTION, SOLUTION INTRAMUSCULAR; INTRAVENOUS PRN
Status: DISCONTINUED | OUTPATIENT
Start: 2022-07-22 | End: 2022-07-22 | Stop reason: SDUPTHER

## 2022-07-22 RX ORDER — IPRATROPIUM BROMIDE AND ALBUTEROL SULFATE 2.5; .5 MG/3ML; MG/3ML
1 SOLUTION RESPIRATORY (INHALATION)
Status: DISCONTINUED | OUTPATIENT
Start: 2022-07-22 | End: 2022-07-22 | Stop reason: HOSPADM

## 2022-07-22 RX ORDER — PROPOFOL 10 MG/ML
INJECTION, EMULSION INTRAVENOUS CONTINUOUS PRN
Status: DISCONTINUED | OUTPATIENT
Start: 2022-07-22 | End: 2022-07-22 | Stop reason: SDUPTHER

## 2022-07-22 RX ORDER — SODIUM CHLORIDE 9 MG/ML
INJECTION, SOLUTION INTRAVENOUS PRN
Status: DISCONTINUED | OUTPATIENT
Start: 2022-07-22 | End: 2022-07-22 | Stop reason: HOSPADM

## 2022-07-22 RX ORDER — MIDAZOLAM HYDROCHLORIDE 1 MG/ML
INJECTION INTRAMUSCULAR; INTRAVENOUS PRN
Status: DISCONTINUED | OUTPATIENT
Start: 2022-07-22 | End: 2022-07-22 | Stop reason: SDUPTHER

## 2022-07-22 RX ORDER — SODIUM CHLORIDE 0.9 % (FLUSH) 0.9 %
5-40 SYRINGE (ML) INJECTION PRN
Status: DISCONTINUED | OUTPATIENT
Start: 2022-07-22 | End: 2022-07-22 | Stop reason: HOSPADM

## 2022-07-22 RX ORDER — HEPARIN SODIUM 1000 [USP'U]/ML
INJECTION, SOLUTION INTRAVENOUS; SUBCUTANEOUS PRN
Status: DISCONTINUED | OUTPATIENT
Start: 2022-07-22 | End: 2022-07-22 | Stop reason: SDUPTHER

## 2022-07-22 RX ORDER — DROPERIDOL 2.5 MG/ML
0.62 INJECTION, SOLUTION INTRAMUSCULAR; INTRAVENOUS
Status: DISCONTINUED | OUTPATIENT
Start: 2022-07-22 | End: 2022-07-22 | Stop reason: HOSPADM

## 2022-07-22 RX ADMIN — FENTANYL CITRATE 100 MCG: 50 INJECTION, SOLUTION INTRAMUSCULAR; INTRAVENOUS at 09:23

## 2022-07-22 RX ADMIN — HEPARIN SODIUM 5000 UNITS: 1000 INJECTION, SOLUTION INTRAVENOUS; SUBCUTANEOUS at 10:00

## 2022-07-22 RX ADMIN — MIDAZOLAM HYDROCHLORIDE 2 MG: 2 INJECTION, SOLUTION INTRAMUSCULAR; INTRAVENOUS at 09:12

## 2022-07-22 RX ADMIN — GLYCOPYRROLATE 0.2 MG: 0.2 INJECTION INTRAMUSCULAR; INTRAVENOUS at 09:52

## 2022-07-22 RX ADMIN — BUPIVACAINE HYDROCHLORIDE 40 ML: 5 INJECTION, SOLUTION EPIDURAL; INTRACAUDAL; PERINEURAL at 09:07

## 2022-07-22 RX ADMIN — SODIUM CHLORIDE: 9 INJECTION, SOLUTION INTRAVENOUS at 08:49

## 2022-07-22 RX ADMIN — HEPARIN SODIUM 1600 UNITS: 1000 INJECTION INTRAVENOUS; SUBCUTANEOUS at 12:25

## 2022-07-22 RX ADMIN — CLINDAMYCIN IN 5 PERCENT DEXTROSE 900 MG: 18 INJECTION, SOLUTION INTRAVENOUS at 09:34

## 2022-07-22 RX ADMIN — PROPOFOL 150 MCG/KG/MIN: 10 INJECTION, EMULSION INTRAVENOUS at 09:29

## 2022-07-22 NOTE — OP NOTE
Operative Note      Patient: rFanklin Causey  YOB: 1960  MRN: [id number]    Date of Procedure: 7/22/2022    Pre-Op Diagnosis: MALFUNCTIONING left upper extremity AV fistula    Post-Op Diagnosis: Same       Procedure:  1) Left upper extremity AV fistula revision  2) Left upper extremity AV fistula ligation    Surgeon(s): Yolie Terry MD    Assistant: Dr. Damien Llamas    Anesthesia: Monitor Anesthesia Care, local with axillary block    Estimated Blood Loss (mL): 21 ml    Complications: None    Specimens: none    Implants: none      Drains: None    Findings: There is an excellent thrill up the fistula in the upper arm, palpable radial artery pulse. Aneurysmal segments of fistula in forearm with some clotted blood, not as distended once fistula revised and ligated. Detailed Description of Procedure:     Mrs. Yesenia Nolen was brought to the operating room for revision of her left upper extremity AV fistula. After appropriate anesthesia delivered, the left upper extremity was prepped and draped in standard sterile fashion. Timeout performed and agreed upon, antibiotics given during this period. I began by marking out the cephalic vein in the upper arm and antecubital fossa. After local anesthesia delivered, small transverse incision created in the antecubital fossa. Cautery used to get through the subcutaneous tissue down to the cephalic vein and branch network to deep system at the antecubital level. Sever centimeters of the cephalic vein was dissected free. Next small longitudinal incision created at the wrist level over the radial artery to cephalic vein anastomosis. Cautery utilized to get through the scar tissue. The proximal anastomosis was identified and freed up. The proximal segment of the fistula was then ligated with a pair of 2-0 silk ties. This caused decompression of the fistula and aneurysmal segments of the fistula.   Next the cephalic vein was ligated at the antecubital level incision and transected. The distal end controlled with yazagil clip. The deep branch was ligated with silk ties and divided. The brachial artery had been dissected free for several centimeters and vessel loops placed to assist with control. 5000 units of heparin given and allowed to circulate. The brachial artery was controlled with the vessel loops and an arteriotomy created, no longer then 4 mm. The cephalic vein was trimmed and spatulated. An end to side anastomosis was performed with 7-0 proline, prior to completion usual flushing maneuvers were performed and the anastomosis was hemostatic. There was an excellent thrill over the fistula in the upper arm and a good radial artery pulse distal to the fistula ligation at the wrist.  Wound beds irrigated and dried. The aneurysmal segments in the forearm did not fill much with blood and ysabel clotting was noted. Decision made to leave these alone and not perform excision. The two incisions were then closed with layers of interrupted vicryl and monocryl. Steristrip and Band-Aid applied, along with compressive ace wrap along the forearm. Patient tolerated procedure well and was brought to the recovery room, hemodynamically stable condition.     Electronically signed by Jackolyn Angelucci, MD on 7/22/2022 at 11:01 AM

## 2022-07-22 NOTE — PROGRESS NOTES
Phase 2 initiated / pars 8 as patient returns to [facility] room 6 sleepy but arousable. Handoff at bedside with anesthesia. Left arm with ace wrap dry and secure with thrill and bruit present to upper arm with band aid dry and secure to left ac. Assessment further reviewed. Writer at bedside.

## 2022-07-22 NOTE — ANESTHESIA PRE PROCEDURE
Department of Anesthesiology  Preprocedure Note       Name:  Lee Francisco   Age:  65 y.o.  :  1960                                          MRN:  [id number]         Date:  2022      Surgeon: Mini Ledesma): Fran Lopes MD    Procedure: Procedure(s):  REVISION OF UPPER EXTREMITY AV FISTULA ( UPPER EXTREMITY NERVE BLOCK)    Medications prior to admission:   Prior to Admission medications    Medication Sig Start Date End Date Taking?  Authorizing Provider   vitamin E 90 MG (200 UNIT) CAPS capsule TAKE 1 CAPSULE BY MOUTH DAILY 22   Fabian Hodge MD   furosemide (LASIX) 80 MG tablet  22   Historical Provider, MD   Lancets MISC 1 each by Does not apply route daily 22  NAV Lopez CNP   allopurinol (ZYLOPRIM) 100 MG tablet Take 1 tablet by mouth daily 22   NAV Lopez CNP   amLODIPine (NORVASC) 10 MG tablet TAKE 1 TABLET BY MOUTH DAILY 22   NAV Lopez CNP   atorvastatin (LIPITOR) 40 MG tablet Take 1 tablet by mouth daily 22   NAV Lopez CNP   aspirin (ASPIRIN LOW DOSE) 81 MG EC tablet TAKE 1 TABLET BY MOUTH DAILY 22   NAV Lopez CNP   labetalol (NORMODYNE) 200 MG tablet Take 1 tablet by mouth 2 times daily 22   NAV Lopez CNP   pantoprazole (PROTONIX) 20 MG tablet Take 1 tablet by mouth daily 22   NAV Lopez CNP   tiZANidine (ZANAFLEX) 4 MG tablet TAKE 1 TABLET BY MOUTH 2 TIMES DAILY 3/15/22   NAV Lopez CNP   simethicone (MYLICON) 80 MG chewable tablet Take 1 tablet by mouth 4 times daily as needed for Flatulence 21   NAV Lopez CNP   docusate sodium (COLACE) 100 MG capsule Take 1 capsule by mouth 2 times daily as needed for Constipation 21   NAV Lopez CNP   lidocaine-prilocaine (EMLA) 2.5-2.5 % cream APPLY 3 TIMES WEEKLY 30-60 MINUTES BEFORE DIALYSIS WRAP WITH PLASTIC WRAP AS DIRECTED 11/3/21   Raffaele Iwona Duncan MD   hydrALAZINE (APRESOLINE) 50 MG tablet Take 50 mg by mouth 3 times daily     Historical Provider, MD   Multiple Vitamins-Minerals (MULTIVITAMIN ADULT EXTRA C) CHEW Take 1 tablet by mouth daily  Patient not taking: Reported on 7/22/2022 8/24/21   NAV Wu CNP   lidocaine (XYLOCAINE) 5 % ointment Apply topically BID as needed. 8/24/21   NAV Wu CNP   Transparent Dressings (TEGADERM ABSORBENT DRESSING) [facility] Provide insurance covered tegaderm dressing, use at hemodialysis 11/5/20   Nara Shaw PA-C   acetaminophen (TYLENOL) 500 MG tablet Take 1,000 mg by mouth every 6 hours as needed for Pain    Historical Provider, MD   TRUE METRIX BLOOD GLUCOSE TEST strip  6/1/18   Historical Provider, MD   calcium citrate-vitamin D (CITRACEL+D) 200-250 MG-UNIT TABS per tablet TAKE 2 TABLETS THREE TIMES A DAY 4/7/18   Historical Provider, MD       Current medications:    Current Facility-Administered Medications   Medication Dose Route Frequency Provider Last Rate Last Admin    propofol 1000 MG/100ML injection             0.9 % sodium chloride infusion   IntraVENous Continuous Heather Marquez MD 10 mL/hr at 07/22/22 0849 New Bag at 07/22/22 0849       Allergies: Allergies   Allergen Reactions    Latex Hives    Penicillins Hives    Codeine Itching    Nsaids Other (See Comments)     Gastric Bypass Surgery     Tolmetin Other (See Comments)     Gastric Bypass Surgery        Problem List:    Patient Active Problem List   Diagnosis Code    Hyperlipidemia E78.5    Primary osteoarthritis of left knee M17.12    Microalbuminuria R80.9    S/P gastric bypass Z98.84    Anemia D64.9    S/P total knee arthroplasty Z96.659    Hyperuricemia E79.0    Hydronephrosis of left kidney N13.30    Renal atrophy, right N26.1    Localized, primary osteoarthritis of hand M19.049    Essential hypertension I10    Chronic kidney disease N18.9    Motion sickness T75. 3XXA    Slow transit constipation K59.01    Chest pain R07.9    Aortic dissection ([facility]) I71.00    Respiratory acidosis E87.2    Subacromial impingement M75.40    Acute on chronic diastolic (congestive) heart failure ([facility]) I50.33    Moderate mitral regurgitation I34.0    Hypertensive emergency I16.1    Acute respiratory failure with hypoxia ([facility] J96.01    Former smoker Z87.891    Acute pulmonary edema (HCC) J81.0    ESRD on hemodialysis ([facility]) N18.6, Z99.2    Chronic pain of left knee M25.562, G89.29    Neuropathic pain M79.2    Gastroesophageal reflux disease K21.9    Problem with vascular access Z78.9       Past Medical History:        Diagnosis Date    CKD (chronic kidney disease) stage 3, GFR 30-59 ml/min ([facility])     per pt, caused by kid stone damage    Gout     Hemodialysis patient ([facility] 10/21/2019    Hyperlipidemia     Hypertension     Kidney stone     Nephrolithiasis     Osteoarthritis     Type II or unspecified type diabetes mellitus without mention of complication, not stated as uncontrolled     patient denies       Past Surgical History:        Procedure Laterality Date    CARDIAC CATHETERIZATION  10/21/2019    DIALYSIS FISTULA CREATION  11/06/2019    LEFT ARM AV FISTULA CREATION (Left Arm Lower)    DIALYSIS FISTULA CREATION Left 11/06/2019    LEFT ARM AV FISTULA CREATION performed by Paula Jon MD at [address] Left 07/29/2020    LIGATION TRIBUTARIES LEFT RADIAL CEPHALIC AV FISTULA --ULTRASOUND AV FISTULA performed by Paula Jon MD at [address], TOTAL ABDOMINAL (CERVIX REMOVED)      IR TUNNELED CATHETER PLACEMENT GREATER THAN 5 YEARS  06/21/2022    IR TUNNELED CATHETER PLACEMENT GREATER THAN 5 YEARS 6/21/2022 Urbano Madera MD [facility] SPECIAL PROCEDURES    JOINT REPLACEMENT      KNEE SURGERY Left 12/05/2018    LITHOTRIPSY      THYROIDECTOMY, PARTIAL      TONSILLECTOMY      TOTAL KNEE ARTHROPLASTY Right CALCIUM 9.2 06/20/2022 12:30 PM    BILITOT 0.36 10/22/2019 12:35 PM    ALKPHOS 100 10/22/2019 12:35 PM    AST 30 10/22/2019 12:35 PM    ALT 22 10/22/2019 12:35 PM       POC Tests:   Recent Labs     07/22/22  0829   POCGLU 86   POCNA 146   POCK 4.1   POCCL 110*   POCBUN 25   POCHEMO 12.7   POCHCT 37       Coags:   Lab Results   Component Value Date/Time    PROTIME 10.3 06/20/2022 12:30 PM    INR 1.0 06/20/2022 12:30 PM    APTT 28.0 06/20/2022 12:30 PM       HCG (If Applicable): No results found for: PREGTESTUR, PREGSERUM, HCG, HCGQUANT     ABGs: No results found for: PHART, PO2ART, NZO0CAR, EET9CPJ, BEART, D6IZBGPJ     Type & Screen (If Applicable):  No results found for: LABABO, LABRH    Drug/Infectious Status (If Applicable):  Lab Results   Component Value Date/Time    HEPCAB NONREACTIVE 10/22/2019 12:35 PM       COVID-19 Screening (If Applicable):   Lab Results   Component Value Date/Time    COVID19 Not Detected 09/27/2021 07:44 PM    COVID19 Not Detected 07/25/2020 04:21 PM    COVID19 Not Detected 06/18/2020 11:00 AM           Anesthesia Evaluation  Patient summary reviewed and Nursing notes reviewed  Airway: Mallampati: II          Dental:    (+) upper dentures      Pulmonary:Negative Pulmonary ROS breath sounds clear to auscultation                             Cardiovascular:  Exercise tolerance: poor (<4 METS),   (+) hypertension:, CHF: systolic,         Rhythm: regular  Rate: normal                    Neuro/Psych:               GI/Hepatic/Renal:   (+) GERD:, renal disease: kidney stones and ESRD,           Endo/Other:                     Abdominal:   (+) obese,           Vascular: Other Findings:           Anesthesia Plan      MAC     ASA 3       Induction: intravenous. MIPS: Postoperative opioids intended and Postoperative trial extubation. Anesthetic plan and risks discussed with patient. Use of blood products discussed with whom consented to blood products.    Plan discussed with Riddhi Porras MD   7/22/2022

## 2022-07-22 NOTE — DISCHARGE INSTRUCTIONS
Discharge Instructions:    Diet:   You may resume a regular diet. Wound Care:   Leaver steri strips in place can remove band aids after 48 hours. You may shower, but do not scrub the incision sites directly or soak (tub, pool, etc.). Keep wrapped when not showering. IN the first 24 hours use ice to help with swelling of forearm. After 24 hours use heat to help soften. Dialysis:  Continue dialysis through tunneled catheter until Federica Zarina by Dr. Shira Hair    Reasons to Return:   Some soreness and redness is common after surgery, especially for the first 24-48 hours. If, however, you experience for increasing redness, worsening pain, new and/or increasing drainage from wound, fever above 101.5 degrees Farenheit, bleeding that does not stop soon after discovery, or any other concerns about your incision or post op course, please either call the office or call/return to the Emergency Department for further evaluation. Follow up with Dr. Shira Hair in 2 weeks   SEDATION / ANALGESIA INFORMATION / [address] have received the sedation/analgesia medication during your visit    Sedation/analgesia is used during short medical procedures under controlled supervision. The medication will produce a strong relaxation. You will be able to hear, speak and follow instructions, but your memory and alertness will be decreased. You will be able to swallow and breathe on your own. During sedation/analgesia your blood pressure, heart and breathing will be watched closely. After the procedure, you may not remember what was said or done. You may have the following effects from the medication. \" Drowsiness, dizziness, sleepiness or confusion. \" Difficulty remembering or delayed reaction times. \" Loss of fine muscle control or difficulty with your balance especially while walking. \" Difficulty focusing or blurred vision.   You may not be aware of slight changes in your behavior and/or your reaction time because of the medication used during the procedure. Therefore you should follow these instructions. \" Have someone responsible help you with your care. \" Do not drive for 24 hours. \" Do not operate equipment for 24 hours (lawnmowers, power tools, kitchen accessories, stove). \" Do not drink any alcoholic beverages for a minimum of 24 hours. \" Do not make important personal, legal or business decisions for 24 hours. \" You may experience dizziness or lightheadedness. Move slowly and carefully, do not make sudden position changes. \" Drink extra amounts of fluids today. \" Increase your diet as tolerated (unless you have received specific instructions from your doctor). \" If you feel nauseated, continue with liquids until the nausea is gone. \" Notify your physician if you have not urinated within 8 hours after the procedure. \" Resume your medications unless otherwise instructed.

## 2022-07-22 NOTE — ANESTHESIA PROCEDURE NOTES
Peripheral Block    Patient location during procedure: OR  Reason for block: procedure for pain and post-op pain management  Start time: 7/22/2022 9:07 AM  End time: 7/22/2022 9:14 AM  Staffing  Anesthesiologist: Moncho Mayorga MD  Preanesthetic Checklist  Completed: patient identified, IV checked, site marked, risks and benefits discussed, surgical/procedural consents, equipment checked, pre-op evaluation, timeout performed, anesthesia consent given, oxygen available, monitors applied/VS acknowledged, fire risk safety assessment completed and verbalized and blood product R/B/A discussed and consented  Peripheral Block   Patient position: supine  Prep: ChloraPrep  Provider prep: mask, sterile gloves and sterile gown  Patient monitoring: cardiac monitor, continuous pulse ox, continuous capnometry, frequent blood pressure checks, IV access, oxygen and responsive to questions  Block type: Brachial plexus and Axillary  Laterality: left  Injection technique: single-shot  Guidance: ultrasound guided    Needle   Needle type: insulated echogenic nerve stimulator needle   Needle gauge: 20 G  Needle localization: anatomical landmarks and ultrasound guidance  Needle insertion depth: 6 cm  Needle length: 10 cm  Assessment   Injection assessment: negative aspiration for heme, no paresthesia on injection, local visualized surrounding nerve on ultrasound and no intravascular symptoms  Paresthesia pain: none  Slow fractionated injection: yes  Hemodynamics: stable  Real-time US image taken/store: yes  Outcomes: uncomplicated    Medications Administered  bupivacaine (PF) 0.5 % - Perineural   40 mL - 7/22/2022 9:07:00 AM

## 2022-07-22 NOTE — PROGRESS NOTES
All discharge instructions reviewed, questions answered, paper signed and given copy. Patient discharged per w/c with family and belongings.

## 2022-07-22 NOTE — PROGRESS NOTES
Patient admitted, consent signature verified and questions answered. Patient ready for procedure. Call light to reach with side rails up 2 of 2. Family at bedside with patient. History and physical completed. 2% Chlorhexidine cloths used to prep site

## 2022-07-22 NOTE — H&P
Update History & Physical    The patient's History and Physical of July 15, 2022 was reviewed with the patient and I examined the patient. There was no change. The surgical site was confirmed by the patient and me. Plan: The risks, benefits, expected outcome, and alternative to the recommended procedure have been discussed with the patient. Patient understands and wants to proceed with the procedure. Electronically signed by Maricruz Gonzalez DO on 7/22/2022 at 9:24 AM           Division of Vascular Surgery          New Consult        Physician Requesting Consult:  Dr. Marleny Laar     Reason for Consult:   Malfunctioning dialysis access     Chief Complaint:      Painful aneurysmal changes and malfunctioning AV fistula     History of Present Illness:      Jad Blake is a 65 y.o. woman who presents for second opinion regarding her dialysis access. She had a left radial cephalic fistula created by Dr. Lilian Pereira (11/6/19) along with revision with branch ligation (7/29/20). There have been issues cannulating and prolong bleeding due to outflow issues. Most recently she underwent fistulagram in June 2022 by IR where they were able to re-cannulate her occluded cephalic vein in the upper arm, she also had tunneled catheter placed so she could undergo dialysis as well due to swelling and hematoma from her access site a few days later. She is right handed and has been having issues with her access for some time. The aneurysmal changes cause her significant discomfort. She has pain intermittently along her left thumb. Denies weakness or numbness in her hand to suggest ischemic steal syndrome.      Medical History:      Past Medical History        Past Medical History:   Diagnosis Date    CKD (chronic kidney disease) stage 3, GFR 30-59 ml/min (HCC)       per pt, caused by kid stone damage    Gout      Hemodialysis patient ([facility] [address] 10/21/2019    Hyperlipidemia      Hypertension      Kidney stone      Nephrolithiasis      Osteoarthritis      Type II or unspecified type diabetes mellitus without mention of complication, not stated as uncontrolled       patient denies      Right femoral endarterectomy, TEVAR (2018 for intramural hematoma, penetrating aortic ulcer)     Surgical History:      Past Surgical History         Past Surgical History:   Procedure Laterality Date    CARDIAC CATHETERIZATION   10/21/2019    DIALYSIS FISTULA CREATION   11/06/2019     LEFT ARM AV FISTULA CREATION (Left Arm Lower)    DIALYSIS FISTULA CREATION Left 11/06/2019     LEFT ARM AV FISTULA CREATION performed by Herman Williamson MD at [facility] Left 07/29/2020     LIGATION TRIBUTARIES LEFT RADIAL CEPHALIC AV FISTULA --ULTRASOUND AV FISTULA performed by Herman Williamson MD at [address] [place] [address] Ave, TOTAL ABDOMINAL (CERVIX REMOVED)        IR TUNNELED CATHETER PLACEMENT GREATER THAN 5 YEARS   6/21/2022     IR TUNNELED CATHETER PLACEMENT GREATER THAN 5 YEARS 6/21/2022 Yael Robbins MD [facility] SPECIAL PROCEDURES    JOINT REPLACEMENT        KNEE SURGERY Left 12/05/2018    LITHOTRIPSY        THYROIDECTOMY, PARTIAL        TONSILLECTOMY        TOTAL KNEE ARTHROPLASTY Right 01/06/2015            Family History:      Family History         Family History   Problem Relation Age of Onset    Heart Disease Mother      Diabetes Mother      Heart Disease Father      High Blood Pressure Father              Allergies:       Latex, Penicillins, Codeine, Nsaids, and Tolmetin     Medications:      Current Facility-Administered Medications          Current Outpatient Medications   Medication Sig Dispense Refill    vitamin E 90 MG (200 UNIT) CAPS capsule TAKE 1 CAPSULE BY MOUTH DAILY 28 capsule 6    furosemide (LASIX) 80 MG tablet          allopurinol (ZYLOPRIM) 100 MG tablet Take 1 tablet by mouth daily 28 tablet 5    amLODIPine (NORVASC) 10 MG tablet TAKE 1 TABLET BY MOUTH DAILY 30 tablet 6    atorvastatin (LIPITOR) 40 MG tablet Take 1 tablet by mouth daily 28 tablet 2    aspirin (ASPIRIN LOW DOSE) 81 MG EC tablet TAKE 1 TABLET BY MOUTH DAILY 28 tablet 5    labetalol (NORMODYNE) 200 MG tablet Take 1 tablet by mouth 2 times daily 56 tablet 5    pantoprazole (PROTONIX) 20 MG tablet Take 1 tablet by mouth daily 28 tablet 5    tiZANidine (ZANAFLEX) 4 MG tablet TAKE 1 TABLET BY MOUTH 2 TIMES DAILY 56 tablet 5    simethicone (MYLICON) 80 MG chewable tablet Take 1 tablet by mouth 4 times daily as needed for Flatulence 180 tablet 0    docusate sodium (COLACE) 100 MG capsule Take 1 capsule by mouth 2 times daily as needed for Constipation 60 capsule 0    lidocaine-prilocaine (EMLA) 2.5-2.5 % cream APPLY 3 TIMES WEEKLY 30-60 MINUTES BEFORE DIALYSIS WRAP WITH PLASTIC WRAP AS DIRECTED 30 g 3    hydrALAZINE (APRESOLINE) 50 MG tablet Take 50 mg by mouth 3 times daily        Multiple Vitamins-Minerals (MULTIVITAMIN ADULT EXTRA C) CHEW Take 1 tablet by mouth daily 30 tablet 5    lidocaine (XYLOCAINE) 5 % ointment Apply topically BID as needed. 240 g 1    Transparent Dressings (TEGADERM ABSORBENT DRESSING) MISC Provide insurance covered tegaderm dressing, use at hemodialysis 100 each 5    acetaminophen (TYLENOL) 500 MG tablet Take 1,000 mg by mouth every 6 hours as needed for Pain        TRUE METRIX BLOOD GLUCOSE TEST strip          calcium citrate-vitamin D (CITRACEL+D) 200-250 MG-UNIT TABS per tablet TAKE 2 TABLETS THREE TIMES A DAY        Lancets MISC 1 each by Does not apply route daily 300 each 5      No current facility-administered medications for this visit. Social History:      Tobacco:    reports that she quit smoking about 21 years ago. Her smoking use included cigarettes. She has a 30.00 pack-year smoking history. She has never used smokeless tobacco.  Alcohol:      reports no history of alcohol use. Drug Use:  reports no history of drug use.      Review of Systems: Review of Systems  Constitutional:  Negative for chills and fever. HENT:  Negative for congestion. Eyes:  Negative for visual disturbance. Respiratory:  Negative for chest tightness and shortness of breath. Cardiovascular:  Negative for chest pain and leg swelling. Gastrointestinal:  Negative for abdominal pain. Endocrine: Negative. Genitourinary: Negative. Musculoskeletal:  Positive for arthralgias. Skin:  Negative for color change and wound. Allergic/Immunologic: Negative. Neurological:  Negative for facial asymmetry, speech difficulty, weakness and numbness. Hematological: Negative. Psychiatric/Behavioral: Negative. Physical Exam:      Vitals:  BP (!) 180/86 (Site: Right Upper Arm, Position: Sitting, Cuff Size: Large Adult)   Resp 18   Ht 5' 3\" (1.6 m)   Wt 164 lb (74.4 kg)   BMI 29.05 kg/m²      Physical Exam  Constitutional:       Appearance: She is well-developed and well-groomed. Eyes:     Extraocular Movements: Extraocular movements intact. Conjunctiva/sclera: Conjunctivae normal.  Neck:     Vascular: No carotid bruit. Cardiovascular:     Rate and Rhythm: Normal rate and regular rhythm. Pulses:           Radial pulses are 2+ on the right side and 2+ on the left side. Arteriovenous access: Left arteriovenous access is present. Comments: Good thrill over fistula  Pulmonary:     Effort: Pulmonary effort is normal. No respiratory distress. Chest:     Comments: Tunneled hemodialysis catheter in place  Abdominal:     Palpations: Abdomen is soft. Tenderness: There is no abdominal tenderness. Musculoskeletal:     Left upper arm: No swelling or tenderness. Left forearm: Swelling and tenderness present. Left hand: No swelling or tenderness. Normal strength. Normal sensation. Normal capillary refill. Cervical back: Full passive range of motion without pain. Right lower leg: No edema. Left lower leg: No edema.   Feet:     Right foot: Skin integrity: No ulcer or skin breakdown. Left foot:     Skin integrity: No ulcer or skin breakdown. Skin:     General: Skin is warm. Capillary Refill: Capillary refill takes less than 2 seconds. Neurological:     Mental Status: She is alert and oriented to person, place, and time. GCS: GCS eye subscore is 4. GCS verbal subscore is 5. GCS motor subscore is 6. Sensory: Sensation is intact. Motor: Motor function is intact. Psychiatric:         Mood and Affect: Mood normal.         Speech: Speech normal.         Behavior: Behavior normal.         Thought Content: Thought content normal.          Good thrill of fistula in upper arm, tortuous and hard aneurysmal changes in forearm     Imaging/Labs:      Fistulagram in June 2022 by IR, shows occluded cephalic vein in upper arm that was recanalized by IR.                               Assessment and Plan:      ESRD on hemodialysis, malfunctioning AV fistula  We discussed revision of her fistula and moving it to her AC fossa if vein is viable still which I think it will be based on exam and duplex performed in the office  May need to consider AV graft if vein is not viable or needs mid segment replaced  Also discussed ligating fistula at her wrist and excising the aneurysmal changes, risks of pain, infection discussed, questions answered and she consented to proceed  Will plan on doing this with MAC anesthesia and a left axillary block by anesthesia  Ok to use tunneled catheter for IV access during surgery

## 2022-08-01 DIAGNOSIS — I10 ESSENTIAL HYPERTENSION: ICD-10-CM

## 2022-08-01 DIAGNOSIS — Z76.0 MEDICATION REFILL: ICD-10-CM

## 2022-08-01 RX ORDER — ATORVASTATIN CALCIUM 40 MG/1
40 TABLET, FILM COATED ORAL DAILY
Qty: 28 TABLET | Refills: 2 | Status: SHIPPED | OUTPATIENT
Start: 2022-08-01

## 2022-08-01 NOTE — TELEPHONE ENCOUNTER
S/P total knee arthroplasty     Hyperuricemia     Hydronephrosis of left kidney     Renal atrophy, right     Localized, primary osteoarthritis of hand     Essential hypertension     Chronic kidney disease     Motion sickness     Slow transit constipation     Chest pain     Aortic dissection (HCC)     Respiratory acidosis     Subacromial impingement     Acute on chronic diastolic (congestive) heart failure (HCC)     Moderate mitral regurgitation     Hypertensive emergency     Acute respiratory failure with hypoxia [facility])     Former smoker     Acute pulmonary edema (HCC)     ESRD on hemodialysis ([facility] [address]     Chronic pain of left knee     Neuropathic pain     Gastroesophageal reflux disease     Problem with vascular access

## 2022-08-11 ENCOUNTER — OFFICE VISIT (OUTPATIENT)
Dept: PRIMARY CARE CLINIC | Age: 62
End: 2022-08-11
Payer: MEDICARE

## 2022-08-11 VITALS
HEART RATE: 67 BPM | DIASTOLIC BLOOD PRESSURE: 70 MMHG | OXYGEN SATURATION: 96 % | SYSTOLIC BLOOD PRESSURE: 134 MMHG | RESPIRATION RATE: 16 BRPM | HEIGHT: 63 IN | WEIGHT: 163.2 LBS | BODY MASS INDEX: 28.92 KG/M2

## 2022-08-11 DIAGNOSIS — I10 ESSENTIAL HYPERTENSION: ICD-10-CM

## 2022-08-11 DIAGNOSIS — Z76.89 ENCOUNTER TO ESTABLISH CARE: Primary | ICD-10-CM

## 2022-08-11 DIAGNOSIS — I71.019 DISSECTION OF THORACIC AORTA: ICD-10-CM

## 2022-08-11 DIAGNOSIS — N18.6 ESRD ON HEMODIALYSIS (HCC): ICD-10-CM

## 2022-08-11 DIAGNOSIS — E78.2 MIXED HYPERLIPIDEMIA: ICD-10-CM

## 2022-08-11 DIAGNOSIS — R73.03 PREDIABETES: ICD-10-CM

## 2022-08-11 DIAGNOSIS — Z99.2 ESRD ON HEMODIALYSIS (HCC): ICD-10-CM

## 2022-08-11 DIAGNOSIS — I50.33 ACUTE ON CHRONIC DIASTOLIC (CONGESTIVE) HEART FAILURE (HCC): ICD-10-CM

## 2022-08-11 DIAGNOSIS — R25.2 LEG CRAMPS: ICD-10-CM

## 2022-08-11 PROBLEM — J81.0 ACUTE PULMONARY EDEMA (HCC): Status: RESOLVED | Noted: 2019-10-21 | Resolved: 2022-08-11

## 2022-08-11 PROBLEM — J96.01 ACUTE RESPIRATORY FAILURE WITH HYPOXIA (HCC): Status: RESOLVED | Noted: 2019-10-17 | Resolved: 2022-08-11

## 2022-08-11 LAB — HBA1C MFR BLD: 5.1 %

## 2022-08-11 PROCEDURE — 83036 HEMOGLOBIN GLYCOSYLATED A1C: CPT | Performed by: NURSE PRACTITIONER

## 2022-08-11 PROCEDURE — 3017F COLORECTAL CA SCREEN DOC REV: CPT | Performed by: NURSE PRACTITIONER

## 2022-08-11 PROCEDURE — G8417 CALC BMI ABV UP PARAM F/U: HCPCS | Performed by: NURSE PRACTITIONER

## 2022-08-11 PROCEDURE — 99204 OFFICE O/P NEW MOD 45 MIN: CPT | Performed by: NURSE PRACTITIONER

## 2022-08-11 PROCEDURE — 1036F TOBACCO NON-USER: CPT | Performed by: NURSE PRACTITIONER

## 2022-08-11 PROCEDURE — G8427 DOCREV CUR MEDS BY ELIG CLIN: HCPCS | Performed by: NURSE PRACTITIONER

## 2022-08-11 RX ORDER — B-COMPLEX WITH VITAMIN C
1 TABLET ORAL DAILY
Qty: 90 TABLET | Refills: 1 | Status: SHIPPED | OUTPATIENT
Start: 2022-08-11 | End: 2023-08-11

## 2022-08-11 RX ORDER — PAROXETINE 10 MG/1
10 TABLET, FILM COATED ORAL DAILY
COMMUNITY
Start: 2022-08-02

## 2022-08-11 RX ORDER — QUININE SULFATE 324 MG/1
324 CAPSULE ORAL NIGHTLY PRN
Qty: 30 CAPSULE | Refills: 2 | Status: SHIPPED | OUTPATIENT
Start: 2022-08-11 | End: 2022-09-10

## 2022-08-11 RX ORDER — MELATONIN
1000 DAILY
Qty: 90 TABLET | Refills: 1 | Status: SHIPPED | OUTPATIENT
Start: 2022-08-11

## 2022-08-11 ASSESSMENT — ENCOUNTER SYMPTOMS
SINUS PRESSURE: 0
EYE ITCHING: 0
DIARRHEA: 0
ABDOMINAL PAIN: 0
TROUBLE SWALLOWING: 0
SINUS PAIN: 0
CHEST TIGHTNESS: 0
SHORTNESS OF BREATH: 0
COUGH: 0
NAUSEA: 0
SORE THROAT: 0
VOMITING: 0
WHEEZING: 0
EYE DISCHARGE: 0
EYE REDNESS: 0

## 2022-08-11 ASSESSMENT — PATIENT HEALTH QUESTIONNAIRE - PHQ9
SUM OF ALL RESPONSES TO PHQ QUESTIONS 1-9: 0
2. FEELING DOWN, DEPRESSED OR HOPELESS: 0
SUM OF ALL RESPONSES TO PHQ QUESTIONS 1-9: 0
SUM OF ALL RESPONSES TO PHQ9 QUESTIONS 1 & 2: 0
SUM OF ALL RESPONSES TO PHQ QUESTIONS 1-9: 0
1. LITTLE INTEREST OR PLEASURE IN DOING THINGS: 0
SUM OF ALL RESPONSES TO PHQ QUESTIONS 1-9: 0

## 2022-08-11 NOTE — PROGRESS NOTES
CATHETERIZATION  10/21/2019    DIALYSIS FISTULA CREATION  2019    LEFT ARM AV FISTULA CREATION (Left Arm Lower)    DIALYSIS FISTULA CREATION Left 2019    LEFT ARM AV FISTULA CREATION performed by Patric Jackson MD at [email or web address] Left 2020    LIGATION TRIBUTARIES LEFT RADIAL CEPHALIC AV FISTULA --ULTRASOUND AV FISTULA performed by Patric Jackson MD at [email or web address] Left 2022    LEFT UPPER EXTREMITY AV FISTULA REVISION WITH UPPER EXTREMITY NERVE BLOCK performed by Ami Mello MD at [address], TOTAL ABDOMINAL (CERVIX REMOVED)      IR TUNNELED CATHETER PLACEMENT GREATER THAN 5 YEARS  2022    IR TUNNELED CATHETER PLACEMENT GREATER THAN 5 YEARS 2022 Karyn White MD [facility] SPECIAL PROCEDURES    JOINT REPLACEMENT      KNEE SURGERY Left 2018    LITHOTRIPSY      THYROIDECTOMY, PARTIAL      TONSILLECTOMY      TOTAL KNEE ARTHROPLASTY Right 2015    VASCULAR SURGERY Left 2022       Family History   Problem Relation Age of Onset    Heart Disease Mother     Diabetes Mother     Heart Disease Father     High Blood Pressure Father        Social History     Tobacco Use    Smoking status: Former     Packs/day: 1.00     Years: 30.00     Pack years: 30.00     Types: Cigarettes     Quit date: 3/21/2001     Years since quittin.4    Smokeless tobacco: Never   Substance Use Topics    Alcohol use: No      Current Outpatient Medications   Medication Sig Dispense Refill    PARoxetine (PAXIL) 10 MG tablet Take 10 mg by mouth in the morning. Handicap Placard MISC by Does not apply route Dx ESRD, heart failure, arthritis  Expiration 5 years; 2027 1 each 0    quiNINE 324 MG capsule Take 1 capsule by mouth nightly as needed (cramping) 30 capsule 2    vitamin D3 (CHOLECALCIFEROL) 25 MCG (1000 UT) TABS tablet Take 1 tablet by mouth in the morning.  90 tablet 1    Calcium Carbonate-Vitamin D (OYSTER SHELL CALCIUM/D) 500-200 MG-UNIT TABS Take 1 tablet by mouth in the morning. 90 tablet 1    atorvastatin (LIPITOR) 40 MG tablet TAKE 1 TABLET BY MOUTH DAILY 28 tablet 2    vitamin E 90 MG (200 UNIT) CAPS capsule TAKE 1 CAPSULE BY MOUTH DAILY 28 capsule 6    Lancets MISC 1 each by Does not apply route daily 300 each 5    allopurinol (ZYLOPRIM) 100 MG tablet Take 1 tablet by mouth daily 28 tablet 5    amLODIPine (NORVASC) 10 MG tablet TAKE 1 TABLET BY MOUTH DAILY 30 tablet 6    aspirin (ASPIRIN LOW DOSE) 81 MG EC tablet TAKE 1 TABLET BY MOUTH DAILY 28 tablet 5    labetalol (NORMODYNE) 200 MG tablet Take 1 tablet by mouth 2 times daily 56 tablet 5    pantoprazole (PROTONIX) 20 MG tablet Take 1 tablet by mouth daily 28 tablet 5    tiZANidine (ZANAFLEX) 4 MG tablet TAKE 1 TABLET BY MOUTH 2 TIMES DAILY 56 tablet 5    hydrALAZINE (APRESOLINE) 50 MG tablet Take 50 mg by mouth 3 times daily       Transparent Dressings (TEGADERM ABSORBENT DRESSING) MISC Provide insurance covered tegaderm dressing, use at hemodialysis 100 each 5    acetaminophen (TYLENOL) 500 MG tablet Take 1,000 mg by mouth every 6 hours as needed for Pain      calcium citrate-vitamin D (CITRACEL+D) 200-250 MG-UNIT TABS per tablet TAKE 2 TABLETS THREE TIMES A DAY       No current facility-administered medications for this visit.      Allergies   Allergen Reactions    Latex Hives    Penicillins Hives    Codeine Itching    Nsaids Other (See Comments)     Gastric Bypass Surgery     Tolmetin Other (See Comments)     Gastric Bypass Surgery        Health Maintenance   Topic Date Due    DTaP/Tdap/Td vaccine (1 - Tdap) Never done    Shingles vaccine (1 of 2) Never done    COVID-19 Vaccine (3 - Booster for Moderna series) 09/06/2021    Lipids  11/09/2021    Pneumococcal 0-64 years Vaccine (2 - PPSV23 or PCV20) 12/09/2021    Flu vaccine (1) 09/01/2022    Depression Screen  04/20/2023    Breast cancer screen  05/20/2023    Colorectal Cancer Screen  06/13/2028    Hepatitis C screen  Completed    HIV screen  Completed    Hepatitis A vaccine  Aged Out    Hib vaccine  Aged Out    Meningococcal (ACWY) vaccine  Aged Out    Hepatitis B vaccine  Discontinued       :     Review of Systems   Constitutional:  Negative for chills, fatigue and fever. HENT:  Negative for ear discharge, ear pain, sinus pressure, sinus pain, sore throat and trouble swallowing. Eyes:  Negative for discharge, redness and itching. Respiratory:  Negative for cough, chest tightness, shortness of breath and wheezing. Cardiovascular:  Negative for chest pain. Gastrointestinal:  Negative for abdominal pain, diarrhea, nausea and vomiting. Genitourinary:  Negative for difficulty urinating. Musculoskeletal:  Positive for arthralgias. Negative for neck pain. Skin:  Negative for rash. Neurological:  Negative for dizziness, weakness, light-headedness and headaches. All other systems reviewed and are negative. Objective:     Physical Exam  Constitutional:       Appearance: Normal appearance. She is normal weight. HENT:      Head: Normocephalic and atraumatic. Nose: Nose normal.   Eyes:      Extraocular Movements: Extraocular movements intact. Conjunctiva/sclera: Conjunctivae normal.      Pupils: Pupils are equal, round, and reactive to light. Cardiovascular:      Rate and Rhythm: Normal rate and regular rhythm. Pulses: Normal pulses. Heart sounds: Normal heart sounds. Pulmonary:      Effort: Pulmonary effort is normal.      Breath sounds: Normal breath sounds. Abdominal:      General: Abdomen is flat. Palpations: Abdomen is soft. Musculoskeletal:         General: Normal range of motion. Cervical back: Neck supple. Skin:     General: Skin is warm and dry. Capillary Refill: Capillary refill takes less than 2 seconds. Neurological:      General: No focal deficit present.       Mental Status: She is alert and oriented to person, place, and time. Psychiatric:         Mood and Affect: Mood normal.     /70   Pulse 67   Resp 16   Ht 5' 3\" (1.6 m)   Wt 163 lb 3.2 oz (74 kg)   SpO2 96%   BMI 28.91 kg/m²     Assessment:       Diagnosis Orders   1. Encounter to establish care        2. Prediabetes  POCT glycosylated hemoglobin (Hb A1C)      3. Essential hypertension        4. Dissection of thoracic aorta ([facility] [address]        5. Acute on chronic diastolic (congestive) heart failure ([facility] [address]        6. ESRD on hemodialysis ([facility] [address]        7. Mixed hyperlipidemia        8. Leg cramps            :      Return in about 3 months (around 11/11/2022) for general follow up. 1.  Encounter establish care  Reviewed previous records  2. Prediabetes  Not currently on medication. A1c checked in office noted to be 5.1  3. Hypertension  Blood pressure stable  Patient is on labetalol 200 g twice a day and amlodipine 10 mg daily and hydralazine 50 mg 3 times a day  4. Acute on chronic heart failure  Patient denies following up with cardiology  Will monitor  5. Dissection of thoracic aorta  Follows with vascular  6. End-stage renal disease  Patient follows up with Dr. Cecile ERN  7. HLD  Last labs were ok  Will do another time   8. Leg cramps  Rx for quinine to try night before HD    F/u in 3 months   Orders Placed This Encounter   Procedures    POCT glycosylated hemoglobin (Hb A1C)     Orders Placed This Encounter   Medications    Handicap Placard MISC     Sig: by Does not apply route Dx ESRD, heart failure, arthritis  Expiration 5 years; 8/2027     Dispense:  1 each     Refill:  0    quiNINE 324 MG capsule     Sig: Take 1 capsule by mouth nightly as needed (cramping)     Dispense:  30 capsule     Refill:  2    vitamin D3 (CHOLECALCIFEROL) 25 MCG (1000 UT) TABS tablet     Sig: Take 1 tablet by mouth in the morning.      Dispense:  90 tablet     Refill:  1    Calcium Carbonate-Vitamin D (OYSTER SHELL CALCIUM/D) 500-200 MG-UNIT TABS     Sig: Take 1 tablet by mouth in the morning. Dispense:  90 tablet     Refill:  1       Patient given educational materials - seepatient instructions. Discussed use, benefit, and side effects of prescribed medications. All patient questions answered. Pt voiced understanding. Reviewed health maintenance. Instructed to continue current medications, diet and exercise. Patient agreedwith treatment plan. Follow up as directed.       Electronically signed by NAV Mcdaniel CNP on 8/11/2022at 3:05 PM

## 2022-08-29 DIAGNOSIS — M62.838 MUSCLE SPASM: ICD-10-CM

## 2022-08-29 DIAGNOSIS — Z76.0 MEDICATION REFILL: ICD-10-CM

## 2022-08-29 RX ORDER — TIZANIDINE 4 MG/1
4 TABLET ORAL 2 TIMES DAILY
Qty: 56 TABLET | Refills: 5 | OUTPATIENT
Start: 2022-08-29

## 2022-09-02 DIAGNOSIS — Z76.0 MEDICATION REFILL: ICD-10-CM

## 2022-09-02 DIAGNOSIS — M62.838 MUSCLE SPASM: ICD-10-CM

## 2022-09-02 RX ORDER — TIZANIDINE 4 MG/1
4 TABLET ORAL 2 TIMES DAILY
Qty: 56 TABLET | Refills: 5 | OUTPATIENT
Start: 2022-09-02

## 2022-09-07 ENCOUNTER — TELEPHONE (OUTPATIENT)
Dept: PRIMARY CARE CLINIC | Age: 62
End: 2022-09-07

## 2022-09-07 DIAGNOSIS — I10 ESSENTIAL HYPERTENSION: ICD-10-CM

## 2022-09-07 DIAGNOSIS — M17.12 PRIMARY OSTEOARTHRITIS OF LEFT KNEE: Primary | ICD-10-CM

## 2022-09-07 DIAGNOSIS — I50.33 ACUTE ON CHRONIC DIASTOLIC (CONGESTIVE) HEART FAILURE (HCC): ICD-10-CM

## 2022-09-08 RX ORDER — ADHESIVE BANDAGE 3/4"
1 BANDAGE TOPICAL DAILY
Qty: 1 EACH | Refills: 0 | Status: SHIPPED | OUTPATIENT
Start: 2022-09-08

## 2022-09-09 NOTE — TELEPHONE ENCOUNTER
Pt daughter informed, faxing orders to [address] on [address] in [place].

## 2022-09-12 ENCOUNTER — OFFICE VISIT (OUTPATIENT)
Dept: PRIMARY CARE CLINIC | Age: 62
End: 2022-09-12
Payer: MEDICARE

## 2022-09-12 VITALS
SYSTOLIC BLOOD PRESSURE: 122 MMHG | HEART RATE: 69 BPM | BODY MASS INDEX: 30.11 KG/M2 | RESPIRATION RATE: 18 BRPM | DIASTOLIC BLOOD PRESSURE: 66 MMHG | OXYGEN SATURATION: 98 % | WEIGHT: 170 LBS

## 2022-09-12 DIAGNOSIS — Z09 HOSPITAL DISCHARGE FOLLOW-UP: Primary | ICD-10-CM

## 2022-09-12 DIAGNOSIS — L29.9 ITCHING OF BOTH HANDS: ICD-10-CM

## 2022-09-12 DIAGNOSIS — Z94.0 RENAL TRANSPLANT, STATUS POST: ICD-10-CM

## 2022-09-12 PROCEDURE — 99214 OFFICE O/P EST MOD 30 MIN: CPT | Performed by: STUDENT IN AN ORGANIZED HEALTH CARE EDUCATION/TRAINING PROGRAM

## 2022-09-12 PROCEDURE — 1111F DSCHRG MED/CURRENT MED MERGE: CPT | Performed by: STUDENT IN AN ORGANIZED HEALTH CARE EDUCATION/TRAINING PROGRAM

## 2022-09-12 RX ORDER — FAMOTIDINE 20 MG/1
20 TABLET, FILM COATED ORAL 2 TIMES DAILY
COMMUNITY
Start: 2022-09-07

## 2022-09-12 RX ORDER — VALGANCICLOVIR 450 MG/1
450 TABLET, FILM COATED ORAL DAILY
COMMUNITY
Start: 2022-09-02

## 2022-09-12 RX ORDER — LIDOCAINE AND PRILOCAINE 25; 25 MG/G; MG/G
CREAM TOPICAL
COMMUNITY
Start: 2022-08-22

## 2022-09-12 RX ORDER — CETIRIZINE HYDROCHLORIDE 10 MG/1
10 TABLET ORAL NIGHTLY
Qty: 30 TABLET | Refills: 0 | Status: SHIPPED | OUTPATIENT
Start: 2022-09-12 | End: 2022-09-23 | Stop reason: SDUPTHER

## 2022-09-12 RX ORDER — FUROSEMIDE 80 MG
80 TABLET ORAL DAILY
COMMUNITY
Start: 2022-08-30

## 2022-09-12 RX ORDER — PREDNISONE 10 MG/1
10 TABLET ORAL DAILY
COMMUNITY
Start: 2022-09-07

## 2022-09-12 RX ORDER — MYCOPHENOLIC ACID 180 MG/1
180 TABLET, DELAYED RELEASE ORAL 2 TIMES DAILY
COMMUNITY
Start: 2022-09-02

## 2022-09-12 RX ORDER — CALAMINE 8% AND ZINC OXIDE 8% 160 MG/ML
1 LOTION TOPICAL 3 TIMES DAILY
Qty: 1 EACH | Refills: 0 | Status: SHIPPED | OUTPATIENT
Start: 2022-09-12

## 2022-09-12 RX ORDER — CLOTRIMAZOLE 10 MG/1
10 LOZENGE ORAL; TOPICAL 4 TIMES DAILY
COMMUNITY
Start: 2022-09-02

## 2022-09-12 RX ORDER — BUMETANIDE 2 MG/1
2 TABLET ORAL EVERY 8 HOURS
COMMUNITY
Start: 2022-09-07

## 2022-09-12 RX ORDER — TACROLIMUS 0.75 MG/1
0.75 TABLET, EXTENDED RELEASE ORAL DAILY
COMMUNITY
Start: 2022-09-02

## 2022-09-12 RX ORDER — OXYCODONE HYDROCHLORIDE AND ACETAMINOPHEN 5; 325 MG/1; MG/1
TABLET ORAL
COMMUNITY
Start: 2022-09-07

## 2022-09-12 SDOH — ECONOMIC STABILITY: FOOD INSECURITY: WITHIN THE PAST 12 MONTHS, YOU WORRIED THAT YOUR FOOD WOULD RUN OUT BEFORE YOU GOT MONEY TO BUY MORE.: PATIENT DECLINED

## 2022-09-12 SDOH — ECONOMIC STABILITY: FOOD INSECURITY: WITHIN THE PAST 12 MONTHS, THE FOOD YOU BOUGHT JUST DIDN'T LAST AND YOU DIDN'T HAVE MONEY TO GET MORE.: PATIENT DECLINED

## 2022-09-12 ASSESSMENT — SOCIAL DETERMINANTS OF HEALTH (SDOH): HOW HARD IS IT FOR YOU TO PAY FOR THE VERY BASICS LIKE FOOD, HOUSING, MEDICAL CARE, AND HEATING?: PATIENT DECLINED

## 2022-09-12 ASSESSMENT — PATIENT HEALTH QUESTIONNAIRE - PHQ9: DEPRESSION UNABLE TO ASSESS: PT REFUSES

## 2022-09-12 NOTE — PROGRESS NOTES
Post-Discharge Transitional Care  Follow Up      Ryan Marrero   YOB: 1960    Date of Office Visit:  9/12/2022  Date of Hospital Admission: 7/22/22  Date of Hospital Discharge: 7/22/22  Risk of hospital readmission (high >=14%. Medium >=10%) :No data recorded    Care management risk score Rising risk (score 2-5) and Complex Care (Scores >=6): No Risk Score On File     Non face to face  following discharge, date last encounter closed (first attempt may have been earlier): *No documented post hospital discharge outreach found in the last 14 days    Call initiated 2 business days of discharge: *No response recorded in the last 14 days    ASSESSMENT/PLAN:   Hospital discharge follow-up  -     [place] DISCHARGE MEDS RECONCILED W/ CURRENT OUTPATIENT MED LIST  -     [place] DISCHARGE MEDS RECONCILED W/ CURRENT OUTPATIENT MED LIST  Renal transplant, status post  -     [place] DISCHARGE MEDS RECONCILED W/ CURRENT OUTPATIENT MED LIST  Itching of both hands  -     Calamine-Zinc Oxide (V-R CALAMINE) LOTN; Apply 1 Dose topically in the morning, at noon, and at bedtime, Disp-1 each, R-0Normal  -     cetirizine (ZYRTEC) 10 MG tablet; Take 1 tablet by mouth at bedtime, Disp-30 tablet, R-0Normal    Patient and daughter did not know what medications she is supposed to take. Advised patient to call office with the medications patient was discharged home from hospital  Patient has upcoming appointment with all the specialists in this week. Medical Decision Making: moderate complexity  Return in 4 weeks (on 10/10/2022) for pcp. On this date 9/12/2022 I have spent 40 minutes reviewing previous notes, test results and face to face with the patient discussing the diagnosis and importance of compliance with the treatment plan as well as documenting on the day of the visit.        Subjective:   HPI:  Follow up of Hospital problems/diagnosis(es): Post renal transplant    Inpatient course: Discharge summary reviewed- see chart.    Interval history/Current status: Patient has been stable post discharge from hospital.  Her only concern was itching on both hands. She had multiple scratch marks from itching. No evident lesions seen. She had a nephrostomy drain on left side which had pinkish clear fluid which is much better than before per patient and her daughter. Patient's urine has been much clearer than before by daughter. Patient and daughter did not know what medications she is supposed to take. Advised patient to call office with the medications patient was discharged from hospital.    Patient Active Problem List   Diagnosis    Hyperlipidemia    Primary osteoarthritis of left knee    Microalbuminuria    S/P gastric bypass    Anemia    S/P total knee arthroplasty    Hyperuricemia    Hydronephrosis of left kidney    Renal atrophy, right    Localized, primary osteoarthritis of hand    Essential hypertension    Chronic kidney disease    Motion sickness    Slow transit constipation    Chest pain    Aortic dissection (HCC)    Respiratory acidosis    Subacromial impingement    Acute on chronic diastolic (congestive) heart failure (HCC)    Moderate mitral regurgitation    Hypertensive emergency    Former smoker    ESRD on hemodialysis ([facility] [address]    Chronic pain of left knee    Neuropathic pain    Gastroesophageal reflux disease    Problem with vascular access       Medications listed as ordered at the time of discharge from hospital     Medication List            Accurate as of September 12, 2022  2:10 PM. If you have any questions, ask your nurse or doctor.                 START taking these medications      cetirizine 10 MG tablet  Commonly known as: ZYRTEC  Take 1 tablet by mouth at bedtime  Started by: Evert Councilman, MD V-R Leandra Saa Lotn  Apply 1 Dose topically in the morning, at noon, and at bedtime  Started by: Evert Councilman, MD            CONTINUE taking these medications      acetaminophen 500 MG tablet  Commonly known as: TYLENOL     allopurinol 100 MG tablet  Commonly known as: ZYLOPRIM  Take 1 tablet by mouth daily     amLODIPine 10 MG tablet  Commonly known as: NORVASC  TAKE 1 TABLET BY MOUTH DAILY     aspirin 81 MG EC tablet  Commonly known as: Aspirin Low Dose  TAKE 1 TABLET BY MOUTH DAILY     atorvastatin 40 MG tablet  Commonly known as: LIPITOR  TAKE 1 TABLET BY MOUTH DAILY     Blood Pressure Cuff Misc  1 each by Does not apply route daily     bumetanide 2 MG tablet  Commonly known as: BUMEX     calcium citrate-vitamin D 200-250 MG-UNIT Tabs per tablet  Commonly known as: CITRACEL+D     clotrimazole 10 MG yoav  Commonly known as: MYCELEX     Envarsus XR 0.75 MG Tb24  Generic drug: Tacrolimus ER     famotidine 20 MG tablet  Commonly known as: PEPCID     furosemide 80 MG tablet  Commonly known as: LASIX     Handicap Placard Misc  by Does not apply route Dx ESRD, heart failure, arthritis  Expiration 5 years; 8/2027     hydrALAZINE 50 MG tablet  Commonly known as: APRESOLINE     labetalol 200 MG tablet  Commonly known as: NORMODYNE  Take 1 tablet by mouth 2 times daily     Lancets Misc  1 each by Does not apply route daily     lidocaine-prilocaine 2.5-2.5 % cream  Commonly known as: EMLA     Myfortic 180 MG DR tablet  Generic drug: mycophenolate     oxyCODONE-acetaminophen 5-325 MG per tablet  Commonly known as: PERCOCET     oyster shell calcium w/D 500-200 MG-UNIT Tabs tablet     Oyster Shell Calcium/D 500-200 MG-UNIT Tabs  Take 1 tablet by mouth in the morning.      pantoprazole 20 MG tablet  Commonly known as: PROTONIX  Take 1 tablet by mouth daily     PARoxetine 10 MG tablet  Commonly known as: PAXIL     predniSONE 10 MG tablet  Commonly known as: DELTASONE     Tegaderm Absorbent Dressing Misc  Provide insurance covered tegaderm dressing, use at hemodialysis     tiZANidine 4 MG tablet  Commonly known as: ZANAFLEX  TAKE 1 TABLET BY MOUTH 2 TIMES DAILY     valGANciclovir 450 MG tablet  Commonly known as: VALCYTE     vitamin D3 25 MCG (1000 UT) Tabs tablet  Commonly known as: CHOLECALCIFEROL  Take 1 tablet by mouth in the morning. vitamin E 90 MG (200 UNIT) Caps capsule  TAKE 1 CAPSULE BY MOUTH DAILY               Where to Get Your Medications        These medications were sent to [address] [place] [address], [address] [facility],  [address] [place] [address]      Phone: [phone]   cetirizine 10 MG tablet  V-R CALAMINE Lotn           Medications marked \"taking\" at this time  Outpatient Medications Marked as Taking for the 9/12/22 encounter (Office Visit) with Artur Mcduffie MD   Medication Sig Dispense Refill    ENVARSUS XR 0.75 MG TB24 Take 0.75 mg by mouth daily Take two tablets by mouth every day      Calamine-Zinc Oxide (V-R CALAMINE) LOTN Apply 1 Dose topically in the morning, at noon, and at bedtime 1 each 0    cetirizine (ZYRTEC) 10 MG tablet Take 1 tablet by mouth at bedtime 30 tablet 0    Blood Pressure Monitoring (BLOOD PRESSURE CUFF) MISC 1 each by Does not apply route daily 1 each 0    PARoxetine (PAXIL) 10 MG tablet Take 10 mg by mouth in the morning. Handicap Placard MISC by Does not apply route Dx ESRD, heart failure, arthritis  Expiration 5 years; 8/2027 1 each 0    vitamin D3 (CHOLECALCIFEROL) 25 MCG (1000 UT) TABS tablet Take 1 tablet by mouth in the morning. 90 tablet 1    Calcium Carbonate-Vitamin D (OYSTER SHELL CALCIUM/D) 500-200 MG-UNIT TABS Take 1 tablet by mouth in the morning.  90 tablet 1    atorvastatin (LIPITOR) 40 MG tablet TAKE 1 TABLET BY MOUTH DAILY 28 tablet 2    vitamin E 90 MG (200 UNIT) CAPS capsule TAKE 1 CAPSULE BY MOUTH DAILY 28 capsule 6    Lancets MISC 1 each by Does not apply route daily 300 each 5    allopurinol (ZYLOPRIM) 100 MG tablet Take 1 tablet by mouth daily 28 tablet 5    amLODIPine (NORVASC) 10 MG tablet TAKE 1 TABLET BY MOUTH DAILY 30 tablet 6    aspirin (ASPIRIN LOW DOSE) 81 MG EC tablet TAKE 1 TABLET BY MOUTH DAILY 28 tablet 5 labetalol (NORMODYNE) 200 MG tablet Take 1 tablet by mouth 2 times daily 56 tablet 5    pantoprazole (PROTONIX) 20 MG tablet Take 1 tablet by mouth daily 28 tablet 5    tiZANidine (ZANAFLEX) 4 MG tablet TAKE 1 TABLET BY MOUTH 2 TIMES DAILY 56 tablet 5    hydrALAZINE (APRESOLINE) 50 MG tablet Take 50 mg by mouth 3 times daily       Transparent Dressings (TEGADERM ABSORBENT DRESSING) MISC Provide insurance covered tegaderm dressing, use at hemodialysis 100 each 5    acetaminophen (TYLENOL) 500 MG tablet Take 1,000 mg by mouth every 6 hours as needed for Pain      calcium citrate-vitamin D (CITRACEL+D) 200-250 MG-UNIT TABS per tablet TAKE 2 TABLETS THREE TIMES A DAY          Medications patient taking as of now reconciled against medications ordered at time of hospital discharge: No    A comprehensive review of systems was negative except for what was noted in the HPI. Objective:    /66   Pulse 69   Resp 18   Wt 170 lb (77.1 kg)   SpO2 98%   BMI 30.11 kg/m²   General Appearance: alert and oriented to person, place and time, well developed and well- nourished, in no acute distress  Skin: Multiple scratch marks on both hands  Head: normocephalic and atraumatic  Eyes: pupils equal, round, and reactive to light, extraocular eye movements intact, conjunctivae normal  ENT: tympanic membrane, external ear and ear canal normal bilaterally, nose without deformity, nasal mucosa and turbinates normal without polyps  Neck: supple and non-tender without mass, no thyromegaly or thyroid nodules, no cervical lymphadenopathy  Pulmonary/Chest: clear to auscultation bilaterally- no wheezes, rales or rhonchi, normal air movement, no respiratory distress  Cardiovascular: normal rate, regular rhythm, normal S1 and S2, no murmurs, rubs, clicks, or gallops, distal pulses intact, no carotid bruits  Abdomen: Nephrostomy drain on left side. Clear pinkish fluid seen.   Extremities: no cyanosis, clubbing or edema  Musculoskeletal: normal range of motion, no joint swelling, deformity or tenderness  Neurologic: reflexes normal and symmetric, no cranial nerve deficit, gait, coordination and speech normal      An electronic signature was used to authenticate this note.   --Nathaniel Appiah MD

## 2022-09-23 DIAGNOSIS — L29.9 ITCHING OF BOTH HANDS: ICD-10-CM

## 2022-09-23 RX ORDER — CETIRIZINE HYDROCHLORIDE 10 MG/1
10 TABLET ORAL NIGHTLY
Qty: 28 TABLET | Refills: 0 | Status: SHIPPED | OUTPATIENT
Start: 2022-09-23 | End: 2022-10-25

## 2022-10-12 ENCOUNTER — OFFICE VISIT (OUTPATIENT)
Dept: PRIMARY CARE CLINIC | Age: 62
End: 2022-10-12
Payer: MEDICARE

## 2022-10-12 VITALS
OXYGEN SATURATION: 99 % | WEIGHT: 150.4 LBS | SYSTOLIC BLOOD PRESSURE: 108 MMHG | RESPIRATION RATE: 16 BRPM | HEART RATE: 70 BPM | BODY MASS INDEX: 26.64 KG/M2 | DIASTOLIC BLOOD PRESSURE: 64 MMHG

## 2022-10-12 DIAGNOSIS — I10 ESSENTIAL HYPERTENSION: ICD-10-CM

## 2022-10-12 DIAGNOSIS — Z94.0 RENAL TRANSPLANT, STATUS POST: ICD-10-CM

## 2022-10-12 DIAGNOSIS — G47.00 INSOMNIA, UNSPECIFIED TYPE: Primary | ICD-10-CM

## 2022-10-12 PROCEDURE — G8484 FLU IMMUNIZE NO ADMIN: HCPCS | Performed by: NURSE PRACTITIONER

## 2022-10-12 PROCEDURE — 1036F TOBACCO NON-USER: CPT | Performed by: NURSE PRACTITIONER

## 2022-10-12 PROCEDURE — 3017F COLORECTAL CA SCREEN DOC REV: CPT | Performed by: NURSE PRACTITIONER

## 2022-10-12 PROCEDURE — 99214 OFFICE O/P EST MOD 30 MIN: CPT | Performed by: NURSE PRACTITIONER

## 2022-10-12 PROCEDURE — G8427 DOCREV CUR MEDS BY ELIG CLIN: HCPCS | Performed by: NURSE PRACTITIONER

## 2022-10-12 PROCEDURE — G8417 CALC BMI ABV UP PARAM F/U: HCPCS | Performed by: NURSE PRACTITIONER

## 2022-10-12 RX ORDER — ONDANSETRON 4 MG/1
TABLET, FILM COATED ORAL
COMMUNITY
Start: 2022-10-07

## 2022-10-12 RX ORDER — TACROLIMUS 4 MG/1
TABLET, EXTENDED RELEASE ORAL
COMMUNITY
Start: 2022-10-06

## 2022-10-12 RX ORDER — TACROLIMUS 1 MG/1
TABLET, EXTENDED RELEASE ORAL
COMMUNITY
Start: 2022-10-06

## 2022-10-12 RX ORDER — POTASSIUM CHLORIDE 20 MEQ/1
TABLET, EXTENDED RELEASE ORAL
COMMUNITY
Start: 2022-09-21

## 2022-10-12 ASSESSMENT — ENCOUNTER SYMPTOMS
WHEEZING: 0
SORE THROAT: 0
SHORTNESS OF BREATH: 0
NAUSEA: 0
EYE ITCHING: 0
DIARRHEA: 0
TROUBLE SWALLOWING: 0
EYE DISCHARGE: 0
VOMITING: 0
CHEST TIGHTNESS: 0
SINUS PRESSURE: 0
SINUS PAIN: 0
EYE REDNESS: 0
COUGH: 0
ABDOMINAL PAIN: 0

## 2022-10-12 NOTE — PROGRESS NOTES
[address] [facility] CARE  [facility] [address] [facility] [address]  [address] [address]  Dept: [phone]  Dept Fax: [phone]    Eduardo Rios is a 65 y.o. female who presents today for her medical conditions/complaintsas noted below. Eduardo Rios is c/o of 1 Month Follow-Up and Insomnia (X1 wk, hasn't tried anything OTC)        HPI:     Patient presents for 1 month follow-up  Blood pressure stable  Weight is down since last visit    Patient presents for 1 month follow-up. She is status post kidney transplant. She is doing well at home. She lives with her daughter. She has been following up with her specialist.  She has had lab work done today. She was told that her kidney function is improving. She is going to be started on IV antibiotics for a urinary tract infection. She will be on these for 10 days. They do not want this infection to go any further due to her recent transplant. She states that she has been going to the bathroom all the time. She does complain of issues sleeping. She has had a hard time sleeping for a while now. She has not tried anything over-the-counter.     She just had her staples and tunnel cath removed       Past Medical History:   Diagnosis Date    Acute pulmonary edema ([address] 10/21/2019    Acute respiratory failure with hypoxia (HCC) 10/17/2019    CKD (chronic kidney disease) stage 3, GFR 30-59 ml/min (HCC)     per pt, caused by kid stone damage    Gout     Hemodialysis patient ([address] 10/21/2019    Hyperlipidemia     Hypertension     Kidney stone     Nephrolithiasis     Osteoarthritis     Type II or unspecified type diabetes mellitus without mention of complication, not stated as uncontrolled     patient denies      Past Surgical History:   Procedure Laterality Date    CARDIAC CATHETERIZATION  10/21/2019    DIALYSIS FISTULA CREATION  2019    LEFT ARM AV FISTULA CREATION (Left Arm Lower)    DIALYSIS FISTULA CREATION Left 2019 LEFT ARM AV FISTULA CREATION performed by Russell Rosas MD at [place] [address] Left 2020    LIGATION TRIBUTARIES LEFT RADIAL CEPHALIC AV FISTULA --ULTRASOUND AV FISTULA performed by Russell Rosas MD at [place] [address] Left 2022    LEFT UPPER EXTREMITY AV FISTULA REVISION WITH UPPER EXTREMITY NERVE BLOCK performed by Kiran Bui MD at [address] [place] [address], TOTAL ABDOMINAL (CERVIX REMOVED)      IR TUNNELED CATHETER PLACEMENT GREATER THAN 5 YEARS  2022    IR TUNNELED CATHETER PLACEMENT GREATER THAN 5 YEARS 2022 Lali Mcfadden MD [facility] SPECIAL PROCEDURES    JOINT REPLACEMENT      KNEE SURGERY Left 2018    LITHOTRIPSY      THYROIDECTOMY, PARTIAL      TONSILLECTOMY      TOTAL KNEE ARTHROPLASTY Right 2015    VASCULAR SURGERY Left 2022       Family History   Problem Relation Age of Onset    Heart Disease Mother     Diabetes Mother     Heart Disease Father     High Blood Pressure Father        Social History     Tobacco Use    Smoking status: Former     Packs/day: 1.00     Years: 30.00     Pack years: 30.00     Types: Cigarettes     Quit date: 3/21/2001     Years since quittin.5    Smokeless tobacco: Never   Substance Use Topics    Alcohol use: No      Current Outpatient Medications   Medication Sig Dispense Refill    ENVARSUS XR 4 MG TB24       ENVARSUS XR 1 MG TB24       potassium chloride (KLOR-CON M) 20 MEQ extended release tablet       ondansetron (ZOFRAN) 4 MG tablet       cetirizine (ZYRTEC) 10 MG tablet TAKE 1 TABLET BY MOUTH AT BEDTIME 28 tablet 0    ENVARSUS XR 0.75 MG TB24 Take 0.75 mg by mouth daily Take two tablets by mouth every day      bumetanide (BUMEX) 2 MG tablet Take 2 mg by mouth every 8 (eight) hours      clotrimazole (MYCELEX) 10 MG yoav Take 10 mg by mouth 4 times daily      famotidine (PEPCID) 20 MG tablet Take 20 mg by mouth in the morning and at bedtime furosemide (LASIX) 80 MG tablet Take 80 mg by mouth daily      lidocaine-prilocaine (EMLA) 2.5-2.5 % cream       MYFORTIC 180 MG DR tablet Take 180 mg by mouth in the morning and at bedtime      oxyCODONE-acetaminophen (PERCOCET) 5-325 MG per tablet TAKE ONE TABLET BY MOUTH EVERY 6 HOURS AS NEEDED      predniSONE (DELTASONE) 10 MG tablet Take 10 mg by mouth daily      valGANciclovir (VALCYTE) 450 MG tablet Take 450 mg by mouth daily      Calamine-Zinc Oxide (V-R CALAMINE) LOTN Apply 1 Dose topically in the morning, at noon, and at bedtime 1 each 0    Blood Pressure Monitoring (BLOOD PRESSURE CUFF) MISC 1 each by Does not apply route daily 1 each 0    PARoxetine (PAXIL) 10 MG tablet Take 10 mg by mouth in the morning. Handicap Placard MISC by Does not apply route Dx ESRD, heart failure, arthritis  Expiration 5 years; 8/2027 1 each 0    vitamin D3 (CHOLECALCIFEROL) 25 MCG (1000 UT) TABS tablet Take 1 tablet by mouth in the morning. 90 tablet 1    Calcium Carbonate-Vitamin D (OYSTER SHELL CALCIUM/D) 500-200 MG-UNIT TABS Take 1 tablet by mouth in the morning.  90 tablet 1    atorvastatin (LIPITOR) 40 MG tablet TAKE 1 TABLET BY MOUTH DAILY 28 tablet 2    vitamin E 90 MG (200 UNIT) CAPS capsule TAKE 1 CAPSULE BY MOUTH DAILY 28 capsule 6    Lancets MISC 1 each by Does not apply route daily 300 each 5    allopurinol (ZYLOPRIM) 100 MG tablet Take 1 tablet by mouth daily 28 tablet 5    labetalol (NORMODYNE) 200 MG tablet Take 1 tablet by mouth 2 times daily 56 tablet 5    pantoprazole (PROTONIX) 20 MG tablet Take 1 tablet by mouth daily 28 tablet 5    tiZANidine (ZANAFLEX) 4 MG tablet TAKE 1 TABLET BY MOUTH 2 TIMES DAILY 56 tablet 5    hydrALAZINE (APRESOLINE) 50 MG tablet Take 50 mg by mouth 3 times daily       Transparent Dressings (TEGADERM ABSORBENT DRESSING) MISC Provide insurance covered tegaderm dressing, use at hemodialysis 100 each 5    acetaminophen (TYLENOL) 500 MG tablet Take 1,000 mg by mouth every 6 hours as needed for Pain       No current facility-administered medications for this visit. Allergies   Allergen Reactions    Latex Hives    Penicillins Hives and Other (See Comments)    Clonidine     Codeine Itching    Nsaids Other (See Comments)     Gastric Bypass Surgery     Tolmetin Other (See Comments)     Gastric Bypass Surgery        Health Maintenance   Topic Date Due    DTaP/Tdap/Td vaccine (1 - Tdap) Never done    Shingles vaccine (1 of 2) Never done    COVID-19 Vaccine (3 - Moderna risk series) 05/04/2021    Pneumococcal 0-64 years Vaccine (2 - PPSV23 or PCV20) 12/09/2021    Flu vaccine (1) 08/01/2022    Breast cancer screen  05/20/2023    Depression Screen  08/11/2023    Lipids  10/12/2023    Colorectal Cancer Screen  06/13/2028    Hepatitis C screen  Completed    HIV screen  Completed    Hepatitis A vaccine  Aged Out    Hib vaccine  Aged Out    Meningococcal (ACWY) vaccine  Aged Out    Hepatitis B vaccine  Discontinued       :     Review of Systems   Constitutional:  Negative for chills, fatigue and fever. HENT:  Negative for ear discharge, ear pain, sinus pressure, sinus pain, sore throat and trouble swallowing. Eyes:  Negative for discharge, redness and itching. Respiratory:  Negative for cough, chest tightness, shortness of breath and wheezing. Cardiovascular:  Negative for chest pain. Gastrointestinal:  Negative for abdominal pain, diarrhea, nausea and vomiting. Genitourinary:  Negative for difficulty urinating. Musculoskeletal:  Negative for arthralgias and neck pain. Skin:  Negative for rash. Neurological:  Negative for dizziness, weakness, light-headedness and headaches. All other systems reviewed and are negative. Objective:     Physical Exam  Constitutional:       Appearance: Normal appearance. She is normal weight. HENT:      Head: Normocephalic and atraumatic. Nose: Nose normal.   Eyes:      Extraocular Movements: Extraocular movements intact. Conjunctiva/sclera: Conjunctivae normal.      Pupils: Pupils are equal, round, and reactive to light. Cardiovascular:      Rate and Rhythm: Normal rate and regular rhythm. Pulses: Normal pulses. Heart sounds: Normal heart sounds. Pulmonary:      Effort: Pulmonary effort is normal.      Breath sounds: Normal breath sounds. Abdominal:      General: Abdomen is flat. Palpations: Abdomen is soft. Musculoskeletal:         General: Normal range of motion. Cervical back: Neck supple. Skin:     General: Skin is warm and dry. Capillary Refill: Capillary refill takes less than 2 seconds. Neurological:      General: No focal deficit present. Mental Status: She is alert and oriented to person, place, and time. Psychiatric:         Mood and Affect: Mood normal.     /64   Pulse 70   Resp 16   Wt 150 lb 6.4 oz (68.2 kg)   SpO2 99%   BMI 26.64 kg/m²     Assessment:       Diagnosis Orders   1. Insomnia, unspecified type        2. Renal transplant, status post        3. Essential hypertension            :      Return in about 3 months (around 1/12/2023) for general follow up. Radha  Pt is going to try melatonin and unisom over the counter. Consider trazadone  2. Renal transplant  Doing well post op. CRE is improving. Level today is 1.6. she does have electrolyte issues. This is being managed by nephro per pt   Reviewed lab work   3. HTN  Bp stable  Continue with labetalol 200 mg twice a day    She is having anemia. Follow hematology. She is getting an \"infusion\". F/u in 3 months or sooner as needed      Patient given educational materials - seepatient instructions. Discussed use, benefit, and side effects of prescribed medications. All patient questions answered. Pt voiced understanding. Reviewed health maintenance. Instructed to continue current medications, diet and exercise. Patient agreedwith treatment plan. Follow up as directed.       Electronically signed by NAV Burkett - CNP on 10/12/2022at 3:07 PM

## 2022-10-25 DIAGNOSIS — I10 ESSENTIAL HYPERTENSION: ICD-10-CM

## 2022-10-25 DIAGNOSIS — Z76.0 MEDICATION REFILL: ICD-10-CM

## 2022-10-25 DIAGNOSIS — L29.9 ITCHING OF BOTH HANDS: ICD-10-CM

## 2022-10-25 RX ORDER — CETIRIZINE HYDROCHLORIDE 10 MG/1
10 TABLET ORAL NIGHTLY
Qty: 28 TABLET | Refills: 0 | Status: SHIPPED | OUTPATIENT
Start: 2022-10-25 | End: 2022-11-16

## 2022-10-25 RX ORDER — ATORVASTATIN CALCIUM 40 MG/1
40 TABLET, FILM COATED ORAL DAILY
Qty: 28 TABLET | Refills: 2 | OUTPATIENT
Start: 2022-10-25

## 2022-10-28 DIAGNOSIS — I10 ESSENTIAL HYPERTENSION: ICD-10-CM

## 2022-10-28 DIAGNOSIS — Z76.0 MEDICATION REFILL: ICD-10-CM

## 2022-10-28 RX ORDER — ATORVASTATIN CALCIUM 40 MG/1
40 TABLET, FILM COATED ORAL DAILY
Qty: 28 TABLET | Refills: 2 | OUTPATIENT
Start: 2022-10-28

## 2022-11-09 DIAGNOSIS — I10 ESSENTIAL HYPERTENSION: ICD-10-CM

## 2022-11-09 DIAGNOSIS — Z76.0 MEDICATION REFILL: ICD-10-CM

## 2022-11-09 RX ORDER — ATORVASTATIN CALCIUM 40 MG/1
40 TABLET, FILM COATED ORAL DAILY
Qty: 90 TABLET | Refills: 2 | Status: SHIPPED | OUTPATIENT
Start: 2022-11-09

## 2022-11-09 NOTE — TELEPHONE ENCOUNTER
Last Visit Date: 10/12/2022   Next Visit Date: 1/18/2023     FYI daughter states kidney transplant doctor d/c'd Baby ASA and Amlodipine.

## 2022-11-16 DIAGNOSIS — L29.9 ITCHING OF BOTH HANDS: ICD-10-CM

## 2022-11-16 RX ORDER — CETIRIZINE HYDROCHLORIDE 10 MG/1
10 TABLET ORAL NIGHTLY
Qty: 90 TABLET | Refills: 1 | Status: SHIPPED | OUTPATIENT
Start: 2022-11-16 | End: 2022-12-16

## 2023-01-17 DIAGNOSIS — Z76.0 MEDICATION REFILL: ICD-10-CM

## 2023-01-17 DIAGNOSIS — K21.9 GASTROESOPHAGEAL REFLUX DISEASE, UNSPECIFIED WHETHER ESOPHAGITIS PRESENT: ICD-10-CM

## 2023-01-17 DIAGNOSIS — I10 ESSENTIAL HYPERTENSION: ICD-10-CM

## 2023-01-17 RX ORDER — ASPIRIN 81 MG/1
TABLET ORAL
Qty: 30 TABLET | Refills: 6 | OUTPATIENT
Start: 2023-01-17

## 2023-01-17 RX ORDER — PANTOPRAZOLE SODIUM 20 MG/1
20 TABLET, DELAYED RELEASE ORAL DAILY
Qty: 30 TABLET | Refills: 6 | OUTPATIENT
Start: 2023-01-17

## 2023-01-17 RX ORDER — LABETALOL 200 MG/1
200 TABLET, FILM COATED ORAL 2 TIMES DAILY
Qty: 60 TABLET | Refills: 6 | OUTPATIENT
Start: 2023-01-17

## 2023-01-17 RX ORDER — CHOLECALCIFEROL (VITAMIN D3) 25 MCG
TABLET ORAL
Qty: 30 TABLET | Refills: 1 | Status: SHIPPED | OUTPATIENT
Start: 2023-01-17 | End: 2023-01-19 | Stop reason: SDUPTHER

## 2023-01-19 ENCOUNTER — OFFICE VISIT (OUTPATIENT)
Dept: PRIMARY CARE CLINIC | Age: 63
End: 2023-01-19
Payer: MEDICARE

## 2023-01-19 VITALS
OXYGEN SATURATION: 96 % | HEART RATE: 68 BPM | WEIGHT: 166.2 LBS | SYSTOLIC BLOOD PRESSURE: 164 MMHG | RESPIRATION RATE: 20 BRPM | DIASTOLIC BLOOD PRESSURE: 80 MMHG | BODY MASS INDEX: 29.44 KG/M2

## 2023-01-19 DIAGNOSIS — N18.6 ESRD ON HEMODIALYSIS (HCC): ICD-10-CM

## 2023-01-19 DIAGNOSIS — M79.605 LEFT LEG PAIN: ICD-10-CM

## 2023-01-19 DIAGNOSIS — I10 ESSENTIAL HYPERTENSION: Primary | ICD-10-CM

## 2023-01-19 DIAGNOSIS — Z94.0 RENAL TRANSPLANT, STATUS POST: ICD-10-CM

## 2023-01-19 DIAGNOSIS — I50.33 ACUTE ON CHRONIC DIASTOLIC (CONGESTIVE) HEART FAILURE (HCC): ICD-10-CM

## 2023-01-19 DIAGNOSIS — Z99.2 ESRD ON HEMODIALYSIS (HCC): ICD-10-CM

## 2023-01-19 DIAGNOSIS — I71.019 DISSECTION OF THORACIC AORTA, UNSPECIFIED PART: ICD-10-CM

## 2023-01-19 DIAGNOSIS — Z76.0 MEDICATION REFILL: ICD-10-CM

## 2023-01-19 PROCEDURE — 3017F COLORECTAL CA SCREEN DOC REV: CPT | Performed by: NURSE PRACTITIONER

## 2023-01-19 PROCEDURE — 1036F TOBACCO NON-USER: CPT | Performed by: NURSE PRACTITIONER

## 2023-01-19 PROCEDURE — G8427 DOCREV CUR MEDS BY ELIG CLIN: HCPCS | Performed by: NURSE PRACTITIONER

## 2023-01-19 PROCEDURE — G8417 CALC BMI ABV UP PARAM F/U: HCPCS | Performed by: NURSE PRACTITIONER

## 2023-01-19 PROCEDURE — 99214 OFFICE O/P EST MOD 30 MIN: CPT | Performed by: NURSE PRACTITIONER

## 2023-01-19 PROCEDURE — G8484 FLU IMMUNIZE NO ADMIN: HCPCS | Performed by: NURSE PRACTITIONER

## 2023-01-19 PROCEDURE — 3078F DIAST BP <80 MM HG: CPT | Performed by: NURSE PRACTITIONER

## 2023-01-19 PROCEDURE — 3077F SYST BP >= 140 MM HG: CPT | Performed by: NURSE PRACTITIONER

## 2023-01-19 RX ORDER — ASPIRIN 81 MG/1
TABLET ORAL
Qty: 30 TABLET | Refills: 6 | OUTPATIENT
Start: 2023-01-19

## 2023-01-19 RX ORDER — GABAPENTIN 300 MG/1
300 CAPSULE ORAL 2 TIMES DAILY
Qty: 60 CAPSULE | Refills: 0 | Status: SHIPPED | OUTPATIENT
Start: 2023-01-19 | End: 2023-02-18

## 2023-01-19 RX ORDER — CHOLECALCIFEROL (VITAMIN D3) 25 MCG
TABLET ORAL
Qty: 90 TABLET | Refills: 1 | Status: SHIPPED | OUTPATIENT
Start: 2023-01-19

## 2023-01-19 ASSESSMENT — ENCOUNTER SYMPTOMS
DIARRHEA: 0
SORE THROAT: 0
ABDOMINAL PAIN: 0
SINUS PAIN: 0
SHORTNESS OF BREATH: 0
EYE REDNESS: 0
SINUS PRESSURE: 0
WHEEZING: 0
COUGH: 0
EYE DISCHARGE: 0
EYE ITCHING: 0
NAUSEA: 0
TROUBLE SWALLOWING: 0
CHEST TIGHTNESS: 0
VOMITING: 0

## 2023-01-19 ASSESSMENT — PATIENT HEALTH QUESTIONNAIRE - PHQ9
1. LITTLE INTEREST OR PLEASURE IN DOING THINGS: 0
SUM OF ALL RESPONSES TO PHQ QUESTIONS 1-9: 0
2. FEELING DOWN, DEPRESSED OR HOPELESS: 0
SUM OF ALL RESPONSES TO PHQ QUESTIONS 1-9: 0
SUM OF ALL RESPONSES TO PHQ QUESTIONS 1-9: 0
SUM OF ALL RESPONSES TO PHQ9 QUESTIONS 1 & 2: 0
SUM OF ALL RESPONSES TO PHQ QUESTIONS 1-9: 0

## 2023-01-19 NOTE — PROGRESS NOTES
[address] PRIMARY CARE  [facility] [address] [facility] [address]  [address] [address]  Dept: [phone]  Dept Fax: [phone]    Nanci Beckett is a 65 y.o. female who presents today for her medical conditions/complaintsas noted below. Nanci Beckett is c/o of 3 Month Follow-Up        HPI:     Patient presents for follow-up  Blood pressure elevated  Weight is up 16 pounds since last visit    Patient presents for an office visit. Her blood pressure is elevated today. Checks bp at home 128/60  Bp usually up when at dr office     Nayana Noriega left thigh pain. This is been ongoing since her surgery. She is on 200 mg of gabapentin twice a day. Denies any relief. This does keep her up at night sometimes. Complains of a burning pain    Recent follow-up with transplant center a few weeks ago. Everything went well.       Past Medical History:   Diagnosis Date    Acute pulmonary edema ([facility] [address] 10/21/2019    Acute respiratory failure with hypoxia ([facility]) 10/17/2019    CKD (chronic kidney disease) stage 3, GFR 30-59 ml/min ([facility])     per pt, caused by kid stone damage    Gout     Hemodialysis patient ([facility] [address] 10/21/2019    Hyperlipidemia     Hypertension     Kidney stone     Nephrolithiasis     Osteoarthritis     Type II or unspecified type diabetes mellitus without mention of complication, not stated as uncontrolled     patient denies      Past Surgical History:   Procedure Laterality Date    CARDIAC CATHETERIZATION  10/21/2019    DIALYSIS FISTULA CREATION  2019    LEFT ARM AV FISTULA CREATION (Left Arm Lower)    DIALYSIS FISTULA CREATION Left 2019    LEFT ARM AV FISTULA CREATION performed by Yane Frazier MD at [address] [place] [address] Road [address] Left 2020    LIGATION TRIBUTARIES LEFT RADIAL CEPHALIC AV FISTULA --ULTRASOUND AV FISTULA performed by Yane Frazier MD at [address] [place] [address] Road [address] Left 2022    LEFT UPPER EXTREMITY AV FISTULA REVISION WITH UPPER EXTREMITY NERVE BLOCK performed by Kalyn Patel MD at [address], TOTAL ABDOMINAL (CERVIX REMOVED)      IR TUNNELED CATHETER PLACEMENT GREATER THAN 5 YEARS  2022    IR TUNNELED CATHETER PLACEMENT GREATER THAN 5 YEARS 2022 Jesenia Gold MD [facility] SPECIAL PROCEDURES    JOINT REPLACEMENT      KNEE SURGERY Left 2018    LITHOTRIPSY      THYROIDECTOMY, PARTIAL      TONSILLECTOMY      TOTAL KNEE ARTHROPLASTY Right 2015    VASCULAR SURGERY Left 2022       Family History   Problem Relation Age of Onset    Heart Disease Mother     Diabetes Mother     Heart Disease Father     High Blood Pressure Father        Social History     Tobacco Use    Smoking status: Former     Packs/day: 1.00     Years: 30.00     Pack years: 30.00     Types: Cigarettes     Quit date: 3/21/2001     Years since quittin.8    Smokeless tobacco: Never   Substance Use Topics    Alcohol use: No      Current Outpatient Medications   Medication Sig Dispense Refill    vitamin E 90 MG (200 UNIT) CAPS capsule TAKE 1 CAPSULE BY MOUTH DAILY 90 capsule 2    Cholecalciferol (VITAMIN D3) 25 MCG TABS TAKE 1 TABLET BY MOUTH IN THE MORNING. 90 tablet 1    Calcium Carb-Cholecalciferol (OYSTER SHELL CALCIUM W/D) 500-5 MG-MCG TABS tablet TAKE 1 TABLET BY MOUTH IN THE MORNING. 90 tablet 1    gabapentin (NEURONTIN) 300 MG capsule Take 1 capsule by mouth 2 times daily for 30 days.  Intended supply: 30 days 60 capsule 0    atorvastatin (LIPITOR) 40 MG tablet Take 1 tablet by mouth daily 90 tablet 2    potassium chloride (KLOR-CON M) 20 MEQ extended release tablet       ondansetron (ZOFRAN) 4 MG tablet       bumetanide (BUMEX) 2 MG tablet Take 2 mg by mouth every 8 (eight) hours      famotidine (PEPCID) 20 MG tablet Take 20 mg by mouth in the morning and at bedtime      furosemide (LASIX) 80 MG tablet Take 80 mg by mouth daily      lidocaine-prilocaine (EMLA) 2.5-2.5 % cream MYFORTIC 180 MG DR tablet Take 180 mg by mouth in the morning and at bedtime      oxyCODONE-acetaminophen (PERCOCET) 5-325 MG per tablet TAKE ONE TABLET BY MOUTH EVERY 6 HOURS AS NEEDED      predniSONE (DELTASONE) 10 MG tablet Take 10 mg by mouth daily      valGANciclovir (VALCYTE) 450 MG tablet Take 450 mg by mouth daily      Calamine-Zinc Oxide (V-R CALAMINE) LOTN Apply 1 Dose topically in the morning, at noon, and at bedtime 1 each 0    Blood Pressure Monitoring (BLOOD PRESSURE CUFF) MISC 1 each by Does not apply route daily 1 each 0    PARoxetine (PAXIL) 10 MG tablet Take 10 mg by mouth in the morning. Handicap Placard MISC by Does not apply route Dx ESRD, heart failure, arthritis  Expiration 5 years; 8/2027 1 each 0    Lancets MISC 1 each by Does not apply route daily 300 each 5    allopurinol (ZYLOPRIM) 100 MG tablet Take 1 tablet by mouth daily 28 tablet 5    labetalol (NORMODYNE) 200 MG tablet Take 1 tablet by mouth 2 times daily 56 tablet 5    pantoprazole (PROTONIX) 20 MG tablet Take 1 tablet by mouth daily 28 tablet 5    tiZANidine (ZANAFLEX) 4 MG tablet TAKE 1 TABLET BY MOUTH 2 TIMES DAILY 56 tablet 5    hydrALAZINE (APRESOLINE) 50 MG tablet Take 50 mg by mouth 3 times daily       Transparent Dressings (TEGADERM ABSORBENT DRESSING) MISC Provide insurance covered tegaderm dressing, use at hemodialysis 100 each 5    acetaminophen (TYLENOL) 500 MG tablet Take 1,000 mg by mouth every 6 hours as needed for Pain      ENVARSUS XR 4 MG TB24       ENVARSUS XR 1 MG TB24       ENVARSUS XR 0.75 MG TB24 Take 0.75 mg by mouth daily Take two tablets by mouth every day      clotrimazole (MYCELEX) 10 MG yoav Take 10 mg by mouth 4 times daily       No current facility-administered medications for this visit.      Allergies   Allergen Reactions    Latex Hives    Penicillins Hives and Other (See Comments)    Clonidine     Codeine Itching    Nsaids Other (See Comments)     Gastric Bypass Surgery     Tolmetin Other (See Comments)     Gastric Bypass Surgery        Health Maintenance   Topic Date Due    DTaP/Tdap/Td vaccine (1 - Tdap) Never done    Shingles vaccine (1 of 2) Never done    Diabetic retinal exam  03/21/2016    COVID-19 Vaccine (3 - Moderna risk series) 05/04/2021    Pneumococcal 0-64 years Vaccine (2 - PPSV23 if available, else PCV20) 12/09/2021    Flu vaccine (1) 08/01/2022    Breast cancer screen  05/20/2023    Depression Screen  08/11/2023    Lipids  01/13/2024    Colorectal Cancer Screen  06/13/2028    Hepatitis C screen  Completed    HIV screen  Completed    Hepatitis A vaccine  Aged Out    Hib vaccine  Aged Out    Meningococcal (ACWY) vaccine  Aged Out    Hepatitis B vaccine  Discontinued       :     Review of Systems   Constitutional:  Negative for chills, fatigue and fever. HENT:  Negative for ear discharge, ear pain, sinus pressure, sinus pain, sore throat and trouble swallowing. Eyes:  Negative for discharge, redness and itching. Respiratory:  Negative for cough, chest tightness, shortness of breath and wheezing. Cardiovascular:  Negative for chest pain. Gastrointestinal:  Negative for abdominal pain, diarrhea, nausea and vomiting. Genitourinary:  Negative for difficulty urinating. Musculoskeletal:  Negative for arthralgias and neck pain. Left thigh burning   Skin:  Negative for rash. Neurological:  Negative for dizziness, weakness, light-headedness and headaches. All other systems reviewed and are negative. Objective:     Physical Exam  Constitutional:       Appearance: Normal appearance. She is normal weight. HENT:      Head: Normocephalic and atraumatic. Nose: Nose normal.   Eyes:      Extraocular Movements: Extraocular movements intact. Conjunctiva/sclera: Conjunctivae normal.      Pupils: Pupils are equal, round, and reactive to light. Cardiovascular:      Rate and Rhythm: Normal rate and regular rhythm. Pulses: Normal pulses.       Heart sounds: Normal heart sounds. Pulmonary:      Effort: Pulmonary effort is normal.      Breath sounds: Normal breath sounds. Abdominal:      General: Abdomen is flat. Palpations: Abdomen is soft. Musculoskeletal:         General: Normal range of motion. Cervical back: Neck supple. Skin:     General: Skin is warm and dry. Capillary Refill: Capillary refill takes less than 2 seconds. Neurological:      General: No focal deficit present. Mental Status: She is alert and oriented to person, place, and time. Psychiatric:         Mood and Affect: Mood normal.     BP (!) 164/80   Pulse 68   Resp 20   Wt 166 lb 3.2 oz (75.4 kg)   SpO2 96%   BMI 29.44 kg/m²     Assessment:       Diagnosis Orders   1. Essential hypertension        2. Renal transplant, status post        3. ESRD on hemodialysis ([facility] [address]        4. Acute on chronic diastolic (congestive) heart failure ([facility] [address]        5. Dissection of thoracic aorta, unspecified part            :      Return in about 1 month (around 2/19/2023) for HTN and leg pain . 1. Hypertension  Blood pressure elevated. We will have her follow-up in 1 month to reevaluate  Continue with labetalol 20 mg twice a day  Continue with hydralazine 50 mg 3 times a day  2. -3. Renal transplant and end-stage renal disease  Status post kidney transplant. Doing well. Continues to make urine. Follows with transplant team and nephrology  4. Acute on chronic congestive heart failure  Does not follow with cardiology. Will monitor. Continue with Bumex 2 mg every 8 hours  5. Aortic dissection  Follows with vascular  6.   Left leg pain  Increase gabapentin to 300 mg 2-3 times a day    Follow-up in 1 month repeat blood pressure  And leg pain  Orders Placed This Encounter   Medications    vitamin E 90 MG (200 UNIT) CAPS capsule     Sig: TAKE 1 CAPSULE BY MOUTH DAILY     Dispense:  90 capsule     Refill:  2    Cholecalciferol (VITAMIN D3) 25 MCG TABS     Sig: TAKE 1 TABLET BY MOUTH IN THE MORNING.     Dispense:  90 tablet     Refill:  1    Calcium Carb-Cholecalciferol (OYSTER SHELL CALCIUM W/D) 500-5 MG-MCG TABS tablet     Sig: TAKE 1 TABLET BY MOUTH IN THE MORNING.     Dispense:  90 tablet     Refill:  1    gabapentin (NEURONTIN) 300 MG capsule     Sig: Take 1 capsule by mouth 2 times daily for 30 days. Intended supply: 30 days     Dispense:  60 capsule     Refill:  0       Patient given educational materials - seepatient instructions.  Discussed use, benefit, and side effects of prescribed medications.All patient questions answered.  Pt voiced understanding. Reviewed health maintenance.Instructed to continue current medications, diet and exercise.  Patient agreedwith treatment plan. Follow up as directed.      Electronically signed by NAV Ferrara CNP on 1/19/2023at 3:17 PM

## 2023-01-30 DIAGNOSIS — M62.838 MUSCLE SPASM: ICD-10-CM

## 2023-01-30 DIAGNOSIS — I10 ESSENTIAL HYPERTENSION: ICD-10-CM

## 2023-01-30 DIAGNOSIS — E79.0 HYPERURICEMIA: ICD-10-CM

## 2023-01-30 DIAGNOSIS — Z76.0 MEDICATION REFILL: ICD-10-CM

## 2023-01-30 DIAGNOSIS — K21.9 GASTROESOPHAGEAL REFLUX DISEASE, UNSPECIFIED WHETHER ESOPHAGITIS PRESENT: ICD-10-CM

## 2023-01-30 RX ORDER — OXYCODONE HYDROCHLORIDE AND ACETAMINOPHEN 5; 325 MG/1; MG/1
1 TABLET ORAL EVERY 6 HOURS PRN
Qty: 90 TABLET | Refills: 0 | Status: SHIPPED | OUTPATIENT
Start: 2023-01-30 | End: 2023-03-01

## 2023-01-30 RX ORDER — PAROXETINE 10 MG/1
10 TABLET, FILM COATED ORAL DAILY
Qty: 90 TABLET | Refills: 1 | Status: SHIPPED | OUTPATIENT
Start: 2023-01-30

## 2023-01-30 RX ORDER — POTASSIUM CHLORIDE 20 MEQ/1
20 TABLET, EXTENDED RELEASE ORAL 2 TIMES DAILY
Qty: 60 TABLET | Refills: 1 | Status: SHIPPED | OUTPATIENT
Start: 2023-01-30

## 2023-01-30 RX ORDER — ONDANSETRON 4 MG/1
4 TABLET, FILM COATED ORAL EVERY 8 HOURS PRN
Qty: 21 TABLET | Refills: 0 | Status: SHIPPED | OUTPATIENT
Start: 2023-01-30

## 2023-01-30 RX ORDER — MYCOPHENOLIC ACID 180 MG/1
180 TABLET, DELAYED RELEASE ORAL 2 TIMES DAILY
Qty: 60 TABLET | Status: CANCELLED | OUTPATIENT
Start: 2023-01-30

## 2023-01-30 RX ORDER — BUMETANIDE 2 MG/1
2 TABLET ORAL EVERY 8 HOURS
Qty: 30 TABLET | Refills: 1 | Status: SHIPPED | OUTPATIENT
Start: 2023-01-30

## 2023-01-30 RX ORDER — PANTOPRAZOLE SODIUM 20 MG/1
20 TABLET, DELAYED RELEASE ORAL DAILY
Qty: 28 TABLET | Refills: 5 | Status: SHIPPED | OUTPATIENT
Start: 2023-01-30

## 2023-01-30 RX ORDER — TACROLIMUS 0.75 MG/1
0.75 TABLET, EXTENDED RELEASE ORAL DAILY
Qty: 60 TABLET | Refills: 2 | Status: CANCELLED | OUTPATIENT
Start: 2023-01-30

## 2023-01-30 RX ORDER — FAMOTIDINE 20 MG/1
20 TABLET, FILM COATED ORAL 2 TIMES DAILY
Qty: 180 TABLET | Refills: 1 | Status: SHIPPED | OUTPATIENT
Start: 2023-01-30

## 2023-01-30 RX ORDER — LABETALOL 200 MG/1
200 TABLET, FILM COATED ORAL 2 TIMES DAILY
Qty: 56 TABLET | Refills: 5 | Status: SHIPPED | OUTPATIENT
Start: 2023-01-30

## 2023-01-30 RX ORDER — ATORVASTATIN CALCIUM 40 MG/1
40 TABLET, FILM COATED ORAL DAILY
Qty: 90 TABLET | Refills: 2 | Status: SHIPPED | OUTPATIENT
Start: 2023-01-30

## 2023-01-30 RX ORDER — TACROLIMUS 1 MG/1
TABLET, EXTENDED RELEASE ORAL
Qty: 150 TABLET | Status: CANCELLED | OUTPATIENT
Start: 2023-01-30

## 2023-01-30 RX ORDER — FUROSEMIDE 80 MG
80 TABLET ORAL DAILY
Qty: 60 TABLET | Refills: 1 | Status: SHIPPED | OUTPATIENT
Start: 2023-01-30

## 2023-01-30 RX ORDER — ALLOPURINOL 100 MG/1
100 TABLET ORAL DAILY
Qty: 90 TABLET | Refills: 1 | Status: SHIPPED | OUTPATIENT
Start: 2023-01-30

## 2023-01-30 RX ORDER — TIZANIDINE 4 MG/1
4 TABLET ORAL 2 TIMES DAILY
Qty: 56 TABLET | Refills: 5 | Status: SHIPPED | OUTPATIENT
Start: 2023-01-30

## 2023-01-30 RX ORDER — HYDRALAZINE HYDROCHLORIDE 50 MG/1
50 TABLET, FILM COATED ORAL 3 TIMES DAILY
Qty: 180 TABLET | Refills: 1 | Status: SHIPPED | OUTPATIENT
Start: 2023-01-30

## 2023-01-30 RX ORDER — TACROLIMUS 4 MG/1
TABLET, EXTENDED RELEASE ORAL
Qty: 30 TABLET | Status: CANCELLED | OUTPATIENT
Start: 2023-01-30

## 2023-01-30 NOTE — TELEPHONE ENCOUNTER
Called and spoke to patient and she states that she has already contacted her transplant team for refills, she is requesting refills on all her other medications.

## 2023-01-30 NOTE — TELEPHONE ENCOUNTER
The patient called stating that she needs a refill on all of her medications. She states she is out of all her medications.     Last Visit Date: 1/19/2023   Next Visit Date: 2/21/2023

## 2023-02-20 NOTE — TELEPHONE ENCOUNTER
OB Intake phone visit with verbal patient permission.    Charisma has received covid vaccination.      Instructed to call nephrologist to report she is pregnant and to confirm should start taking labetolol.   Please pend orders for me to fill out.  thanks

## 2023-03-15 ENCOUNTER — TELEPHONE (OUTPATIENT)
Dept: PRIMARY CARE CLINIC | Age: 63
End: 2023-03-15

## 2023-03-15 NOTE — TELEPHONE ENCOUNTER
Musa Oviedo from [facility] called to schedule patient for hospital f/u for abdominal pain. Patient currently still admitted during call, unable to do TCM call. Musa Oviedo states that patient's anticipated discharge date would be sometime today, 3/15/2023. No available appts in 7-14 days with PCP.  Scheduled patient with Harpal Kirby CNP on 3/22/2023 @8:00am

## 2023-04-06 ENCOUNTER — OFFICE VISIT (OUTPATIENT)
Dept: PRIMARY CARE CLINIC | Age: 63
End: 2023-04-06

## 2023-04-06 VITALS
OXYGEN SATURATION: 96 % | SYSTOLIC BLOOD PRESSURE: 120 MMHG | RESPIRATION RATE: 16 BRPM | WEIGHT: 174.2 LBS | HEART RATE: 74 BPM | DIASTOLIC BLOOD PRESSURE: 66 MMHG | BODY MASS INDEX: 30.86 KG/M2

## 2023-04-06 DIAGNOSIS — Z09 HOSPITAL DISCHARGE FOLLOW-UP: Primary | ICD-10-CM

## 2023-04-06 DIAGNOSIS — K56.609 SBO (SMALL BOWEL OBSTRUCTION) (HCC): ICD-10-CM

## 2023-04-06 DIAGNOSIS — Z94.0 RENAL TRANSPLANT, STATUS POST: ICD-10-CM

## 2023-04-06 DIAGNOSIS — I10 ESSENTIAL HYPERTENSION: ICD-10-CM

## 2023-04-06 RX ORDER — LOSARTAN POTASSIUM 25 MG/1
TABLET ORAL
COMMUNITY
Start: 2023-03-15

## 2023-04-06 RX ORDER — DOCUSATE SODIUM 100 MG/1
100 CAPSULE, LIQUID FILLED ORAL 2 TIMES DAILY
COMMUNITY
Start: 2023-03-15

## 2023-04-06 RX ORDER — HYDRALAZINE HYDROCHLORIDE 25 MG/1
25 TABLET, FILM COATED ORAL 2 TIMES DAILY
COMMUNITY
Start: 2023-03-15

## 2023-04-06 SDOH — ECONOMIC STABILITY: INCOME INSECURITY: HOW HARD IS IT FOR YOU TO PAY FOR THE VERY BASICS LIKE FOOD, HOUSING, MEDICAL CARE, AND HEATING?: NOT HARD AT ALL

## 2023-04-06 SDOH — ECONOMIC STABILITY: FOOD INSECURITY: WITHIN THE PAST 12 MONTHS, THE FOOD YOU BOUGHT JUST DIDN'T LAST AND YOU DIDN'T HAVE MONEY TO GET MORE.: NEVER TRUE

## 2023-04-06 SDOH — ECONOMIC STABILITY: HOUSING INSECURITY
IN THE LAST 12 MONTHS, WAS THERE A TIME WHEN YOU DID NOT HAVE A STEADY PLACE TO SLEEP OR SLEPT IN A SHELTER (INCLUDING NOW)?: NO

## 2023-04-06 SDOH — ECONOMIC STABILITY: FOOD INSECURITY: WITHIN THE PAST 12 MONTHS, YOU WORRIED THAT YOUR FOOD WOULD RUN OUT BEFORE YOU GOT MONEY TO BUY MORE.: NEVER TRUE

## 2023-04-06 ASSESSMENT — PATIENT HEALTH QUESTIONNAIRE - PHQ9
SUM OF ALL RESPONSES TO PHQ QUESTIONS 1-9: 0
SUM OF ALL RESPONSES TO PHQ9 QUESTIONS 1 & 2: 0
2. FEELING DOWN, DEPRESSED OR HOPELESS: 0
1. LITTLE INTEREST OR PLEASURE IN DOING THINGS: 0

## 2023-04-06 NOTE — PROGRESS NOTES
LASIX  Take 1 tablet by mouth daily     gabapentin 300 MG capsule  Commonly known as: NEURONTIN  Take 1 capsule by mouth 2 times daily for 30 days. Intended supply: 30 days     Handicap Placard Misc  by Does not apply route Dx ESRD, heart failure, arthritis  Expiration 5 years; 8/2027     hydrALAZINE 25 MG tablet  Commonly known as: APRESOLINE     labetalol 200 MG tablet  Commonly known as: NORMODYNE  Take 1 tablet by mouth 2 times daily     Lancets Misc  1 each by Does not apply route daily     lidocaine-prilocaine 2.5-2.5 % cream  Commonly known as: EMLA     losartan 25 MG tablet  Commonly known as: COZAAR     Myfortic 180 MG DR tablet  Generic drug: mycophenolate     ondansetron 4 MG tablet  Commonly known as: ZOFRAN  Take 1 tablet by mouth every 8 hours as needed for Nausea or Vomiting     oyster shell calcium w/D 500-5 MG-MCG Tabs tablet  TAKE 1 TABLET BY MOUTH IN THE MORNING.     pantoprazole 20 MG tablet  Commonly known as: PROTONIX  Take 1 tablet by mouth daily     PARoxetine 10 MG tablet  Commonly known as: PAXIL  Take 1 tablet by mouth daily     potassium chloride 20 MEQ extended release tablet  Commonly known as: KLOR-CON M  Take 1 tablet by mouth 2 times daily     predniSONE 10 MG tablet  Commonly known as: DELTASONE     Tegaderm Absorbent Dressing Misc  Provide insurance covered tegaderm dressing, use at hemodialysis     tiZANidine 4 MG tablet  Commonly known as: ZANAFLEX  Take 1 tablet by mouth 2 times daily     V-R CALAMINE Lotn  Apply 1 Dose topically in the morning, at noon, and at bedtime     valGANciclovir 450 MG tablet  Commonly known as: VALCYTE     Vitamin D3 25 MCG Tabs  TAKE 1 TABLET BY MOUTH IN THE MORNING. vitamin E 90 MG (200 UNIT) Caps capsule  TAKE 1 CAPSULE BY MOUTH DAILY           * This list has 3 medication(s) that are the same as other medications prescribed for you. Read the directions carefully, and ask your doctor or other care provider to review them with you.

## 2023-04-08 PROBLEM — K56.609 SBO (SMALL BOWEL OBSTRUCTION) (HCC): Status: ACTIVE | Noted: 2023-04-08

## 2023-05-09 DIAGNOSIS — L29.9 ITCHING OF BOTH HANDS: ICD-10-CM

## 2023-05-09 RX ORDER — CETIRIZINE HYDROCHLORIDE 10 MG/1
10 TABLET ORAL NIGHTLY
Qty: 28 TABLET | Refills: 1 | Status: SHIPPED | OUTPATIENT
Start: 2023-05-09 | End: 2023-06-08

## 2023-07-03 DIAGNOSIS — K21.9 GASTROESOPHAGEAL REFLUX DISEASE, UNSPECIFIED WHETHER ESOPHAGITIS PRESENT: ICD-10-CM

## 2023-07-03 DIAGNOSIS — I10 ESSENTIAL HYPERTENSION: ICD-10-CM

## 2023-07-03 DIAGNOSIS — Z76.0 MEDICATION REFILL: ICD-10-CM

## 2023-07-03 DIAGNOSIS — L29.9 ITCHING OF BOTH HANDS: ICD-10-CM

## 2023-07-03 DIAGNOSIS — M62.838 MUSCLE SPASM: ICD-10-CM

## 2023-07-03 DIAGNOSIS — E79.0 HYPERURICEMIA: ICD-10-CM

## 2023-07-03 RX ORDER — ALLOPURINOL 100 MG/1
100 TABLET ORAL DAILY
Qty: 28 TABLET | Refills: 1 | Status: SHIPPED | OUTPATIENT
Start: 2023-07-03

## 2023-07-03 RX ORDER — PANTOPRAZOLE SODIUM 20 MG/1
20 TABLET, DELAYED RELEASE ORAL DAILY
Qty: 28 TABLET | Refills: 5 | Status: SHIPPED | OUTPATIENT
Start: 2023-07-03

## 2023-07-03 RX ORDER — TIZANIDINE 4 MG/1
4 TABLET ORAL 2 TIMES DAILY
Qty: 56 TABLET | Refills: 5 | Status: SHIPPED | OUTPATIENT
Start: 2023-07-03

## 2023-07-03 RX ORDER — CETIRIZINE HYDROCHLORIDE 10 MG/1
10 TABLET ORAL NIGHTLY
Qty: 28 TABLET | Refills: 1 | Status: SHIPPED | OUTPATIENT
Start: 2023-07-03 | End: 2023-08-02

## 2023-07-03 RX ORDER — FAMOTIDINE 20 MG/1
TABLET, FILM COATED ORAL
Qty: 56 TABLET | Refills: 1 | Status: SHIPPED | OUTPATIENT
Start: 2023-07-03

## 2023-07-03 RX ORDER — PAROXETINE 10 MG/1
10 TABLET, FILM COATED ORAL DAILY
Qty: 28 TABLET | Refills: 1 | Status: SHIPPED | OUTPATIENT
Start: 2023-07-03

## 2023-07-03 RX ORDER — LABETALOL 200 MG/1
200 TABLET, FILM COATED ORAL 2 TIMES DAILY
Qty: 56 TABLET | Refills: 5 | Status: SHIPPED | OUTPATIENT
Start: 2023-07-03

## 2023-08-21 DIAGNOSIS — Z76.0 MEDICATION REFILL: ICD-10-CM

## 2023-08-21 DIAGNOSIS — I10 ESSENTIAL HYPERTENSION: ICD-10-CM

## 2023-08-21 DIAGNOSIS — M62.838 MUSCLE SPASM: ICD-10-CM

## 2023-08-21 RX ORDER — PREDNISONE 10 MG/1
10 TABLET ORAL DAILY
Refills: 0 | OUTPATIENT
Start: 2023-08-21

## 2023-08-21 RX ORDER — CETIRIZINE HYDROCHLORIDE 10 MG/1
10 TABLET ORAL
COMMUNITY
Start: 2023-08-10 | End: 2023-08-21 | Stop reason: SDUPTHER

## 2023-08-21 RX ORDER — LANOLIN ALCOHOL/MO/W.PET/CERES
400 CREAM (GRAM) TOPICAL 2 TIMES DAILY
Qty: 30 TABLET | Refills: 0 | Status: SHIPPED | OUTPATIENT
Start: 2023-08-21

## 2023-08-21 RX ORDER — CHOLECALCIFEROL (VITAMIN D3) 25 MCG
TABLET ORAL
Qty: 90 TABLET | Refills: 1 | Status: SHIPPED | OUTPATIENT
Start: 2023-08-21 | End: 2023-08-23

## 2023-08-21 RX ORDER — HYDRALAZINE HYDROCHLORIDE 25 MG/1
25 TABLET, FILM COATED ORAL 2 TIMES DAILY
Qty: 90 TABLET | Refills: 0 | Status: SHIPPED | OUTPATIENT
Start: 2023-08-21

## 2023-08-21 RX ORDER — AMILORIDE HYDROCHLORIDE 5 MG/1
5 TABLET ORAL DAILY
COMMUNITY
Start: 2023-08-10 | End: 2023-08-21 | Stop reason: SDUPTHER

## 2023-08-21 RX ORDER — DOCUSATE SODIUM 100 MG/1
100 CAPSULE, LIQUID FILLED ORAL 2 TIMES DAILY
Qty: 60 CAPSULE | Refills: 1 | Status: SHIPPED | OUTPATIENT
Start: 2023-08-21

## 2023-08-21 RX ORDER — TIZANIDINE 4 MG/1
4 TABLET ORAL 2 TIMES DAILY
Qty: 56 TABLET | Refills: 5 | Status: SHIPPED | OUTPATIENT
Start: 2023-08-21

## 2023-08-21 RX ORDER — ATORVASTATIN CALCIUM 40 MG/1
40 TABLET, FILM COATED ORAL DAILY
Qty: 90 TABLET | Refills: 2 | Status: SHIPPED | OUTPATIENT
Start: 2023-08-21

## 2023-08-21 RX ORDER — AMLODIPINE BESYLATE 10 MG/1
10 TABLET ORAL DAILY
COMMUNITY
Start: 2023-08-10 | End: 2023-08-21 | Stop reason: SDUPTHER

## 2023-08-21 RX ORDER — AMILORIDE HYDROCHLORIDE 5 MG/1
5 TABLET ORAL DAILY
Qty: 30 TABLET | Refills: 0 | Status: SHIPPED | OUTPATIENT
Start: 2023-08-21

## 2023-08-21 RX ORDER — FAMOTIDINE 20 MG/1
TABLET, FILM COATED ORAL
Qty: 56 TABLET | Refills: 1 | Status: SHIPPED | OUTPATIENT
Start: 2023-08-21 | End: 2023-08-23

## 2023-08-21 RX ORDER — LANOLIN ALCOHOL/MO/W.PET/CERES
400 CREAM (GRAM) TOPICAL 2 TIMES DAILY
COMMUNITY
Start: 2023-08-10 | End: 2023-08-21 | Stop reason: SDUPTHER

## 2023-08-21 RX ORDER — MYCOPHENOLIC ACID 180 MG/1
180 TABLET, DELAYED RELEASE ORAL 2 TIMES DAILY
Qty: 60 TABLET | Refills: 0 | Status: SHIPPED | OUTPATIENT
Start: 2023-08-21

## 2023-08-21 RX ORDER — MULTIVITAMIN
1 TABLET ORAL DAILY
Qty: 30 TABLET | Refills: 1 | Status: SHIPPED | OUTPATIENT
Start: 2023-08-21

## 2023-08-21 RX ORDER — CETIRIZINE HYDROCHLORIDE 10 MG/1
10 TABLET ORAL DAILY
Qty: 30 TABLET | Refills: 0 | Status: SHIPPED | OUTPATIENT
Start: 2023-08-21 | End: 2023-08-23

## 2023-08-21 RX ORDER — POTASSIUM CHLORIDE 20 MEQ/1
20 TABLET, EXTENDED RELEASE ORAL 2 TIMES DAILY
Qty: 60 TABLET | Refills: 1 | Status: SHIPPED | OUTPATIENT
Start: 2023-08-21

## 2023-08-21 RX ORDER — AMLODIPINE BESYLATE 10 MG/1
10 TABLET ORAL DAILY
Qty: 30 TABLET | Refills: 0 | Status: SHIPPED | OUTPATIENT
Start: 2023-08-21

## 2023-08-21 RX ORDER — LOSARTAN POTASSIUM 25 MG/1
25 TABLET ORAL EVERY MORNING
Qty: 30 TABLET | Refills: 0 | Status: SHIPPED | OUTPATIENT
Start: 2023-08-21

## 2023-08-21 RX ORDER — MULTIVITAMIN
TABLET ORAL
COMMUNITY
Start: 2023-08-10 | End: 2023-08-21 | Stop reason: SDUPTHER

## 2023-08-21 RX ORDER — LABETALOL 200 MG/1
200 TABLET, FILM COATED ORAL 2 TIMES DAILY
Qty: 56 TABLET | Refills: 5 | Status: SHIPPED | OUTPATIENT
Start: 2023-08-21

## 2023-08-21 NOTE — TELEPHONE ENCOUNTER
Last Visit Date: 4/6/2023   Next Visit Date: None    Spoke with patient regarding VV scheduled for today. Patient states she currently has [facility]. Writer informed patient that PCP is no longer covered under insurance and needed to cancel appt. Patient states she is moving to [place] on 8/31/2023 and needed her meds refilled and sent to the [address] [place] [address] on [employer] so she could transfer her meds when she moves. Patient will be finding another provider in [place] when she moves    Writer reviewed patient's med list with patient and patient's daughter.  Pended refills needed to requested pharmacy    [facility] [employer]

## 2023-08-23 DIAGNOSIS — E79.0 HYPERURICEMIA: ICD-10-CM

## 2023-08-23 DIAGNOSIS — Z76.0 MEDICATION REFILL: ICD-10-CM

## 2023-08-23 RX ORDER — ALLOPURINOL 100 MG/1
100 TABLET ORAL DAILY
Qty: 28 TABLET | Refills: 1 | Status: SHIPPED | OUTPATIENT
Start: 2023-08-23

## 2023-08-23 RX ORDER — PAROXETINE 10 MG/1
10 TABLET, FILM COATED ORAL DAILY
Qty: 28 TABLET | Refills: 1 | Status: SHIPPED | OUTPATIENT
Start: 2023-08-23

## 2023-08-23 RX ORDER — FAMOTIDINE 20 MG/1
TABLET, FILM COATED ORAL
Qty: 56 TABLET | Refills: 1 | Status: SHIPPED | OUTPATIENT
Start: 2023-08-23

## 2023-08-23 RX ORDER — CHOLECALCIFEROL (VITAMIN D3) 25 MCG
TABLET ORAL
Qty: 28 TABLET | Refills: 1 | Status: SHIPPED | OUTPATIENT
Start: 2023-08-23

## 2023-08-23 RX ORDER — CETIRIZINE HYDROCHLORIDE 10 MG/1
TABLET ORAL
Qty: 28 TABLET | Refills: 1 | Status: SHIPPED | OUTPATIENT
Start: 2023-08-23

## 2023-10-23 DIAGNOSIS — Z76.0 MEDICATION REFILL: ICD-10-CM

## 2023-10-23 DIAGNOSIS — E79.0 HYPERURICEMIA: ICD-10-CM

## 2023-10-23 RX ORDER — PAROXETINE 10 MG/1
10 TABLET, FILM COATED ORAL DAILY
Qty: 28 TABLET | Refills: 1 | Status: SHIPPED | OUTPATIENT
Start: 2023-10-23

## 2023-10-23 RX ORDER — CETIRIZINE HYDROCHLORIDE 10 MG/1
TABLET ORAL
Qty: 28 TABLET | Refills: 1 | Status: SHIPPED | OUTPATIENT
Start: 2023-10-23

## 2023-10-23 RX ORDER — ALLOPURINOL 100 MG/1
100 TABLET ORAL DAILY
Qty: 28 TABLET | Refills: 1 | Status: SHIPPED | OUTPATIENT
Start: 2023-10-23

## 2023-10-23 RX ORDER — FAMOTIDINE 20 MG/1
TABLET, FILM COATED ORAL
Qty: 56 TABLET | Refills: 1 | Status: SHIPPED | OUTPATIENT
Start: 2023-10-23

## 2023-12-16 DIAGNOSIS — E79.0 HYPERURICEMIA: ICD-10-CM

## 2023-12-16 DIAGNOSIS — Z76.0 MEDICATION REFILL: ICD-10-CM

## 2023-12-16 DIAGNOSIS — K21.9 GASTROESOPHAGEAL REFLUX DISEASE, UNSPECIFIED WHETHER ESOPHAGITIS PRESENT: ICD-10-CM

## 2023-12-16 RX ORDER — FAMOTIDINE 20 MG/1
TABLET, FILM COATED ORAL
Qty: 56 TABLET | Refills: 1 | Status: SHIPPED | OUTPATIENT
Start: 2023-12-16

## 2023-12-16 RX ORDER — PANTOPRAZOLE SODIUM 20 MG/1
20 TABLET, DELAYED RELEASE ORAL DAILY
Qty: 28 TABLET | Refills: 5 | Status: SHIPPED | OUTPATIENT
Start: 2023-12-16

## 2023-12-16 RX ORDER — ALLOPURINOL 100 MG/1
100 TABLET ORAL DAILY
Qty: 28 TABLET | Refills: 1 | Status: SHIPPED | OUTPATIENT
Start: 2023-12-16

## 2023-12-16 RX ORDER — CETIRIZINE HYDROCHLORIDE 10 MG/1
TABLET ORAL
Qty: 28 TABLET | Refills: 1 | Status: SHIPPED | OUTPATIENT
Start: 2023-12-16

## 2023-12-16 RX ORDER — PAROXETINE 10 MG/1
10 TABLET, FILM COATED ORAL DAILY
Qty: 28 TABLET | Refills: 1 | Status: SHIPPED | OUTPATIENT
Start: 2023-12-16

## 2023-12-16 RX ORDER — CHOLECALCIFEROL (VITAMIN D3) 25 MCG
TABLET ORAL
Qty: 28 TABLET | Refills: 1 | Status: SHIPPED | OUTPATIENT
Start: 2023-12-16

## 2024-01-12 DIAGNOSIS — I10 ESSENTIAL HYPERTENSION: ICD-10-CM

## 2024-01-12 DIAGNOSIS — Z76.0 MEDICATION REFILL: ICD-10-CM

## 2024-01-12 DIAGNOSIS — M62.838 MUSCLE SPASM: ICD-10-CM

## 2024-01-12 RX ORDER — LABETALOL 200 MG/1
200 TABLET, FILM COATED ORAL 2 TIMES DAILY
Qty: 56 TABLET | Refills: 5 | Status: SHIPPED | OUTPATIENT
Start: 2024-01-12

## 2024-01-12 RX ORDER — TIZANIDINE 4 MG/1
4 TABLET ORAL 2 TIMES DAILY
Qty: 56 TABLET | Refills: 5 | Status: SHIPPED | OUTPATIENT
Start: 2024-01-12

## 2024-01-16 RX ORDER — LANOLIN ALCOHOL/MO/W.PET/CERES
CREAM (GRAM) TOPICAL
Qty: 56 TABLET | Refills: 3 | Status: SHIPPED | OUTPATIENT
Start: 2024-01-16

## 2024-02-10 DIAGNOSIS — Z76.0 MEDICATION REFILL: ICD-10-CM

## 2024-02-10 DIAGNOSIS — E79.0 HYPERURICEMIA: ICD-10-CM

## 2024-02-10 RX ORDER — ALLOPURINOL 100 MG/1
100 TABLET ORAL DAILY
Qty: 28 TABLET | Refills: 1 | OUTPATIENT
Start: 2024-02-10

## 2024-02-10 RX ORDER — FAMOTIDINE 20 MG/1
TABLET, FILM COATED ORAL
Qty: 56 TABLET | Refills: 1 | OUTPATIENT
Start: 2024-02-10

## 2024-02-10 RX ORDER — PAROXETINE 10 MG/1
10 TABLET, FILM COATED ORAL DAILY
Qty: 28 TABLET | Refills: 1 | OUTPATIENT
Start: 2024-02-10

## 2024-02-10 RX ORDER — CHOLECALCIFEROL (VITAMIN D3) 25 MCG
TABLET ORAL
Qty: 28 TABLET | Refills: 1 | OUTPATIENT
Start: 2024-02-10

## 2024-02-10 RX ORDER — CETIRIZINE HYDROCHLORIDE 10 MG/1
TABLET ORAL
Qty: 28 TABLET | Refills: 1 | OUTPATIENT
Start: 2024-02-10

## 2024-02-13 DIAGNOSIS — E79.0 HYPERURICEMIA: ICD-10-CM

## 2024-02-13 DIAGNOSIS — Z76.0 MEDICATION REFILL: ICD-10-CM

## 2024-02-13 RX ORDER — ALLOPURINOL 100 MG/1
100 TABLET ORAL DAILY
Qty: 28 TABLET | Refills: 1 | OUTPATIENT
Start: 2024-02-13

## 2024-02-13 RX ORDER — CETIRIZINE HYDROCHLORIDE 10 MG/1
TABLET ORAL
Qty: 28 TABLET | Refills: 1 | OUTPATIENT
Start: 2024-02-13

## 2024-02-19 DIAGNOSIS — E79.0 HYPERURICEMIA: ICD-10-CM

## 2024-02-19 DIAGNOSIS — Z76.0 MEDICATION REFILL: ICD-10-CM

## 2024-02-19 RX ORDER — ALLOPURINOL 100 MG/1
100 TABLET ORAL DAILY
Qty: 28 TABLET | Refills: 1 | OUTPATIENT
Start: 2024-02-19

## 2024-03-12 DIAGNOSIS — Z76.0 MEDICATION REFILL: ICD-10-CM

## 2024-03-12 DIAGNOSIS — E79.0 HYPERURICEMIA: ICD-10-CM

## 2024-03-12 RX ORDER — ALLOPURINOL 100 MG/1
100 TABLET ORAL DAILY
Qty: 28 TABLET | Refills: 1 | OUTPATIENT
Start: 2024-03-12

## 2024-04-13 ENCOUNTER — HOSPITAL ENCOUNTER (EMERGENCY)
Age: 64
Discharge: HOME | End: 2024-04-13
Attending: EMERGENCY MEDICINE
Payer: MEDICAID

## 2024-04-13 VITALS
OXYGEN SATURATION: 97 % | TEMPERATURE: 97.8 F | BODY MASS INDEX: 35.22 KG/M2 | DIASTOLIC BLOOD PRESSURE: 75 MMHG | SYSTOLIC BLOOD PRESSURE: 141 MMHG | RESPIRATION RATE: 18 BRPM | HEIGHT: 63 IN | HEART RATE: 69 BPM | WEIGHT: 198.8 LBS

## 2024-04-13 DIAGNOSIS — B02.9 HERPES ZOSTER WITHOUT COMPLICATION: Primary | ICD-10-CM

## 2024-04-13 PROCEDURE — 96372 THER/PROPH/DIAG INJ SC/IM: CPT

## 2024-04-13 PROCEDURE — 99284 EMERGENCY DEPT VISIT MOD MDM: CPT

## 2024-04-13 PROCEDURE — 6360000002 HC RX W HCPCS: Performed by: NURSE PRACTITIONER

## 2024-04-13 PROCEDURE — 6370000000 HC RX 637 (ALT 250 FOR IP): Performed by: NURSE PRACTITIONER

## 2024-04-13 RX ORDER — OXYCODONE HYDROCHLORIDE AND ACETAMINOPHEN 5; 325 MG/1; MG/1
1 TABLET ORAL EVERY 8 HOURS PRN
Qty: 9 TABLET | Refills: 0 | Status: SHIPPED | OUTPATIENT
Start: 2024-04-13 | End: 2024-04-13

## 2024-04-13 RX ORDER — ONDANSETRON 4 MG/1
4 TABLET, ORALLY DISINTEGRATING ORAL ONCE
Status: COMPLETED | OUTPATIENT
Start: 2024-04-13 | End: 2024-04-13

## 2024-04-13 RX ORDER — MORPHINE SULFATE 4 MG/ML
4 INJECTION, SOLUTION INTRAMUSCULAR; INTRAVENOUS ONCE
Status: COMPLETED | OUTPATIENT
Start: 2024-04-13 | End: 2024-04-13

## 2024-04-13 RX ORDER — OXYCODONE HYDROCHLORIDE AND ACETAMINOPHEN 5; 325 MG/1; MG/1
1 TABLET ORAL EVERY 8 HOURS PRN
Qty: 9 TABLET | Refills: 0 | Status: SHIPPED | OUTPATIENT
Start: 2024-04-13 | End: 2024-04-16

## 2024-04-13 RX ORDER — VALACYCLOVIR HYDROCHLORIDE 1 G/1
1000 TABLET, FILM COATED ORAL DAILY
Qty: 10 TABLET | Refills: 0 | Status: SHIPPED | OUTPATIENT
Start: 2024-04-13 | End: 2024-04-13

## 2024-04-13 RX ORDER — VALACYCLOVIR HYDROCHLORIDE 1 G/1
1000 TABLET, FILM COATED ORAL DAILY
Qty: 10 TABLET | Refills: 0 | Status: SHIPPED | OUTPATIENT
Start: 2024-04-13 | End: 2024-04-23

## 2024-04-13 RX ADMIN — MORPHINE SULFATE 4 MG: 4 INJECTION, SOLUTION INTRAMUSCULAR; INTRAVENOUS at 18:48

## 2024-04-13 RX ADMIN — ONDANSETRON 4 MG: 4 TABLET, ORALLY DISINTEGRATING ORAL at 18:48

## 2024-04-13 ASSESSMENT — ENCOUNTER SYMPTOMS
COLOR CHANGE: 0
SHORTNESS OF BREATH: 0

## 2024-04-13 ASSESSMENT — PAIN SCALES - GENERAL
PAINLEVEL_OUTOF10: 10
PAINLEVEL_OUTOF10: 10

## 2024-04-13 ASSESSMENT — PAIN DESCRIPTION - LOCATION: LOCATION: BACK

## 2024-04-13 ASSESSMENT — PAIN - FUNCTIONAL ASSESSMENT: PAIN_FUNCTIONAL_ASSESSMENT: 0-10

## 2024-04-13 NOTE — ED PROVIDER NOTES
[facility] ED  eMERGENCY dEPARTMENT eNCOUnter      Pt Name: Mitchell Stallings  MRN: [id number]  Birthdate 1960  Date of evaluation: 4/13/2024  Provider: NAV Gardner CNP    CHIEF COMPLAINT       Chief Complaint   Patient presents with    Rash     Pt states rash started 2 days ago. Rash is located under right breast and back.Pt states rash is itchy and stings.     Back Pain         HISTORY OF PRESENT ILLNESS  (Location/Symptom, Timing/Onset, Context/Setting, Quality, Duration, Modifying Factors, Severity.)   Mitchell Stallings is a 64 y.o. female who presents to the emergency department. C/o painful, itchy rash to her right flank that wraps around her right side to her right breast. Onset was 2 days ago. Denies fever, chills, injury, SOB. Rates her pain 10/10. She is accompanied by family.      Nursing Notes were reviewed.    ALLERGIES     Latex, Penicillins, Clonidine, Codeine, Nsaids, and Tolmetin    CURRENT MEDICATIONS       Previous Medications    ACETAMINOPHEN (TYLENOL) 500 MG TABLET    Take 2 tablets by mouth every 6 hours as needed for Pain    ALLOPURINOL (ZYLOPRIM) 100 MG TABLET    TAKE 1 TABLET BY MOUTH DAILY    AMILORIDE (MIDAMOR) 5 MG TABLET    Take 1 tablet by mouth daily    AMLODIPINE (NORVASC) 10 MG TABLET    Take 1 tablet by mouth daily    ATORVASTATIN (LIPITOR) 40 MG TABLET    Take 1 tablet by mouth daily    BLOOD PRESSURE MONITORING (BLOOD PRESSURE CUFF) MISC    1 each by Does not apply route daily    BUMETANIDE (BUMEX) 2 MG TABLET    Take 1 tablet by mouth every 8 (eight) hours    CALAMINE-ZINC OXIDE (V-R CALAMINE) LOTN    Apply 1 Dose topically in the morning, at noon, and at bedtime    CALCIUM CARB-CHOLECALCIFEROL (OYSTER SHELL CALCIUM W/D) 500-5 MG-MCG TABS TABLET    TAKE 1 TABLET BY MOUTH IN THE MORNING.    CETIRIZINE (ZYRTEC) 10 MG TABLET    TAKE 1 TABLET BY MOUTH AT BEDTIME    CHOLECALCIFEROL (VITAMIN D3) 25 MCG TABS    TAKE 1 TABLET BY MOUTH IN THE MORNING.     mis-transcribed.)    NAV Gardner - Emmy Camacho APRN - CNP  04/13/24 2362

## 2024-04-13 NOTE — ED PROVIDER NOTES
eMERGENCY dEPARTMENT eNCOUnter   Independent Attestation     Pt Name: Mitchell Stallings  MRN: [id number]  Birthdate 1960  Date of evaluation: 4/13/24     Mitchell Stallings is a 64 y.o. female with CC: Rash (Pt states rash started 2 days ago. Rash is located under right breast and back.Pt states rash is itchy and stings. ) and Back Pain      Based on the medical record the care appears appropriate.  I was personally available for consultation in the Emergency Department.    Rowdy Smith DO  Attending Emergency Physician                  Rowdy Smith DO  04/13/24 0432

## 2024-04-13 NOTE — ED NOTES
Pt presents w/ complaint of muscle pain and rash of R mid back and R breast. Pt has concern for shingles. Upon assessment rash noted to R lateral and under right breast R mid back.

## 2024-04-13 NOTE — DISCHARGE INSTRUCTIONS
Call [phone] ([phone]) to establish care for follow up.  You can also call [employer] at [phone] to establish care.

## 2024-04-14 NOTE — ED NOTES
Pt presenting to the ED with complaints of arm pain and shortness of breath. Pt states her arm started to hurt during dialysis on Friday, pt states they only gave her one hour before she asked them to stop because of the pain. Pt states she had a study done in May to make sure her fistula is working properly.       Riley Cuellar RN  06/20/22 0356
Pt refusing to be poked anymore for blood work. Dr. Myla Butcher notified.       Robles Lewis RN  06/20/22 3687
Pt. Kyle Tubbs. Dr. Angela Lizarraga notified.       Ilana Franco, RN  06/20/22 1189
Female

## 2024-04-26 NOTE — PRE SEDATION
History of Anesthesia Complications:   none    ASA Classification:  Class 3 - A patient with severe systemic disease that limits activity but is not incapacitating    Sedation/ Anesthesia Plan:   intravenous sedation    Medications Planned:   midazolam (Versed) intravenously and fentanyl intravenously    Patient is an appropriate candidate for plan of sedation: yes    Electronically signed by Lázaro Marrero MD on 3/11/2020 at 9:39 AM n/a

## 2024-05-31 DIAGNOSIS — K21.9 GASTROESOPHAGEAL REFLUX DISEASE, UNSPECIFIED WHETHER ESOPHAGITIS PRESENT: ICD-10-CM

## 2024-05-31 DIAGNOSIS — Z76.0 MEDICATION REFILL: ICD-10-CM

## 2024-05-31 RX ORDER — PANTOPRAZOLE SODIUM 20 MG/1
20 TABLET, DELAYED RELEASE ORAL DAILY
Qty: 28 TABLET | Refills: 5 | OUTPATIENT
Start: 2024-05-31

## 2024-06-04 DIAGNOSIS — K21.9 GASTROESOPHAGEAL REFLUX DISEASE, UNSPECIFIED WHETHER ESOPHAGITIS PRESENT: ICD-10-CM

## 2024-06-04 DIAGNOSIS — Z76.0 MEDICATION REFILL: ICD-10-CM

## 2024-06-04 RX ORDER — PANTOPRAZOLE SODIUM 20 MG/1
20 TABLET, DELAYED RELEASE ORAL DAILY
Qty: 28 TABLET | Refills: 5 | OUTPATIENT
Start: 2024-06-04

## 2024-06-06 DIAGNOSIS — Z76.0 MEDICATION REFILL: ICD-10-CM

## 2024-06-06 DIAGNOSIS — K21.9 GASTROESOPHAGEAL REFLUX DISEASE, UNSPECIFIED WHETHER ESOPHAGITIS PRESENT: ICD-10-CM

## 2024-06-06 RX ORDER — PANTOPRAZOLE SODIUM 20 MG/1
20 TABLET, DELAYED RELEASE ORAL DAILY
Qty: 28 TABLET | Refills: 5 | OUTPATIENT
Start: 2024-06-06

## 2024-06-11 DIAGNOSIS — K21.9 GASTROESOPHAGEAL REFLUX DISEASE, UNSPECIFIED WHETHER ESOPHAGITIS PRESENT: ICD-10-CM

## 2024-06-11 DIAGNOSIS — Z76.0 MEDICATION REFILL: ICD-10-CM

## 2024-06-11 RX ORDER — PANTOPRAZOLE SODIUM 20 MG/1
20 TABLET, DELAYED RELEASE ORAL DAILY
Qty: 28 TABLET | Refills: 5 | OUTPATIENT
Start: 2024-06-11

## 2024-06-27 ENCOUNTER — APPOINTMENT (OUTPATIENT)
Dept: CT IMAGING | Age: 64
End: 2024-06-27
Payer: MEDICAID

## 2024-06-27 ENCOUNTER — HOSPITAL ENCOUNTER (EMERGENCY)
Age: 64
Discharge: HOME | End: 2024-06-27
Attending: EMERGENCY MEDICINE
Payer: MEDICAID

## 2024-06-27 ENCOUNTER — APPOINTMENT (OUTPATIENT)
Dept: VASCULAR LAB | Age: 64
End: 2024-06-27
Attending: EMERGENCY MEDICINE
Payer: MEDICAID

## 2024-06-27 VITALS
SYSTOLIC BLOOD PRESSURE: 110 MMHG | BODY MASS INDEX: 32.43 KG/M2 | TEMPERATURE: 97.8 F | HEART RATE: 64 BPM | DIASTOLIC BLOOD PRESSURE: 60 MMHG | RESPIRATION RATE: 20 BRPM | OXYGEN SATURATION: 94 % | WEIGHT: 183 LBS | HEIGHT: 63 IN

## 2024-06-27 DIAGNOSIS — R20.2 LEFT LEG PARESTHESIAS: Primary | ICD-10-CM

## 2024-06-27 LAB
ALBUMIN SERPL-MCNC: 4.1 G/DL (ref 3.5–5.2)
ALP SERPL-CCNC: 59 U/L (ref 35–104)
ALT SERPL-CCNC: 11 U/L (ref 5–33)
ANION GAP SERPL CALCULATED.3IONS-SCNC: 18 MMOL/L (ref 9–17)
AST SERPL-CCNC: 18 U/L
BACTERIA URNS QL MICRO: ABNORMAL
BASOPHILS # BLD: 0 K/UL (ref 0–0.2)
BASOPHILS NFR BLD: 0 % (ref 0–2)
BILIRUB SERPL-MCNC: 0.6 MG/DL (ref 0.3–1.2)
BILIRUB UR QL STRIP: NEGATIVE
BUN SERPL-MCNC: 12 MG/DL (ref 8–23)
CALCIUM SERPL-MCNC: 9 MG/DL (ref 8.6–10.4)
CASTS #/AREA URNS LPF: ABNORMAL /LPF
CHLORIDE SERPL-SCNC: 101 MMOL/L (ref 98–107)
CLARITY UR: CLEAR
CO2 SERPL-SCNC: 27 MMOL/L (ref 20–31)
COLOR UR: YELLOW
CREAT SERPL-MCNC: 1.3 MG/DL (ref 0.5–0.9)
ECHO BSA: 1.92 M2
EOSINOPHIL # BLD: 0.1 K/UL (ref 0–0.4)
EOSINOPHILS RELATIVE PERCENT: 3 % (ref 0–4)
EPI CELLS #/AREA URNS HPF: ABNORMAL /HPF
ERYTHROCYTE [DISTWIDTH] IN BLOOD BY AUTOMATED COUNT: 15.7 % (ref 11.5–14.9)
GFR, ESTIMATED: 46 ML/MIN/1.73M2
GLUCOSE SERPL-MCNC: 108 MG/DL (ref 70–99)
GLUCOSE UR STRIP-MCNC: NEGATIVE MG/DL
HCT VFR BLD AUTO: 34.9 % (ref 36–46)
HGB BLD-MCNC: 11 G/DL (ref 12–16)
HGB UR QL STRIP.AUTO: NEGATIVE
INR PPP: 1.1
KETONES UR STRIP-MCNC: ABNORMAL MG/DL
LEUKOCYTE ESTERASE UR QL STRIP: NEGATIVE
LYMPHOCYTES NFR BLD: 0.7 K/UL (ref 1–4.8)
LYMPHOCYTES RELATIVE PERCENT: 14 % (ref 24–44)
MAGNESIUM SERPL-MCNC: 1.3 MG/DL (ref 1.6–2.6)
MCH RBC QN AUTO: 28.5 PG (ref 26–34)
MCHC RBC AUTO-ENTMCNC: 31.6 G/DL (ref 31–37)
MCV RBC AUTO: 90.3 FL (ref 80–100)
MONOCYTES NFR BLD: 0.4 K/UL (ref 0.1–1.3)
MONOCYTES NFR BLD: 8 % (ref 1–7)
NEUTROPHILS NFR BLD: 75 % (ref 36–66)
NEUTS SEG NFR BLD: 3.5 K/UL (ref 1.3–9.1)
NITRITE UR QL STRIP: NEGATIVE
PH UR STRIP: 6.5 [PH] (ref 5–8)
PLATELET # BLD AUTO: 266 K/UL (ref 150–450)
PMV BLD AUTO: 7.3 FL (ref 6–12)
POTASSIUM SERPL-SCNC: 3 MMOL/L (ref 3.7–5.3)
PROT SERPL-MCNC: 7.2 G/DL (ref 6.4–8.3)
PROT UR STRIP-MCNC: ABNORMAL MG/DL
PROTHROMBIN TIME: 14.8 SEC (ref 11.8–14.6)
RBC # BLD AUTO: 3.87 M/UL (ref 4–5.2)
RBC #/AREA URNS HPF: ABNORMAL /HPF
SODIUM SERPL-SCNC: 146 MMOL/L (ref 135–144)
SP GR UR STRIP: 1.02 (ref 1–1.03)
UROBILINOGEN UR STRIP-ACNC: NORMAL EU/DL (ref 0–1)
WBC #/AREA URNS HPF: ABNORMAL /HPF
WBC OTHER # BLD: 4.8 K/UL (ref 3.5–11)

## 2024-06-27 PROCEDURE — 6360000002 HC RX W HCPCS: Performed by: EMERGENCY MEDICINE

## 2024-06-27 PROCEDURE — 72131 CT LUMBAR SPINE W/O DYE: CPT

## 2024-06-27 PROCEDURE — 36415 COLL VENOUS BLD VENIPUNCTURE: CPT

## 2024-06-27 PROCEDURE — 74176 CT ABD & PELVIS W/O CONTRAST: CPT

## 2024-06-27 PROCEDURE — 85025 COMPLETE CBC W/AUTO DIFF WBC: CPT

## 2024-06-27 PROCEDURE — 93971 EXTREMITY STUDY: CPT

## 2024-06-27 PROCEDURE — 80053 COMPREHEN METABOLIC PANEL: CPT

## 2024-06-27 PROCEDURE — 85610 PROTHROMBIN TIME: CPT

## 2024-06-27 PROCEDURE — 83735 ASSAY OF MAGNESIUM: CPT

## 2024-06-27 PROCEDURE — 6370000000 HC RX 637 (ALT 250 FOR IP): Performed by: EMERGENCY MEDICINE

## 2024-06-27 PROCEDURE — 99284 EMERGENCY DEPT VISIT MOD MDM: CPT

## 2024-06-27 PROCEDURE — 81001 URINALYSIS AUTO W/SCOPE: CPT

## 2024-06-27 RX ORDER — POTASSIUM CHLORIDE 7.45 MG/ML
10 INJECTION INTRAVENOUS ONCE
Status: COMPLETED | OUTPATIENT
Start: 2024-06-27 | End: 2024-06-27

## 2024-06-27 RX ORDER — LANOLIN ALCOHOL/MO/W.PET/CERES
400 CREAM (GRAM) TOPICAL ONCE
Status: COMPLETED | OUTPATIENT
Start: 2024-06-27 | End: 2024-06-27

## 2024-06-27 RX ADMIN — POTASSIUM BICARBONATE 40 MEQ: 782 TABLET, EFFERVESCENT ORAL at 16:34

## 2024-06-27 RX ADMIN — Medication 400 MG: at 18:08

## 2024-06-27 RX ADMIN — POTASSIUM CHLORIDE 10 MEQ: 7.46 INJECTION, SOLUTION INTRAVENOUS at 16:47

## 2024-06-27 ASSESSMENT — LIFESTYLE VARIABLES
HOW MANY STANDARD DRINKS CONTAINING ALCOHOL DO YOU HAVE ON A TYPICAL DAY: PATIENT DOES NOT DRINK
HOW OFTEN DO YOU HAVE A DRINK CONTAINING ALCOHOL: NEVER

## 2024-06-27 ASSESSMENT — ENCOUNTER SYMPTOMS
SHORTNESS OF BREATH: 0
ABDOMINAL PAIN: 0
EYE PAIN: 0
BACK PAIN: 0
COLOR CHANGE: 0

## 2024-06-27 ASSESSMENT — PAIN - FUNCTIONAL ASSESSMENT: PAIN_FUNCTIONAL_ASSESSMENT: NONE - DENIES PAIN

## 2024-06-27 NOTE — ED PROVIDER NOTES
limits   COMPREHENSIVE METABOLIC PANEL - Abnormal; Notable for the following components:    Sodium 146 (*)     Potassium 3.0 (*)     Anion Gap 18 (*)     Glucose 108 (*)     Creatinine 1.3 (*)     Est, Glom Filt Rate 46 (*)     All other components within normal limits   MAGNESIUM - Abnormal; Notable for the following components:    Magnesium 1.3 (*)     All other components within normal limits   PROTIME-INR - Abnormal; Notable for the following components:    Protime 14.8 (*)     All other components within normal limits   URINALYSIS - Abnormal; Notable for the following components:    Ketones, Urine TRACE (*)     Protein, UA TRACE (*)     All other components within normal limits   MICROSCOPIC URINALYSIS - Abnormal; Notable for the following components:    WBC, UA 0 TO 2 (*)     Casts UA 0 TO 2 (*)     Bacteria, UA MODERATE (*)     All other components within normal limits       Vitals Reviewed:    Vitals:    06/27/24 1433 06/27/24 1530 06/27/24 1847   BP: (!) 132/58 (!) 144/81 110/60   Pulse: 67 69 64   Resp: 14 21 20   Temp: 97.8 °F (36.6 °C)     TempSrc: Tympanic     SpO2: 94% 92% 94%   Weight: 83 kg (183 lb)     Height: 1.6 m (5' 3\")       MEDICATIONS GIVEN TO PATIENT THIS ENCOUNTER:  Orders Placed This Encounter   Medications    potassium bicarb-citric acid (EFFER-K) effervescent tablet 40 mEq    potassium chloride 10 mEq/100 mL IVPB (Peripheral Line)    magnesium oxide (MAG-OX) tablet 400 mg     DISCHARGE PRESCRIPTIONS:  Discharge Medication List as of 6/27/2024  7:05 PM        PHYSICIAN CONSULTS ORDERED THIS ENCOUNTER:  None  FINAL IMPRESSION      1. Left leg paresthesias          DISPOSITION/PLAN   DISPOSITION Decision To Discharge 06/27/2024 07:04:53 PM      OUTPATIENT FOLLOW UP THE PATIENT:  [facility]  [address] [address]  [phone]    As needed, If symptoms worsen    Your Nephrologist    Schedule an appointment as soon as possible for a visit       [facility]

## (undated) DEVICE — CATHETER IV 18GA L1.16IN OD1.27-1.3462MM ID0.9398-1.016MM

## (undated) DEVICE — SYRINGE MED 10ML TRNSLUC BRL PLUNG BLK MRK POLYPR CTRL

## (undated) DEVICE — SUTURE MCRYL SZ 5-0 L18IN ABSRB VLT P-3 L13MM 3/8 CIR REV Y463G

## (undated) DEVICE — Device

## (undated) DEVICE — DRAPE SURGICAL HAND PROX AURORA

## (undated) DEVICE — GARMENT,MEDLINE,DVT,INT,CALF,MED, GEN2: Brand: MEDLINE

## (undated) DEVICE — PROBE COVER KIT: Brand: MEDLINE INDUSTRIES, INC.

## (undated) DEVICE — ELECTRODE ES L3IN S STL BLDE INSUL DISP VALLEYLAB EDGE

## (undated) DEVICE — SUTURE NONABSORBABLE MONOFILAMENT 6-0 BV-1 1X30 IN PROLENE 8709H

## (undated) DEVICE — INTENDED FOR TISSUE SEPARATION, AND OTHER PROCEDURES THAT REQUIRE A SHARP SURGICAL BLADE TO PUNCTURE OR CUT.: Brand: BARD-PARKER ® CARBON RIB-BACK BLADES

## (undated) DEVICE — TOWEL,OR,DSP,ST,BLUE,DLX,XR,4/PK,20PK/CS: Brand: MEDLINE

## (undated) DEVICE — GLOVE SURG SZ 7.5 L11.73IN FNGR THK9.8MIL STRW LTX POLYMER

## (undated) DEVICE — BANDAGE,GAUZE,BULKEE II,4.5"X4.1YD,STRL: Brand: MEDLINE

## (undated) DEVICE — TUBING, SUCTION, 1/4" X 12', STRAIGHT: Brand: MEDLINE

## (undated) DEVICE — DECANTER BAG 9": Brand: MEDLINE INDUSTRIES, INC.

## (undated) DEVICE — COVER,LIGHT HANDLE,FLX,2/PK: Brand: MEDLINE INDUSTRIES, INC.

## (undated) DEVICE — SUTURE MCRYL + SZ 4 0 L18IN ABSRB UD PC 3 L16MM 3 8 CIR PRIM MCP845G

## (undated) DEVICE — NEEDLE HYPO 25GA L1.5IN BLU POLYPR HUB S STL REG BVL STR

## (undated) DEVICE — GLOVE SURG SZ 7 L12IN FNGR THK87MIL WHT LTX FREE

## (undated) DEVICE — STRIP,CLOSURE,WOUND,MEDI-STRIP,1/2X4: Brand: MEDLINE

## (undated) DEVICE — GOWN,SIRUS,NON REINFRCD,LARGE,SET IN SL: Brand: MEDLINE

## (undated) DEVICE — BLADE ES ELASTOMERIC COAT INSUL DURABLE BEND UPTO 90DEG

## (undated) DEVICE — SUTURE NONABSORBABLE MONOFILAMENT 7-0 BV-1 1X24 IN PROLENE 8702H

## (undated) DEVICE — SUTURE MCRYL SZ 5-0 L18IN ABSRB UD PC-3 L16MM 3/8 CIR Y844G

## (undated) DEVICE — SOLUTION PREP 4OZ 3% H PEROX 1ST AID ANTISEP ORAL DEBRIDING

## (undated) DEVICE — STANDARD HYPODERMIC NEEDLE,POLYPROPYLENE HUB: Brand: MONOJECT

## (undated) DEVICE — DRESSING TRNSPAR W2XL2.75IN FLM SHT SEMIPERMEABLE WIND

## (undated) DEVICE — GLOVE SURG SZ 8 CRM LTX FREE POLYISOPRENE POLYMER BEAD ANTI

## (undated) DEVICE — GLOVE SURG SZ 75 L12IN FNGR THK79MIL GRN LTX FREE

## (undated) DEVICE — SUTURE PROL SZ 5-0 L36IN NONABSORBABLE BLU L13MM C-1 3/8 8720H

## (undated) DEVICE — SUTURE VCRL + SZ 3-0 L27IN ABSRB UD L26MM SH 1/2 CIR VCP416H

## (undated) DEVICE — GLOVE SURG SZ 8 L12IN FNGR THK87MIL WHT LTX FREE

## (undated) DEVICE — CONTAINER,SPECIMEN,OR STERILE,4OZ: Brand: MEDLINE

## (undated) DEVICE — GLOVE SURG SZ 65 L12IN FNGR THK87MIL WHT LTX FREE

## (undated) DEVICE — BLANKET WRM W40.2XL55.9IN IORT LO BODY + MISTRAL AIR

## (undated) DEVICE — GEL US 20GM NONIRRITATING OVERWRAPPED FILE PCH TRNSMIT

## (undated) DEVICE — PROVE COVER: Brand: UNBRANDED

## (undated) DEVICE — GLOVE SURG SZ 75 CRM LTX FREE POLYISOPRENE POLYMER BEAD ANTI

## (undated) DEVICE — APPLICATOR MEDICATED 26 CC SOLUTION HI LT ORNG CHLORAPREP

## (undated) DEVICE — CONTAINER,SPECIMEN,4OZ,OR STRL: Brand: MEDLINE

## (undated) DEVICE — TUBING AMB

## (undated) DEVICE — GLOVE SURG SZ 8 L11.77IN FNGR THK9.8MIL STRW LTX POLYMER

## (undated) DEVICE — SUTURE ETHIB EXCL BR SUTUPAK 4-0 30 X303H

## (undated) DEVICE — SUTURE MCRYL SZ 4-0 L27IN ABSRB UD L19MM PS-2 1/2 CIR PRIM Y426H

## (undated) DEVICE — CONTROL SYRINGE LUER-LOCK TIP: Brand: MONOJECT

## (undated) DEVICE — CHLORAPREP 26ML ORANGE

## (undated) DEVICE — SPONGE GZ W2XL2IN NONWOVEN 4 PLY FASTER WICKING ABIL AVANT

## (undated) DEVICE — SUTURE PROL SZ 6-0 L24IN NONABSORBABLE BLU L9.3MM BV-1 3/8 8805H

## (undated) DEVICE — GOWN,SIRUS,NONRNF,SETINSLV,XL,20/CS: Brand: MEDLINE

## (undated) DEVICE — CONTAINER SPEC 33OZ URIN COLL BIODEGRADABLE PAPER DISP

## (undated) DEVICE — APPLICATOR MEDICATED 10.5 CC SOLUTION HI LT ORNG CHLORAPREP

## (undated) DEVICE — GOWN,SIRUS,POLYRNF,XLN/3XL,18/CS: Brand: MEDLINE

## (undated) DEVICE — FOGARTY - HYDRAGRIP SURGICAL - CLAMP INSERTS: Brand: FOGARTY HYDRAJAW

## (undated) DEVICE — YANKAUER,FLEXIBLE HANDLE,REGLR CAPACITY: Brand: MEDLINE INDUSTRIES, INC.